# Patient Record
Sex: FEMALE | Race: WHITE | NOT HISPANIC OR LATINO | Employment: OTHER
[De-identification: names, ages, dates, MRNs, and addresses within clinical notes are randomized per-mention and may not be internally consistent; named-entity substitution may affect disease eponyms.]

---

## 2017-03-17 DIAGNOSIS — I10 HYPERTENSION GOAL BP (BLOOD PRESSURE) < 140/80: ICD-10-CM

## 2017-03-20 RX ORDER — TRIAMTERENE AND HYDROCHLOROTHIAZIDE 37.5; 25 MG/1; MG/1
CAPSULE ORAL
Qty: 90 CAPSULE | Refills: 2 | Status: SHIPPED | OUTPATIENT
Start: 2017-03-20 | End: 2017-10-11

## 2017-03-20 NOTE — TELEPHONE ENCOUNTER
TRIAMTERENE 37.5MG/ HCTZ 25MG CAPS    Last Written Prescription Date: 09/20/2016  Last Fill Quantity: 90, # refills: 1  Last Office Visit with [facility], [facility] or [facility] prescribing provider: 12/09/2016       Potassium   Date Value Ref Range Status   11/21/2016 4.5 3.4 - 5.3 mmol/L Final     Creatinine   Date Value Ref Range Status   11/21/2016 1.15 (H) 0.52 - 1.04 mg/dL Final     BP Readings from Last 3 Encounters:   12/09/16 119/82   11/21/16 128/70   08/25/16 118/64

## 2017-05-26 ENCOUNTER — HEALTH MAINTENANCE LETTER (OUTPATIENT)
Age: 62
End: 2017-05-26

## 2017-07-11 ENCOUNTER — TELEPHONE (OUTPATIENT)
Dept: FAMILY MEDICINE | Facility: CLINIC | Age: 62
End: 2017-07-11

## 2017-07-11 NOTE — LETTER
[facility]  [address]  [address]  [place] [facility] [address] [phone]                                                                                                           Suma Calabrese  [address]  [facility] [address]    July 11, 2017          Dear Suma Calabrese,      We care about your well-being!  In order to ensure we are providing the best quality care, we have reviewed your chart and see that you are due for:     __x___ Physical   _____ Pap Smear   _____ Mammogram   _____ Diabetes Followup   _____ Hypertension Followup   _____ Cholesterol Followup (Provider Appointment)   _____ Cholesterol Test (Lab-Only Appointment)   _____ Other:       We can assist you in scheduling these appointments at [phone].    If you have had (or plan to have) any of these tests done at a facility other than [facility] or a [facility], please have the results from these tests sent to your primary physician at [facility].         Sincerely,    Murali Kenney Jr MD

## 2017-07-11 NOTE — TELEPHONE ENCOUNTER
Panel Management Review      Patient has the following on her problem list:     Hypertension   Last three blood pressure readings:  BP Readings from Last 3 Encounters:   12/09/16 119/82   11/21/16 128/70   08/25/16 118/64     Blood pressure: Passed    HTN Guidelines:  Age 18-59 BP range:  Less than 140/90  Age 60-85 with Diabetes:  Less than 140/90  Age 60-85 without Diabetes:  less than 150/90      Composite cancer screening  Chart review shows that this patient is due/due soon for the following Pap Smear, Mammogram and Colonoscopy  Summary:    Patient is due/failing the following:   COLONOSCOPY, MAMMOGRAM and PAP    Action needed:   Patient needs office visit for ap. Mammo, colon.    Type of outreach:    Sent letter.    Questions for provider review:    None                                                                                                                                    Marley Lion CMA

## 2017-09-08 ENCOUNTER — TELEPHONE (OUTPATIENT)
Dept: FAMILY MEDICINE | Facility: CLINIC | Age: 62
End: 2017-09-08

## 2017-09-18 ENCOUNTER — OFFICE VISIT (OUTPATIENT)
Dept: FAMILY MEDICINE | Facility: CLINIC | Age: 62
End: 2017-09-18
Payer: COMMERCIAL

## 2017-09-18 ENCOUNTER — HOSPITAL ENCOUNTER (OUTPATIENT)
Dept: MAMMOGRAPHY | Facility: CLINIC | Age: 62
Discharge: HOME | End: 2017-09-18
Attending: FAMILY MEDICINE | Admitting: FAMILY MEDICINE
Payer: COMMERCIAL

## 2017-09-18 ENCOUNTER — HOSPITAL ENCOUNTER (OUTPATIENT)
Dept: MAMMOGRAPHY | Facility: CLINIC | Age: 62
End: 2017-09-18
Attending: FAMILY MEDICINE
Payer: COMMERCIAL

## 2017-09-18 VITALS
TEMPERATURE: 97.5 F | HEART RATE: 89 BPM | RESPIRATION RATE: 16 BRPM | WEIGHT: 121 LBS | OXYGEN SATURATION: 100 % | BODY MASS INDEX: 20.14 KG/M2 | SYSTOLIC BLOOD PRESSURE: 112 MMHG | DIASTOLIC BLOOD PRESSURE: 70 MMHG

## 2017-09-18 DIAGNOSIS — N63.10 MASS OF RIGHT BREAST: ICD-10-CM

## 2017-09-18 DIAGNOSIS — N63.10 MASS OF RIGHT BREAST: Primary | ICD-10-CM

## 2017-09-18 DIAGNOSIS — E03.4 HYPOTHYROIDISM DUE TO ACQUIRED ATROPHY OF THYROID: Chronic | ICD-10-CM

## 2017-09-18 PROCEDURE — 36415 COLL VENOUS BLD VENIPUNCTURE: CPT | Performed by: FAMILY MEDICINE

## 2017-09-18 PROCEDURE — 99214 OFFICE O/P EST MOD 30 MIN: CPT | Performed by: FAMILY MEDICINE

## 2017-09-18 PROCEDURE — G0204 DX MAMMO INCL CAD BI: HCPCS

## 2017-09-18 PROCEDURE — 84443 ASSAY THYROID STIM HORMONE: CPT | Performed by: FAMILY MEDICINE

## 2017-09-18 PROCEDURE — 76642 ULTRASOUND BREAST LIMITED: CPT | Mod: RT

## 2017-09-18 NOTE — LETTER
September 20, 2017      Suma Calabrese  [address]  [facility] [address]        Dear ,    We are writing to inform you of your test results.    Your test results fall within the expected range(s) or remain unchanged from previous results.  Please continue with current treatment plan.    NORMAL THYROID STIMULATING HORMONE TEST    Resulted Orders   TSH   Result Value Ref Range    TSH 1.21 0.40 - 4.00 mU/L       If you have any questions or concerns, please call the clinic at the number listed above.       Sincerely,        BEKAH KAUR MD

## 2017-09-18 NOTE — MR AVS SNAPSHOT
After Visit Summary   9/18/2017    Suma Calabrese    MRN: [id number]           Patient Information     Date Of Birth          1955        Visit Information        Provider Department      9/18/2017 9:30 AM Murali Kenney MD [facility]        Today's Diagnoses     Mass of right breast    -  1    Hypothyroidism due to acquired atrophy of thyroid          Care Instructions    (N63) Mass of right breast  (primary encounter diagnosis)  Comment:    Plan: BRE[facility] CENTER REFERRAL, [place] Diagnostic Digital         Bilateral  N=NEEDS DIAGNOSIS MAMMOGRAM BIOPSY OR ULTRASOUND AS SOON AS POSSIBLE            (E03.4) Hypothyroidism due to acquired atrophy of thyroid  Comment:    Plan: TSH             I STRONGLY RECOMMEND THAT YOU QUIT SMOKING OR USING ORAL TOBACCO    FIRST START SLOWLY CUTTING DOWN ON THE TIMES OF DAY THAT YOU SMOKE     BREATHER WHEN CRAVINGS COME    PUT CIGARETTES IN A JAR TO  REMIND YOU OF YOUR HABIT    TAKE THE MONEY YOU SAVE AND PLAN A VACATION OR GET AWAY    WRITE A BIG CHECK TO AN OPPOSING POLITICAL ORG ANIZATION    NEXT CONSIDER NICOTINE REPLACEMENT EITHER PATCHES 21MG IF YOU SMOKE PACK PER DAY    14 MG  IF YOU SMOKE  15 CIGARETTES PER DAY     7MG IF  SMOKE 10 CIGARETTES PER DAY     ORAL LOZENGES, OR GUM, OR INHALER ARE ALSO A CONSIDERATION  FOR CRAVINGS    E CIGARETTES HAVE NOT BEEN PROVEN, BUT MIGHT BE A CONSIDERATION BUT KEEP THEM AWAY FROM CHILDREN AND OTHERS     ZYBAN OR WELLBUTRIN 100MG SR OR 150MG PO TWICE DAILY  X 2 WEEKS    THEN SET A DATE TO QUIT    DON'T TAKE IF YOU HAVE HAD SEIZURES    LASTLY CONSIDER CHANTIX     SIDE EFFECTS INCLUDE ANOREXIA AND NAUSEA AND POSSIBLE WORSENING OF DEPRESSION  BUT  IT HAS THE HIGHEST QUIT RATE    SET A DATE TO QUIT IN TWO WEEKS    DEVELOP BEHAVIORS TO HELP FIGHT THE URGE    POSITIVE AFFIRMATIONS     RELY ON HIGHER POWER IF YOU HAVE ONE                      Follow-ups after your visit         Additional Services     BREAST CENTER REFERRAL       Your provider has referred you to: [facility]: [facility] Himanshu Mitchell [phone]   [email or web address]  NEWLY FOUND RIGHT BREAST MASS RIGHT UPPER QUADRANT BREAST MASS 1 CM IN DIAMETER   HISTORY OF RIGHT BREAST ISSUE IN PAST   SMOKER   ADOPTED   PLEASE TO DIAGNOSTIC MAMMOGRAM AS SOON AS POSSIBLE   ULTRASOUND AND BIOPSY IF NECESSARY     Please be aware that coverage of these services is subject to the terms and limitations of your health insurance plan.  Call member services at your health plan with any benefit or coverage questions.      Please bring the following with you to your appointment:    (1) Any X-Rays, CTs or MRIs which have been performed.  Contact the facility where they were done to arrange for  prior to your scheduled appointment.    (2) List of current medications   (3) This referral request   (4) Any documents/labs given to you for this referral                  Future tests that were ordered for you today     Open Future Orders        Priority Expected Expires Ordered    [facility] Diagnostic Digital Bilateral Routine  9/18/2018 9/18/2017            Who to contact     If you have questions or need follow up information about today's clinic visit or your schedule please contact [facility] directly at [phone].  Normal or non-critical lab and imaging results will be communicated to you by MyChart, letter or phone within 4 business days after the clinic has received the results. If you do not hear from us within 7 days, please contact the clinic through MyChart or phone. If you have a critical or abnormal lab result, we will notify you by phone as soon as possible.  Submit refill requests through [email or web address] or call your pharmacy and they will forward the refill request to us. Please allow 3 business days for your refill to be completed.          Additional  Information About Your Visit        [employer] Information     [employer] gives you secure access to your electronic health record. If you see a primary care provider, you can also send messages to your care team and make appointments. If you have questions, please call your primary care clinic.  If you do not have a primary care provider, please call [phone] and they will assist you.        Care EveryWhere ID     This is your Care EveryWhere ID. This could be used by other organizations to access your [facility] medical records  [id number]        Your Vitals Were     Pulse Temperature Respirations Last Period Pulse Oximetry BMI (Body Mass Index)    89 97.5  F (36.4  C) (Tympanic) 16 (LMP Unknown) 100% 20.14 kg/m2       Blood Pressure from Last 3 Encounters:   09/18/17 112/70   12/09/16 119/82   11/21/16 128/70    Weight from Last 3 Encounters:   09/18/17 121 lb (54.9 kg)   12/09/16 121 lb (54.9 kg)   11/21/16 121 lb (54.9 kg)              We Performed the Following     BREAST CENTER REFERRAL     [facility]        Primary Care Provider Office Phone # Fax #    Murali Aria Kenney [phone] [address] [facility] [address]        Equal Access to Services     KIANNA ROMERO : Hadii aad ku hadasho Soomaali, waaxda luqadaha, qaybta kaalmada adeegyada, waxay estiven moraes. So [facility] [phone].    ATENCIÓN: Si habla español, tiene a yepez disposición servicios gratuitos de asistencia lingüística. Llame al [phone].    We comply with applicable federal civil rights laws and [place] laws. We do not discriminate on the basis of race, color, national origin, age, disability sex, sexual orientation or gender identity.            Thank you!     Thank you for choosing [facility]  for your care. Our goal is always to provide you with excellent care. Hearing back from our patients is one way we can continue to improve our services. Please take a  few minutes to complete the written survey that you may receive in the mail after your visit with us. Thank you!             Your Updated Medication List - Protect others around you: Learn how to safely use, store and throw away your medicines at www.disposemymeds.org.          This list is accurate as of: 9/18/17 10:02 AM.  Always use your most recent med list.                   Brand Name Dispense Instructions for use Diagnosis    ADVIL 200 MG tablet   Generic drug:  ibuprofen      Take 200 mg by mouth every 4 hours as needed for mild pain        levothyroxine 75 MCG tablet    SYNTHROID/LEVOTHROID    90 tablet    Take 1 tablet (75 mcg) by mouth daily    Hypothyroidism due to acquired atrophy of thyroid       triamterene-hydrochlorothiazide 37.5-25 MG per capsule    DYAZIDE    90 capsule    TAKE ONE CAPSULE BY MOUTH DAILY    Hypertension goal BP (blood pressure) < 140/80

## 2017-09-18 NOTE — NURSING NOTE
"Chief Complaint   Patient presents with     Mass     right breast       Initial /70  Pulse 89  Temp 97.5  F (36.4  C) (Tympanic)  Resp 16  Wt 121 lb (54.9 kg)  LMP  (LMP Unknown)  SpO2 100%  BMI 20.14 kg/m2 Estimated body mass index is 20.14 kg/(m^2) as calculated from the following:    Height as of 12/9/16: 5' 5\" (1.651 m).    Weight as of this encounter: 121 lb (54.9 kg).  Medication Reconciliation: complete   Marley Lion CMA    "

## 2017-09-18 NOTE — PROGRESS NOTES
"  SUBJECTIVE:   Suma Calabrese is a 62 year old female who presents to clinic today for the following health issues:      lump      Duration: found a few days ago    Description (location/character/radiation): lump on right breast    Intensity:  none    Accompanying signs and symptoms: feels small lump, slightly tender    History (similar episodes/previous evaluation): yes    Precipitating or alleviating factors: None    Therapies tried and outcome: None    RIGHT UPPER INNER QUADRANT     WANTS TO GO TO BREAST CENTER IMMEDIATELY     1 CM LUMP    HISTORY OF SIMILAR ISSUES IN PAST     NEEDS COMPARISON    LAST MAMMOGRAM 2015     SMOKER     ADOPTED with UNKNOWN RISK FACTORS          LAST Friday NIGHT    THYROID NEEDS TO RECHECK ACQUIRED ON 77 MICROGRAMS DAILY    HYPERTENSION WITH GOAL OF LESS THAN 140/80 WELL CONTROLLED     HEMORRHOIDS OK OCCASIONAL BLEEDING     REFUSING COLONOSCOPY     SMOKING     TEMPOROMANDIBULAR JOINT IMPROVED     LDL OR \"BAD\" CHOLESTEROL  GOAL < 100     DIVERTICULOSIS WITH OCCASIONAL ISSUES     NEEDS ENOUGH FIBE IN DIET     BLADDER DILATION PROCEDURE IN PAST     WISDOM TEETH REMOVED    [facility] DENTAL REMOVAL  OF ALL TEETH   CALLED BREAST CENTER [facility]  BREAST CENTER   Address: [address] [place] [address]   Acc[facility]: [facility]   Phone:   [phone]    LEFT MESSAGE TO CALL BACK           .  Current Outpatient Prescriptions   Medication Sig Dispense Refill     triamterene-hydrochlorothiazide (DYAZIDE) 37.5-25 MG per capsule TAKE ONE CAPSULE BY MOUTH DAILY 90 capsule 2     levothyroxine (SYNTHROID/LEVOTHROID) 75 MCG tablet Take 1 tablet (75 mcg) by mouth daily 90 tablet 3     ibuprofen (ADVIL) 200 MG tablet Take 200 mg by mouth every 4 hours as needed for mild pain            Allergies   Allergen Reactions     Bupropion Other (See Comments)     Jittery and HA     Erythromycin GI Disturbance "       Immunization History   Administered Date(s) Administered     TD (ADULT, 7+) 2005     TDAP Vaccine (Adacel) 2015         reports that she drinks alcohol.      reports that she does not use illicit drugs.    family history includes Unknown/Adopted in her father and mother.    indicated that her mother is . She indicated that her father is . She indicated that her maternal grandmother is . She indicated that her maternal grandfather is . She indicated that her paternal grandmother is . She indicated that her paternal grandfather is . She indicated that her daughter is alive. She indicated that her son is alive.      has a past surgical history that includes bladder dilitation[ (age 17) and Dental surgery.     reports that she does not engage in sexual activity.  .  Pediatric History   Patient Guardian Status     Not on file.     Other Topics Concern     Not on file     Social History Narrative         reports that she has been smoking Cigarettes.  She has a 20.00 pack-year smoking history. She has never used smokeless tobacco.    Medical, social, surgical, and family histories reviewed.    Labs reviewed in EPIC  Patient Active Problem List   Diagnosis     Nontoxic uninodular goiter     Rectal prolapse     Hypertension goal BP (blood pressure) < 140/80     Internal hemorrhoids with other complication     Dysfunction of eustachian tube     Tobacco use disorder     TMJ (temporomandibular joint syndrome)     Hyperlipidemia LDL goal <100     Hypothyroidism due to acquired atrophy of thyroid     Diverticulitis of colon     Acute diverticulitis     Past Surgical History:   Procedure Laterality Date     bladder dilitation[  age 17     DENTAL SURGERY      wisdom tooth extraction       Social History   Substance Use Topics     Smoking status: Current Every Day Smoker     Packs/day: 0.50     Years: 40.00     Types: Cigarettes     Smokeless tobacco: Never Used       Comment: 3/4 PPD     Alcohol use 0.0 oz/week     0 Standard drinks or equivalent per week      Comment: weekends     Family History   Problem Relation Age of Onset     Unknown/Adopted Mother      Unknown/Adopted Father          Allergies   Allergen Reactions     Bupropion Other (See Comments)     Jittery and HA     Erythromycin GI Disturbance     Recent Labs   Lab Test  11/21/16   1653  08/23/16   1154  03/07/16   0857   05/05/15   1030  07/30/14   0814   05/23/11   1255   LDL   --    --   143*   --   99  138*   < >  98   HDL   --    --   83   --   73  87   < >  47   TRIG   --    --   46   --   45  49   < >  68   ALT   --    --   18   --   14   --    --   10   CR  1.15*  0.71  0.90   --   0.80  1.00   < >   --    GFRESTIMATED  48*  83  63   --   73  56*   < >   --    GFRESTBLACK  58*  >90  77   --   88  68   < >   --    POTASSIUM  4.5  3.2*  3.6   --   4.0  3.7   < >   --    TSH  0.23*  0.17*  49.11*   < >   --   96.93*   --    --     < > = values in this interval not displayed.        BP Readings from Last 6 Encounters:   09/18/17 112/70   12/09/16 119/82   11/21/16 128/70   08/25/16 118/64   08/23/16 98/68   08/20/16 110/60       Wt Readings from Last 3 Encounters:   09/18/17 121 lb (54.9 kg)   12/09/16 121 lb (54.9 kg)   11/21/16 121 lb (54.9 kg)         Positive symptoms or findings indicated by bold designation:     ROS: 10 point ROS neg other than the symptoms noted above in the HPI.except  has Nontoxic uninodular goiter; Rectal prolapse; Hypertension goal BP (blood pressure) < 140/80; Internal hemorrhoids with other complication; Dysfunction of eustachian tube; Tobacco use disorder; TMJ (temporomandibular joint syndrome); Hyperlipidemia LDL goal <100; Hypothyroidism due to acquired atrophy of thyroid; Diverticulitis of colon; and Acute diverticulitis on her problem list.   Constitutional: The patient denied fatigue, fever, insomnia, night sweats, recent illness and weight loss.  SLENDER FEMALE     Eyes: The  patient denied blindness, eye pain, eye tearing, photophobia, vision change and visual disturbance. NORMAL       Ears/Nose/Throat/Neck: The patient denied dizziness, facial pain, hearing loss, nasal discharge, oral pain, otalgia, postnasal drip, sinus congestion, sore throat, tinnitus and voice change.   EDENTULOUS     Cardiovascular: The patient denied arrhythmia, chest pain/pressure, claudication, edema, exercise intolerance, fatigue, orthopnea, palpitations and syncope.  N    Respiratory: The patient denied asthma, chest congestion, cough, dyspnea on exertion, dyspnea/shortness of breath, hemoptysis, pedal edema, pleuritic pain, productive sputum, snoring and wheezing. SMOKER with SOME EXERTIONAL DYSPNEA     NEW RIGHT BREAST LUMP     Gastrointestinal: The patient denied abdominal pain, anorexia, constipation, diarrhea, dysphagia, gastroesophageal reflux, hematochezia, hemorrhoids, melena, nausea and vomiting . NORMAL     Genitourinary/Nephrology: The patient denied breast complaint, dysuria, nocturia sexual dysfunction, t, urinary frequency, urinary incontinence, urinary urgency    NORMAL      Musculoskeletal: The patient denied arthralgia(s), back pain, joint complaint, muscle weakness, myalgias, osteoporosis, sciatica, stiffness and swelling.      Dermatoligic:: The patient denied acne, dermatitis, ecchymosis, itching, mole change, rash, skin cancer, skin lesion and sores.      Neurologic: The patient denied dizziness, gait abnormality, headache, memory loss, mental status change, paresis, paresthesia, seizure, syncope, tremor and vision change.     Psychiatric: The patient denied anxiety, depression, disturbances of memory, drug abuse, insomnia, mood swings and relationship difficulties.      Endocrine: The patient denied , goiter, obesity, polyuria and thyroid disease.  HYPOTHYROID AND EXTREMELY SLIMP     Hematologic/Lymphatic: The patient denied abnormal bleeding and bruising, abnormal ecchymoses, anemia,  lymph node enlargement/mass, petechiae and venous  Thrombosis.      Allergy/Immunology: The patient denied food allergy and  Allergic rhinitis or conjunctivitis.        PE:  /70  Pulse 89  Temp 97.5  F (36.4  C) (Tympanic)  Resp 16  Wt 121 lb (54.9 kg)  LMP  (LMP Unknown)  SpO2 100%  BMI 20.14 kg/m2 Body mass index is 20.14 kg/(m^2).    Constitutional: general appearance, well nourished, well developed, in no acute distress, well developed, appears stated age, normal body habitus,      Eyes:; The patient has normal eyelids sclerae and conjunctivae : NORMAL       Ears/Nose/Throat: external ear, overall: normal appearance; external nose, overall: benign appearance, normal moujth gums and lips  The patient has: NORMAL       Neck: thyroid, overall: normal size, normal consistency, nontender, N LK       Respiratory:  palpation of chest, overall: normal excursion, NORMAL    Clear to percussion and auscultation NORMAL     Tachypnea  NORMAL  Color  NL    Cardiovascular:  Good color with no peripheral edema    Regular sinus rhythm without murmur. Physiologic heart sounds Heart is unelarged  .   Chest/Breast: normal shape  SMALL BREAST LUMP RIGHT UPPER OUTER QUADRANT   2 CM FROM AREOLA   SOFT      Abdominal exam,  Liver and spleen are  unenlarged        Tenderness    Scars  WITHIN NORMAL LIMITS      Urogenital; no renal, flank or bladder  tenderness;  NORMAL      Lymphatic: neck nodes,  NORMAL     Other nodesNL       Musculoskeletal:  Brief ortho exam normal except:  N WITHIN NORMAL LIMITS      Integument: inspection of skin, no rash, lesions; and, palpation, no induration, no tenderness.  NORMAL      Neurologic mental status, overall: alert and oriented; gait, no ataxia, no unsteadiness; coordination, no tremors; cranial nerves, overall: normal motor, overall: normal bulk, tone.  NORMAL      Psychiatric: orientation/consciousness, overall: oriented to person, place and time; behavior/psychomotor activity, no  tics, normal psychomotor activity; mood and affect, overall: normal mood and affect; appearance, overall: well-groomed, good eye contact; speech, overall: normal quality, no aphasia and normal quality, quantity, intact.  N left     Diagnostic Test Results:  Results for orders placed or performed in visit on 11/21/16   TSH with free T4 reflex   Result Value Ref Range    TSH 0.23 (L) 0.40 - 5.00 mU/L   T4 free   Result Value Ref Range    T4 Free 1.19 0.70 - 1.85 ng/dL   Basic metabolic panel  (Ca, Cl, CO2, Creat, Gluc, K, Na, BUN)   Result Value Ref Range    Sodium 142 133 - 144 mmol/L    Potassium 4.5 3.4 - 5.3 mmol/L    Chloride 103 94 - 109 mmol/L    Carbon Dioxide 25 20 - 32 mmol/L    Anion Gap 13.8 3 - 14 mmol/L    Glucose 86 70 - 99 mg/dL    Urea Nitrogen 27 7 - 30 mg/dL    Creatinine 1.15 (H) 0.52 - 1.04 mg/dL    GFR Estimate 48 (L) >60 mL/min/1.7m2    GFR Estimate If Black 58 (L) >60 mL/min/1.7m2    Calcium 9.1 8.5 - 10.4 mg/dL         ICD-10-CM    1. Mass of right breast N63 BREAST CENTER REFERRAL     [facility] Diagnostic Digital Bilateral   2. Hypothyroidism due to acquired atrophy of thyroid E03.4 TSH        .    Side effects benefits and risks thoroughly discussed. .she may come in early if unimproved or getting worse          Importance of adhering to regimen discussed and if medications were dispensed, the importance of taking medications discussed and bringing in the medications after every visit for chronic problems         Please drink 2 glasses of water prior to meals and walk 15-30 minutes after meals    I spent  25 MINUTES SPENT  with patient discussing the following issues    The primary encounter diagnosis was Mass of right breast. A diagnosis of Hypothyroidism due to acquired atrophy of thyroid was also pertinent to this visit. over half of which involved counseling and coordination of care.    Patient Instructions   (N63) Mass of right breast  (primary encounter diagnosis)  Comment:    Plan: BREAST  CENTER REFERRAL, [facility] Diagnostic Digital         Bilateral  N=NEEDS DIAGNOSIS MAMMOGRAM BIOPSY OR ULTRASOUND AS SOON AS POSSIBLE            (E03.4) Hypothyroidism due to acquired atrophy of thyroid  Comment:    Plan: TSH             I STRONGLY RECOMMEND THAT YOU QUIT SMOKING OR USING ORAL TOBACCO    FIRST START SLOWLY CUTTING DOWN ON THE TIMES OF DAY THAT YOU SMOKE     BREATHER WHEN CRAVINGS COME    PUT CIGARETTES IN A JAR TO  REMIND YOU OF YOUR HABIT    TAKE THE MONEY YOU SAVE AND PLAN A VACATION OR GET AWAY    WRITE A BIG CHECK TO AN OPPOSING POLITICAL ORG ANIZATION    NEXT CONSIDER NICOTINE REPLACEMENT EITHER PATCHES 21MG IF YOU SMOKE PACK PER DAY    14 MG  IF YOU SMOKE  15 CIGARETTES PER DAY     7MG IF  SMOKE 10 CIGARETTES PER DAY     ORAL LOZENGES, OR GUM, OR INHALER ARE ALSO A CONSIDERATION  FOR CRAVINGS    E CIGARETTES HAVE NOT BEEN PROVEN, BUT MIGHT BE A CONSIDERATION BUT KEEP THEM AWAY FROM CHILDREN AND OTHERS     ZYBAN OR WELLBUTRIN 100MG SR OR 150MG PO TWICE DAILY  X 2 WEEKS    THEN SET A DATE TO QUIT    DON'T TAKE IF YOU HAVE HAD SEIZURES    LASTLY CONSIDER CHANTIX     SIDE EFFECTS INCLUDE ANOREXIA AND NAUSEA AND POSSIBLE WORSENING OF DEPRESSION  BUT  IT HAS THE HIGHEST QUIT RATE    SET A DATE TO QUIT IN TWO WEEKS    DEVELOP BEHAVIORS TO HELP FIGHT THE URGE    POSITIVE AFFIRMATIONS     RELY ON HIGHER POWER IF YOU HAVE ONE                    ALL THE ABOVE PROBLEMS ARE STABLE AND MED CHANGES AS NOTED    Diet:  Mediterranean diet recommended with high fibe    Exercise:  regual recommended walking   Exercises Range of motion, balance, isometric, and strengthening exercises 30 repetitions twice daily of involved joints      .BEKAH KAUR MD 9/18/2017 10:07 AM  September 18, 2017

## 2017-09-18 NOTE — PATIENT INSTRUCTIONS
(N63) Mass of right breast  (primary encounter diagnosis)  Comment:    Plan: [facility] REFERRAL, [place] [facility]         Bilateral  N=NEEDS DIAGNOSIS MAMMOGRAM BIOPSY OR ULTRASOUND AS SOON AS POSSIBLE            (E03.4) Hypothyroidism due to acquired atrophy of thyroid  Comment:    Plan: TSH             I STRONGLY RECOMMEND THAT YOU QUIT SMOKING OR USING ORAL TOBACCO    FIRST START SLOWLY CUTTING DOWN ON THE TIMES OF DAY THAT YOU SMOKE     BREATHER WHEN CRAVINGS COME    PUT CIGARETTES IN A JAR TO  REMIND YOU OF YOUR HABIT    TAKE THE MONEY YOU SAVE AND PLAN A VACATION OR GET AWAY    WRITE A BIG CHECK TO AN OPPOSING POLITICAL ORG ANIZATION    NEXT CONSIDER NICOTINE REPLACEMENT EITHER PATCHES 21MG IF YOU SMOKE PACK PER DAY    14 MG  IF YOU SMOKE  15 CIGARETTES PER DAY     7MG IF  SMOKE 10 CIGARETTES PER DAY     ORAL LOZENGES, OR GUM, OR INHALER ARE ALSO A CONSIDERATION  FOR CRAVINGS    E CIGARETTES HAVE NOT BEEN PROVEN, BUT MIGHT BE A CONSIDERATION BUT KEEP THEM AWAY FROM CHILDREN AND OTHERS     ZYBAN OR WELLBUTRIN 100MG SR OR 150MG PO TWICE DAILY  X 2 WEEKS    THEN SET A DATE TO QUIT    DON'T TAKE IF YOU HAVE HAD SEIZURES    LASTLY CONSIDER CHANTIX     SIDE EFFECTS INCLUDE ANOREXIA AND NAUSEA AND POSSIBLE WORSENING OF DEPRESSION  BUT  IT HAS THE HIGHEST QUIT RATE    SET A DATE TO QUIT IN TWO WEEKS    DEVELOP BEHAVIORS TO HELP FIGHT THE URGE    POSITIVE AFFIRMATIONS     RELY ON HIGHER POWER IF YOU HAVE ONE

## 2017-09-19 LAB — TSH SERPL DL<=0.005 MIU/L-ACNC: 1.21 MU/L (ref 0.4–4)

## 2017-09-21 ENCOUNTER — HOSPITAL ENCOUNTER (OUTPATIENT)
Dept: MAMMOGRAPHY | Facility: CLINIC | Age: 62
End: 2017-09-21
Attending: FAMILY MEDICINE
Payer: COMMERCIAL

## 2017-09-21 ENCOUNTER — HOSPITAL ENCOUNTER (OUTPATIENT)
Dept: MAMMOGRAPHY | Facility: CLINIC | Age: 62
Discharge: HOME | End: 2017-09-21
Attending: FAMILY MEDICINE | Admitting: FAMILY MEDICINE
Payer: COMMERCIAL

## 2017-09-21 DIAGNOSIS — N63.10 MASS OF RIGHT BREAST: ICD-10-CM

## 2017-09-21 PROCEDURE — 88360 TUMOR IMMUNOHISTOCHEM/MANUAL: CPT | Performed by: FAMILY MEDICINE

## 2017-09-21 PROCEDURE — 88305 TISSUE EXAM BY PATHOLOGIST: CPT | Performed by: FAMILY MEDICINE

## 2017-09-21 PROCEDURE — 88342 IMHCHEM/IMCYTCHM 1ST ANTB: CPT | Mod: 26 | Performed by: FAMILY MEDICINE

## 2017-09-21 PROCEDURE — 88342 IMHCHEM/IMCYTCHM 1ST ANTB: CPT | Performed by: FAMILY MEDICINE

## 2017-09-21 PROCEDURE — 88377 M/PHMTRC ALYS ISHQUANT/SEMIQ: CPT | Performed by: PATHOLOGY

## 2017-09-21 PROCEDURE — 25000125 ZZHC RX 250: Performed by: FAMILY MEDICINE

## 2017-09-21 PROCEDURE — 88360 TUMOR IMMUNOHISTOCHEM/MANUAL: CPT | Mod: 26 | Performed by: FAMILY MEDICINE

## 2017-09-21 PROCEDURE — 00000158 ZZHCL STATISTIC H-FISH PROCESS B/S: Performed by: FAMILY MEDICINE

## 2017-09-21 PROCEDURE — 88305 TISSUE EXAM BY PATHOLOGIST: CPT | Mod: 26 | Performed by: FAMILY MEDICINE

## 2017-09-21 PROCEDURE — 40000986 MA POST PROCEDURE RIGHT

## 2017-09-21 PROCEDURE — 88360 TUMOR IMMUNOHISTOCHEM/MANUAL: CPT | Mod: 26,59 | Performed by: FAMILY MEDICINE

## 2017-09-21 PROCEDURE — 00000159 ZZHCL STATISTIC H-SEND OUTS PREP: Performed by: FAMILY MEDICINE

## 2017-09-21 PROCEDURE — 27210206 US BREAST BIOPSY CORE NEEDLE RIGHT

## 2017-09-21 RX ADMIN — LIDOCAINE HYDROCHLORIDE 5 ML: 10 INJECTION, SOLUTION INFILTRATION; PERINEURAL at 09:03

## 2017-09-21 NOTE — LETTER
September 27, 2017      Suma Calabrese  [address] [facility] [address]        Dear ,    We are writing to inform you of your test results.        Resulted Orders   US Breast Biopsy Core Needle Right    Addendum: 9/26/2017    Suma Calabrese  Accession # [id number]    Addendum: The pathology diagnosis is invasive lobular carcinoma.  Surgical consultation and breast MRI are recommended. The patient was  notified of results and recommendations by the Breast Center nurse  navigator.    Lois Feliciano MD, (Date of Addendum: 9/26/2017).    LOIS FELICIANO MD      Narrative    ULTRASOUND-GUIDED RIGHT BREAST CORE BIOPSY;  CLIP PLACEMENT;   POSTBIOPSY UNILATERAL DIGITAL MAMMOGRAM - 9/21/2017 9:07 AM    HISTORY: 1.1 cm mass at the 11 o'clock position 3 cm from the nipple.    PROCEDURE: Following discussion of risks and benefits, consent was  obtained from the patient. 5 mL 1% lidocaine without epinephrine was  infiltrated for local anesthetic and a skin nick was made through  which a 13-gauge trocar was introduced and its tip was placed adjacent  to the mass. A series of 3 samples were obtained with a 14-gauge  core-cutting needle. A metal tissue marker was then deployed to nannette  the lesion.    Estimated blood loss was less than 3 mL. The post biopsy mammogram  showed satisfactory positioning of the marker. She tolerated the  procedure without difficulty. There was no significant post- procedure  pain or complication.     LOIS FELICIANO MD   Surgical pathology exam   Result Value Ref Range    Copath Report       Patient Name: SUMA CALABRESE  MR#: [id number]  Specimen #: [id number]  Collected: 9/21/2017  Received: 9/21/2017  Reported: 9/24/2017 13:41  Ordering Phy(s): BEKAH KAUR  Additional Phy(s): LOIS FELICIANO    For improved result formatting, select 'View Enhanced Report Format'  under Linked Documents section.    SPECIMEN(S):  Right ultrasound guided breast needle biopsy, 11:00,  3cm from nipple    FINAL DIAGNOSIS:  Breast, right, 11:00, 3 cm FN, ultrasound-guided core needle biopsy  - Invasive lobular carcinoma, [place] grade 2 of 3  - Positive for estrogen and progesterone receptors (see comment)    COMMENT:  FISH studies for HER2 have been ordered and the results will be reported  separately.    IMMUNOHISTOCHEMICAL ANALYSIS FOR ESTROGEN AND PROGESTERONE RECEPTORS    RESULTS:    ESTROGEN RECEPTORS:    Positive     (>95% of tumor cell nuclei stain positive)  Intensity of staining: strong    PROGESTERONE RECEPTORS:    Positive    (25% of tumor cell nuclei stain posi tive)  Intensity of staining: moderate    Performed on paraffin block:  1    External and internal controls stain appropriately.  Standard assay conditions may have been met.  Assay Conditions:  Fixative and processing:  10% neutral buffered formalin, paraffin  embedded.  Cold ischemia time less than one hour.  Time of specimen collection  9:07.  Time placed in formalin: not recorded (received in lab at 11:05  in formalin).  Total duration of formalin fixation greater than 6 hours and less than  72 hours.  Staining method used:  [place] predilute monoclonal antibodies, estrogen  receptor clone SP1 and progesterone receptor clone 1E2, antigen heat  retrieval in EDTA alkaline pH for 60 minutes, [place] ultraview  detection system and automatic immunostainer.    Scoring system:  The ASCO-CAP scoring guidelines are used.  A positive  test is defined as positive nuclei staining in greater than or equal to  1% of tumor cells.  A negative test is defined as nuclear staining in  less than 1% o f tumor cells.  For positive tests, an estimation of  intensity of nuclear staining over the entire tumor on tissue sections  is also provided.    Reference:  NEVAEH Bhatia, JUAREZ Simeon, SHERITA Farooq, et al.  American Society  of Clinical Oncology/College of American Pathologists guideline  recommendations for immunohistochemical testing of estrogen  "and  progesterone receptors in breast cancer, Arch Pathol Lab Med.  2010;134:907-922    Electronically signed out by:    Judy Carranza M.D.    CLINICAL HISTORY:  62-year-old, right breast, 11 o'clock, 3 cm from nipple, 1.1 cm in size,  high suspicion    GROSS:  The specimen is received in formalin with the patient's name and proper  identification labeled \"ultrasound guided right breast biopsy 11:00, 3  cm from nipple, 1.1 cm in size\".  The specimen consists of four similar  appearing yellow-pink fibrofatty breast tissue core fragments measuring  3.5 x 0.1 x 0.1 cm in aggregate.  The specimen is entirely submitted in  one cassette. (Dictated by:  Mike Capone 9/21/2017 01:38 PM)    MICROSCOPIC:  Sections show cores breast parenchyma involved by invasive carcinoma  characterized by cells that predominantly form a single filing  arrangement with rare small nests. The [place] score is 6 of 9 and  the Aarti grade is 2 of 3 for poor tubule formation, intermediate  nuclear grade, and a low mitotic rate. Lymphovascular invasion is not  identified. Invasive carcinoma extends 0.8 cm in this sample. A minute  focus of in situ lobular carcinoma is identified, associated with  microcalcification. Immunohistochemical stains for E-cadherin show that  the lesional cells are negative, supporting the morphologic impression  of a lobular phenotype.    CPT Codes:  A: 95247-QI5, 07546-MU, 32639-MI, HFISH, SOH, 31282-NNG    TESTING LAB LOCATION:  [address]  [id number] [phone]    COLLECTION SITE:  Client: [facility]  Location: [facility] (S)         If you have any questions or concerns, please call the clinic at the number listed above.       Sincerely,         Breast Radiologist                "

## 2017-09-21 NOTE — LETTER
September 26, 2017      Suma Calabrese  [address]  [facility] [address]        Dear ,    We are writing to inform you of your test results.    Please make an appointment with your provider to review or follow up on your test results.  Appointments can be made by calling [phone].    Resulted Orders   Surgical pathology exam   Result Value Ref Range    Copath Report       Patient Name: SUMA CALABRESE  MR#: [id number]  Specimen #: [id number]  Collected: 9/21/2017  Received: 9/21/2017  Reported: 9/24/2017 13:41  Ordering Phy(s): BEKAH KAUR  Additional Phy(s): VLAD FELICIANO    For improved result formatting, select 'View Enhanced Report Format'  under Linked Documents section.    SPECIMEN(S):  Right ultrasound guided breast needle biopsy, 11:00, 3cm from nipple    FINAL DIAGNOSIS:  Breast, right, 11:00, 3 cm FN, ultrasound-guided core needle biopsy  - Invasive lobular carcinoma, [place] grade 2 of 3  - Positive for estrogen and progesterone receptors (see comment)    COMMENT:  FISH studies for HER2 have been ordered and the results will be reported  separately.    IMMUNOHISTOCHEMICAL ANALYSIS FOR ESTROGEN AND PROGESTERONE RECEPTORS    RESULTS:    ESTROGEN RECEPTORS:    Positive     (>95% of tumor cell nuclei stain positive)  Intensity of staining: strong    PROGESTERONE RECEPTORS:    Positive    (25% of tumor cell nuclei stain posi tive)  Intensity of staining: moderate    Performed on paraffin block:  1    External and internal controls stain appropriately.  Standard assay conditions may have been met.  Assay Conditions:  Fixative and processing:  10% neutral buffered formalin, paraffin  embedded.  Cold ischemia time less than one hour.  Time of specimen collection  9:07.  Time placed in formalin: not recorded (received in lab at 11:05  in formalin).  Total duration of formalin fixation greater than 6 hours and less than  72 hours.  Staining method used:  [place]  "predilute monoclonal antibodies, estrogen  receptor clone SP1 and progesterone receptor clone 1E2, antigen heat  retrieval in EDTA alkaline pH for 60 minutes, [place] ultraview  detection system and automatic immunostainer.    Scoring system:  The ASCO-CAP scoring guidelines are used.  A positive  test is defined as positive nuclei staining in greater than or equal to  1% of tumor cells.  A negative test is defined as nuclear staining in  less than 1% o f tumor cells.  For positive tests, an estimation of  intensity of nuclear staining over the entire tumor on tissue sections  is also provided.    Reference:  NEVAEH Bhatia, JUAREZ Simeon, SHERITA Farooq, et al.  American Society  of Clinical Oncology/College of American Pathologists guideline  recommendations for immunohistochemical testing of estrogen and  progesterone receptors in breast cancer, Arch Pathol Lab Med.  2010;134:907-922    Electronically signed out by:    Judy Carranza M.D.    CLINICAL HISTORY:  62-year-old, right breast, 11 o'clock, 3 cm from nipple, 1.1 cm in size,  high suspicion    GROSS:  The specimen is received in formalin with the patient's name and proper  identification labeled \"ultrasound guided right breast biopsy 11:00, 3  cm from nipple, 1.1 cm in size\".  The specimen consists of four similar  appearing yellow-pink fibrofatty breast tissue core fragments measuring  3.5 x 0.1 x 0.1 cm in aggregate.  The specimen is entirely submitted in  one cassette. (Dictated by:  Mike Capone 9/21/2017 01:38 PM)    MICROSCOPIC:  Sections show cores breast parenchyma involved by invasive carcinoma  characterized by cells that predominantly form a single filing  arrangement with rare small nests. The [place] score is 6 of 9 and  the Aarti grade is 2 of 3 for poor tubule formation, intermediate  nuclear grade, and a low mitotic rate. Lymphovascular invasion is not  identified. Invasive carcinoma extends 0.8 cm in this sample. A minute  focus of in situ " lobular carcinoma is identified, associated with  microcalcification. Immunohistochemical stains for E-cadherin show that  the lesional cells are negative, supporting the morphologic impression  of a lobular phenotype.    CPT Codes:  A: 66535-UV3, 47520-FE, 89022-MQ, HFISH, SOH, 07968-WWT    TESTING LAB LOCATION:  [address]  [id number] [phone]    COLLECTION SITE:  Client: [facility]  Location: [facility] (S)         If you have any questions or concerns, please call the clinic at the number listed above.       Sincerely,    Dr. Murali Kenney

## 2017-09-21 NOTE — DISCHARGE INSTRUCTIONS

## 2017-09-22 NOTE — PROGRESS NOTES
ULTRASOUND-GUIDED RIGHT BREAST CORE BIOPSY;  CLIP PLACEMENT;   POSTBIOPSY UNILATERAL DIGITAL MAMMOGRAM - 9/21/2017 9:07 AM    HISTORY: 1.1 cm mass at the 11 o'clock position 3 cm from the nipple.    PROCEDURE: Following discussion of risks and benefits, consent was  obtained from the patient. 5 mL 1% lidocaine without epinephrine was  infiltrated for local anesthetic and a skin nick was made through  which a 13-gauge trocar was introduced and its tip was placed adjacent  to the mass. A series of 3 samples were obtained with a 14-gauge  core-cutting needle. A metal tissue marker was then deployed to nannette  the lesion.    Estimated blood loss was less than 3 mL. The post biopsy mammogram  showed satisfactory positioning of the marker. She tolerated the  procedure without difficulty. There was no significant post- procedure  pain or complication.     VLAD FELICIANO MD

## 2017-09-24 LAB — COPATH REPORT: NORMAL

## 2017-09-25 ENCOUNTER — MYC MEDICAL ADVICE (OUTPATIENT)
Dept: FAMILY MEDICINE | Facility: CLINIC | Age: 62
End: 2017-09-25

## 2017-09-25 ENCOUNTER — TELEPHONE (OUTPATIENT)
Dept: MAMMOGRAPHY | Facility: CLINIC | Age: 62
End: 2017-09-25

## 2017-09-25 LAB — COPATH REPORT: NORMAL

## 2017-09-25 NOTE — TELEPHONE ENCOUNTER
MALIGNANT path  Pathology report reviewed with breast radiologist Faraz.  I phoned and informed patient of results showing invasive lobular carcinoma. Patient states she did speak with Dr Kenney 9-24-17.    Patient states no problems with biopsy site.  Recommended follow up is surgical consult and possible MRI.    [facility] have been notified and will contact patient.     Patient has directions and phone numbers.  Questions were answered and I explained my role as Nurse Navigator in assisting her with appointments, resources and social support. New diagnosis information packet will be mailed. I will follow up with the patient. She has my phone number if she has further questions. Ordering provider notified.

## 2017-09-28 ENCOUNTER — OFFICE VISIT (OUTPATIENT)
Dept: SURGERY | Facility: CLINIC | Age: 62
End: 2017-09-28
Payer: COMMERCIAL

## 2017-09-28 VITALS
HEART RATE: 82 BPM | SYSTOLIC BLOOD PRESSURE: 114 MMHG | HEIGHT: 65 IN | WEIGHT: 121 LBS | BODY MASS INDEX: 20.16 KG/M2 | DIASTOLIC BLOOD PRESSURE: 72 MMHG

## 2017-09-28 DIAGNOSIS — C50.911 MALIGNANT NEOPLASM OF RIGHT FEMALE BREAST, UNSPECIFIED ESTROGEN RECEPTOR STATUS, UNSPECIFIED SITE OF BREAST (H): Primary | ICD-10-CM

## 2017-09-28 PROCEDURE — 99205 OFFICE O/P NEW HI 60 MIN: CPT | Performed by: SURGERY

## 2017-09-28 NOTE — LETTER
"    [facility]  Surgery Consultation    Re: Suma Calabrese-  1955     HPI: Patient is a 62 year old female who is here for consultation requested by Murali Kenney [phone] for right breast cancer The lesion was discovered on the patients recent mammogram after she felt a palpable nodule at the 11:00 position. An ultrasound revealed a 0.9 x 1.1 x 0.8 cm mass. The final pathology showed invasive lobular ER/IL positive. HER-2/selena negative. The patients last mammogram was in 2016. She has never had an abnormality or biopsy in the past. Her menarche was at age 10. Patients last menstrual cycle was in 52. She denies taking hormone replacement in the past. She has had 2 pregnancies and has 2 living children. Her first pregnancy was at age 27. Her family history is significant for unknown as she is adopted. She denies all other complaints today. Patient denies fevers, chills, nausea, vomiting, SOB, chest pain, abdominal pain.     PMH:  Suma Calabrese  has a past medical history of Hypertension and Pure hypercholesterolemia (2013).  PSH:  Suma Calabrese  has a past surgical history that includes bladder dilitation[ (age 17) and Dental surgery.  Social History:  Suma Calabrese  reports that she has been smoking Cigarettes.  She has a 20.00 pack-year smoking history. She has never used smokeless tobacco. She reports that she drinks alcohol. She reports that she does not use illicit drugs.  Family History:  Suma Calabrese family history includes Unknown/Adopted in her father and mother.  Medications/Allergies: Home medications and allergies reviewed.     ROS:  The 10 point Review of Systems is negative other than noted in the HPI.     Physical Exam:  /72  Pulse 82  Ht 5' 5\" (1.651 m)  Wt 121 lb (54.9 kg)  LMP  (LMP Unknown)  BMI 20.14 kg/m2  GENERAL: Generally appears well.  Psych: Alert and Oriented.  Normal affect  Eyes: Sclera " clear  Respiratory:  Lungs clear to ausculation bilaterally with good air excursion  Cardiovascular:  Regular Rate and Rhythm with no murmurs gallops or rubs, normal peripheral pulses  Breast: A bilateral breast exam was performed in the sitting and supine position. The hands were placed at this side and above the head. Bilateral breasts were palpated in a circumferential clockwise fashion including the supraclavicular and axillary areas. Right breast-mass palpated at 11 o clock position. Measures approximately 1 cm and is freely mobile. No axillary adenopathy. Left breast-no masses palpated.  No axillary adenopathy.  GI: Abdomen Soft Non-Tender   Lymphatic/Hematologic/Immune:  No femoral or cervical lymphadenopathy.  Integumentary:  No rashes  Neurological: grossly intact     All new lab and imaging data was reviewed.      Impression and Plan:  Patient is a 62 year old female with right breast invasive lobular carcinoma     PLAN:   I spent over 60 minutes discussing the pathophysiology of breast cancer and surgical options. She has a 1.1 cm invasive lobular carcinoma. ER/[place] positive and HER-2 negative. I will set her up for an MRI given the lobular nature. We also discussed surgical options including breast conservative therapy with a localized lumpectomy and sentinel lymph node biopsy versus simple mastectomy and sentinel lymph node biopsy. I explained all the procedures in great depth and described the postoperative course which could include radiation if she chose breast conserving therapy and possible chemotherapy or hormonal therapy depending on the final pathology report. We also discussed recurrence with lumpectomy versus mastectomy which would be approximately 10-12% versus 1-2%. She currently smokes approximately 1 pack a day so would likely not be a great candidate for reconstruction. She is also interested in having the least amount of surgery possible. She is leaning towards lumpectomy with sentinel  node but will make her final determination after the MRI.     I also discussed the risks, benefits, complications, including but not limited to, bleeding, infection, wound breakdown or skin necrosis, lymphedema, need for re excision, as well as postoperative MI, PE, and other anesthetic complications. The patient thoroughly understood the conversation and asked appropriate questions. She will sign consent and would like to proceed with surgery in the very near future.        Thank you very much for this consult.     Van Salazar M.D.  [facility]  [phone]

## 2017-09-28 NOTE — PROGRESS NOTES
"[facility]  Surgery Consultation    HPI: Patient is a 62 year old female who is here for consultation requested by Murali Kenney [phone] for right breast cancer The lesion was discovered on the patients recent mammogram after she felt a palpable nodule at the 11:00 position. An ultrasound revealed a 0.9 x 1.1 x 0.8 cm mass. The final pathology showed invasive lobular ER/IA positive. HER-2/selena negative. The patients last mammogram was in 2016. She has never had an abnormality or biopsy in the past. Her menarche was at age 10. Patients last menstrual cycle was in 52. She denies taking hormone replacement in the past. She has had 2 pregnancies and has 2 living children. Her first pregnancy was at age 27. Her family history is significant for unknown as she is adopted. She denies all other complaints today. Patient denies fevers, chills, nausea, vomiting, SOB, chest pain, abdominal pain.    PMH:  Suma Calabrese  has a past medical history of Hypertension and Pure hypercholesterolemia (1/28/2013).  PSH:  Suma Calabrese  has a past surgical history that includes bladder dilitation[ (age 17) and Dental surgery.  Social History:  Suma Calabrese  reports that she has been smoking Cigarettes.  She has a 20.00 pack-year smoking history. She has never used smokeless tobacco. She reports that she drinks alcohol. She reports that she does not use illicit drugs.  Family History:  Suma Calabrese family history includes Unknown/Adopted in her father and mother.  Medications/Allergies: Home medications and allergies reviewed.    ROS:  The 10 point Review of Systems is negative other than noted in the HPI.    Physical Exam:  /72  Pulse 82  Ht 5' 5\" (1.651 m)  Wt 121 lb (54.9 kg)  LMP  (LMP Unknown)  BMI 20.14 kg/m2  GENERAL: Generally appears well.  Psych: Alert and Oriented.  Normal affect  Eyes: Sclera clear  Respiratory:  Lungs clear to ausculation bilaterally with good air " excursion  Cardiovascular:  Regular Rate and Rhythm with no murmurs gallops or rubs, normal peripheral pulses  Breast: A bilateral breast exam was performed in the sitting and supine position. The hands were placed at this side and above the head. Bilateral breasts were palpated in a circumferential clockwise fashion including the supraclavicular and axillary areas. Right breast-mass palpated at 11 o clock position. Measures approximately 1 cm and is freely mobile. No axillary adenopathy. Left breast-no masses palpated.  No axillary adenopathy.  GI: Abdomen Soft Non-Tender   Lymphatic/Hematologic/Immune:  No femoral or cervical lymphadenopathy.  Integumentary:  No rashes  Neurological: grossly intact    All new lab and imaging data was reviewed.     Impression and Plan:  Patient is a 62 year old female with right breast invasive lobular carcinoma    PLAN:   I spent over 60 minutes discussing the pathophysiology of breast cancer and surgical options. She has a 1.1 cm invasive lobular carcinoma. ER/[place] positive and HER-2 negative. I will set her up for an MRI given the lobular nature. We also discussed surgical options including breast conservative therapy with a localized lumpectomy and sentinel lymph node biopsy versus simple mastectomy and sentinel lymph node biopsy. I explained all the procedures in great depth and described the postoperative course which could include radiation if she chose breast conserving therapy and possible chemotherapy or hormonal therapy depending on the final pathology report. We also discussed recurrence with lumpectomy versus mastectomy which would be approximately 10-12% versus 1-2%. She currently smokes approximately 1 pack a day so would likely not be a great candidate for reconstruction. She is also interested in having the least amount of surgery possible. She is leaning towards lumpectomy with sentinel node but will make her final determination after the MRI.    I also discussed  the risks, benefits, complications, including but not limited to, bleeding, infection, wound breakdown or skin necrosis, lymphedema, need for re excision, as well as postoperative MI, PE, and other anesthetic complications. The patient thoroughly understood the conversation and asked appropriate questions. She will sign consent and would like to proceed with surgery in the very near future.      Thank you very much for this consult.    Van Salazar M.D.  [facility]  [phone]    Please route or send letter to:  Primary Care Provider (PCP) and Referring Provider

## 2017-09-28 NOTE — NURSING NOTE
Breast Patients    BREAST PATIENTS (ALL)    1-Do you have any of the following symptoms? Lump(s) or Mass(es)  2-In which breast are you having the symptoms? right  3-Do you use hormones?  No  4-Have you had a Mammogram? Yes  Where: [facility]  Date: 9/2017  5-Have you ever had a breast cyst drained? No  6-Have you ever had a breast biopsy? Yes  Side: [facility]  Date: 9/2017  7-Have you ever had a Breast Cancer? No   8-Is there a history of Breast Cancer in your family? Adopted  9-Have you ever had Ovarian Cancer? No  10-Is there a history of Ovarian Cancer in your family? Adopted  11-Summarize your caffeine intake (i.e. coffee, tea, chocolate, soda etc.): 1 cup daily    BREAST PATIENTS (FEMALE)    12-What age did your periods begin? 10  13-Date your last menstrual period began? 52  14-Number of full-term pregnancies: 2  15-Your age when your first child was born? 27  16-Did you nurse your children? Yes  17-Are you pregnant now? No  18-Have you begun menopause? N/A  19-Have you had either ovary removed?No  20-Do you have breast implants? No

## 2017-09-28 NOTE — MR AVS SNAPSHOT
After Visit Summary   9/28/2017    Suma Calabrese    MRN: [id number]           Patient Information     Date Of Birth          1955        Visit Information        Provider Department      9/28/2017 2:20 PM Van Salazar MD [facility] General Surgery      Today's Diagnoses     Malignant neoplasm of right female breast, unspecified estrogen receptor status, unspecified site of breast (H)    -  1      Care Instructions    Your breast MRI is scheduled for 9/29/17 4:00 pm at [facility]          Follow-ups after your visit        Your next 10 appointments already scheduled     Sep 29, 2017  4:00 PM CDT   MR BREAST BILATERAL W/O & W CONTRAST with SHMRP1   [facility] MRI ([facility])    [address] [place] [id number] [phone]           Take your medicines as usual, unless your doctor tells you not to. Bring a list of your current medicines to your exam (including vitamins, minerals and over-the-counter drugs).  The timing of your exam may depend on the start of your last period. If you re in menopause, you may have the exam anytime.  Please bring any previous mammograms or breast MRIs from other facilities to the MRI dept. Do not mail these items to us.  You will have contrast for this exam. To prepare:   The day before your exam, drink extra fluids at least six 8-ounce glasses (unless your doctor tells you to restrict your fluids).   Have a blood test (creatinine test) within 30 days of your exam. Go to your clinic or Diagnostic Imaging Department for this test.  The MRI machine uses a strong magnet. Please wear clothes without metal (snaps, zippers). A sweatsuit works well, or we may give you a hospital gown.  Please remove any body piercings and hair extensions before you arrive. You will also remove watches, jewelry, hairpins, wallets, dentures, partial dental plates  and hearing aids. You may wear contact lenses, and you may be able to wear your rings. We have a safe place to keep your personal items, but it is safer to leave them at home.   **IMPORTANT** THE INSTRUCTIONS BELOW ARE ONLY FOR THOSE PATIENTS WHO HAVE BEEN TOLD THEY WILL RECEIVE SEDATION OR GENERAL ANESTHESIA DURING THEIR MRI PROCEDURE:  IF YOU WILL RECEIVE SEDATION (take medicine to help you relax during your exam)   You must get the medicine from your doctor before you arrive. Bring the medicine to the exam. Do not take it at home.   Arrive one hour early. Bring someone who can take you home after the test. Your medicine will make you sleepy. After the exam, you may not drive, take a bus or take a taxi by yourself.   No eating 8 hours before your exam. You may have clear liquids up until 4 hours before your exam. (Clear liquids include water, clear tea, black coffee and fruit juice without pulp.)  IF YOU WILL RECEIVE ANESTHESIA (be asleep for your exam)   Arrive 1 1/2 hours early. Bring someone who can take you home after the test. You may not drive, take a bus or take a taxi by yourself.   No eating 8 hours before your exam. You may have clear liquids up until 4 hours before your exam. (Clear liquids include water, clear tea, black coffee and fruit juice without pulp.)  If you have any questions, please contact your Imaging Department exam site.              Future tests that were ordered for you today     Open Future Orders        Priority Expected Expires Ordered    MR Breast Bilateral w/o & w Contrast Routine 9/28/2017 9/28/2018 9/28/2017            Who to contact     If you have questions or need follow up information about today's clinic visit or your schedule please contact [facility] directly at [phone].  Normal or non-critical lab and imaging results will be communicated to you by MyChart, letter or phone within 4 business days after the clinic has received the results.  "If you do not hear from us within 7 days, please contact the clinic through Viajala or phone. If you have a critical or abnormal lab result, we will notify you by phone as soon as possible.  Submit refill requests through Viajala or call your pharmacy and they will forward the refill request to us. Please allow 3 business days for your refill to be completed.          Additional Information About Your Visit        [employer] Information     [employer] gives you secure access to your electronic health record. If you see a primary care provider, you can also send messages to your care team and make appointments. If you have questions, please call your primary care clinic.  If you do not have a primary care provider, please call [phone] and they will assist you.        Care EveryWhere ID     This is your Care EveryWhere ID. This could be used by other organizations to access your [facility] medical records  [id number]        Your Vitals Were     Pulse Height Last Period BMI (Body Mass Index)          82 5' 5\" (1.651 m) (LMP Unknown) 20.14 kg/m2         Blood Pressure from Last 3 Encounters:   09/28/17 114/72   09/18/17 112/70   12/09/16 119/82    Weight from Last 3 Encounters:   09/28/17 121 lb (54.9 kg)   09/18/17 121 lb (54.9 kg)   12/09/16 121 lb (54.9 kg)               Primary Care Provider Office Phone # Fax #    Murali Aria Kenney [phone] [address] [facility] [address]        Equal Access to Services     [facility] AH: Hadii aad ku hadasho Soomaali, waaxda luqadaha, qaybta kaalmada adeegyada, omar moraes. So [facility] [phone].    ATENCIÓN: Si habla español, tiene a yepez disposición servicios gratuitos de asistencia lingüística. Llame al [phone].    We comply with applicable federal civil rights laws and [place] laws. We do not discriminate on the basis of race, color, national origin, age, disability sex, sexual orientation or gender " identity.            Thank you!     Thank you for choosing [facility]  for your care. Our goal is always to provide you with excellent care. Hearing back from our patients is one way we can continue to improve our services. Please take a few minutes to complete the written survey that you may receive in the mail after your visit with us. Thank you!             Your Updated Medication List - Protect others around you: Learn how to safely use, store and throw away your medicines at www.disposemymeds.org.          This list is accurate as of: 9/28/17  2:57 PM.  Always use your most recent med list.                   Brand Name Dispense Instructions for use Diagnosis    ADVIL 200 MG tablet   Generic drug:  ibuprofen      Take 200 mg by mouth every 4 hours as needed for mild pain        levothyroxine 75 MCG tablet    SYNTHROID/LEVOTHROID    90 tablet    Take 1 tablet (75 mcg) by mouth daily    Hypothyroidism due to acquired atrophy of thyroid       triamterene-hydrochlorothiazide 37.5-25 MG per capsule    DYAZIDE    90 capsule    TAKE ONE CAPSULE BY MOUTH DAILY    Hypertension goal BP (blood pressure) < 140/80

## 2017-09-29 ENCOUNTER — MYC MEDICAL ADVICE (OUTPATIENT)
Dept: FAMILY MEDICINE | Facility: CLINIC | Age: 62
End: 2017-09-29

## 2017-09-29 ENCOUNTER — HOSPITAL ENCOUNTER (OUTPATIENT)
Dept: MRI IMAGING | Facility: CLINIC | Age: 62
Discharge: HOME | End: 2017-09-29
Attending: SURGERY | Admitting: SURGERY
Payer: COMMERCIAL

## 2017-09-29 DIAGNOSIS — C50.911 MALIGNANT NEOPLASM OF RIGHT FEMALE BREAST, UNSPECIFIED ESTROGEN RECEPTOR STATUS, UNSPECIFIED SITE OF BREAST (H): ICD-10-CM

## 2017-09-29 PROCEDURE — 0159T MR BREAST BILATERAL W/O & W CONTRAST: CPT

## 2017-09-29 PROCEDURE — A9585 GADOBUTROL INJECTION: HCPCS | Performed by: SURGERY

## 2017-09-29 PROCEDURE — 25000128 H RX IP 250 OP 636: Performed by: SURGERY

## 2017-09-29 RX ORDER — GADOBUTROL 604.72 MG/ML
5 INJECTION INTRAVENOUS ONCE
Status: COMPLETED | OUTPATIENT
Start: 2017-09-29 | End: 2017-09-29

## 2017-09-29 RX ADMIN — GADOBUTROL 5 ML: 604.72 INJECTION INTRAVENOUS at 16:57

## 2017-10-02 ENCOUNTER — TELEPHONE (OUTPATIENT)
Dept: SURGERY | Facility: CLINIC | Age: 62
End: 2017-10-02

## 2017-10-02 DIAGNOSIS — C50.911 MALIGNANT NEOPLASM OF RIGHT FEMALE BREAST, UNSPECIFIED ESTROGEN RECEPTOR STATUS, UNSPECIFIED SITE OF BREAST (H): Primary | ICD-10-CM

## 2017-10-02 NOTE — TELEPHONE ENCOUNTER
I called and discussed MRI results with the patient. Shows her lesion in total volume measures close to 25 mm. There is a nodule on the left side that will need to be biopsied. She is in the process of getting the left side biopsy set up now. We had a long discussion options including our previous conversations. Her lesion is bigger compared to her breast size ratio so we once again talked about mastectomy. We also talked about what to do with the left side is positive. She would like some time to think about but has been leaning towards mastectomy. She would like to schedule surgery in the near future and we will help her with this. She will call if any further questions.

## 2017-10-03 DIAGNOSIS — C50.911 MALIGNANT NEOPLASM OF RIGHT FEMALE BREAST, UNSPECIFIED ESTROGEN RECEPTOR STATUS, UNSPECIFIED SITE OF BREAST (H): Primary | ICD-10-CM

## 2017-10-04 ENCOUNTER — HOSPITAL ENCOUNTER (OUTPATIENT)
Dept: MAMMOGRAPHY | Facility: CLINIC | Age: 62
End: 2017-10-04
Attending: SURGERY
Payer: COMMERCIAL

## 2017-10-04 ENCOUNTER — HOSPITAL ENCOUNTER (OUTPATIENT)
Dept: MAMMOGRAPHY | Facility: CLINIC | Age: 62
Discharge: HOME | End: 2017-10-04
Attending: SURGERY | Admitting: SURGERY
Payer: COMMERCIAL

## 2017-10-04 DIAGNOSIS — C50.911 MALIGNANT NEOPLASM OF RIGHT FEMALE BREAST, UNSPECIFIED ESTROGEN RECEPTOR STATUS, UNSPECIFIED SITE OF BREAST (H): ICD-10-CM

## 2017-10-04 PROCEDURE — 88305 TISSUE EXAM BY PATHOLOGIST: CPT | Performed by: SURGERY

## 2017-10-04 PROCEDURE — 25000125 ZZHC RX 250: Performed by: SURGERY

## 2017-10-04 PROCEDURE — 27210206 US BREAST BIOPSY CORE NEEDLE LEFT

## 2017-10-04 PROCEDURE — 76642 ULTRASOUND BREAST LIMITED: CPT | Mod: LT

## 2017-10-04 PROCEDURE — 40000986 MA POST PROCEDURE LEFT

## 2017-10-04 PROCEDURE — 88305 TISSUE EXAM BY PATHOLOGIST: CPT | Mod: 26 | Performed by: SURGERY

## 2017-10-04 RX ADMIN — LIDOCAINE HYDROCHLORIDE 5 ML: 10 INJECTION, SOLUTION INFILTRATION; PERINEURAL at 11:27

## 2017-10-04 NOTE — DISCHARGE INSTRUCTIONS

## 2017-10-05 LAB — COPATH REPORT: NORMAL

## 2017-10-09 ENCOUNTER — TELEPHONE (OUTPATIENT)
Dept: SURGERY | Facility: CLINIC | Age: 62
End: 2017-10-09

## 2017-10-09 DIAGNOSIS — C50.911 MALIGNANT NEOPLASM OF RIGHT FEMALE BREAST, UNSPECIFIED ESTROGEN RECEPTOR STATUS, UNSPECIFIED SITE OF BREAST (H): Primary | ICD-10-CM

## 2017-10-09 NOTE — TELEPHONE ENCOUNTER
Called patient back, she received a call regarding her results last Friday from Dr. Salazar and would like to discuss with him if still the best option is bilat. Mastectomy. Told patient that I would send him a message that she would like to discuss this further and he would call her back. Patient verbalized understanding of this and agreed. Encouraged them to call back if they had further questions or concerns.    Guillermina Dias RN BSN

## 2017-10-09 NOTE — TELEPHONE ENCOUNTER
Name of caller: Patient    Reason for Call:  questions    Surgeon:  Dr. Salazar    Recent Surgery:  Up coming surgery    If yes, when & what type:  Bilateral mastectomies  10/16/17      Best phone number to reach pt at is: [phone]  Ok to leave a message with medical info? Yes.    Pharmacy preferred (if calling for a refill): na

## 2017-10-11 ENCOUNTER — OFFICE VISIT (OUTPATIENT)
Dept: FAMILY MEDICINE | Facility: CLINIC | Age: 62
End: 2017-10-11
Payer: COMMERCIAL

## 2017-10-11 VITALS
SYSTOLIC BLOOD PRESSURE: 120 MMHG | HEART RATE: 71 BPM | TEMPERATURE: 98.6 F | DIASTOLIC BLOOD PRESSURE: 80 MMHG | OXYGEN SATURATION: 99 % | BODY MASS INDEX: 19.97 KG/M2 | WEIGHT: 120 LBS

## 2017-10-11 DIAGNOSIS — F17.200 TOBACCO USE DISORDER: ICD-10-CM

## 2017-10-11 DIAGNOSIS — K57.32 DIVERTICULITIS OF COLON: ICD-10-CM

## 2017-10-11 DIAGNOSIS — Z17.0 MALIGNANT NEOPLASM OF UPPER-OUTER QUADRANT OF RIGHT BREAST IN FEMALE, ESTROGEN RECEPTOR POSITIVE (H): ICD-10-CM

## 2017-10-11 DIAGNOSIS — C50.411 MALIGNANT NEOPLASM OF UPPER-OUTER QUADRANT OF RIGHT BREAST IN FEMALE, ESTROGEN RECEPTOR POSITIVE (H): ICD-10-CM

## 2017-10-11 DIAGNOSIS — E03.4 HYPOTHYROIDISM DUE TO ACQUIRED ATROPHY OF THYROID: Chronic | ICD-10-CM

## 2017-10-11 DIAGNOSIS — R94.31 ABNORMAL ELECTROCARDIOGRAM: ICD-10-CM

## 2017-10-11 DIAGNOSIS — Z01.818 PREOP GENERAL PHYSICAL EXAM: Primary | ICD-10-CM

## 2017-10-11 DIAGNOSIS — I10 HYPERTENSION GOAL BP (BLOOD PRESSURE) < 140/80: ICD-10-CM

## 2017-10-11 LAB
BASOPHILS # BLD AUTO: 0 10E9/L (ref 0–0.2)
BASOPHILS NFR BLD AUTO: 0.2 %
DIFFERENTIAL METHOD BLD: ABNORMAL
EOSINOPHIL # BLD AUTO: 0.1 10E9/L (ref 0–0.7)
EOSINOPHIL NFR BLD AUTO: 1.7 %
ERYTHROCYTE [DISTWIDTH] IN BLOOD BY AUTOMATED COUNT: 13 % (ref 10–15)
HCT VFR BLD AUTO: 43.4 % (ref 35–47)
HGB BLD-MCNC: 14.7 G/DL (ref 11.7–15.7)
LYMPHOCYTES # BLD AUTO: 2.7 10E9/L (ref 0.8–5.3)
LYMPHOCYTES NFR BLD AUTO: 33.6 %
MCH RBC QN AUTO: 33.1 PG (ref 26.5–33)
MCHC RBC AUTO-ENTMCNC: 33.9 G/DL (ref 31.5–36.5)
MCV RBC AUTO: 98 FL (ref 78–100)
MONOCYTES # BLD AUTO: 0.6 10E9/L (ref 0–1.3)
MONOCYTES NFR BLD AUTO: 7.1 %
NEUTROPHILS # BLD AUTO: 4.6 10E9/L (ref 1.6–8.3)
NEUTROPHILS NFR BLD AUTO: 57.4 %
PLATELET # BLD AUTO: 222 10E9/L (ref 150–450)
RBC # BLD AUTO: 4.44 10E12/L (ref 3.8–5.2)
WBC # BLD AUTO: 8 10E9/L (ref 4–11)

## 2017-10-11 PROCEDURE — 93000 ELECTROCARDIOGRAM COMPLETE: CPT | Performed by: FAMILY MEDICINE

## 2017-10-11 PROCEDURE — 85025 COMPLETE CBC W/AUTO DIFF WBC: CPT | Performed by: FAMILY MEDICINE

## 2017-10-11 PROCEDURE — 80048 BASIC METABOLIC PNL TOTAL CA: CPT | Performed by: FAMILY MEDICINE

## 2017-10-11 PROCEDURE — 36415 COLL VENOUS BLD VENIPUNCTURE: CPT | Performed by: FAMILY MEDICINE

## 2017-10-11 PROCEDURE — 99214 OFFICE O/P EST MOD 30 MIN: CPT | Performed by: FAMILY MEDICINE

## 2017-10-11 RX ORDER — TRIAMTERENE/HYDROCHLOROTHIAZID 37.5-25 MG
1 TABLET ORAL EVERY MORNING
COMMUNITY
End: 2017-11-06

## 2017-10-11 NOTE — NURSING NOTE
"Chief Complaint   Patient presents with     Pre-Op Exam       Initial /80  Pulse 71  Temp 98.6  F (37  C) (Tympanic)  Wt 120 lb (54.4 kg)  LMP  (LMP Unknown)  SpO2 99%  BMI 19.97 kg/m2 Estimated body mass index is 19.97 kg/(m^2) as calculated from the following:    Height as of 9/28/17: 5' 5\" (1.651 m).    Weight as of this encounter: 120 lb (54.4 kg).  Medication Reconciliation: complete   .Silviano SINGLETON      "

## 2017-10-11 NOTE — PATIENT INSTRUCTIONS
Before Your Surgery      Call your surgeon if there is any change in your health. This includes signs of a cold or flu (such as a sore throat, runny nose, cough, rash or fever).    Do not smoke, drink alcohol or take over the counter medicine (unless your surgeon or primary care doctor tells you to) for the 24 hours before and after surgery.    If you take prescribed drugs: Follow your doctor s orders about which medicines to take and which to stop until after surgery.    Eating and drinking prior to surgery: follow the instructions from your surgeon    Take a shower or bath the night before surgery. Use the soap your surgeon gave you to gently clean your skin. If you do not have soap from your surgeon, use your regular soap. Do not shave or scrub the surgery site.  Wear clean pajamas and have clean sheets on your bed.       1) Come back in for a pap smear/physical!  2) You need a colonoscopy!  This is a lifesaving screening test - please call to set up an appointment today.  [phone]

## 2017-10-11 NOTE — MR AVS SNAPSHOT
After Visit Summary   10/11/2017    Suma Calabrese    MRN: [id number]           Patient Information     Date Of Birth          1955        Visit Information        Provider Department      10/11/2017 3:20 PM May Montaño MD [facility]        Today's Diagnoses     Preop general physical exam    -  1    Malignant neoplasm of upper-outer quadrant of right breast in female, estrogen receptor positive (H)        Diverticulitis of colon        Hypertension goal BP (blood pressure) < 140/80        Hypothyroidism due to acquired atrophy of thyroid          Care Instructions      Before Your Surgery      Call your surgeon if there is any change in your health. This includes signs of a cold or flu (such as a sore throat, runny nose, cough, rash or fever).    Do not smoke, drink alcohol or take over the counter medicine (unless your surgeon or primary care doctor tells you to) for the 24 hours before and after surgery.    If you take prescribed drugs: Follow your doctor s orders about which medicines to take and which to stop until after surgery.    Eating and drinking prior to surgery: follow the instructions from your surgeon    Take a shower or bath the night before surgery. Use the soap your surgeon gave you to gently clean your skin. If you do not have soap from your surgeon, use your regular soap. Do not shave or scrub the surgery site.  Wear clean pajamas and have clean sheets on your bed.       1) Come back in for a pap smear/physical!  2) You need a colonoscopy!  This is a lifesaving screening test - please call to set up an appointment today.  [phone]                  Follow-ups after your visit        Your next 10 appointments already scheduled     Oct 16, 2017 10:15 AM CDT   NM SENTINEL NODE INJECTION BILATERAL with SHNM2   [facility] Nuclear Medicine ([facility] [place])    [address] [place] [address]    [phone]           Please bring a list of your medicines to the exam. (Include vitamins, minerals and over-the-counter drugs.) You should wear comfortable clothes. Leave your valuables at home. Please bring related prior results and films.  Tell your doctor:   If you are breastfeeding or may be pregnant.   If you have had a barium test within the past few days. Barium may change the results of certain exams.   If you think you may need sedation (medicine to help you relax).  You may eat and drink as normal.  Please call your Imaging Department at your exam site with any questions.            Oct 16, 2017 11:15 AM CDT   [facility] Same Day Surgery with Van Salazar MD   [facility])    [facility])   [phone]            Oct 16, 2017   Procedure with Van Salazar MD   [facility] (--)    [address]  [facility] [id number] [phone]            Oct 26, 2017  1:00 PM CDT   Post Op with Van Salazar MD   [facility] ([facility])    [address]  [address]  [facility] [id number] [phone]              Who to contact     If you have questions or need follow up information about today's clinic visit or your schedule please contact [facility] directly at [phone].  Normal or non-critical lab and imaging results will be communicated to you by MyChart, letter or phone within 4 business days after the clinic has received the results. If you do not hear from us within 7 days, please contact the clinic through [employer] or phone. If you have a critical or abnormal lab result, we will notify you by phone as soon as possible.  Submit refill requests through [employer]Signal or call your pharmacy and they will forward the refill request to  us. Please allow 3 business days for your refill to be completed.          Additional Information About Your Visit        [employer] Information     [employer] gives you secure access to your electronic health record. If you see a primary care provider, you can also send messages to your care team and make appointments. If you have questions, please call your primary care clinic.  If you do not have a primary care provider, please call [phone] and they will assist you.        Care EveryWhere ID     This is your Care EveryWhere ID. This could be used by other organizations to access your [facility] medical records  [id number]        Your Vitals Were     Pulse Temperature Last Period Pulse Oximetry BMI (Body Mass Index)       71 98.6  F (37  C) (Tympanic) (LMP Unknown) 99% 19.97 kg/m2        Blood Pressure from Last 3 Encounters:   10/11/17 120/80   09/28/17 114/72   09/18/17 112/70    Weight from Last 3 Encounters:   10/11/17 120 lb (54.4 kg)   09/28/17 121 lb (54.9 kg)   09/18/17 121 lb (54.9 kg)              We Performed the Following     Basic metabolic panel     CBC with platelets and differential          Today's Medication Changes          These changes are accurate as of: 10/11/17  3:52 PM.  If you have any questions, ask your nurse or doctor.               Stop taking these medicines if you haven't already. Please contact your care team if you have questions.     ADVIL 200 MG tablet   Generic drug:  ibuprofen   Stopped by:  May Montaño MD                    Primary Care Provider Office Phone # Fax #    Murali Aria Kenney [phone] [address] [facility] [address]        Equal Access to Services     [facility]: Hadii beata Martínez, wazenada romeo, qaybta kaalomar calderon. So [facility] [phone].    ATENCIÓN: Si habla español, tiene a yepez disposición servicios gratuitos de asistencia lingüística. Llame al  [phone].    We comply with applicable federal civil rights laws and [place] laws. We do not discriminate on the basis of race, color, national origin, age, disability, sex, sexual orientation, or gender identity.            Thank you!     Thank you for choosing [facility]  for your care. Our goal is always to provide you with excellent care. Hearing back from our patients is one way we can continue to improve our services. Please take a few minutes to complete the written survey that you may receive in the mail after your visit with us. Thank you!             Your Updated Medication List - Protect others around you: Learn how to safely use, store and throw away your medicines at www.disposemymeds.org.          This list is accurate as of: 10/11/17  3:52 PM.  Always use your most recent med list.                   Brand Name Dispense Instructions for use Diagnosis    levothyroxine 75 MCG tablet    SYNTHROID/LEVOTHROID    90 tablet    Take 1 tablet (75 mcg) by mouth daily    Hypothyroidism due to acquired atrophy of thyroid       triamterene-hydrochlorothiazide 37.5-25 MG per tablet    MAXZIDE-25     Take 1 tablet by mouth daily

## 2017-10-11 NOTE — PROGRESS NOTES
[address]  [address]  [facility] [address]  [phone]  Dept: [phone]    PRE-OP EVALUATION:  Today's date: 10/11/2017    Suma Calabrese (: 1955) presents for pre-operative evaluation assessment as requested by Dr. Van fortune.  She requires evaluation and anesthesia risk assessment prior to undergoing surgery/procedure for treatment of mastectomy .  Proposed procedure: double mastectomy    Date of Surgery/ Procedure: 10-16-17  Time of Surgery/ Procedure: 9:45am  Hospital/Surgical Facility: [facility]  Fax number for surgical facility: [facility]  Primary Physician: Murali Kenney  Type of Anesthesia Anticipated: to be determined    Patient has a Health Care Directive or Living Will:  NO    Preop Questions 10/11/2017   1.  Do you have a history of heart attack, stroke, stent, bypass or surgery on an artery in the head, neck, heart or legs? No   2.  Do you ever have any pain or discomfort in your chest? No   3.  Do you have a history of  Heart Failure? No   4.   Are you troubled by shortness of breath when:  walking on a level surface, or up a slight hill, or at night? No   5.  Do you currently have a cold, bronchitis or other respiratory infection? No   6.  Do you have a cough, shortness of breath, or wheezing? No   7.  Do you sometimes get pains in the calves of your legs when you walk? No   8. Do you or anyone in your family have previous history of blood clots? No   9.  Do you or does anyone in your family have a serious bleeding problem such as prolonged bleeding following surgeries or cuts? No   10. Have you ever had problems with anemia or been told to take iron pills? No   11. Have you had any abnormal blood loss such as black, tarry or bloody stools, or abnormal vaginal bleeding? No   12. Have you ever had a blood transfusion? No   13. Have you or any of your relatives ever had problems with anesthesia? No   14.  Do you have sleep apnea, excessive snoring or daytime drowsiness? No   15. Do you have any prosthetic heart valves? No   16. Do you have prosthetic joints? No   17. Is there any chance that you may be pregnant? No           HPI:                                                      Brief HPI related to upcoming procedure:   New breast cancer.  Having mastectomy.      HYPERTENSION - Patient has longstanding history of mod-severe HTN , currently denies any symptoms referable to elevated blood pressure. Specifically denies chest pain, palpitations, dyspnea, orthopnea, PND or peripheral edema. Blood pressure readings have been in normal range. Current medication regimen is as listed below. Patient denies any side effects of medication.                                                                                                                                                                                          .  HYPOTHYROIDISM - Patient has a longstanding history of chronic Hypothyroidism. Patient has been doing well, noting no tremor, insomnia, hair loss or changes in skin texture. Last TSH value of   TSH   Date Value Ref Range Status   09/18/2017 1.21 0.40 - 4.00 mU/L Final   ]  . Continues to take medications as directed, without adverse reactions or side effects.                                                                                                                                                                                                                        .    MEDICAL HISTORY:                                                    Patient Active Problem List    Diagnosis Date Noted     Malignant neoplasm of upper-outer quadrant of right breast in female, estrogen receptor positive (H) 10/11/2017     Priority: Medium     Hypothyroidism due to acquired atrophy of thyroid 08/25/2016     Priority: Medium     Diagnosed 2016 or so - stable on levothyroxine       Diverticulitis of colon 08/25/2016      Priority: Medium     2016 - no recurrence       Nontoxic uninodular goiter 01/28/2013     Priority: Medium     Rectal prolapse 01/28/2013     Priority: Medium     Hypertension goal BP (blood pressure) < 140/80 01/28/2013     Priority: Medium     Well controlled on triamterene/dyazide       Internal hemorrhoids with other complication 01/28/2013     Priority: Medium     Tobacco use disorder 01/28/2013     Priority: Medium      Past Medical History:   Diagnosis Date     Cancer (H)      Hypertension      Nontoxic uninodular goiter 1/28/2013     Pure hypercholesterolemia 1/28/2013     TMJ (temporomandibular joint syndrome) 7/1/2013     Past Surgical History:   Procedure Laterality Date     BIOPSY       bladder dilitation[  age 17     DENTAL SURGERY      wisdom tooth extraction     Current Outpatient Prescriptions   Medication Sig Dispense Refill     triamterene-hydrochlorothiazide (MAXZIDE-25) 37.5-25 MG per tablet Take 1 tablet by mouth daily       levothyroxine (SYNTHROID/LEVOTHROID) 75 MCG tablet Take 1 tablet (75 mcg) by mouth daily 90 tablet 3     OTC products: None, except as noted above, no recent use of OTC ASA, NSAIDS or Steroids, no use of herbal medications or other supplements other than 400 mg of ibuprofen today, Wed 10/11.  Will stop all NSAIDS as of this appointment.    Allergies   Allergen Reactions     Bupropion Other (See Comments)     Jittery and HA     Erythromycin GI Disturbance      Latex Allergy: NO    Social History   Substance Use Topics     Smoking status: Current Every Day Smoker     Packs/day: 0.50     Years: 40.00     Types: Cigarettes     Smokeless tobacco: Never Used      Comment: 3/4 PPD     Alcohol use 0.0 oz/week     0 Standard drinks or equivalent per week      Comment: weekends     History   Drug Use No       REVIEW OF SYSTEMS:                                                    C: NEGATIVE for fever, chills, change in weight  I: NEGATIVE for worrisome rashes, moles or lesions  E:  NEGATIVE for vision changes or irritation  E/M: NEGATIVE for ear, mouth and throat problems  R: NEGATIVE for significant cough or SOB  B: POSITIVE for breast mass  CV: NEGATIVE for chest pain, palpitations or peripheral edema  GI: NEGATIVE for nausea, abdominal pain, heartburn, or change in bowel habits  : NEGATIVE for frequency, dysuria, or hematuria  M: NEGATIVE for significant arthralgias or myalgia  N: NEGATIVE for weakness, dizziness or paresthesias  E: NEGATIVE for temperature intolerance, skin/hair changes  H: NEGATIVE for bleeding problems  P: NEGATIVE for changes in mood or affect    EXAM:                                                    /80  Pulse 71  Temp 98.6  F (37  C) (Tympanic)  Wt 120 lb (54.4 kg)  LMP  (LMP Unknown)  SpO2 99%  BMI 19.97 kg/m2    GENERAL APPEARANCE: healthy, alert, no distress and smells of tobacco smoke     EYES: EOMI, PERRL     HENT: ear canals and TM's normal and nose and mouth without ulcers or lesions     NECK: no adenopathy, no asymmetry, masses, or scars and thyroid normal to palpation     RESP: lungs clear to auscultation - no rales, rhonchi or wheezes     BREAST: mass right breast - 9 oclock     CV: regular rates and rhythm, normal S1 S2, no S3 or S4 and no murmur, click or rub     ABDOMEN:  soft, nontender, no HSM or masses and bowel sounds normal     MS: extremities normal- no gross deformities noted, no evidence of inflammation in joints, FROM in all extremities.     SKIN: no suspicious lesions or rashes     NEURO: Normal strength and tone, sensory exam grossly normal, mentation intact and speech normal     PSYCH: mentation appears normal. and affect normal/bright     LYMPHATICS: No axillary, cervical, or supraclavicular nodes    DIAGNOSTICS:                                                    EKG: appears normal, NSR, nonspecific ST-T changes, otherwise unchanged from previous tracing.  Given patient's tobacco history I'd like her to have cardiology eval -  referred.    Recent Labs   Lab Test  11/21/16   1653  08/25/16   1257  08/23/16   1154  04/28/16   1616   HGB   --   13.6   --   13.6   PLT   --   232   --   187   NA  142   --   126*   --    POTASSIUM  4.5   --   3.2*   --    CR  1.15*   --   0.71   --         IMPRESSION:                                                    Reason for surgery/procedure: Breast Cancer  Diagnosis/reason for consult: Pre-op for mastectomy for above    The proposed surgical procedure is considered INTERMEDIATE risk.    REVISED CARDIAC RISK INDEX  The patient has the following serious cardiovascular risks for perioperative complications such as (MI, PE, VFib and 3  AV Block):  No serious cardiac risks  INTERPRETATION: 0 risks: Class I (very low risk - 0.4% complication rate)    The patient has the following additional risks for perioperative complications:  No identified additional risks      ICD-10-CM    1. Preop general physical exam Z01.818 CBC with platelets and differential     Basic metabolic panel     EKG 12-lead complete w/read - Clinics   2. Malignant neoplasm of upper-outer quadrant of right breast in female, estrogen receptor positive (H) C50.411     Z17.0    3. Diverticulitis of colon K57.32    4. Hypertension goal BP (blood pressure) < 140/80 I10    5. Hypothyroidism due to acquired atrophy of thyroid E03.4    6. Abnormal electrocardiogram R94.31 CARDIOLOGY EVAL ADULT REFERRAL   7. Tobacco use disorder F17.200        RECOMMENDATIONS:                                                      EKG: appears normal, NSR, nonspecific ST-T changes, otherwise unchanged from previous tracing.  Given patient's tobacco history I'd like her to have cardiology eval - referred prior to surgery.    Pulmonary Risk  Incentive spirometry post op  Respiratory Therapy (Respiratory Care IP Consult)  post op  NG tube decompression if abdominal distension or significant vomiting   Advised smoking cessation.       --Patient is to take all scheduled  medications on the day of surgery EXCEPT for modifications listed below.  STOP NSAIDS as of today.    Additionally, needs pap and colonoscopy.  Patient agrees to schedule.       Signed Electronically by: May Montaño MD    Copy of this evaluation report is provided to requesting physician.    [facility]

## 2017-10-12 ENCOUNTER — OFFICE VISIT (OUTPATIENT)
Dept: CARDIOLOGY | Facility: CLINIC | Age: 62
End: 2017-10-12
Payer: COMMERCIAL

## 2017-10-12 VITALS
BODY MASS INDEX: 19.83 KG/M2 | WEIGHT: 119 LBS | HEART RATE: 78 BPM | HEIGHT: 65 IN | SYSTOLIC BLOOD PRESSURE: 111 MMHG | DIASTOLIC BLOOD PRESSURE: 78 MMHG

## 2017-10-12 DIAGNOSIS — I99.8 VASCULAR CALCIFICATION: ICD-10-CM

## 2017-10-12 DIAGNOSIS — I10 HYPERTENSION GOAL BP (BLOOD PRESSURE) < 140/80: Primary | ICD-10-CM

## 2017-10-12 PROCEDURE — 99204 OFFICE O/P NEW MOD 45 MIN: CPT | Performed by: INTERNAL MEDICINE

## 2017-10-12 RX ORDER — PRAVASTATIN SODIUM 20 MG
20 TABLET ORAL DAILY
Qty: 90 TABLET | Refills: 3 | Status: ON HOLD | OUTPATIENT
Start: 2017-10-12 | End: 2017-10-16

## 2017-10-12 NOTE — LETTER
10/12/2017    BEKAH KAUR MD  [address]  [facility] [address]      RE: Suma Calabrese       Dear Colleague,    I had the pleasure of seeing Suma Calabrese in the [facility].    Cardio-oncology  Consultation      Suma Calabrese MRN# [id number]   YOB: 1955 Age: 62 year old   Date of Visit 10/12/2017     Reason for consult:  preoperative clearance            Assessment and Plan:     1. Preoperative cardiovascular clearance prior to mastectomy    Patient is a low-medium (not elevated) risk for a moderate risk surgery.  She may proceed without further cardiovascular evaluation.    T waves anteriorly in V1/V2 may be related more to body habitus rather than ischemia.    Patient has no symptoms of dyspnea on exertion or exertional chest discomfort and is able to do six flights of stairs without stopping and no change in her functional capacity.    We did start a small dose of pravastatin 20 mg daily for her vascular calcification seen on CT 2016, elevated LDL and current smoking.      2. Hypertension, good control    Continue current medications      3. Hyperlipidemia and vascular calcification in the setting of smoking    Start pravastatin 20 mg daily      4. Nicotine abuse    Urged to quit.  Discussed increased future cancer risk and delayed wound healing with continued nicotine abuse as well as risk of vascular event in the setting of her calcification.    Patient will let us know if she winds up being on anthracycline or Herceptin in the future, would arrange baseline and follow-up echocardiograms if that were to happen.        This note was transcribed using electronic voice recognition software, typographical errors may be present.                Chief Complaint:   Cardiac Clearance and Hypertension           History of Present Illness:   This patient is a very pleasant 62 year old female that I'm meeting for the first time.  She has upcoming mastectomy  "planned with Dr. Paz.  She works at the [employer] and can walk to the third floor regularly which is six flights of stairs.  She is a current smoker but denies any dyspnea on exertion or exertional chest discomfort.  She has some vascular calcification seen on CT 2016.  No personal history of myocardial infarction or hard cardiovascular event.  I reviewed her EKG which has T-wave inversions in V1 and V2.  I do not feel that these are of any clinical significance at this time and may be related to her thin chest wall and body habitus.  Last LDL greater than 140 in the setting of smoking.  She denies any passing out or palpitations.             Physical Exam:     Vitals: /78  Pulse 78  Ht 1.651 m (5' 5\")  Wt 54 kg (119 lb)  LMP  (LMP Unknown)  BMI 19.8 kg/m2  Constitutional:  cooperative;alert and oriented thin      Skin:  warm and dry to the touch, no apparent skin lesions or masses noted        Head:  normocephalic, no masses or lesions        Eyes:  pupils equal and round, conjunctivae and lids unremarkable, sclera white, no xanthalasma, EOMS intact, no nystagmus        ENT:  no pallor or cyanosis, dentition good        Neck:  JVP normal;no carotid bruit        Chest:  normal breath sounds, clear to auscultation, normal A-P diameter, normal symmetry, normal respiratory excursion, no use of accessory muscles        Cardiac: regular rhythm;normal S1 and S2;no murmurs, gallops or rubs detected                  Abdomen:      benign    Vascular:                                        Extremities and Back:  no deformities, clubbing, cyanosis, erythema observed;no edema        Neurological:  affect appropriate, oriented to time, person and place;no gross motor deficits                    Past Medical History:   I have reviewed this patient's past medical history  Past Medical History:   Diagnosis Date     Cancer (H)      Hypertension      Nontoxic uninodular goiter 1/28/2013     Pure hypercholesterolemia 1/28/2013 "     TMJ (temporomandibular joint syndrome) 7/1/2013             Past Surgical History:   I have reviewed this patient's past surgical history  Past Surgical History:   Procedure Laterality Date     BIOPSY       bladder dilitation[  age 17     DENTAL SURGERY      wisdom tooth extraction               Social History:   I have reviewed this patient's social history  Social History   Substance Use Topics     Smoking status: Current Every Day Smoker     Packs/day: 0.50     Years: 40.00     Types: Cigarettes     Smokeless tobacco: Never Used      Comment: 3/4 PPD     Alcohol use 0.0 oz/week     0 Standard drinks or equivalent per week      Comment: weekends             Family History:   I have reviewed this patient's family history  Family History   Problem Relation Age of Onset     Unknown/Adopted Mother      Unknown/Adopted Father              Allergies:     Allergies   Allergen Reactions     Bupropion Other (See Comments)     Jittery and HA     Erythromycin GI Disturbance             Medications:   I have reviewed this patient's current medications  Current Outpatient Prescriptions   Medication Sig Dispense Refill     pravastatin (PRAVACHOL) 20 MG tablet Take 1 tablet (20 mg) by mouth daily 90 tablet 3     triamterene-hydrochlorothiazide (MAXZIDE-25) 37.5-25 MG per tablet Take 1 tablet by mouth daily       levothyroxine (SYNTHROID/LEVOTHROID) 75 MCG tablet Take 1 tablet (75 mcg) by mouth daily 90 tablet 3               Review of Systems:     Review of Systems:  Skin:  Negative     Eyes:  Positive for glasses  ENT:  Negative    Respiratory:  Negative    Cardiovascular:  Negative    Gastroenterology: Negative    Genitourinary:  not assessed    Musculoskeletal:  Negative    Neurologic:  Positive for headaches  Psychiatric:  Negative    Heme/Lymph/Imm:  Negative    Endocrine:  Positive for thyroid disorder                     Data:   All laboratory data reviewed  Lab Results   Component Value Date    CHOL 235 03/07/2016      Lab Results   Component Value Date    HDL 83 03/07/2016     Lab Results   Component Value Date     03/07/2016     Lab Results   Component Value Date    TRIG 46 03/07/2016     Lab Results   Component Value Date    CHOLHDLRATIO 2.5 05/05/2015     TSH   Date Value Ref Range Status   09/18/2017 1.21 0.40 - 4.00 mU/L Final     Last Basic Metabolic Panel:  Lab Results   Component Value Date     11/21/2016      Lab Results   Component Value Date    POTASSIUM 4.5 11/21/2016     Lab Results   Component Value Date    CHLORIDE 103 11/21/2016     Lab Results   Component Value Date    CODY 9.1 11/21/2016     Lab Results   Component Value Date    CO2 25 11/21/2016     Lab Results   Component Value Date    BUN 27 11/21/2016     Lab Results   Component Value Date    CR 1.15 11/21/2016     Lab Results   Component Value Date    GLC 86 11/21/2016     Lab Results   Component Value Date    WBC 8.0 10/11/2017     Lab Results   Component Value Date    RBC 4.44 10/11/2017     Lab Results   Component Value Date    HGB 14.7 10/11/2017     Lab Results   Component Value Date    HCT 43.4 10/11/2017     Lab Results   Component Value Date    MCV 98 10/11/2017     Lab Results   Component Value Date    MCH 33.1 10/11/2017     Lab Results   Component Value Date    MCHC 33.9 10/11/2017     Lab Results   Component Value Date    RDW 13.0 10/11/2017     Lab Results   Component Value Date     10/11/2017     Thank you for allowing me to participate in the care of your patient.    Sincerely,     Delmar Bneson MD     [facility]

## 2017-10-12 NOTE — MR AVS SNAPSHOT
After Visit Summary   10/12/2017    Suma Calabrese    MRN: [id number]           Patient Information     Date Of Birth          1955        Visit Information        Provider Department      10/12/2017 9:15 AM Delmar Benson MD [facility] [place]        Today's Diagnoses     Hypertension goal BP (blood pressure) < 140/80    -  1    Vascular calcification           Follow-ups after your visit        Your next 10 appointments already scheduled     Oct 16, 2017 10:15 AM CDT   NM SENTINEL NODE INJECTION BILATERAL with SHNM2   [facility] ([facility])    [address] [address] [phone]           Please bring a list of your medicines to the exam. (Include vitamins, minerals and over-the-counter drugs.) You should wear comfortable clothes. Leave your valuables at home. Please bring related prior results and films.  Tell your doctor:   If you are breastfeeding or may be pregnant.   If you have had a barium test within the past few days. Barium may change the results of certain exams.   If you think you may need sedation (medicine to help you relax).  You may eat and drink as normal.  Please call your Imaging Department at your exam site with any questions.            Oct 16, 2017 11:15 AM CDT   [facility] Same Day Surgery with Van Salazar MD   [facility])    [facility] ([facility])   [phone]            Oct 16, 2017   Procedure with Van Salazar MD   [facility] (--)    [address]  [facility] [address]   [phone]            Oct 26, 2017  1:00 PM CDT   Post Op with Van Salazar MD   [facility] ([facility])    [address] [facility]  [address]  [facility]  [phone]              Who to contact     If you have questions or need follow up information about today's clinic visit or your schedule please contact [facility] [place] directly at [phone].  Normal or non-critical lab and imaging results will be communicated to you by MyChart, letter or phone within 4 business days after the clinic has received the results. If you do not hear from us within 7 days, please contact the clinic through Archy[employer] or phone. If you have a critical or abnormal lab result, we will notify you by phone as soon as possible.  Submit refill requests through Deemelo or call your pharmacy and they will forward the refill request to us. Please allow 3 business days for your refill to be completed.          Additional Information About Your Visit        Archyhart Information     [facility] gives you secure access to your electronic health record. If you see a primary care provider, you can also send messages to your care team and make appointments. If you have questions, please call your primary care clinic.  If you do not have a primary care provider, please call [phone] and they will assist you.        Care EveryWhere ID     This is your Care EveryWhere ID. This could be used by other organizations to access your [facility] records  [id number]        Your Vitals Were     Pulse Height Last Period BMI (Body Mass Index)          78 1.651 m (5' 5\") (LMP Unknown) 19.8 kg/m2         Blood Pressure from Last 3 Encounters:   10/12/17 111/78   10/11/17 120/80   09/28/17 114/72    Weight from Last 3 Encounters:   10/12/17 54 kg (119 lb)   10/11/17 54.4 kg (120 lb)   09/28/17 54.9 kg (121 lb)              Today, you had the following     No orders found for display         Today's Medication Changes          These changes are accurate as of: 10/12/17  9:56 AM.  If you have any questions, ask your nurse or doctor.               Start taking these " medicines.        Dose/Directions    pravastatin 20 MG tablet   Commonly known as:  PRAVACHOL   Used for:  Vascular calcification   Started by:  Delmar Benson MD        Dose:  20 mg   Take 1 tablet (20 mg) by mouth daily   Quantity:  90 tablet   Refills:  3            Where to get your medicines      These medications were sent to [id number] [address] [facility] & [address] [place] [address]    Hours:  24-hours Phone:  [phone]     pravastatin 20 MG tablet                Primary Care Provider Office Phone # Fax #    Murali Aria Kenney [phone] [address] [facility] [address]        Equal Access to Services     KIANNA ROMERO AH: Hadii beata edmonds hadasho Soomaali, waaxda luqadaha, qaybta kaalmada adeegyada, omar aguayo . So [facility] [phone].    ATENCIÓN: Si habla español, tiene a yepez disposición servicios gratuitos de asistencia lingüística. Llame al [phone].    We comply with applicable federal civil rights laws and [place] laws. We do not discriminate on the basis of race, color, national origin, age, disability, sex, sexual orientation, or gender identity.            Thank you!     Thank you for choosing [facility] [place]  for your care. Our goal is always to provide you with excellent care. Hearing back from our patients is one way we can continue to improve our services. Please take a few minutes to complete the written survey that you may receive in the mail after your visit with us. Thank you!             Your Updated Medication List - Protect others around you: Learn how to safely use, store and throw away your medicines at www.disposemymeds.org.          This list is accurate as of: 10/12/17  9:56 AM.  Always use your most recent med list.                   Brand Name Dispense Instructions for use Diagnosis     levothyroxine 75 MCG tablet    SYNTHROID/LEVOTHROID    90 tablet    Take 1 tablet (75 mcg) by mouth daily    Hypothyroidism due to acquired atrophy of thyroid       pravastatin 20 MG tablet    PRAVACHOL    90 tablet    Take 1 tablet (20 mg) by mouth daily    Vascular calcification       triamterene-hydrochlorothiazide 37.5-25 MG per tablet    MAXZIDE-25     Take 1 tablet by mouth daily

## 2017-10-12 NOTE — H&P (VIEW-ONLY)
[address]  [address]  [facility] [address]  [phone]  Dept: [phone]    PRE-OP EVALUATION:  Today's date: 10/11/2017    Suma Calabrese (: 1955) presents for pre-operative evaluation assessment as requested by Dr. Van fortune.  She requires evaluation and anesthesia risk assessment prior to undergoing surgery/procedure for treatment of mastectomy .  Proposed procedure: double mastectomy    Date of Surgery/ Procedure: 10-16-17  Time of Surgery/ Procedure: 9:45am  Hospital/Surgical Facility: [facility]  Fax number for surgical facility: [facility]  Primary Physician: Murali Kenney  Type of Anesthesia Anticipated: to be determined    Patient has a Health Care Directive or Living Will:  NO    Preop Questions 10/11/2017   1.  Do you have a history of heart attack, stroke, stent, bypass or surgery on an artery in the head, neck, heart or legs? No   2.  Do you ever have any pain or discomfort in your chest? No   3.  Do you have a history of  Heart Failure? No   4.   Are you troubled by shortness of breath when:  walking on a level surface, or up a slight hill, or at night? No   5.  Do you currently have a cold, bronchitis or other respiratory infection? No   6.  Do you have a cough, shortness of breath, or wheezing? No   7.  Do you sometimes get pains in the calves of your legs when you walk? No   8. Do you or anyone in your family have previous history of blood clots? No   9.  Do you or does anyone in your family have a serious bleeding problem such as prolonged bleeding following surgeries or cuts? No   10. Have you ever had problems with anemia or been told to take iron pills? No   11. Have you had any abnormal blood loss such as black, tarry or bloody stools, or abnormal vaginal bleeding? No   12. Have you ever had a blood transfusion? No   13. Have you or any of your relatives ever had problems with anesthesia? No   14.  Do you have sleep apnea, excessive snoring or daytime drowsiness? No   15. Do you have any prosthetic heart valves? No   16. Do you have prosthetic joints? No   17. Is there any chance that you may be pregnant? No           HPI:                                                      Brief HPI related to upcoming procedure:   New breast cancer.  Having mastectomy.      HYPERTENSION - Patient has longstanding history of mod-severe HTN , currently denies any symptoms referable to elevated blood pressure. Specifically denies chest pain, palpitations, dyspnea, orthopnea, PND or peripheral edema. Blood pressure readings have been in normal range. Current medication regimen is as listed below. Patient denies any side effects of medication.                                                                                                                                                                                          .  HYPOTHYROIDISM - Patient has a longstanding history of chronic Hypothyroidism. Patient has been doing well, noting no tremor, insomnia, hair loss or changes in skin texture. Last TSH value of   TSH   Date Value Ref Range Status   09/18/2017 1.21 0.40 - 4.00 mU/L Final   ]  . Continues to take medications as directed, without adverse reactions or side effects.                                                                                                                                                                                                                        .    MEDICAL HISTORY:                                                    Patient Active Problem List    Diagnosis Date Noted     Malignant neoplasm of upper-outer quadrant of right breast in female, estrogen receptor positive (H) 10/11/2017     Priority: Medium     Hypothyroidism due to acquired atrophy of thyroid 08/25/2016     Priority: Medium     Diagnosed 2016 or so - stable on levothyroxine       Diverticulitis of colon 08/25/2016      Priority: Medium     2016 - no recurrence       Nontoxic uninodular goiter 01/28/2013     Priority: Medium     Rectal prolapse 01/28/2013     Priority: Medium     Hypertension goal BP (blood pressure) < 140/80 01/28/2013     Priority: Medium     Well controlled on triamterene/dyazide       Internal hemorrhoids with other complication 01/28/2013     Priority: Medium     Tobacco use disorder 01/28/2013     Priority: Medium      Past Medical History:   Diagnosis Date     Cancer (H)      Hypertension      Nontoxic uninodular goiter 1/28/2013     Pure hypercholesterolemia 1/28/2013     TMJ (temporomandibular joint syndrome) 7/1/2013     Past Surgical History:   Procedure Laterality Date     BIOPSY       bladder dilitation[  age 17     DENTAL SURGERY      wisdom tooth extraction     Current Outpatient Prescriptions   Medication Sig Dispense Refill     triamterene-hydrochlorothiazide (MAXZIDE-25) 37.5-25 MG per tablet Take 1 tablet by mouth daily       levothyroxine (SYNTHROID/LEVOTHROID) 75 MCG tablet Take 1 tablet (75 mcg) by mouth daily 90 tablet 3     OTC products: None, except as noted above, no recent use of OTC ASA, NSAIDS or Steroids, no use of herbal medications or other supplements other than 400 mg of ibuprofen today, Wed 10/11.  Will stop all NSAIDS as of this appointment.    Allergies   Allergen Reactions     Bupropion Other (See Comments)     Jittery and HA     Erythromycin GI Disturbance      Latex Allergy: NO    Social History   Substance Use Topics     Smoking status: Current Every Day Smoker     Packs/day: 0.50     Years: 40.00     Types: Cigarettes     Smokeless tobacco: Never Used      Comment: 3/4 PPD     Alcohol use 0.0 oz/week     0 Standard drinks or equivalent per week      Comment: weekends     History   Drug Use No       REVIEW OF SYSTEMS:                                                    C: NEGATIVE for fever, chills, change in weight  I: NEGATIVE for worrisome rashes, moles or lesions  E:  NEGATIVE for vision changes or irritation  E/M: NEGATIVE for ear, mouth and throat problems  R: NEGATIVE for significant cough or SOB  B: POSITIVE for breast mass  CV: NEGATIVE for chest pain, palpitations or peripheral edema  GI: NEGATIVE for nausea, abdominal pain, heartburn, or change in bowel habits  : NEGATIVE for frequency, dysuria, or hematuria  M: NEGATIVE for significant arthralgias or myalgia  N: NEGATIVE for weakness, dizziness or paresthesias  E: NEGATIVE for temperature intolerance, skin/hair changes  H: NEGATIVE for bleeding problems  P: NEGATIVE for changes in mood or affect    EXAM:                                                    /80  Pulse 71  Temp 98.6  F (37  C) (Tympanic)  Wt 120 lb (54.4 kg)  LMP  (LMP Unknown)  SpO2 99%  BMI 19.97 kg/m2    GENERAL APPEARANCE: healthy, alert, no distress and smells of tobacco smoke     EYES: EOMI, PERRL     HENT: ear canals and TM's normal and nose and mouth without ulcers or lesions     NECK: no adenopathy, no asymmetry, masses, or scars and thyroid normal to palpation     RESP: lungs clear to auscultation - no rales, rhonchi or wheezes     BREAST: mass right breast - 9 oclock     CV: regular rates and rhythm, normal S1 S2, no S3 or S4 and no murmur, click or rub     ABDOMEN:  soft, nontender, no HSM or masses and bowel sounds normal     MS: extremities normal- no gross deformities noted, no evidence of inflammation in joints, FROM in all extremities.     SKIN: no suspicious lesions or rashes     NEURO: Normal strength and tone, sensory exam grossly normal, mentation intact and speech normal     PSYCH: mentation appears normal. and affect normal/bright     LYMPHATICS: No axillary, cervical, or supraclavicular nodes    DIAGNOSTICS:                                                    EKG: appears normal, NSR, nonspecific ST-T changes, otherwise unchanged from previous tracing.  Given patient's tobacco history I'd like her to have cardiology eval -  referred.    Recent Labs   Lab Test  11/21/16   1653  08/25/16   1257  08/23/16   1154  04/28/16   1616   HGB   --   13.6   --   13.6   PLT   --   232   --   187   NA  142   --   126*   --    POTASSIUM  4.5   --   3.2*   --    CR  1.15*   --   0.71   --         IMPRESSION:                                                    Reason for surgery/procedure: Breast Cancer  Diagnosis/reason for consult: Pre-op for mastectomy for above    The proposed surgical procedure is considered INTERMEDIATE risk.    REVISED CARDIAC RISK INDEX  The patient has the following serious cardiovascular risks for perioperative complications such as (MI, PE, VFib and 3  AV Block):  No serious cardiac risks  INTERPRETATION: 0 risks: Class I (very low risk - 0.4% complication rate)    The patient has the following additional risks for perioperative complications:  No identified additional risks      ICD-10-CM    1. Preop general physical exam Z01.818 CBC with platelets and differential     Basic metabolic panel     EKG 12-lead complete w/read - Clinics   2. Malignant neoplasm of upper-outer quadrant of right breast in female, estrogen receptor positive (H) C50.411     Z17.0    3. Diverticulitis of colon K57.32    4. Hypertension goal BP (blood pressure) < 140/80 I10    5. Hypothyroidism due to acquired atrophy of thyroid E03.4    6. Abnormal electrocardiogram R94.31 CARDIOLOGY EVAL ADULT REFERRAL   7. Tobacco use disorder F17.200        RECOMMENDATIONS:                                                      EKG: appears normal, NSR, nonspecific ST-T changes, otherwise unchanged from previous tracing.  Given patient's tobacco history I'd like her to have cardiology eval - referred prior to surgery.    Pulmonary Risk  Incentive spirometry post op  Respiratory Therapy (Respiratory Care IP Consult)  post op  NG tube decompression if abdominal distension or significant vomiting   Advised smoking cessation.       --Patient is to take all scheduled  medications on the day of surgery EXCEPT for modifications listed below.  STOP NSAIDS as of today.    Additionally, needs pap and colonoscopy.  Patient agrees to schedule.       Signed Electronically by: May Montaño MD    Copy of this evaluation report is provided to requesting physician.    [facility]

## 2017-10-12 NOTE — PROGRESS NOTES
Cardio-oncology  Consultation      Suma Calabrese MRN# [id number]   YOB: 1955 Age: 62 year old   Date of Visit 10/12/2017     Reason for consult:  preoperative clearance            Assessment and Plan:     1. Preoperative cardiovascular clearance prior to mastectomy    Patient is a low-medium (not elevated) risk for a moderate risk surgery.  She may proceed without further cardiovascular evaluation.    T waves anteriorly in V1/V2 may be related more to body habitus rather than ischemia.    Patient has no symptoms of dyspnea on exertion or exertional chest discomfort and is able to do six flights of stairs without stopping and no change in her functional capacity.    We did start a small dose of pravastatin 20 mg daily for her vascular calcification seen on CT 2016, elevated LDL and current smoking.      2. Hypertension, good control    Continue current medications      3. Hyperlipidemia and vascular calcification in the setting of smoking    Start pravastatin 20 mg daily      4. Nicotine abuse    Urged to quit.  Discussed increased future cancer risk and delayed wound healing with continued nicotine abuse as well as risk of vascular event in the setting of her calcification.    Patient will let us know if she winds up being on anthracycline or Herceptin in the future, would arrange baseline and follow-up echocardiograms if that were to happen.        This note was transcribed using electronic voice recognition software, typographical errors may be present.                Chief Complaint:   Cardiac Clearance and Hypertension           History of Present Illness:   This patient is a very pleasant 62 year old female that I'm meeting for the first time.  She has upcoming mastectomy planned with Dr. Paz.  She works at the [employer] and can walk to the third floor regularly which is six flights of stairs.  She is a current smoker but denies any dyspnea on exertion or exertional chest discomfort.  She has some  "vascular calcification seen on CT 2016.  No personal history of myocardial infarction or hard cardiovascular event.  I reviewed her EKG which has T-wave inversions in V1 and V2.  I do not feel that these are of any clinical significance at this time and may be related to her thin chest wall and body habitus.  Last LDL greater than 140 in the setting of smoking.  She denies any passing out or palpitations.             Physical Exam:     Vitals: /78  Pulse 78  Ht 1.651 m (5' 5\")  Wt 54 kg (119 lb)  LMP  (LMP Unknown)  BMI 19.8 kg/m2  Constitutional:  cooperative;alert and oriented thin      Skin:  warm and dry to the touch, no apparent skin lesions or masses noted        Head:  normocephalic, no masses or lesions        Eyes:  pupils equal and round, conjunctivae and lids unremarkable, sclera white, no xanthalasma, EOMS intact, no nystagmus        ENT:  no pallor or cyanosis, dentition good        Neck:  JVP normal;no carotid bruit        Chest:  normal breath sounds, clear to auscultation, normal A-P diameter, normal symmetry, normal respiratory excursion, no use of accessory muscles        Cardiac: regular rhythm;normal S1 and S2;no murmurs, gallops or rubs detected                  Abdomen:      benign    Vascular:                                        Extremities and Back:  no deformities, clubbing, cyanosis, erythema observed;no edema        Neurological:  affect appropriate, oriented to time, person and place;no gross motor deficits                    Past Medical History:   I have reviewed this patient's past medical history  Past Medical History:   Diagnosis Date     Cancer (H)      Hypertension      Nontoxic uninodular goiter 1/28/2013     Pure hypercholesterolemia 1/28/2013     TMJ (temporomandibular joint syndrome) 7/1/2013             Past Surgical History:   I have reviewed this patient's past surgical history  Past Surgical History:   Procedure Laterality Date     BIOPSY       bladder " dilitation[  age 17     DENTAL SURGERY      wisdom tooth extraction               Social History:   I have reviewed this patient's social history  Social History   Substance Use Topics     Smoking status: Current Every Day Smoker     Packs/day: 0.50     Years: 40.00     Types: Cigarettes     Smokeless tobacco: Never Used      Comment: 3/4 PPD     Alcohol use 0.0 oz/week     0 Standard drinks or equivalent per week      Comment: weekends             Family History:   I have reviewed this patient's family history  Family History   Problem Relation Age of Onset     Unknown/Adopted Mother      Unknown/Adopted Father              Allergies:     Allergies   Allergen Reactions     Bupropion Other (See Comments)     Jittery and HA     Erythromycin GI Disturbance             Medications:   I have reviewed this patient's current medications  Current Outpatient Prescriptions   Medication Sig Dispense Refill     pravastatin (PRAVACHOL) 20 MG tablet Take 1 tablet (20 mg) by mouth daily 90 tablet 3     triamterene-hydrochlorothiazide (MAXZIDE-25) 37.5-25 MG per tablet Take 1 tablet by mouth daily       levothyroxine (SYNTHROID/LEVOTHROID) 75 MCG tablet Take 1 tablet (75 mcg) by mouth daily 90 tablet 3               Review of Systems:     Review of Systems:  Skin:  Negative     Eyes:  Positive for glasses  ENT:  Negative    Respiratory:  Negative    Cardiovascular:  Negative    Gastroenterology: Negative    Genitourinary:  not assessed    Musculoskeletal:  Negative    Neurologic:  Positive for headaches  Psychiatric:  Negative    Heme/Lymph/Imm:  Negative    Endocrine:  Positive for thyroid disorder                     Data:   All laboratory data reviewed  Lab Results   Component Value Date    CHOL 235 03/07/2016     Lab Results   Component Value Date    HDL 83 03/07/2016     Lab Results   Component Value Date     03/07/2016     Lab Results   Component Value Date    TRIG 46 03/07/2016     Lab Results   Component Value Date     CHOLHDLRATIO 2.5 05/05/2015     TSH   Date Value Ref Range Status   09/18/2017 1.21 0.40 - 4.00 mU/L Final     Last Basic Metabolic Panel:  Lab Results   Component Value Date     11/21/2016      Lab Results   Component Value Date    POTASSIUM 4.5 11/21/2016     Lab Results   Component Value Date    CHLORIDE 103 11/21/2016     Lab Results   Component Value Date    CODY 9.1 11/21/2016     Lab Results   Component Value Date    CO2 25 11/21/2016     Lab Results   Component Value Date    BUN 27 11/21/2016     Lab Results   Component Value Date    CR 1.15 11/21/2016     Lab Results   Component Value Date    GLC 86 11/21/2016     Lab Results   Component Value Date    WBC 8.0 10/11/2017     Lab Results   Component Value Date    RBC 4.44 10/11/2017     Lab Results   Component Value Date    HGB 14.7 10/11/2017     Lab Results   Component Value Date    HCT 43.4 10/11/2017     Lab Results   Component Value Date    MCV 98 10/11/2017     Lab Results   Component Value Date    MCH 33.1 10/11/2017     Lab Results   Component Value Date    MCHC 33.9 10/11/2017     Lab Results   Component Value Date    RDW 13.0 10/11/2017     Lab Results   Component Value Date     10/11/2017

## 2017-10-13 LAB
ANION GAP SERPL CALCULATED.3IONS-SCNC: 5 MMOL/L (ref 3–14)
BUN SERPL-MCNC: 21 MG/DL (ref 7–30)
CALCIUM SERPL-MCNC: 9.6 MG/DL (ref 8.5–10.1)
CHLORIDE SERPL-SCNC: 99 MMOL/L (ref 94–109)
CO2 SERPL-SCNC: 29 MMOL/L (ref 20–32)
CREAT SERPL-MCNC: 1.08 MG/DL (ref 0.52–1.04)
GFR SERPL CREATININE-BSD FRML MDRD: 51 ML/MIN/1.7M2
GLUCOSE SERPL-MCNC: 81 MG/DL (ref 70–99)
POTASSIUM SERPL-SCNC: 5.2 MMOL/L (ref 3.4–5.3)
SODIUM SERPL-SCNC: 133 MMOL/L (ref 133–144)

## 2017-10-16 ENCOUNTER — SURGERY (OUTPATIENT)
Age: 62
End: 2017-10-16

## 2017-10-16 ENCOUNTER — ANESTHESIA (OUTPATIENT)
Dept: SURGERY | Facility: CLINIC | Age: 62
End: 2017-10-16
Payer: COMMERCIAL

## 2017-10-16 ENCOUNTER — TRANSFERRED RECORDS (OUTPATIENT)
Dept: HEALTH INFORMATION MANAGEMENT | Facility: CLINIC | Age: 62
End: 2017-10-16

## 2017-10-16 ENCOUNTER — HOSPITAL ENCOUNTER (OUTPATIENT)
Dept: NUCLEAR MEDICINE | Facility: CLINIC | Age: 62
Setting detail: NUCLEAR MEDICINE
Discharge: HOME | End: 2017-10-16
Attending: SURGERY | Admitting: SURGERY
Payer: COMMERCIAL

## 2017-10-16 ENCOUNTER — HOSPITAL ENCOUNTER (OUTPATIENT)
Facility: CLINIC | Age: 62
Discharge: HOME | End: 2017-10-17
Attending: SURGERY | Admitting: SURGERY
Payer: COMMERCIAL

## 2017-10-16 ENCOUNTER — ANESTHESIA EVENT (OUTPATIENT)
Dept: SURGERY | Facility: CLINIC | Age: 62
End: 2017-10-16
Payer: COMMERCIAL

## 2017-10-16 ENCOUNTER — OFFICE VISIT (OUTPATIENT)
Dept: SURGERY | Facility: PHYSICIAN GROUP | Age: 62
End: 2017-10-16
Payer: COMMERCIAL

## 2017-10-16 DIAGNOSIS — Z17.0 MALIGNANT NEOPLASM OF UPPER-OUTER QUADRANT OF RIGHT BREAST IN FEMALE, ESTROGEN RECEPTOR POSITIVE (H): Primary | ICD-10-CM

## 2017-10-16 DIAGNOSIS — C50.911 MALIGNANT NEOPLASM OF RIGHT FEMALE BREAST, UNSPECIFIED ESTROGEN RECEPTOR STATUS, UNSPECIFIED SITE OF BREAST (H): ICD-10-CM

## 2017-10-16 DIAGNOSIS — C50.411 MALIGNANT NEOPLASM OF UPPER-OUTER QUADRANT OF RIGHT BREAST IN FEMALE, ESTROGEN RECEPTOR POSITIVE (H): Primary | ICD-10-CM

## 2017-10-16 PROCEDURE — 25000128 H RX IP 250 OP 636: Performed by: NURSE ANESTHETIST, CERTIFIED REGISTERED

## 2017-10-16 PROCEDURE — 40000936 ZZH STATISTIC OUTPATIENT (NON-OBS) NIGHT

## 2017-10-16 PROCEDURE — 71000012 ZZH RECOVERY PHASE 1 LEVEL 1 FIRST HR: Performed by: SURGERY

## 2017-10-16 PROCEDURE — 88377 M/PHMTRC ALYS ISHQUANT/SEMIQ: CPT | Performed by: PATHOLOGY

## 2017-10-16 PROCEDURE — 88360 TUMOR IMMUNOHISTOCHEM/MANUAL: CPT | Performed by: SURGERY

## 2017-10-16 PROCEDURE — 38525 BIOPSY/REMOVAL LYMPH NODES: CPT | Mod: 50 | Performed by: SURGERY

## 2017-10-16 PROCEDURE — 88307 TISSUE EXAM BY PATHOLOGIST: CPT | Mod: 26,59 | Performed by: SURGERY

## 2017-10-16 PROCEDURE — 19303 MAST SIMPLE COMPLETE: CPT | Mod: 50 | Performed by: SURGERY

## 2017-10-16 PROCEDURE — 88331 PATH CONSLTJ SURG 1 BLK 1SPC: CPT | Mod: 91 | Performed by: SURGERY

## 2017-10-16 PROCEDURE — 25000128 H RX IP 250 OP 636: Performed by: PHYSICIAN ASSISTANT

## 2017-10-16 PROCEDURE — 25000128 H RX IP 250 OP 636: Performed by: ANESTHESIOLOGY

## 2017-10-16 PROCEDURE — 88342 IMHCHEM/IMCYTCHM 1ST ANTB: CPT | Mod: 26 | Performed by: SURGERY

## 2017-10-16 PROCEDURE — 88360 TUMOR IMMUNOHISTOCHEM/MANUAL: CPT | Mod: 26,59 | Performed by: SURGERY

## 2017-10-16 PROCEDURE — 36000056 ZZH SURGERY LEVEL 3 1ST 30 MIN: Performed by: SURGERY

## 2017-10-16 PROCEDURE — 25000125 ZZHC RX 250: Performed by: SURGERY

## 2017-10-16 PROCEDURE — 88341 IMHCHEM/IMCYTCHM EA ADD ANTB: CPT | Mod: 26 | Performed by: SURGERY

## 2017-10-16 PROCEDURE — S0020 INJECTION, BUPIVICAINE HYDRO: HCPCS | Performed by: SURGERY

## 2017-10-16 PROCEDURE — 19303 MAST SIMPLE COMPLETE: CPT | Mod: AS | Performed by: PHYSICIAN ASSISTANT

## 2017-10-16 PROCEDURE — 00000158 ZZHCL STATISTIC H-FISH PROCESS B/S: Performed by: SURGERY

## 2017-10-16 PROCEDURE — 25000125 ZZHC RX 250: Performed by: NURSE ANESTHETIST, CERTIFIED REGISTERED

## 2017-10-16 PROCEDURE — 88342 IMHCHEM/IMCYTCHM 1ST ANTB: CPT | Performed by: SURGERY

## 2017-10-16 PROCEDURE — 25000566 ZZH SEVOFLURANE, EA 15 MIN: Performed by: SURGERY

## 2017-10-16 PROCEDURE — 88360 TUMOR IMMUNOHISTOCHEM/MANUAL: CPT | Mod: 26 | Performed by: SURGERY

## 2017-10-16 PROCEDURE — 88341 IMHCHEM/IMCYTCHM EA ADD ANTB: CPT | Performed by: SURGERY

## 2017-10-16 PROCEDURE — 27210794 ZZH OR GENERAL SUPPLY STERILE: Performed by: SURGERY

## 2017-10-16 PROCEDURE — 40000935 ZZH STATISTIC OUTPATIENT (NON-OBS) EVE

## 2017-10-16 PROCEDURE — 37000008 ZZH ANESTHESIA TECHNICAL FEE, 1ST 30 MIN: Performed by: SURGERY

## 2017-10-16 PROCEDURE — 25000132 ZZH RX MED GY IP 250 OP 250 PS 637: Performed by: PHYSICIAN ASSISTANT

## 2017-10-16 PROCEDURE — 00000093 ZZHCL STATISTIC COURTESY CONSULT: Performed by: SURGERY

## 2017-10-16 PROCEDURE — 88331 PATH CONSLTJ SURG 1 BLK 1SPC: CPT | Mod: 26,59 | Performed by: SURGERY

## 2017-10-16 PROCEDURE — 00000159 ZZHCL STATISTIC H-SEND OUTS PREP: Performed by: SURGERY

## 2017-10-16 PROCEDURE — 40000170 ZZH STATISTIC PRE-PROCEDURE ASSESSMENT II: Performed by: SURGERY

## 2017-10-16 PROCEDURE — 36000058 ZZH SURGERY LEVEL 3 EA 15 ADDTL MIN: Performed by: SURGERY

## 2017-10-16 PROCEDURE — 88307 TISSUE EXAM BY PATHOLOGIST: CPT | Performed by: SURGERY

## 2017-10-16 PROCEDURE — 25000128 H RX IP 250 OP 636: Performed by: SURGERY

## 2017-10-16 PROCEDURE — 37000009 ZZH ANESTHESIA TECHNICAL FEE, EACH ADDTL 15 MIN: Performed by: SURGERY

## 2017-10-16 PROCEDURE — 71000013 ZZH RECOVERY PHASE 1 LEVEL 1 EA ADDTL HR: Performed by: SURGERY

## 2017-10-16 RX ORDER — ONDANSETRON 2 MG/ML
4 INJECTION INTRAMUSCULAR; INTRAVENOUS EVERY 30 MIN PRN
Status: DISCONTINUED | OUTPATIENT
Start: 2017-10-16 | End: 2017-10-16 | Stop reason: HOSPADM

## 2017-10-16 RX ORDER — HYDROCODONE BITARTRATE AND ACETAMINOPHEN 5; 325 MG/1; MG/1
1-2 TABLET ORAL EVERY 4 HOURS PRN
Qty: 30 TABLET | Refills: 0 | Status: SHIPPED | OUTPATIENT
Start: 2017-10-16 | End: 2017-10-26

## 2017-10-16 RX ORDER — CEFAZOLIN SODIUM 2 G/100ML
2 INJECTION, SOLUTION INTRAVENOUS
Status: COMPLETED | OUTPATIENT
Start: 2017-10-16 | End: 2017-10-16

## 2017-10-16 RX ORDER — TRIAMTERENE/HYDROCHLOROTHIAZID 37.5-25 MG
1 TABLET ORAL EVERY MORNING
Status: DISCONTINUED | OUTPATIENT
Start: 2017-10-17 | End: 2017-10-17 | Stop reason: HOSPADM

## 2017-10-16 RX ORDER — ONDANSETRON 2 MG/ML
4 INJECTION INTRAMUSCULAR; INTRAVENOUS EVERY 6 HOURS PRN
Status: DISCONTINUED | OUTPATIENT
Start: 2017-10-16 | End: 2017-10-17 | Stop reason: HOSPADM

## 2017-10-16 RX ORDER — PROPOFOL 10 MG/ML
INJECTION, EMULSION INTRAVENOUS PRN
Status: DISCONTINUED | OUTPATIENT
Start: 2017-10-16 | End: 2017-10-16

## 2017-10-16 RX ORDER — CEFAZOLIN SODIUM 1 G/3ML
1 INJECTION, POWDER, FOR SOLUTION INTRAMUSCULAR; INTRAVENOUS SEE ADMIN INSTRUCTIONS
Status: DISCONTINUED | OUTPATIENT
Start: 2017-10-16 | End: 2017-10-16 | Stop reason: HOSPADM

## 2017-10-16 RX ORDER — LEVOTHYROXINE SODIUM 75 UG/1
75 TABLET ORAL EVERY MORNING
Status: DISCONTINUED | OUTPATIENT
Start: 2017-10-17 | End: 2017-10-17 | Stop reason: HOSPADM

## 2017-10-16 RX ORDER — SODIUM CHLORIDE, SODIUM LACTATE, POTASSIUM CHLORIDE, CALCIUM CHLORIDE 600; 310; 30; 20 MG/100ML; MG/100ML; MG/100ML; MG/100ML
INJECTION, SOLUTION INTRAVENOUS CONTINUOUS PRN
Status: DISCONTINUED | OUTPATIENT
Start: 2017-10-16 | End: 2017-10-16

## 2017-10-16 RX ORDER — HYDROMORPHONE HYDROCHLORIDE 1 MG/ML
.2-.4 INJECTION, SOLUTION INTRAMUSCULAR; INTRAVENOUS; SUBCUTANEOUS
Status: DISCONTINUED | OUTPATIENT
Start: 2017-10-16 | End: 2017-10-17 | Stop reason: HOSPADM

## 2017-10-16 RX ORDER — PROPOFOL 10 MG/ML
INJECTION, EMULSION INTRAVENOUS CONTINUOUS PRN
Status: DISCONTINUED | OUTPATIENT
Start: 2017-10-16 | End: 2017-10-16

## 2017-10-16 RX ORDER — ONDANSETRON 4 MG/1
4 TABLET, ORALLY DISINTEGRATING ORAL EVERY 6 HOURS PRN
Status: DISCONTINUED | OUTPATIENT
Start: 2017-10-16 | End: 2017-10-17 | Stop reason: HOSPADM

## 2017-10-16 RX ORDER — ONDANSETRON 2 MG/ML
INJECTION INTRAMUSCULAR; INTRAVENOUS PRN
Status: DISCONTINUED | OUTPATIENT
Start: 2017-10-16 | End: 2017-10-16

## 2017-10-16 RX ORDER — FENTANYL CITRATE 50 UG/ML
INJECTION, SOLUTION INTRAMUSCULAR; INTRAVENOUS PRN
Status: DISCONTINUED | OUTPATIENT
Start: 2017-10-16 | End: 2017-10-16

## 2017-10-16 RX ORDER — EPHEDRINE SULFATE 50 MG/ML
INJECTION, SOLUTION INTRAMUSCULAR; INTRAVENOUS; SUBCUTANEOUS PRN
Status: DISCONTINUED | OUTPATIENT
Start: 2017-10-16 | End: 2017-10-16

## 2017-10-16 RX ORDER — DEXAMETHASONE SODIUM PHOSPHATE 4 MG/ML
INJECTION, SOLUTION INTRA-ARTICULAR; INTRALESIONAL; INTRAMUSCULAR; INTRAVENOUS; SOFT TISSUE PRN
Status: DISCONTINUED | OUTPATIENT
Start: 2017-10-16 | End: 2017-10-16

## 2017-10-16 RX ORDER — NALOXONE HYDROCHLORIDE 0.4 MG/ML
.1-.4 INJECTION, SOLUTION INTRAMUSCULAR; INTRAVENOUS; SUBCUTANEOUS
Status: DISCONTINUED | OUTPATIENT
Start: 2017-10-16 | End: 2017-10-16 | Stop reason: HOSPADM

## 2017-10-16 RX ORDER — LIDOCAINE HYDROCHLORIDE 10 MG/ML
INJECTION, SOLUTION EPIDURAL; INFILTRATION; INTRACAUDAL; PERINEURAL
Status: DISCONTINUED
Start: 2017-10-16 | End: 2017-10-16 | Stop reason: HOSPADM

## 2017-10-16 RX ORDER — IBUPROFEN 600 MG/1
600 TABLET, FILM COATED ORAL EVERY 6 HOURS PRN
Status: DISCONTINUED | OUTPATIENT
Start: 2017-10-16 | End: 2017-10-17 | Stop reason: HOSPADM

## 2017-10-16 RX ORDER — PHYSOSTIGMINE SALICYLATE 1 MG/ML
1.2 INJECTION INTRAVENOUS
Status: DISCONTINUED | OUTPATIENT
Start: 2017-10-16 | End: 2017-10-16 | Stop reason: HOSPADM

## 2017-10-16 RX ORDER — OXYCODONE AND ACETAMINOPHEN 5; 325 MG/1; MG/1
1-2 TABLET ORAL EVERY 4 HOURS PRN
Status: DISCONTINUED | OUTPATIENT
Start: 2017-10-16 | End: 2017-10-16

## 2017-10-16 RX ORDER — ACETAMINOPHEN 10 MG/ML
INJECTION, SOLUTION INTRAVENOUS PRN
Status: DISCONTINUED | OUTPATIENT
Start: 2017-10-16 | End: 2017-10-16

## 2017-10-16 RX ORDER — AMOXICILLIN 250 MG
1 CAPSULE ORAL AT BEDTIME
Status: DISCONTINUED | OUTPATIENT
Start: 2017-10-16 | End: 2017-10-17 | Stop reason: HOSPADM

## 2017-10-16 RX ORDER — KETOROLAC TROMETHAMINE 15 MG/ML
15 INJECTION, SOLUTION INTRAMUSCULAR; INTRAVENOUS ONCE
Status: COMPLETED | OUTPATIENT
Start: 2017-10-16 | End: 2017-10-16

## 2017-10-16 RX ORDER — SODIUM CHLORIDE, SODIUM LACTATE, POTASSIUM CHLORIDE, CALCIUM CHLORIDE 600; 310; 30; 20 MG/100ML; MG/100ML; MG/100ML; MG/100ML
INJECTION, SOLUTION INTRAVENOUS CONTINUOUS
Status: DISCONTINUED | OUTPATIENT
Start: 2017-10-16 | End: 2017-10-17 | Stop reason: HOSPADM

## 2017-10-16 RX ORDER — FENTANYL CITRATE 50 UG/ML
25-50 INJECTION, SOLUTION INTRAMUSCULAR; INTRAVENOUS EVERY 5 MIN PRN
Status: DISCONTINUED | OUTPATIENT
Start: 2017-10-16 | End: 2017-10-16 | Stop reason: HOSPADM

## 2017-10-16 RX ORDER — ACETAMINOPHEN 10 MG/ML
INJECTION, SOLUTION INTRAVENOUS
Status: COMPLETED
Start: 2017-10-16 | End: 2017-10-16

## 2017-10-16 RX ORDER — PRAVASTATIN SODIUM 40 MG
20 TABLET ORAL AT BEDTIME
Status: DISCONTINUED | OUTPATIENT
Start: 2017-10-16 | End: 2017-10-17 | Stop reason: HOSPADM

## 2017-10-16 RX ORDER — NALOXONE HYDROCHLORIDE 0.4 MG/ML
.1-.4 INJECTION, SOLUTION INTRAMUSCULAR; INTRAVENOUS; SUBCUTANEOUS
Status: DISCONTINUED | OUTPATIENT
Start: 2017-10-16 | End: 2017-10-17 | Stop reason: HOSPADM

## 2017-10-16 RX ORDER — BUPIVACAINE HYDROCHLORIDE 2.5 MG/ML
INJECTION, SOLUTION EPIDURAL; INFILTRATION; INTRACAUDAL
Status: DISCONTINUED
Start: 2017-10-16 | End: 2017-10-16 | Stop reason: HOSPADM

## 2017-10-16 RX ORDER — PROCHLORPERAZINE MALEATE 5 MG
5-10 TABLET ORAL EVERY 6 HOURS PRN
Status: DISCONTINUED | OUTPATIENT
Start: 2017-10-16 | End: 2017-10-17 | Stop reason: HOSPADM

## 2017-10-16 RX ORDER — HYDROCODONE BITARTRATE AND ACETAMINOPHEN 5; 325 MG/1; MG/1
1-2 TABLET ORAL EVERY 4 HOURS PRN
Status: DISCONTINUED | OUTPATIENT
Start: 2017-10-16 | End: 2017-10-17 | Stop reason: HOSPADM

## 2017-10-16 RX ORDER — MEPERIDINE HYDROCHLORIDE 25 MG/ML
12.5 INJECTION INTRAMUSCULAR; INTRAVENOUS; SUBCUTANEOUS
Status: DISCONTINUED | OUTPATIENT
Start: 2017-10-16 | End: 2017-10-16 | Stop reason: HOSPADM

## 2017-10-16 RX ORDER — FENTANYL CITRATE 50 UG/ML
25-50 INJECTION, SOLUTION INTRAMUSCULAR; INTRAVENOUS
Status: DISCONTINUED | OUTPATIENT
Start: 2017-10-16 | End: 2017-10-16 | Stop reason: HOSPADM

## 2017-10-16 RX ORDER — LIDOCAINE HYDROCHLORIDE 20 MG/ML
INJECTION, SOLUTION INFILTRATION; PERINEURAL PRN
Status: DISCONTINUED | OUTPATIENT
Start: 2017-10-16 | End: 2017-10-16

## 2017-10-16 RX ORDER — ONDANSETRON 4 MG/1
4 TABLET, ORALLY DISINTEGRATING ORAL EVERY 30 MIN PRN
Status: DISCONTINUED | OUTPATIENT
Start: 2017-10-16 | End: 2017-10-16 | Stop reason: HOSPADM

## 2017-10-16 RX ORDER — SODIUM CHLORIDE, SODIUM LACTATE, POTASSIUM CHLORIDE, CALCIUM CHLORIDE 600; 310; 30; 20 MG/100ML; MG/100ML; MG/100ML; MG/100ML
INJECTION, SOLUTION INTRAVENOUS CONTINUOUS
Status: DISCONTINUED | OUTPATIENT
Start: 2017-10-16 | End: 2017-10-16 | Stop reason: HOSPADM

## 2017-10-16 RX ADMIN — FENTANYL CITRATE 50 MCG: 50 INJECTION, SOLUTION INTRAMUSCULAR; INTRAVENOUS at 12:52

## 2017-10-16 RX ADMIN — Medication 5 MG: at 12:37

## 2017-10-16 RX ADMIN — FENTANYL CITRATE 50 MCG: 50 INJECTION, SOLUTION INTRAMUSCULAR; INTRAVENOUS at 12:28

## 2017-10-16 RX ADMIN — MIDAZOLAM HYDROCHLORIDE 2 MG: 1 INJECTION, SOLUTION INTRAMUSCULAR; INTRAVENOUS at 12:28

## 2017-10-16 RX ADMIN — BUPIVACAINE HYDROCHLORIDE 35 ML: 2.5 INJECTION, SOLUTION EPIDURAL; INFILTRATION; INTRACAUDAL; PERINEURAL at 14:19

## 2017-10-16 RX ADMIN — SODIUM CHLORIDE, POTASSIUM CHLORIDE, SODIUM LACTATE AND CALCIUM CHLORIDE: 600; 310; 30; 20 INJECTION, SOLUTION INTRAVENOUS at 16:15

## 2017-10-16 RX ADMIN — TILMANOCEPT 0.58 MILLICURIE: KIT at 10:05

## 2017-10-16 RX ADMIN — SODIUM CHLORIDE, POTASSIUM CHLORIDE, SODIUM LACTATE AND CALCIUM CHLORIDE: 600; 310; 30; 20 INJECTION, SOLUTION INTRAVENOUS at 12:26

## 2017-10-16 RX ADMIN — LIDOCAINE HYDROCHLORIDE 40 MG: 20 INJECTION, SOLUTION INFILTRATION; PERINEURAL at 12:28

## 2017-10-16 RX ADMIN — DEXAMETHASONE SODIUM PHOSPHATE 4 MG: 4 INJECTION, SOLUTION INTRA-ARTICULAR; INTRALESIONAL; INTRAMUSCULAR; INTRAVENOUS; SOFT TISSUE at 12:35

## 2017-10-16 RX ADMIN — FENTANYL CITRATE 50 MCG: 50 INJECTION, SOLUTION INTRAMUSCULAR; INTRAVENOUS at 15:32

## 2017-10-16 RX ADMIN — ONDANSETRON 4 MG: 2 INJECTION INTRAMUSCULAR; INTRAVENOUS at 14:30

## 2017-10-16 RX ADMIN — KETOROLAC TROMETHAMINE 15 MG: 15 INJECTION, SOLUTION INTRAMUSCULAR; INTRAVENOUS at 16:24

## 2017-10-16 RX ADMIN — SENNOSIDES AND DOCUSATE SODIUM 1 TABLET: 8.6; 5 TABLET ORAL at 23:45

## 2017-10-16 RX ADMIN — FENTANYL CITRATE 50 MCG: 50 INJECTION, SOLUTION INTRAMUSCULAR; INTRAVENOUS at 15:08

## 2017-10-16 RX ADMIN — SODIUM CHLORIDE, POTASSIUM CHLORIDE, SODIUM LACTATE AND CALCIUM CHLORIDE: 600; 310; 30; 20 INJECTION, SOLUTION INTRAVENOUS at 18:19

## 2017-10-16 RX ADMIN — PROPOFOL 160 MG: 10 INJECTION, EMULSION INTRAVENOUS at 12:28

## 2017-10-16 RX ADMIN — FENTANYL CITRATE 50 MCG: 50 INJECTION, SOLUTION INTRAMUSCULAR; INTRAVENOUS at 13:27

## 2017-10-16 RX ADMIN — SODIUM CHLORIDE, POTASSIUM CHLORIDE, SODIUM LACTATE AND CALCIUM CHLORIDE: 600; 310; 30; 20 INJECTION, SOLUTION INTRAVENOUS at 14:12

## 2017-10-16 RX ADMIN — FENTANYL CITRATE 25 MCG: 50 INJECTION, SOLUTION INTRAMUSCULAR; INTRAVENOUS at 14:13

## 2017-10-16 RX ADMIN — METHYLENE BLUE 7 ML: 10 INJECTION INTRAVENOUS at 12:45

## 2017-10-16 RX ADMIN — ACETAMINOPHEN 1000 MG: 10 INJECTION, SOLUTION INTRAVENOUS at 12:51

## 2017-10-16 RX ADMIN — CEFAZOLIN SODIUM 2 G: 2 INJECTION, SOLUTION INTRAVENOUS at 12:34

## 2017-10-16 RX ADMIN — PROPOFOL 50 MCG/KG/MIN: 10 INJECTION, EMULSION INTRAVENOUS at 14:20

## 2017-10-16 RX ADMIN — PRAVASTATIN SODIUM 20 MG: 40 TABLET ORAL at 23:45

## 2017-10-16 RX ADMIN — PROPOFOL 180 MCG/KG/MIN: 10 INJECTION, EMULSION INTRAVENOUS at 12:28

## 2017-10-16 ASSESSMENT — LIFESTYLE VARIABLES: TOBACCO_USE: 1

## 2017-10-16 NOTE — OP NOTE
PREOPERATIVE DIAGNOSIS: Left-sided lobular breast cancer.          Right Radial Scar      POSTOPERATIVE DIAGNOSIS: Left-sided lobular breast cancer.             Right Radial Scar      PROCEDURE:   1. Bilateral mastectomy.   2. Bilateral sentinel lymph node biopsy.       ASSISTANT: Judy Carter PA-C      ANESTHESIA: LMA.       COMPLICATIONS: None.       BLOOD LOSS: 10 cc.       FINDINGS- Bilateral node reported as negative      INDICATIONS: Mrs. Calabrese presented with a Left sided lobular breast cancer and right radial scar. She has elected to go forward with bilateral simple mastectomy and node biopsy. I explained the risks, benefits, complications including but not limited to bleeding, infection, possible postop hematoma, seroma, skin necrosis, lymphedema, axillary nerve injury. If any of these occurred, she would require additional procedures. Patient did agree and did sign consent.       DETAILS OF PROCEDURE: The patient was brought to the operating room per anesthesia. She had a preop sentinel node injection, and 5 mL of blue dye was also injected. She was then prepped and draped in the usual fashion using ChloraPrep. A surgical timeout was then performed, verifying the correct surgeon, site, procedure and patient, and all in the room were in agreement. The boundaries were marked with a pen bilateral and an elliptical incision was made on the left. I then created planes to get into the axilla. The [place] counter was used and I was able to follow blue dye into the axillary fat pad. Immediately, I found 1 small blue node. I removed this and had a count of 1200. I then got into the axillary fat pad further and found 1 additional node with a count of 500. I placed the [place] counter back into the axilla and found no count was found. I explored the area and did not palpate any other abnormalities. The nodes were sent for frozen section and reported to be negative. I then created the flaps with cautery going up to the  sternal border, the clavicle, the inframammary ridge and the lateral chest wall. This was taken down with minimal bleeding. Cautery was used for hemostasis when needed. I then took the breast off with the clavipectoral fascia. This was done without significant bleeding or other findings. The breast was marked and sent to pathology. It was then copiously irrigated with saline until it was cleared. There was no bleeding whatsoever. A drain was placed and anchored with 3-0 nylon suture.   An incision was then made on the right and planes were created to get into the axilla. The [place] counter was used and I was able to follow blue dye into the axillary fat pad. Immediately, I found 1 small blue node. I removed this and had a count of 500.  I placed the [place] counter back into the axilla and found no count was found. I explored the area and did not palpate any other abnormalities. The nodes were sent for frozen section and reported to be negative. I then created the flaps with cautery going up to the sternal border, the clavicle, the inframammary ridge and the lateral chest wall. This was taken down with minimal bleeding. Cautery was used for hemostasis when needed. I then took the breast off with the clavipectoral fascia. This was done without significant bleeding or other findings. The breast was marked and sent to pathology. It was then copiously irrigated with saline until it was cleared. There was no bleeding whatsoever. A drain was placed and anchored with 3-0 nylon suture. The skin was approximated with multiple 3-0 Vicryls and a 4-0 Monocryl in subcuticular fashion bilateraly. Steri-Strips were applied. The patient was awoken from anesthesia. She transferred to PACU in stable condition.  A PA was medically necessary for prepping, patient positioning, to aid in retraction, control of bleeding,and complex closure.           EMILEE QUINTERO MD     Please CC to referring provider and PCP

## 2017-10-16 NOTE — IP AVS SNAPSHOT
MRN:[id number]                      After Visit Summary   10/16/2017    Suma Calabrese    MRN: [id number]           Thank you!     Thank you for choosing [facility] for your care. Our goal is always to provide you with excellent care. Hearing back from our patients is one way we can continue to improve our services. Please take a few minutes to complete the written survey that you may receive in the mail after you visit with us. Thank you!        Patient Information     Date Of Birth          1955        Designated Caregiver       Most Recent Value    Caregiver    Will someone help with your care after discharge? yes    Name of designated caregiver Analisa Maldonado    Phone number of caregiver [phone]    Caregiver address [address] pt unsure of daughters address      About your hospital stay     You were admitted on:  October 16, 2017 [facility]    You were discharged on:  October 17, 2017        Reason for your hospital stay       Bilateral mastectomy and bilateral sentinel lymph node biopsy with Dr. Van Salazar                  Who to Call     For medical emergencies, please call 911.  For non-urgent questions about your medical care, please call your primary care provider or clinic, [phone]  For questions related to your surgery, please call your surgery clinic        Attending Provider     Provider Specialty    Van Salazar MD Surgery       Primary Care Provider Office Phone # Fax #    Murali Aria Kenney [phone]      After Care Instructions     Activity       Avoid strenuous upper body activity or heavy lifting >20 pounds for 2 weeks.            Diet       Gradually resume your regular diet as tolerated.            Discharge Instructions       CONSTIPATION PREVENTION  We recommend that you go to a pharmacy and purchase ONE of the following stool softeners/laxatives and start taking today to  prevent constipation. You can stop this bowel regimen once you are no longer taking your narcotic pain medication or are having regular bowel movements:    - Senokot oral once daily  - Milk of magnesium once daily  - Docusate Sodium 1 pill twice daily (stool softener)  - Miralax 1 scoop/packet 1-2 times daily (laxative)    In addition to the above options, if constipation continues, you can add:   - 4 oz Prune juice or Plum juice daily for constipation            Wound care and dressings       Wear the ace wrap for the first 2-3 days postoperatively. Keep your dressings clean and dry.  After 2 days (Wednesday or later) you may remove the outer white dressing and tape. White steri strips that are over the incision are to remain in place and will typically fall off on their own in 7-10 days. Do not attempt to replace these if they should fall off sooner. Do not submerge or soak your incision under water for 2 weeks. You can shower normally, allowing warm water and soap to run over the incision after you have removed the outer dressing. After showering always pat the incision dry.                  Follow-up Appointments     Follow Up and recommended labs and tests       Follow-up next week with Dr. Salazar for your first postoperative appointment and drain removal. Call our office at [phone] to schedule this appointment. If your drain output is still high the day prior to your appointment (>40cc per day) cancel the appointment and reschedule when your drain output is 30cc or less for at least 2 consecutive days.            Follow-up and recommended labs and tests        DRAIN CARE  You have 2 ASHWINI drains. In order to empty this, open the tab/vent on the drain and record how much liquid you remove. To properly close the drain, squeeze the bulb (with tab/vent open) then close the tab while still squeezing the bulb. You should notice that liquid moves in the tubing toward the bulb if it is properly closed.   You should  "also strip the drain tubing once daily to avoid any clogging. You do this by gently pinching the drain, starting near the skin, and stretching the tubing out this way until you reach the bulb. Do not pull hard on the drain tubing to the point of pulling the drain away from the skin.     When each drain is putting out 30cc or less for at least 2 consecutive days, call Dr. Salazar's office and schedule an appointment with him for drain removal.                  Your next 10 appointments already scheduled     Oct 26, 2017  1:00 PM CDT   Post Op with Van Salazar MD   [facility] ([facility])    [address]  [address]  [place] [address] [phone]              Pending Results     Date and Time Order Name Status Description    10/16/2017 1312 Surgical pathology exam In process             Statement of Approval     Ordered          10/17/17 6066  I have reviewed and agree with all the recommendations and orders detailed in this document.  EFFECTIVE NOW     Approved and electronically signed by:  Van Salazar MD             Admission Information     Date & Time Provider Department Dept. Phone    10/16/2017 Van Salazar MD [facility] [phone]      Your Vitals Were     Blood Pressure Temperature Respirations Height Weight Last Period    103/63 (BP Location: Left arm) 98.7  F (37.1  C) (Oral) 16 1.676 m (5' 6\") 54.6 kg (120 lb 6.4 oz) (LMP Unknown)    Pulse Oximetry BMI (Body Mass Index)                96% 19.43 kg/m2          [employer] Information     [employer] gives you secure access to your electronic health record. If you see a primary care provider, you can also send messages to your care team and make appointments. If you have questions, please call your primary care clinic.  If you do not have a primary care provider, please call [phone] and they will assist you.        Care EveryWhere ID     " This is your Care EveryWhere ID. This could be used by other organizations to access your [place] medical records  [id number]        Equal Access to Services     KIANNA ROMERO : Hadii beata Martínez, amauri walters, daphniemax allenstevieashleigh sierraanicetoashleigh, waxtolu blanein hayaaraya sierraarpita garcia lakarenraya moraes. So [facility] [phone].    ATENCIÓN: Si habla español, tiene a yepez disposición servicios gratuitos de asistencia lingüística. Llame al [phone].    We comply with applicable federal civil rights laws and [place] laws. We do not discriminate on the basis of race, color, national origin, age, disability, sex, sexual orientation, or gender identity.               Review of your medicines      START taking        Dose / Directions    * HYDROcodone-acetaminophen 5-325 MG per tablet   Commonly known as:  NORCO   Used for:  Malignant neoplasm of upper-outer quadrant of right breast in female, estrogen receptor positive (H)        Dose:  1-2 tablet   Take 1-2 tablets by mouth every 4 hours as needed for moderate to severe pain maximum 8 tablet(s) per day   Quantity:  30 tablet   Refills:  0       * HYDROcodone-acetaminophen 5-325 MG per tablet   Commonly known as:  NORCO   Used for:  Malignant neoplasm of upper-outer quadrant of right breast in female, estrogen receptor positive (H)        Dose:  1-2 tablet   Take 1-2 tablets by mouth every 4 hours as needed for moderate to severe pain   Quantity:  20 tablet   Refills:  0       ondansetron 4 MG ODT tab   Commonly known as:  ZOFRAN-ODT   Used for:  Malignant neoplasm of upper-outer quadrant of right breast in female, estrogen receptor positive (H)        Dose:  4 mg   Take 1 tablet (4 mg) by mouth every 6 hours as needed for nausea or vomiting   Quantity:  6 tablet   Refills:  0       * Notice:  This list has 2 medication(s) that are the same as other medications prescribed for you. Read the directions carefully, and ask your doctor or other care provider to review them with you.       CONTINUE these medicines which have NOT CHANGED        Dose / Directions    ADVIL PO        Dose:  200 mg   Take 200 mg by mouth every 4 hours as needed for moderate pain   Refills:  0       CLARITIN PO        Dose:  1 Dose   Take 1 Dose by mouth daily as needed   Refills:  0       PRAVACHOL PO        Dose:  20 mg   Take 20 mg by mouth At Bedtime   Refills:  0       SYNTHROID PO        Dose:  75 mcg   Take 75 mcg by mouth every morning   Refills:  0       triamterene-hydrochlorothiazide 37.5-25 MG per tablet   Commonly known as:  MAXZIDE-25        Dose:  1 tablet   Take 1 tablet by mouth every morning   Refills:  0       VITAMIN D (CHOLECALCIFEROL) PO        Dose:  1000 Units   Take 1,000 Units by mouth every morning   Refills:  0            Where to get your medicines      Some of these will need a paper prescription and others can be bought over the counter. Ask your nurse if you have questions.     Bring a paper prescription for each of these medications     HYDROcodone-acetaminophen 5-325 MG per tablet    HYDROcodone-acetaminophen 5-325 MG per tablet    ondansetron 4 MG ODT tab                Protect others around you: Learn how to safely use, store and throw away your medicines at www.disposemymeds.org.             Medication List: This is a list of all your medications and when to take them. Check marks below indicate your daily home schedule. Keep this list as a reference.      Medications           Morning Afternoon Evening Bedtime As Needed    ADVIL PO   Take 200 mg by mouth every 4 hours as needed for moderate pain                                   CLARITIN PO   Take 1 Dose by mouth daily as needed                                   * HYDROcodone-acetaminophen 5-325 MG per tablet   Commonly known as:  NORCO   Take 1-2 tablets by mouth every 4 hours as needed for moderate to severe pain maximum 8 tablet(s) per day   Last time this was given:  1 tablet on 10/17/2017  8:55 AM   Next Dose Due:  May have  again @1 p.m. If needed for pain control.                                    * HYDROcodone-acetaminophen 5-325 MG per tablet   Commonly known as:  NORCO   Take 1-2 tablets by mouth every 4 hours as needed for moderate to severe pain   Last time this was given:  1 tablet on 10/17/2017  8:55 AM                                ondansetron 4 MG ODT tab   Commonly known as:  ZOFRAN-ODT   Take 1 tablet (4 mg) by mouth every 6 hours as needed for nausea or vomiting                                   PRAVACHOL PO   Take 20 mg by mouth At Bedtime   Last time this was given:  20 mg on 10/16/2017 11:45 PM   Next Dose Due:  May take again tonight.                                    SYNTHROID PO   Take 75 mcg by mouth every morning   Last time this was given:  75 mcg on 10/17/2017  8:55 AM   Next Dose Due:  Next dose due tomorrow.                                    triamterene-hydrochlorothiazide 37.5-25 MG per tablet   Commonly known as:  MAXZIDE-25   Take 1 tablet by mouth every morning   Last time this was given:  1 tablet on 10/17/2017  8:55 AM   Next Dose Due:  Next dose tomorrow.                                    VITAMIN D (CHOLECALCIFEROL) PO   Take 1,000 Units by mouth every morning   Next Dose Due:  Next dose --may restart today or tomorrow.                                    * Notice:  This list has 2 medication(s) that are the same as other medications prescribed for you. Read the directions carefully, and ask your doctor or other care provider to review them with you.

## 2017-10-16 NOTE — PROGRESS NOTES
Admission medication history interview status for the 10/16/2017  admission is complete. See EPIC admission navigator for prior to admission medications     Medication history source reliability:Good    Medication history interview source(s):Patient    Medication history resources (including written lists, pill bottles, clinic record):Patient mailed in a med list prior to day of procedure.    Primary pharmacy.[facility] ([place])    Additional medication history information not noted on PTA med list :None    Time spent in this activity: 30 minutes    Prior to Admission medications    Medication Sig Last Dose Taking? Auth Provider   Levothyroxine Sodium (SYNTHROID PO) Take 75 mcg by mouth every morning 10/16/2017 at 0645 Yes Reported, Patient   Pravastatin Sodium (PRAVACHOL PO) Take 20 mg by mouth At Bedtime 10/15/2017 at 1830 Yes Reported, Patient   Ibuprofen (ADVIL PO) Take 200 mg by mouth every 4 hours as needed for moderate pain 10/12/2017 at prn Yes Reported, Patient   VITAMIN D, CHOLECALCIFEROL, PO Take 1,000 Units by mouth every morning 10/15/2017 at am Yes Reported, Patient   Loratadine (CLARITIN PO) Take 1 Dose by mouth daily as needed Over 2 weeks ago at prn Yes Reported, Patient   triamterene-hydrochlorothiazide (MAXZIDE-25) 37.5-25 MG per tablet Take 1 tablet by mouth every morning  10/16/2017 at 0640 Yes Reported, Patient

## 2017-10-16 NOTE — ANESTHESIA CARE TRANSFER NOTE
Patient: Suma Calabrese    Procedure(s):  BILATERAL SIMPLE MASTECTOMY, BILATERAL AXILLARY SENTINEL NODE BIOPSY - Wound Class: I-Clean   - Wound Class: I-Clean    Diagnosis: RIGHT BREAST CANCER  Diagnosis Additional Information: No value filed.    Anesthesia Type:   General, LMA     Note:  Airway :Face Mask  Patient transferred to:PACU  Comments: Patient to PACU, 10L o2 via face mask, exchanging well, VSS; Stable transfer of care, report to RN.  Handoff Report: Identifed the Patient, Identified the Reponsible Provider, Reviewed the pertinent medical history, Discussed the surgical course, Reviewed Intra-OP anesthesia mangement and issues during anesthesia, Set expectations for post-procedure period and Allowed opportunity for questions and acknowledgement of understanding      Vitals: (Last set prior to Anesthesia Care Transfer)    CRNA VITALS  10/16/2017 1412 - 10/16/2017 1446      10/16/2017             Pulse: 73    SpO2: 99 %    Resp Rate (observed): 12    Resp Rate (set): 10    EKG: Sinus rhythm                Electronically Signed By: JT Dennison CRNA  October 16, 2017  2:46 PM

## 2017-10-16 NOTE — ANESTHESIA POSTPROCEDURE EVALUATION
Patient: Suma Calabrese    Procedure(s):  BILATERAL SIMPLE MASTECTOMY, BILATERAL AXILLARY SENTINEL NODE BIOPSY - Wound Class: I-Clean   - Wound Class: I-Clean    Diagnosis:RIGHT BREAST CANCER  Diagnosis Additional Information: No value filed.    Anesthesia Type:  General, LMA    Note:  Anesthesia Post Evaluation    Patient location during evaluation: PACU  Patient participation: Able to fully participate in evaluation  Level of consciousness: awake  Pain management: adequate  Airway patency: patent  Cardiovascular status: acceptable  Respiratory status: acceptable  Hydration status: acceptable  PONV: none     Anesthetic complications: None          Last vitals:  Vitals:    10/16/17 1530 10/16/17 1535 10/16/17 1545   BP: (!) 153/99 (!) 153/99 145/90   Resp: 19 9 12   Temp:      SpO2: 98% 98% 98%         Electronically Signed By: Angel Gee MD  October 16, 2017  3:58 PM

## 2017-10-16 NOTE — IP AVS SNAPSHOT
[facility]    [address] [place] [address]    Phone:  [phone]                                       After Visit Summary   10/16/2017    Suma Calabrese    MRN: [id number]           After Visit Summary Signature Page     I have received my discharge instructions, and my questions have been answered. I have discussed any challenges I see with this plan with the nurse or doctor.    ..........................................................................................................................................  Patient/Patient Representative Signature      ..........................................................................................................................................  Patient Representative Print Name and Relationship to Patient    ..................................................               ................................................  Date                                            Time    ..........................................................................................................................................  Reviewed by Signature/Title    ...................................................              ..............................................  Date                                                            Time

## 2017-10-16 NOTE — ANESTHESIA PREPROCEDURE EVALUATION
Anesthesia Evaluation     . Pt has had prior anesthetic.     No history of anesthetic complications          ROS/MED HX    ENT/Pulmonary:     (+)tobacco use, Current use 3/4 packs/day  , . .    Neurologic:       Cardiovascular:     (+) hypertension----. : . . . :. .       METS/Exercise Tolerance:     Hematologic:         Musculoskeletal:         GI/Hepatic:         Renal/Genitourinary:         Endo:     (+) thyroid problem hypothyroidism, .      Psychiatric:         Infectious Disease:         Malignancy:   (+) Malignancy History of Breast          Other:                     Physical Exam  Normal systems: cardiovascular and pulmonary    Airway   Mallampati: I  TM distance: >3 FB  Neck ROM: full    Dental     Cardiovascular       Pulmonary                     Anesthesia Plan      History & Physical Review  History and physical reviewed and following examination; no interval change.    ASA Status:  2 .    NPO Status:  > 8 hours    Plan for General and LMA with Intravenous and Propofol induction. Maintenance will be TIVA.    PONV prophylaxis:  Ondansetron (or other 5HT-3) and Dexamethasone or Solumedrol  IV Tylenol      Postoperative Care  Postoperative pain management:  IV analgesics and Oral pain medications.      Consents  Anesthetic plan, risks, benefits and alternatives discussed with:  Patient..                          .  DPreop diagnosis: RIGHT BREAST CANCER  Procedure(s):  MASTECTOMY SIMPLE BILATERAL  BIOPSY NODE SENTINEL  Allergies   Allergen Reactions     Bupropion Other (See Comments)     Jittery and HA     Erythromycin GI Disturbance     Oxycodone Nausea and Vomiting       No current facility-administered medications on file prior to encounter.   No current outpatient prescriptions on file prior to encounter.  Hemoglobin   Date Value Ref Range Status   10/11/2017 14.7 11.7 - 15.7 g/dL Final     Potassium   Date Value Ref Range Status   10/11/2017 5.2 3.4 - 5.3 mmol/L Final

## 2017-10-17 VITALS
BODY MASS INDEX: 19.35 KG/M2 | SYSTOLIC BLOOD PRESSURE: 103 MMHG | RESPIRATION RATE: 16 BRPM | TEMPERATURE: 98.7 F | HEIGHT: 66 IN | WEIGHT: 120.4 LBS | DIASTOLIC BLOOD PRESSURE: 63 MMHG | OXYGEN SATURATION: 96 %

## 2017-10-17 LAB — GLUCOSE BLDC GLUCOMTR-MCNC: 105 MG/DL (ref 70–99)

## 2017-10-17 PROCEDURE — 25000132 ZZH RX MED GY IP 250 OP 250 PS 637: Performed by: PHYSICIAN ASSISTANT

## 2017-10-17 PROCEDURE — 25000128 H RX IP 250 OP 636: Performed by: PHYSICIAN ASSISTANT

## 2017-10-17 PROCEDURE — 82962 GLUCOSE BLOOD TEST: CPT

## 2017-10-17 PROCEDURE — 40000934 ZZH STATISTIC OUTPATIENT (NON-OBS) DAY

## 2017-10-17 RX ORDER — ONDANSETRON 4 MG/1
4 TABLET, ORALLY DISINTEGRATING ORAL EVERY 6 HOURS PRN
Qty: 6 TABLET | Refills: 0 | Status: SHIPPED | OUTPATIENT
Start: 2017-10-17 | End: 2017-10-17

## 2017-10-17 RX ORDER — ONDANSETRON 4 MG/1
4 TABLET, ORALLY DISINTEGRATING ORAL EVERY 6 HOURS PRN
Qty: 6 TABLET | Refills: 0 | Status: SHIPPED | OUTPATIENT
Start: 2017-10-17 | End: 2017-10-26

## 2017-10-17 RX ORDER — HYDROCODONE BITARTRATE AND ACETAMINOPHEN 5; 325 MG/1; MG/1
1-2 TABLET ORAL EVERY 4 HOURS PRN
Qty: 20 TABLET | Refills: 0 | Status: SHIPPED | OUTPATIENT
Start: 2017-10-17 | End: 2017-10-26

## 2017-10-17 RX ADMIN — TRIAMTERENE AND HYDROCHLOROTHIAZIDE 1 TABLET: 37.5; 25 TABLET ORAL at 08:55

## 2017-10-17 RX ADMIN — SODIUM CHLORIDE, POTASSIUM CHLORIDE, SODIUM LACTATE AND CALCIUM CHLORIDE: 600; 310; 30; 20 INJECTION, SOLUTION INTRAVENOUS at 00:37

## 2017-10-17 RX ADMIN — HYDROCODONE BITARTRATE AND ACETAMINOPHEN 1 TABLET: 5; 325 TABLET ORAL at 00:36

## 2017-10-17 RX ADMIN — LEVOTHYROXINE SODIUM 75 MCG: 75 TABLET ORAL at 08:55

## 2017-10-17 RX ADMIN — HYDROCODONE BITARTRATE AND ACETAMINOPHEN 1 TABLET: 5; 325 TABLET ORAL at 08:55

## 2017-10-17 ASSESSMENT — PAIN DESCRIPTION - DESCRIPTORS: DESCRIPTORS: ACHING

## 2017-10-17 NOTE — DISCHARGE SUMMARY
Patient has been discharged to home. Home medication regimen and new medications discussed with patient and patient verbalizes understanding. Diet and activity and wound care discussed with patient and patient verbalizes understanding. Upcoming appointments also discussed. Patient had no questions. Sent w/discharge meds and mastectomy camisole.

## 2017-10-17 NOTE — PLAN OF CARE
Problem: Patient Care Overview  Goal: Plan of Care/Patient Progress Review  Outcome: Adequate for Discharge Date Met:  10/17/17  2 ASHWINI drains in place w/serosangeonous output (L>R). Educated on drain care. Reports mild HA, relieved w/norco, denies incisional pain. Ace bandage around chest, CDI. Ambulating SBA. Voiding adequately. Tolerating regular diet. Will d/c later this morning.

## 2017-10-17 NOTE — PLAN OF CARE
Problem: Patient Care Overview  Goal: Plan of Care/Patient Progress Review  Outcome: Improving  Pt is A+O x 4. BP is soft 95/55, asymptomatic, other VSS on RA. IVF is infusing. Bilateral dressings covered with ace wrap on chest are CDI. ASHWINI drains are patent with sanguineous drainage; R 50 cc L 70 cc. Tolerating regular diet. Up independently. Voiding blue tinged output. L hand PIV infiltrated. New PIV inserted. Denied pain. Amb in hallway. PCDs used. Daughter is at bedside.

## 2017-10-17 NOTE — PLAN OF CARE
Problem: Patient Care Overview  Goal: Plan of Care/Patient Progress Review  Outcome: Improving  RN: Alert and oriented X 4, up Ind without assistance. ACE wrap in place over chest; CDI. Denies associated pain. Upper back pain improved with oral Norco. ASHWINI drains patent and draining. Tolerating regular diet. Voiding adequately without difficulty. Daughter stayed overnight at bedside. Plan to d/c home today if stable and improving.

## 2017-10-17 NOTE — DISCHARGE SUMMARY
Admission Date: 10/16/2017  Dismissal Date: 10/17/2016    Diagnoses:  Patient Active Problem List   Diagnosis     Nontoxic uninodular goiter     Rectal prolapse     Hypertension goal BP (blood pressure) < 140/80     Internal hemorrhoids with other complication     Tobacco use disorder     Hypothyroidism due to acquired atrophy of thyroid     Diverticulitis of colon     Malignant neoplasm of upper-outer quadrant of right breast in female, estrogen receptor positive (H)     S/P bilateral mastectomy       Consultants:  None    Procedures Performed:  Bilateral mastectomy and bilateral SLNBX for left lobular breast cancer and right radial scar    Brief Clinical History: Please see the H&P    Hospital Course:  Patient had a non-complicated postoperative course. No significant complications occurred and the patient was ready for dismissal to home after meeting all discharge criteria.      Instructions:  Diet: Return to preoperative diet as tolerated.  Activity: Slowly return to regular activity as tolerated.  Medications: Resume home medications   Followup: 2 weeks in surgical office    Questions answered.    Condition on discharge: Stable

## 2017-10-17 NOTE — PROGRESS NOTES
"Surgery Progress Note    Doing well. Pain controlled with meds. Mild nausea when taking narcotics.     /63 (BP Location: Left arm)  Temp 98.7  F (37.1  C) (Oral)  Resp 16  Ht 5' 6\" (1.676 m)  Wt 120 lb 6.4 oz (54.6 kg)  LMP  (LMP Unknown)  SpO2 96%  BMI 19.43 kg/m2  General- Alert and Oriented.  Chest- Wrap removed. Flaps pink with minimal ecchymosis. Drains serosang    A/P-   Doing great. Flaps pink without ecchymosis. Plan for D/C today. Follow up in 2 weeks or when drain output less than 30 cc for 24 hrs.      Van Salazar M.D.  [facility]  [phone]  "

## 2017-10-18 ENCOUNTER — OFFICE VISIT (OUTPATIENT)
Dept: SURGERY | Facility: CLINIC | Age: 62
End: 2017-10-18
Payer: COMMERCIAL

## 2017-10-18 DIAGNOSIS — Z48.00 CHANGE OF DRESSING: Primary | ICD-10-CM

## 2017-10-18 PROCEDURE — 99207 ZZC NO CHARGE NURSE ONLY: CPT

## 2017-10-18 NOTE — MR AVS SNAPSHOT
After Visit Summary   10/18/2017    Suma Calabrese    MRN: [id number]           Patient Information     Date Of Birth          1955        Visit Information        Provider Department      10/18/2017 12:30 PM Nurse, [facility]      Today's Diagnoses     Change of dressing    -  1       Follow-ups after your visit        Your next 10 appointments already scheduled     Oct 26, 2017  1:00 PM CDT   Post Op with Van Salazar MD   [facility] ([facility])    [address]  [address]  [facility] [address] [phone]              Who to contact     If you have questions or need follow up information about today's clinic visit or your schedule please contact [facility] directly at [phone].  Normal or non-critical lab and imaging results will be communicated to you by MyChart, letter or phone within 4 business days after the clinic has received the results. If you do not hear from us within 7 days, please contact the clinic through [employer] or phone. If you have a critical or abnormal lab result, we will notify you by phone as soon as possible.  Submit refill requests through MiName or call your pharmacy and they will forward the refill request to us. Please allow 3 business days for your refill to be completed.          Additional Information About Your Visit        MyChart Information     MiName gives you secure access to your electronic health record. If you see a primary care provider, you can also send messages to your care team and make appointments. If you have questions, please call your primary care clinic.  If you do not have a primary care provider, please call [phone] and they will assist you.        Care EveryWhere ID     This is your Care EveryWhere ID. This could be used by other organizations to access  your [place] medical records  [id number]        Your Vitals Were     Last Period                   (LMP Unknown)            Blood Pressure from Last 3 Encounters:   10/17/17 103/63   10/12/17 111/78   10/11/17 120/80    Weight from Last 3 Encounters:   10/16/17 120 lb 6.4 oz (54.6 kg)   10/12/17 119 lb (54 kg)   10/11/17 120 lb (54.4 kg)              Today, you had the following     No orders found for display       Primary Care Provider Office Phone # Fax #    Murali Aria Kenney [phone] [address] [address]        Equal Access to Services     SANJUANA ROMERO : Hadii beata Martínez, waaxda lujesicaadaha, qaybta kaalmada adearpitayaashleigh, omar moraes. So [facility] [phone].    ATENCIÓN: Si habla español, tiene a yepez disposición servicios gratuitos de asistencia lingüística. Llame al [phone].    We comply with applicable federal civil rights laws and [place] laws. We do not discriminate on the basis of race, color, national origin, age, disability, sex, sexual orientation, or gender identity.            Thank you!     Thank you for choosing [facility]  for your care. Our goal is always to provide you with excellent care. Hearing back from our patients is one way we can continue to improve our services. Please take a few minutes to complete the written survey that you may receive in the mail after your visit with us. Thank you!             Your Updated Medication List - Protect others around you: Learn how to safely use, store and throw away your medicines at www.disposemymeds.org.          This list is accurate as of: 10/18/17  2:05 PM.  Always use your most recent med list.                   Brand Name Dispense Instructions for use Diagnosis    ADVIL PO      Take 200 mg by mouth every 4 hours as needed for moderate pain        CLARITIN PO      Take 1 Dose by mouth daily as needed        * HYDROcodone-acetaminophen 5-325  MG per tablet    NORCO    30 tablet    Take 1-2 tablets by mouth every 4 hours as needed for moderate to severe pain maximum 8 tablet(s) per day    Malignant neoplasm of upper-outer quadrant of right breast in female, estrogen receptor positive (H)       * HYDROcodone-acetaminophen 5-325 MG per tablet    NORCO    20 tablet    Take 1-2 tablets by mouth every 4 hours as needed for moderate to severe pain    Malignant neoplasm of upper-outer quadrant of right breast in female, estrogen receptor positive (H)       ondansetron 4 MG ODT tab    ZOFRAN-ODT    6 tablet    Take 1 tablet (4 mg) by mouth every 6 hours as needed for nausea or vomiting    Malignant neoplasm of upper-outer quadrant of right breast in female, estrogen receptor positive (H)       PRAVACHOL PO      Take 20 mg by mouth At Bedtime        SYNTHROID PO      Take 75 mcg by mouth every morning        triamterene-hydrochlorothiazide 37.5-25 MG per tablet    MAXZIDE-25     Take 1 tablet by mouth every morning        VITAMIN D (CHOLECALCIFEROL) PO      Take 1,000 Units by mouth every morning        * Notice:  This list has 2 medication(s) that are the same as other medications prescribed for you. Read the directions carefully, and ask your doctor or other care provider to review them with you.

## 2017-10-18 NOTE — TELEPHONE ENCOUNTER
Patient had double mastectomy 10/16/17, discharged 10/17/17. Has questions regarding after care and incisions. Please call.

## 2017-10-18 NOTE — PROGRESS NOTES
GENERAL SURGERY NURSE VISIT WOUND/INCISION CARE     Suma Calabrese      MRN# [id number]  AGE:  62 year old     YOB: 1955  [phone] (home) [phone] (work)      Surgeon:  Dr. Salazar      Surgery type: Mastectomy, Bilateral      Surgery Date: October / 17 / 2017     Post Op Day: 1     WOUND CARE:     Breast (Bilateral) drain sites.  Open Post-operative wound: Drain sites    Surrounding Skin:  Intact  Drainage amount: moderate  Type of Exudate: Sero-Sang.  Drainage Color: serous sanguinous     Patient was seen in clinic to provide education on caring for her incisions and drain sites. Removed bandages from surgery and showed patient how to apply new dressing to area. Discussed options for showering with the ASHWINI drains. Told patient to continue to monitor drainage each day as it changes/decreases. Provided some supplies for at home. Encouraged her to call back if she had any questions.  PLAN:   Plan clinic appointment with MD/AP as scheduled  Suma Calabrese is recommended to contact the clinic if worsening pain, onset of fever/redness at any incision site, or new drainage from the area. Patientt also recommended to call office at any time if ongoing questions/concerns during recovery.   Patientt is in agreement with this plan.

## 2017-10-18 NOTE — TELEPHONE ENCOUNTER
Patient was called back, she stated that she has questions about caring for her drains and the incision areas. Offered nurse visit to go through this information, patient was very appreciative of this and felt that she did not get very much information or training at discharge on how to manage this. Patient was scheduled for nurse visit for today at 12:30pm. Patient verbalized understanding of this and agreed. Encouraged them to call back if they had further questions or concerns.    Guillermina Dias RN BSN

## 2017-10-19 ENCOUNTER — TELEPHONE (OUTPATIENT)
Dept: SURGERY | Facility: CLINIC | Age: 62
End: 2017-10-19

## 2017-10-19 LAB
COPATH REPORT: NORMAL
COPATH REPORT: NORMAL

## 2017-10-19 NOTE — TELEPHONE ENCOUNTER
Called patient's to discuss pathology results. Right breast showed a 2 cm invasive lobular with node negative. Left breast actually showed invasive ductal with node negative. She is recovering very well. Her drains have serosanguineous output. She has no discomfort at this time. She will follow up next week.

## 2017-10-20 ENCOUNTER — MEDICAL CORRESPONDENCE (OUTPATIENT)
Dept: HEALTH INFORMATION MANAGEMENT | Facility: CLINIC | Age: 62
End: 2017-10-20

## 2017-10-20 ENCOUNTER — TELEPHONE (OUTPATIENT)
Dept: SURGERY | Facility: CLINIC | Age: 62
End: 2017-10-20

## 2017-10-20 NOTE — TELEPHONE ENCOUNTER
Name of caller: Patient    Reason for Call:  QUESTIONS    Surgeon:  Dr. Salazar    Recent Surgery:  Yes.    If yes, when & what type:  BILATERAL MASTECTOMIES.5,460 mg (actual weight)/16/17      Best phone number to reach pt at is: [phone]  Ok to leave a message with medical info? Yes.    Pharmacy preferred (if calling for a refill): NA

## 2017-10-20 NOTE — TELEPHONE ENCOUNTER
Called Patient back, she has some concerns about continued drainage from around her tube site. This is a clear yellow/orange color. Denies fever or night sweats or chills. Patient is changing bandage daily PRN. Told patient that this is normal and to continue to monitor area for thick white or pus like drainage or fever. Patient verbalized understanding of this and agreed. Encouraged them to call back if they had further questions or concerns.    Guillermina Dias RN BSN

## 2017-10-26 ENCOUNTER — OFFICE VISIT (OUTPATIENT)
Dept: SURGERY | Facility: CLINIC | Age: 62
End: 2017-10-26
Payer: COMMERCIAL

## 2017-10-26 DIAGNOSIS — C50.912 BILATERAL MALIGNANT NEOPLASM OF BREAST IN FEMALE, UNSPECIFIED ESTROGEN RECEPTOR STATUS, UNSPECIFIED SITE OF BREAST (H): ICD-10-CM

## 2017-10-26 DIAGNOSIS — C50.911 BILATERAL MALIGNANT NEOPLASM OF BREAST IN FEMALE, UNSPECIFIED ESTROGEN RECEPTOR STATUS, UNSPECIFIED SITE OF BREAST (H): ICD-10-CM

## 2017-10-26 DIAGNOSIS — Z09 SURGERY FOLLOW-UP: Primary | ICD-10-CM

## 2017-10-26 PROCEDURE — 99024 POSTOP FOLLOW-UP VISIT: CPT | Performed by: SURGERY

## 2017-10-26 NOTE — MR AVS SNAPSHOT
After Visit Summary   10/26/2017    Suma Calabrese    MRN: [id number]           Patient Information     Date Of Birth          1955        Visit Information        Provider Department      10/26/2017 1:00 PM Van Salazar MD [facility]      Today's Diagnoses     Surgery follow-up    -  1    Bilateral malignant neoplasm of breast in female, unspecified estrogen receptor status, unspecified site of breast (H)          Care Instructions    Your appt with Dr Powers is scheduled for 11/8/17 3:15 pm          Follow-ups after your visit        Additional Services     ONC/HEME ADULT REFERRAL       Your provider has referred you to: [facility]: Cancer Care/Hematology (All Cancer Related Services) - [place] [phone]   [email or web address]    Please be aware that coverage of these services is subject to the terms and limitations of your health insurance plan.  Call member services at your health plan with any benefit or coverage questions.      Please bring the following with you to your appointment:    (1) Any X-Rays, CTs or MRIs which have been performed.  Contact the facility where they were done to arrange for  prior to your scheduled appointment.   (2) List of current medications  (3) This referral request   (4) Any documents/labs given to you for this referral                  Who to contact     If you have questions or need follow up information about today's clinic visit or your schedule please contact [facility] directly at [phone].  Normal or non-critical lab and imaging results will be communicated to you by MyChart, letter or phone within 4 business days after the clinic has received the results. If you do not hear from us within 7 days, please contact the clinic through MyChart or phone. If you have a critical or abnormal lab  result, we will notify you by phone as soon as possible.  Submit refill requests through [employer] or call your pharmacy and they will forward the refill request to us. Please allow 3 business days for your refill to be completed.          Additional Information About Your Visit        Hochy etohart Information     [employer] gives you secure access to your electronic health record. If you see a primary care provider, you can also send messages to your care team and make appointments. If you have questions, please call your primary care clinic.  If you do not have a primary care provider, please call [phone] and they will assist you.        Care EveryWhere ID     This is your Care EveryWhere ID. This could be used by other organizations to access your [place] medical records  [id number]        Your Vitals Were     Last Period                   (LMP Unknown)            Blood Pressure from Last 3 Encounters:   10/17/17 103/63   10/12/17 111/78   10/11/17 120/80    Weight from Last 3 Encounters:   10/16/17 120 lb 6.4 oz (54.6 kg)   10/12/17 119 lb (54 kg)   10/11/17 120 lb (54.4 kg)              We Performed the Following     ONC/HEME ADULT REFERRAL          Today's Medication Changes          These changes are accurate as of: 10/26/17  1:30 PM.  If you have any questions, ask your nurse or doctor.               Stop taking these medicines if you haven't already. Please contact your care team if you have questions.     HYDROcodone-acetaminophen 5-325 MG per tablet   Commonly known as:  NORCO   Stopped by:  Van Salazar MD           ondansetron 4 MG ODT tab   Commonly known as:  ZOFRAN-ODT   Stopped by:  Van Salazar MD                    Primary Care Provider Office Phone # Fax #    Murali Aria Kenney [phone] [address]  [facility] [address]        Equal Access to Services     KIANNA ROMERO AH: Christine Martínez, waaxda luqadaha, qaybta kaalmada sloan,  omar sierraarpita machado'aan ah. So [facility] [phone].    ATENCIÓN: Si habla aryan, tiene a yepez disposición servicios gratuitos de asistencia lingüística. Estella al [phone].    We comply with applicable federal civil rights laws and [place] laws. We do not discriminate on the basis of race, color, national origin, age, disability, sex, sexual orientation, or gender identity.            Thank you!     Thank you for choosing [facility]  for your care. Our goal is always to provide you with excellent care. Hearing back from our patients is one way we can continue to improve our services. Please take a few minutes to complete the written survey that you may receive in the mail after your visit with us. Thank you!             Your Updated Medication List - Protect others around you: Learn how to safely use, store and throw away your medicines at www.disposemymeds.org.          This list is accurate as of: 10/26/17  1:30 PM.  Always use your most recent med list.                   Brand Name Dispense Instructions for use Diagnosis    ADVIL PO      Take 200 mg by mouth every 4 hours as needed for moderate pain        CLARITIN PO      Take 1 Dose by mouth daily as needed        PRAVACHOL PO      Take 20 mg by mouth At Bedtime        SYNTHROID PO      Take 75 mcg by mouth every morning        triamterene-hydrochlorothiazide 37.5-25 MG per tablet    MAXZIDE-25     Take 1 tablet by mouth every morning        VITAMIN D (CHOLECALCIFEROL) PO      Take 1,000 Units by mouth every morning

## 2017-10-26 NOTE — LETTER
2017    Re: Suma Calabrese - 1955    Doing well. Tolerating diet without nausea. Pain controlled without narcotics. Drain output approximately or less than 30 cc in 24 hours bilaterally for 2-3 days.     Mastectomy sites-incisions completely healed. Flush against the chest wall. Drains with scant serosanguineous output.     A/P  Suma Calabrese is recovering well from her bilateral mastectomy and bilateral sentinel node biopsy. We discussed her path which showed invasive lobular an approximate 2 cm on the right. The left side showed invasive ductal at 5 mm. Nodes negative. I will place a referral for oncology today. Bilateral drains were removed without issues. I advised her to watch out for swelling and to come back to the office immediately if she notices redness or swelling. I will also place referral for a bra prosthesis for her. She will return in 2-4 weeks after oncology visit.     Thank you for the opportunity to help in her care.     Van Salazar M.D.  [facility]  [phone]

## 2017-10-26 NOTE — PROGRESS NOTES
Surgery Postoperative Note    Doing well. Tolerating diet without nausea. Pain controlled without narcotics. Drain output approximately or less than 30 cc in 24 hours bilaterally for 2-3 days.    Mastectomy sites-incisions completely healed. Flush against the chest wall. Drains with scant serosanguineous output.    A/P  Suma Calabrese is recovering well from her bilateral mastectomy and bilateral sentinel node biopsy. We discussed her path which showed invasive lobular an approximate 2 cm on the right. The left side showed invasive ductal at 5 mm. Nodes negative. I will place a referral for oncology today. Bilateral drains were removed without issues. I advised her to watch out for swelling and to come back to the office immediately if she notices redness or swelling. I will also place referral for a bra prosthesis for her. She will return in 2-4 weeks after oncology visit.    Thank you for the opportunity to help in her care.    Van Salazar M.D.  [facility]  [phone]    Please route or send letter to:  Primary Care Provider (PCP) and Referring Provider

## 2017-10-27 ENCOUNTER — TELEPHONE (OUTPATIENT)
Dept: SURGERY | Facility: CLINIC | Age: 62
End: 2017-10-27

## 2017-10-27 NOTE — TELEPHONE ENCOUNTER
Called patient back after speaking with PA, recommended Ace bandage or compressive bra/camisole to help minimize risk for increasing fluid in the area and to follow-up Monday if this increases or become painful over the weekend. Patient verbalized understanding of this and agreed. Encouraged them to call back if they had further questions or concerns.    Guillermina Dias RN BSN

## 2017-10-27 NOTE — TELEPHONE ENCOUNTER
Name of caller: Patient    Reason for Call:   Questions about fluid in drains and build up     Surgeon:  Dr. Salazar    Recent Surgery:  Yes.    If yes, when & what type:  *bilateral simple mastectomies bilateral axillary SLN biopsy 10-16-17    Best phone number to reach pt at is:[phone]  Ok to leave a message with medical info? No    Pharmacy preferred (if calling for a refill): N/A

## 2017-10-27 NOTE — TELEPHONE ENCOUNTER
Patient was called back and states that she has noticed a fluid pocket of what she would estimate is about 25cc's of drainage just under her incision area. Denies pain or fevers.  She is concerned about if this continues over the weekend or if it should be drained today. Will discuss with PA or Dr. Salazar and let patient know. Patient verbalized understanding of this and agreed. Encouraged them to call back if they had further questions or concerns.    Guillermina Dias RN BSN

## 2017-10-30 NOTE — PROGRESS NOTES
Here are your results - your kidney tests are a bit worse than they were 1 year ago.  I think you should follow up with Dr. Kenney about this after you are recovered from your surgery - I hope everything went very well.  Best,  Dr. May Montaño MD/[facility]   none

## 2017-11-03 ENCOUNTER — OFFICE VISIT (OUTPATIENT)
Dept: FAMILY MEDICINE | Facility: CLINIC | Age: 62
End: 2017-11-03
Payer: COMMERCIAL

## 2017-11-03 ENCOUNTER — APPOINTMENT (OUTPATIENT)
Dept: CT IMAGING | Facility: CLINIC | Age: 62
End: 2017-11-03
Attending: EMERGENCY MEDICINE
Payer: COMMERCIAL

## 2017-11-03 ENCOUNTER — TELEPHONE (OUTPATIENT)
Dept: FAMILY MEDICINE | Facility: CLINIC | Age: 62
End: 2017-11-03

## 2017-11-03 ENCOUNTER — HOSPITAL ENCOUNTER (EMERGENCY)
Facility: CLINIC | Age: 62
Discharge: HOME | End: 2017-11-03
Attending: EMERGENCY MEDICINE | Admitting: EMERGENCY MEDICINE
Payer: COMMERCIAL

## 2017-11-03 VITALS
BODY MASS INDEX: 19.77 KG/M2 | OXYGEN SATURATION: 100 % | DIASTOLIC BLOOD PRESSURE: 90 MMHG | TEMPERATURE: 97.8 F | SYSTOLIC BLOOD PRESSURE: 115 MMHG | HEIGHT: 66 IN | WEIGHT: 123 LBS

## 2017-11-03 VITALS
SYSTOLIC BLOOD PRESSURE: 94 MMHG | OXYGEN SATURATION: 99 % | HEART RATE: 76 BPM | WEIGHT: 123.5 LBS | TEMPERATURE: 97.6 F | DIASTOLIC BLOOD PRESSURE: 72 MMHG | RESPIRATION RATE: 20 BRPM | BODY MASS INDEX: 19.93 KG/M2

## 2017-11-03 DIAGNOSIS — E87.6 HYPOKALEMIA: ICD-10-CM

## 2017-11-03 DIAGNOSIS — R10.32 ABDOMINAL PAIN, LEFT LOWER QUADRANT: Primary | ICD-10-CM

## 2017-11-03 DIAGNOSIS — E87.1 HYPONATREMIA: ICD-10-CM

## 2017-11-03 DIAGNOSIS — Z87.19 HISTORY OF DIVERTICULITIS: ICD-10-CM

## 2017-11-03 DIAGNOSIS — K57.32 DIVERTICULITIS OF COLON: ICD-10-CM

## 2017-11-03 LAB
ALBUMIN SERPL-MCNC: 2.8 G/DL (ref 3.4–5)
ALBUMIN UR-MCNC: NEGATIVE MG/DL
ALP SERPL-CCNC: 83 U/L (ref 40–150)
ALT SERPL W P-5'-P-CCNC: 13 U/L (ref 0–50)
ANION GAP SERPL CALCULATED.3IONS-SCNC: 9 MMOL/L (ref 3–14)
APPEARANCE UR: CLEAR
AST SERPL W P-5'-P-CCNC: 13 U/L (ref 0–45)
BASOPHILS # BLD AUTO: 0 10E9/L (ref 0–0.2)
BASOPHILS NFR BLD AUTO: 0.2 %
BILIRUB SERPL-MCNC: 0.7 MG/DL (ref 0.2–1.3)
BILIRUB UR QL STRIP: NEGATIVE
BUN SERPL-MCNC: 7 MG/DL (ref 7–30)
CALCIUM SERPL-MCNC: 8.9 MG/DL (ref 8.5–10.1)
CHLORIDE SERPL-SCNC: 87 MMOL/L (ref 94–109)
CO2 SERPL-SCNC: 29 MMOL/L (ref 20–32)
COLOR UR AUTO: YELLOW
CREAT SERPL-MCNC: 0.66 MG/DL (ref 0.52–1.04)
DIFFERENTIAL METHOD BLD: ABNORMAL
EOSINOPHIL # BLD AUTO: 0.1 10E9/L (ref 0–0.7)
EOSINOPHIL NFR BLD AUTO: 0.8 %
ERYTHROCYTE [DISTWIDTH] IN BLOOD BY AUTOMATED COUNT: 11.8 % (ref 10–15)
GFR SERPL CREATININE-BSD FRML MDRD: >90 ML/MIN/1.7M2
GLUCOSE SERPL-MCNC: 94 MG/DL (ref 70–99)
GLUCOSE UR STRIP-MCNC: NEGATIVE MG/DL
HCT VFR BLD AUTO: 33 % (ref 35–47)
HGB BLD-MCNC: 12.2 G/DL (ref 11.7–15.7)
HGB UR QL STRIP: NEGATIVE
IMM GRANULOCYTES # BLD: 0 10E9/L (ref 0–0.4)
IMM GRANULOCYTES NFR BLD: 0.3 %
KETONES UR STRIP-MCNC: NEGATIVE MG/DL
LEUKOCYTE ESTERASE UR QL STRIP: NEGATIVE
LYMPHOCYTES # BLD AUTO: 1.1 10E9/L (ref 0.8–5.3)
LYMPHOCYTES NFR BLD AUTO: 18.4 %
MCH RBC QN AUTO: 32.9 PG (ref 26.5–33)
MCHC RBC AUTO-ENTMCNC: 37 G/DL (ref 31.5–36.5)
MCV RBC AUTO: 89 FL (ref 78–100)
MONOCYTES # BLD AUTO: 0.6 10E9/L (ref 0–1.3)
MONOCYTES NFR BLD AUTO: 10 %
NEUTROPHILS # BLD AUTO: 4.3 10E9/L (ref 1.6–8.3)
NEUTROPHILS NFR BLD AUTO: 70.3 %
NITRATE UR QL: NEGATIVE
NRBC # BLD AUTO: 0 10*3/UL
NRBC BLD AUTO-RTO: 0 /100
OSMOLALITY SERPL: 253 MMOL/KG (ref 280–301)
PH UR STRIP: 7.5 PH (ref 5–7)
PLATELET # BLD AUTO: 249 10E9/L (ref 150–450)
POTASSIUM SERPL-SCNC: 3.1 MMOL/L (ref 3.4–5.3)
PROT SERPL-MCNC: 6.7 G/DL (ref 6.8–8.8)
RBC # BLD AUTO: 3.71 10E12/L (ref 3.8–5.2)
SODIUM SERPL-SCNC: 125 MMOL/L (ref 133–144)
SOURCE: ABNORMAL
SP GR UR STRIP: 1 (ref 1–1.03)
UROBILINOGEN UR STRIP-MCNC: 2 MG/DL (ref 0–2)
WBC # BLD AUTO: 6.1 10E9/L (ref 4–11)

## 2017-11-03 PROCEDURE — 81003 URINALYSIS AUTO W/O SCOPE: CPT | Performed by: EMERGENCY MEDICINE

## 2017-11-03 PROCEDURE — 96361 HYDRATE IV INFUSION ADD-ON: CPT

## 2017-11-03 PROCEDURE — 74177 CT ABD & PELVIS W/CONTRAST: CPT

## 2017-11-03 PROCEDURE — 99285 EMERGENCY DEPT VISIT HI MDM: CPT | Mod: 25

## 2017-11-03 PROCEDURE — 25000128 H RX IP 250 OP 636: Performed by: EMERGENCY MEDICINE

## 2017-11-03 PROCEDURE — 99215 OFFICE O/P EST HI 40 MIN: CPT | Performed by: FAMILY MEDICINE

## 2017-11-03 PROCEDURE — 85025 COMPLETE CBC W/AUTO DIFF WBC: CPT | Performed by: EMERGENCY MEDICINE

## 2017-11-03 PROCEDURE — 25000125 ZZHC RX 250: Performed by: EMERGENCY MEDICINE

## 2017-11-03 PROCEDURE — 83930 ASSAY OF BLOOD OSMOLALITY: CPT | Performed by: EMERGENCY MEDICINE

## 2017-11-03 PROCEDURE — 80053 COMPREHEN METABOLIC PANEL: CPT | Performed by: EMERGENCY MEDICINE

## 2017-11-03 PROCEDURE — 96360 HYDRATION IV INFUSION INIT: CPT | Mod: 59

## 2017-11-03 RX ORDER — METRONIDAZOLE 500 MG/1
500 TABLET ORAL 3 TIMES DAILY
Qty: 30 TABLET | Refills: 0 | Status: ON HOLD | OUTPATIENT
Start: 2017-11-03 | End: 2017-11-17

## 2017-11-03 RX ORDER — CIPROFLOXACIN 500 MG/1
500 TABLET, FILM COATED ORAL 2 TIMES DAILY
Qty: 20 TABLET | Refills: 0 | Status: ON HOLD | OUTPATIENT
Start: 2017-11-03 | End: 2017-11-17

## 2017-11-03 RX ORDER — IOPAMIDOL 755 MG/ML
62 INJECTION, SOLUTION INTRAVASCULAR ONCE
Status: COMPLETED | OUTPATIENT
Start: 2017-11-03 | End: 2017-11-03

## 2017-11-03 RX ORDER — SODIUM CHLORIDE 9 MG/ML
1000 INJECTION, SOLUTION INTRAVENOUS CONTINUOUS
Status: DISCONTINUED | OUTPATIENT
Start: 2017-11-03 | End: 2017-11-03 | Stop reason: HOSPADM

## 2017-11-03 RX ADMIN — SODIUM CHLORIDE, PRESERVATIVE FREE 60 ML: 5 INJECTION INTRAVENOUS at 12:34

## 2017-11-03 RX ADMIN — SODIUM CHLORIDE 1000 ML: 9 INJECTION, SOLUTION INTRAVENOUS at 11:27

## 2017-11-03 RX ADMIN — IOPAMIDOL 62 ML: 755 INJECTION, SOLUTION INTRAVENOUS at 12:34

## 2017-11-03 ASSESSMENT — ENCOUNTER SYMPTOMS
ABDOMINAL DISTENTION: 1
RHINORRHEA: 0
CHILLS: 0
HEADACHES: 0
ABDOMINAL PAIN: 1
APPETITE CHANGE: 1
FATIGUE: 0
CONSTIPATION: 1
FEVER: 0

## 2017-11-03 NOTE — NURSING NOTE
"Chief Complaint   Patient presents with     Constipation       Initial BP 94/72 (Cuff Size: Adult Regular)  Pulse 76  Temp 97.6  F (36.4  C) (Oral)  Resp 20  Wt 123 lb 8 oz (56 kg)  LMP  (LMP Unknown)  SpO2 99%  BMI 19.93 kg/m2 Estimated body mass index is 19.93 kg/(m^2) as calculated from the following:    Height as of 10/16/17: 5' 6\" (1.676 m).    Weight as of this encounter: 123 lb 8 oz (56 kg).  Medication Reconciliation: complete     Evelyn Calixto, RUBI      "

## 2017-11-03 NOTE — ED AVS SNAPSHOT
Emergency Department    [address] [facility] [address]    Phone:  [phone]    Fax:  [phone]                                       Suma Calabrese   MRN: [id number]    Department:   Emergency Department   Date of Visit:  11/3/2017           Patient Information     Date Of Birth          1955        Your diagnoses for this visit were:     Diverticulitis of colon     Hyponatremia     Hypokalemia        You were seen by Sanjeev Jimenez MD.      Follow-up Information     Follow up with Murali Kenney MD In 3 days.    Specialty:  Family Practice    Contact information:    [address]  [facility] [id number] [phone]          Follow up with  Emergency Department Today.    Specialty:  EMERGENCY MEDICINE    Why:  If symptoms worsen    Contact information:    [address] [facility] [id number] [phone]        Discharge Instructions         Diverticulitis    Some people get pouches along the wall of the colon as they get older. The pouches, called diverticuli, usually cause no symptoms. If the pouches become blocked, you can get an infection. This infection is called diverticulitis. It causes pain in your lower abdomen and fever. If not treated, it can become a serious condition, causing an abscess to form inside the pouch. The abscess may block the intestinal tract even or rupture, spreading infection throughout the abdomen.  When treatment is started early, oral antibiotics alone may be enough to cure diverticulitis. This method is tried first. But, if you don't improve or if your condition gets worse while using oral antibiotics, you may need to be admitted to the hospital for IV antibiotics. Severe cases may require surgery.  Home care  The following guidelines will help you care for yourself at home:    During the acute illness, rest and follow your healthcare provider's instructions about diet. Sometimes you will need to follow a clear  liquid diet to rest your bowel. Once your symptoms are better, you may be told to follow a low-fiber diet for some time. Include foods like:    Flake cereal, mashed potatoes, pancakes, waffles, pasta, white bread, rice, applesauce, bananas, eggs, fish, poultry, tofu, and cooked soft vegetables    Take antibiotics exactly as instructed. Don't miss any doses or stop taking the medication, even if you feel better.    Monitor your temperature and tell your healthcare provider if you have rising temperatures.  Preventing future attacks  Once you have an episode of diverticulitis, you are at risk for having it again. After you have recovered from this episode, you may be able to lower your risk by eating a high-fiber diet (20 gm/day to 35 gm/day of fiber). This cleans out the colon pouches that already exist and may prevent new ones from forming. Foods high in fiber include fresh fruits and edible peelings, raw or lightly cooked vegetables, whole grain cereals and breads, dried beans and peas, and bran.  Other steps that can help prevent future attacks include:    Take your medicines, such as antibiotics, as your healthcare provider says.    Drink 6 to 8 glasses of water every day, unless told otherwise.    Use a heating pad or hot water bottle to help abdominal cramping or pain.    Begin an exercise program. Ask your healthcare provider how to get started. You can benefit from simple activities such as walking or gardening.    Treat diarrhea with a bland diet. Start with liquids only; then slowly add fiber over time.    Watch for changes in your bowel movements (constipation to diarrhea). Avoid constipation by eating a high fiber diet and taking a stool softener if needed.    Get plenty of rest and sleep.  Follow-up care  Follow up with your healthcare provider as advised or sooner if you are not getting better in the next 2 days.  When to seek medical advice  Call your healthcare provider right away if any of these  occur:    Fever of 100.4 F (38 C) or higher, or as directed by your healthcare provider    Repeated vomiting or swelling of the abdomen    Weakness, dizziness, light-headedness    Pain in your abdomen that gets worse, severe, or spreads to your back    Pain that moves to the right lower abdomen    Rectal bleeding (stools that are red, black or maroon color)    Unexpected vaginal bleeding  Date Last Reviewed: 9/1/2016 2000-2017 [employer]. [address] [place] [address]. All rights reserved. This information is not intended as a substitute for professional medical care. Always follow your healthcare professional's instructions.          Hyponatremia  Hyponatremia means low sodium levels in the blood. This condition most often occurs after prolonged vomiting or diarrhea, which causes your body to lose too much water and sodium. It can also result from drinking excess amounts of water or the use of diuretics (water pills). Rarely, it can be associated with disorders of your endocrine system, as side effects of illicit drug use (ecstasy), as a complication of some cancers especially small cell lung cancer, or as a complication of renal and liver disease or heart failure.  Mild hyponatremia causes no symptoms. It is only discovered with a blood test. As sodium levels in the blood decreases, symptoms begin to appear. This includes weakness, confusion, muscle cramping and seizures.  Home care    Reduce your daily water intake until the problem is corrected.    If you have been taking diuretics, you may be asked to stop taking them for a short time.    If you are having symptoms of weakness or confusion, do not drive or operate dangerous machinery until symptoms resolve.    If your sodium levels are too low to be managed at home with the above recommendations, you will be asked to go to the hospital to have your sodium replaced through your vein.  Follow-up care  Follow up with your healthcare  provider for a repeat blood test within the next week, or as advised.  When to seek medical advice  Call your healthcare provider if any of the following occur:    Increasing weakness    Dizziness    Irregular heartbeat, extra beats or very fast heart rate    Increasing confusion    Fainting or loss of consciousness    Seizure  Date Last Reviewed: 7/1/2017 2000-2017 [employer]. [address] [place] [address]. All rights reserved. This information is not intended as a substitute for professional medical care. Always follow your healthcare professional's instructions.          Hypokalemia  Hypokalemia means a low level of potassium in the blood. This most often occurs in people who take diuretics (water pills). It can also occur due to severe vomiting or diarrhea.  It is also seen in people who take laxatives for long periods of time. It can sometimes be associated with hypomagnesemia (low magnesium) which needs to be corrected before your potassium levels can be fixed.  A mild case usually causes no symptoms. It is only found with blood testing. More severe potassium loss causes generalized weakness, muscle or abdominal cramping, heart palpitations (rapid or irregular heartbeats), low blood pressure, specific muscle weakness, and in some people who are paralyzed.   Home care    Take any potassium supplements prescribed.    Eat foods rich in potassium. The highest amount is found in avocado, baked potatoes, spinach, cantaloupe, cod, halibut, salmon, and scallops. White, red, or moore beans are also very good sources. A modest amount is found in orange juice, bananas, carrots, and tomato juice.    If you take certain types of diuretics, you will also need to take potassium supplements. If you take a diuretic, discuss potassium supplements with your doctor.  Follow-up care  Follow up with your healthcare provider for a repeat blood test within the next week or as advised by our staff.  When  to seek medical advice  Call your healthcare provider if any of the following occur:    Increased weakness, fatigue, muscle cramps    Dizziness  Call 911  Call 911 if any of the following occur:    Irregular heartbeat, extra beats or very fast heart rate    Loss of consciousness  Date Last Reviewed: 7/1/2017 2000-2017 [employer]. [address] [place] [address]. All rights reserved. This information is not intended as a substitute for professional medical care. Always follow your healthcare professional's instructions.          Future Appointments        Provider Department Dept Phone Center    11/8/2017 3:15 PM Celi Powers MD, MD [facility] [phone] [facility]    11/9/2017 9:45 AM Van Salazar MD [phone] [facility]    11/16/2017 1:30 PM Van Salazar MD [facility] [phone] [facility]      24 Hour Appointment Hotline       To make an appointment at any [facility], call [phone] ([phone]). If you don't have a family doctor or clinic, we will help you find one. [facility] clinics are conveniently located to serve the needs of you and your family.             Review of your medicines      START taking        Dose / Directions Last dose taken    ciprofloxacin 500 MG tablet   Commonly known as:  CIPRO   Dose:  500 mg   Quantity:  20 tablet        Take 1 tablet (500 mg) by mouth 2 times daily for 10 days   Refills:  0        metroNIDAZOLE 500 MG tablet   Commonly known as:  FLAGYL   Dose:  500 mg   Quantity:  30 tablet        Take 1 tablet (500 mg) by mouth 3 times daily for 10 days   Refills:  0          Our records show that you are taking the medicines listed below. If these are incorrect, please call your family doctor or clinic.        Dose / Directions Last dose taken    ADVIL PO   Dose:  200 mg        Take 200 mg by mouth every 4 hours as needed for moderate pain   Refills:  0         CLARITIN PO   Dose:  1 Dose        Take 1 Dose by mouth daily as needed   Refills:  0        PRAVACHOL PO   Dose:  20 mg        Take 20 mg by mouth At Bedtime   Refills:  0        SYNTHROID PO   Dose:  75 mcg        Take 75 mcg by mouth every morning   Refills:  0        triamterene-hydrochlorothiazide 37.5-25 MG per tablet   Commonly known as:  MAXZIDE-25   Dose:  1 tablet        Take 1 tablet by mouth every morning   Refills:  0        VITAMIN D (CHOLECALCIFEROL) PO   Dose:  1000 Units        Take 1,000 Units by mouth every morning   Refills:  0                Prescriptions were sent or printed at these locations (2 Prescriptions)                   Other Prescriptions                Printed at Department/Unit printer (2 of 2)         ciprofloxacin (CIPRO) 500 MG tablet               metroNIDAZOLE (FLAGYL) 500 MG tablet                Procedures and tests performed during your visit     CBC with platelets differential    CT Abdomen Pelvis w Contrast    Comprehensive metabolic panel    UA reflex to Microscopic      Orders Needing Specimen Collection     None      Pending Results     Date and Time Order Name Status Description    11/3/2017 1103 CT Abdomen Pelvis w Contrast Preliminary             Pending Culture Results     No orders found from 11/1/2017 to 11/4/2017.            Pending Results Instructions     If you had any lab results that were not finalized at the time of your Discharge, you can call the ED Lab Result RN at [phone]. You will be contacted by this team for any positive Lab results or changes in treatment. The nurses are available 7 days a week from 10A to 6:30P.  You can leave a message 24 hours per day and they will return your call.        Test Results From Your Hospital Stay        11/3/2017 11:56 AM      Component Results     Component Value Ref Range & Units Status    WBC 6.1 4.0 - 11.0 10e9/L Final    RBC Count 3.71 (L) 3.8 - 5.2 10e12/L Final    Hemoglobin 12.2 11.7 - 15.7 g/dL Final     Hematocrit 33.0 (L) 35.0 - 47.0 % Final    MCV 89 78 - 100 fl Final    MCH 32.9 26.5 - 33.0 pg Final    MCHC 37.0 (H) 31.5 - 36.5 g/dL Final    RDW 11.8 10.0 - 15.0 % Final    Platelet Count 249 150 - 450 10e9/L Final    Diff Method Automated Method  Final    % Neutrophils 70.3 % Final    % Lymphocytes 18.4 % Final    % Monocytes 10.0 % Final    % Eosinophils 0.8 % Final    % Basophils 0.2 % Final    % Immature Granulocytes 0.3 % Final    Nucleated RBCs 0 0 /100 Final    Absolute Neutrophil 4.3 1.6 - 8.3 10e9/L Final    Absolute Lymphocytes 1.1 0.8 - 5.3 10e9/L Final    Absolute Monocytes 0.6 0.0 - 1.3 10e9/L Final    Absolute Eosinophils 0.1 0.0 - 0.7 10e9/L Final    Absolute Basophils 0.0 0.0 - 0.2 10e9/L Final    Abs Immature Granulocytes 0.0 0 - 0.4 10e9/L Final    Absolute Nucleated RBC 0.0  Final         11/3/2017 12:06 PM      Component Results     Component Value Ref Range & Units Status    Sodium 125 (L) 133 - 144 mmol/L Final    Potassium 3.1 (L) 3.4 - 5.3 mmol/L Final    Chloride 87 (L) 94 - 109 mmol/L Final    Carbon Dioxide 29 20 - 32 mmol/L Final    Anion Gap 9 3 - 14 mmol/L Final    Glucose 94 70 - 99 mg/dL Final    Urea Nitrogen 7 7 - 30 mg/dL Final    Creatinine 0.66 0.52 - 1.04 mg/dL Final    GFR Estimate >90 >60 mL/min/1.7m2 Final    Non  GFR Calc    GFR Estimate If Black >90 >60 mL/min/1.7m2 Final    African American GFR Calc    Calcium 8.9 8.5 - 10.1 mg/dL Final    Bilirubin Total 0.7 0.2 - 1.3 mg/dL Final    Albumin 2.8 (L) 3.4 - 5.0 g/dL Final    Protein Total 6.7 (L) 6.8 - 8.8 g/dL Final    Alkaline Phosphatase 83 40 - 150 U/L Final    ALT 13 0 - 50 U/L Final    AST 13 0 - 45 U/L Final         11/3/2017  1:33 PM      Narrative     CT ABDOMEN/PELVIS WITH CONTRAST November 3, 2017 12:55 PM    HISTORY: Left lower quadrant pain.    TECHNIQUE: Helical axial scans from dome of liver through pubic  symphysis with 62mL Isovue-370 IV contrast. Radiation dose for this  scan was  reduced using automated exposure control, adjustment of the  mA and/or kV according to patient size, or iterative reconstruction  technique.    COMPARISON: None.    FINDINGS: The liver, spleen, pancreas and bilateral adrenal glands are  normal. The left kidney is unremarkable. The right kidney contains a  0.8 cm benign cyst anteriorly in the midpole. There is a large amount  of stool throughout the ascending, transverse and descending colon.  Vascular calcifications are seen.    Scans through the pelvis are difficult to interpret because of a  relative lack of pelvic fat. The sigmoid colon appears abnormally  thickened. There is suggestion of an intramural lucency in the mid  sigmoid colon (image 52 of series 2) which could be an intramural  abscess. The most likely diagnosis is diverticulitis but the  thickening of the sigmoid colon is nonspecific and tumor is not  completely excluded so follow-up exam is recommended. In addition,  there is a collection of fluid interposed between the uterus and the  sigmoid colon and extending to the left side which is not definitely  bowel and could be an abscess measuring up to 7.7 x 4 x 2 cm (images  23-32 of series 3). Other very small abnormal collections of fluid  could be present in the right pelvis also. There is no free fluid.  There is only mild induration of the pericolonic fat in the sigmoid  region. The appendix is identified and appears normal.        Impression     IMPRESSION:  1. Abnormal appearing sigmoid colon, superimposed on diverticulosis.  This is most likely diverticulitis, perhaps with a small intramural  abscess.  2. 7.7 x 4 x 2 cm fluid collection interposed between the uterus and  the sigmoid colon could represent an abscess. Other small fluid  collections in the right pelvis are also possible.  3. Large amount of stool in the colon.         11/3/2017 11:45 AM      Component Results     Component Value Ref Range & Units Status    Color Urine Yellow   Final    Appearance Urine Clear  Final    Glucose Urine Negative NEG^Negative mg/dL Final    Bilirubin Urine Negative NEG^Negative Final    Ketones Urine Negative NEG^Negative mg/dL Final    Specific Gravity Urine 1.003 1.003 - 1.035 Final    Blood Urine Negative NEG^Negative Final    pH Urine 7.5 (H) 5.0 - 7.0 pH Final    Protein Albumin Urine Negative NEG^Negative mg/dL Final    Urobilinogen mg/dL 2.0 0.0 - 2.0 mg/dL Final    Nitrite Urine Negative NEG^Negative Final    Leukocyte Esterase Urine Negative NEG^Negative Final    Source Midstream Urine  Final                Clinical Quality Measure: Blood Pressure Screening     Your blood pressure was checked while you were in the emergency department today. The last reading we obtained was  BP: 114/65 . Please read the guidelines below about what these numbers mean and what you should do about them.  If your systolic blood pressure (the top number) is less than 120 and your diastolic blood pressure (the bottom number) is less than 80, then your blood pressure is normal. There is nothing more that you need to do about it.  If your systolic blood pressure (the top number) is 120-139 or your diastolic blood pressure (the bottom number) is 80-89, your blood pressure may be higher than it should be. You should have your blood pressure rechecked within a year by a primary care provider.  If your systolic blood pressure (the top number) is 140 or greater or your diastolic blood pressure (the bottom number) is 90 or greater, you may have high blood pressure. High blood pressure is treatable, but if left untreated over time it can put you at risk for heart attack, stroke, or kidney failure. You should have your blood pressure rechecked by a primary care provider within the next 4 weeks.  If your provider in the emergency department today gave you specific instructions to follow-up with your doctor or provider even sooner than that, you should follow that instruction and not  wait for up to 4 weeks for your follow-up visit.        Thank you for choosing [facility]       Thank you for choosing [facility] for your care. Our goal is always to provide you with excellent care. Hearing back from our patients is one way we can continue to improve our services. Please take a few minutes to complete the written survey that you may receive in the mail after you visit with us. Thank you!        [employer] Information     [employer] gives you secure access to your electronic health record. If you see a primary care provider, you can also send messages to your care team and make appointments. If you have questions, please call your primary care clinic.  If you do not have a primary care provider, please call [phone] and they will assist you.        Care EveryWhere ID     This is your Care EveryWhere ID. This could be used by other organizations to access your [facility] medical records  [id number]        Equal Access to Services     KIANNA ROMERO : Christine Martínez, amauri walters, omar winslow. So [facility] [phone].    ATENCIÓN: Si habla español, tiene a yepez disposición servicios gratuitos de asistencia lingüística. Llame al [phone].    We comply with applicable federal civil rights laws and [place] laws. We do not discriminate on the basis of race, color, national origin, age, disability, sex, sexual orientation, or gender identity.            After Visit Summary       This is your record. Keep this with you and show to your community pharmacist(s) and doctor(s) at your next visit.

## 2017-11-03 NOTE — MR AVS SNAPSHOT
After Visit Summary   11/3/2017    Suma Calabrese    MRN: [id number]           Patient Information     Date Of Birth          1955        Visit Information        Provider Department      11/3/2017 9:00 AM Jake Olivera MD [facility]        Today's Diagnoses     Abdominal pain, left lower quadrant    -  1    History of diverticulitis           Follow-ups after your visit        Your next 10 appointments already scheduled     Nov 08, 2017  3:15 PM CST   New Visit with Celi Powers MD   [facility] ([facility])    [facility]  [address]  [place] [address]   [phone]            Nov 09, 2017  9:45 AM CST   Post Op with Van Salazar MD   [facility] [place] [facility] [place]    [address]  [place] [address]   [phone]            Nov 16, 2017  1:30 PM CST   Post Op with Van Salazar MD   [facility] ([facility])    [address]  [address]  [place] [address]   [phone]              Future tests that were ordered for you today     Open Future Orders        Priority Expected Expires Ordered    CT Abdomen Pelvis w Contrast STAT  11/3/2018 11/3/2017            Who to contact     If you have questions or need follow up information about today's clinic visit or your schedule please contact [facility] directly at [phone].  Normal or non-critical lab and imaging results will be communicated to you by MyChart, letter or phone within 4 business days after the clinic has received the results. If you do not hear from us within 7 days, please contact the clinic through MyChart or phone. If you have a critical or abnormal lab result, we will notify you by phone as soon as possible.  Submit refill requests through [employer] or call your pharmacy and they will forward  the refill request to us. Please allow 3 business days for your refill to be completed.          Additional Information About Your Visit        [email or web address] Information     [employer] gives you secure access to your electronic health record. If you see a primary care provider, you can also send messages to your care team and make appointments. If you have questions, please call your primary care clinic.  If you do not have a primary care provider, please call [phone] and they will assist you.        Care EveryWhere ID     This is your Care EveryWhere ID. This could be used by other organizations to access your [facility] medical records  [id number]        Your Vitals Were     Pulse Temperature Respirations Last Period Pulse Oximetry BMI (Body Mass Index)    76 97.6  F (36.4  C) (Oral) 20 (LMP Unknown) 99% 19.93 kg/m2       Blood Pressure from Last 3 Encounters:   11/03/17 94/72   10/17/17 103/63   10/12/17 111/78    Weight from Last 3 Encounters:   11/03/17 123 lb 8 oz (56 kg)   10/16/17 120 lb 6.4 oz (54.6 kg)   10/12/17 119 lb (54 kg)               Primary Care Provider Office Phone # Fax #    Murali Aria Kenney [phone] [address] [facility] [address]        Equal Access to Services     KIANNA ROMERO AH: Hadii aad ku hadasho Soomaali, waaxda luqadaha, qaybta kaalmada adeegyada, waxtolu moraes. So [facility] [phone].    ATENCIÓN: Si habla español, tiene a yepez disposición servicios gratuitos de asistencia lingüística. Llame al [phone].    We comply with applicable federal civil rights laws and [place] laws. We do not discriminate on the basis of race, color, national origin, age, disability, sex, sexual orientation, or gender identity.            Thank you!     Thank you for choosing [facility]  for your care. Our goal is always to provide you with excellent care. Hearing back from our patients is one way we can continue to improve our  services. Please take a few minutes to complete the written survey that you may receive in the mail after your visit with us. Thank you!             Your Updated Medication List - Protect others around you: Learn how to safely use, store and throw away your medicines at www.disposemymeds.org.          This list is accurate as of: 11/3/17 10:04 AM.  Always use your most recent med list.                   Brand Name Dispense Instructions for use Diagnosis    ADVIL PO      Take 200 mg by mouth every 4 hours as needed for moderate pain        CLARITIN PO      Take 1 Dose by mouth daily as needed        PRAVACHOL PO      Take 20 mg by mouth At Bedtime        SYNTHROID PO      Take 75 mcg by mouth every morning        triamterene-hydrochlorothiazide 37.5-25 MG per tablet    MAXZIDE-25     Take 1 tablet by mouth every morning        VITAMIN D (CHOLECALCIFEROL) PO      Take 1,000 Units by mouth every morning

## 2017-11-03 NOTE — DISCHARGE INSTRUCTIONS
Diverticulitis    Some people get pouches along the wall of the colon as they get older. The pouches, called diverticuli, usually cause no symptoms. If the pouches become blocked, you can get an infection. This infection is called diverticulitis. It causes pain in your lower abdomen and fever. If not treated, it can become a serious condition, causing an abscess to form inside the pouch. The abscess may block the intestinal tract even or rupture, spreading infection throughout the abdomen.  When treatment is started early, oral antibiotics alone may be enough to cure diverticulitis. This method is tried first. But, if you don't improve or if your condition gets worse while using oral antibiotics, you may need to be admitted to the hospital for IV antibiotics. Severe cases may require surgery.  Home care  The following guidelines will help you care for yourself at home:    During the acute illness, rest and follow your healthcare provider's instructions about diet. Sometimes you will need to follow a clear liquid diet to rest your bowel. Once your symptoms are better, you may be told to follow a low-fiber diet for some time. Include foods like:    Flake cereal, mashed potatoes, pancakes, waffles, pasta, white bread, rice, applesauce, bananas, eggs, fish, poultry, tofu, and cooked soft vegetables    Take antibiotics exactly as instructed. Don't miss any doses or stop taking the medication, even if you feel better.    Monitor your temperature and tell your healthcare provider if you have rising temperatures.  Preventing future attacks  Once you have an episode of diverticulitis, you are at risk for having it again. After you have recovered from this episode, you may be able to lower your risk by eating a high-fiber diet (20 gm/day to 35 gm/day of fiber). This cleans out the colon pouches that already exist and may prevent new ones from forming. Foods high in fiber include fresh fruits and edible peelings, raw or  lightly cooked vegetables, whole grain cereals and breads, dried beans and peas, and bran.  Other steps that can help prevent future attacks include:    Take your medicines, such as antibiotics, as your healthcare provider says.    Drink 6 to 8 glasses of water every day, unless told otherwise.    Use a heating pad or hot water bottle to help abdominal cramping or pain.    Begin an exercise program. Ask your healthcare provider how to get started. You can benefit from simple activities such as walking or gardening.    Treat diarrhea with a bland diet. Start with liquids only; then slowly add fiber over time.    Watch for changes in your bowel movements (constipation to diarrhea). Avoid constipation by eating a high fiber diet and taking a stool softener if needed.    Get plenty of rest and sleep.  Follow-up care  Follow up with your healthcare provider as advised or sooner if you are not getting better in the next 2 days.  When to seek medical advice  Call your healthcare provider right away if any of these occur:    Fever of 100.4 F (38 C) or higher, or as directed by your healthcare provider    Repeated vomiting or swelling of the abdomen    Weakness, dizziness, light-headedness    Pain in your abdomen that gets worse, severe, or spreads to your back    Pain that moves to the right lower abdomen    Rectal bleeding (stools that are red, black or maroon color)    Unexpected vaginal bleeding  Date Last Reviewed: 9/1/2016 2000-2017 [employer]. [address] [place] [address]. All rights reserved. This information is not intended as a substitute for professional medical care. Always follow your healthcare professional's instructions.          Hyponatremia  Hyponatremia means low sodium levels in the blood. This condition most often occurs after prolonged vomiting or diarrhea, which causes your body to lose too much water and sodium. It can also result from drinking excess amounts of water or  the use of diuretics (water pills). Rarely, it can be associated with disorders of your endocrine system, as side effects of illicit drug use (ecstasy), as a complication of some cancers especially small cell lung cancer, or as a complication of renal and liver disease or heart failure.  Mild hyponatremia causes no symptoms. It is only discovered with a blood test. As sodium levels in the blood decreases, symptoms begin to appear. This includes weakness, confusion, muscle cramping and seizures.  Home care    Reduce your daily water intake until the problem is corrected.    If you have been taking diuretics, you may be asked to stop taking them for a short time.    If you are having symptoms of weakness or confusion, do not drive or operate dangerous machinery until symptoms resolve.    If your sodium levels are too low to be managed at home with the above recommendations, you will be asked to go to the hospital to have your sodium replaced through your vein.  Follow-up care  Follow up with your healthcare provider for a repeat blood test within the next week, or as advised.  When to seek medical advice  Call your healthcare provider if any of the following occur:    Increasing weakness    Dizziness    Irregular heartbeat, extra beats or very fast heart rate    Increasing confusion    Fainting or loss of consciousness    Seizure  Date Last Reviewed: 7/1/2017 2000-2017 [employer]. [address] [place] [address], [place] [address]. All rights reserved. This information is not intended as a substitute for professional medical care. Always follow your healthcare professional's instructions.          Hypokalemia  Hypokalemia means a low level of potassium in the blood. This most often occurs in people who take diuretics (water pills). It can also occur due to severe vomiting or diarrhea.  It is also seen in people who take laxatives for long periods of time. It can sometimes be associated with hypomagnesemia  (low magnesium) which needs to be corrected before your potassium levels can be fixed.  A mild case usually causes no symptoms. It is only found with blood testing. More severe potassium loss causes generalized weakness, muscle or abdominal cramping, heart palpitations (rapid or irregular heartbeats), low blood pressure, specific muscle weakness, and in some people who are paralyzed.   Home care    Take any potassium supplements prescribed.    Eat foods rich in potassium. The highest amount is found in avocado, baked potatoes, spinach, cantaloupe, cod, halibut, salmon, and scallops. White, red, or moore beans are also very good sources. A modest amount is found in orange juice, bananas, carrots, and tomato juice.    If you take certain types of diuretics, you will also need to take potassium supplements. If you take a diuretic, discuss potassium supplements with your doctor.  Follow-up care  Follow up with your healthcare provider for a repeat blood test within the next week or as advised by our staff.  When to seek medical advice  Call your healthcare provider if any of the following occur:    Increased weakness, fatigue, muscle cramps    Dizziness  Call 911  Call 911 if any of the following occur:    Irregular heartbeat, extra beats or very fast heart rate    Loss of consciousness  Date Last Reviewed: 7/1/2017 2000-2017 The [employer]. [address] [place] [address], [place] [address]. All rights reserved. This information is not intended as a substitute for professional medical care. Always follow your healthcare professional's instructions.

## 2017-11-03 NOTE — ED PROVIDER NOTES
History     Chief Complaint:  Abdominal Pain    The history is provided by the patient.      Suma Calabrese is a 62 year old female who presents with abdominal pain. The patient was seen by her PCP in clinic today for LLQ pain and abdominal distention. She was unable to have a CT scan performed until 1630 today, so her PCP told her to come into the ED for imaging. Here in the ED the patient states that today is day 4 of her pain. She has not been eating as much because she is currently constipated and afraid to eat. She reports that her last bowel movement was about 4 days ago. Of note, the patient had diverticulitis in the past and her current symptoms feel similar but not as intense as her previous diverticulitis. She denies any fever, chills, headaches, runny nose, fatigue, or other medical concerns. She has been taking Miralax for the past 2 days with no relief in her constipation.    Allergies:  Bupropion  Erythromycin  Oxycodone      Medications:    Synthroid  Pravachol  Claritin  Maxzide    Past Medical History:    Cancer (H)   Hypertension   Nontoxic uninodular goiter   Pure hypercholesterolemia   TMJ (temporomandibular joint syndrome)     Past Surgical History:    Biopsy  Biopsy node sentinel  Bladder dilation  Bird In Hand tooth extraction  Mastectomy simple bilateral    Family History:    History reviewed. No pertinent family history.      Social History:  Presents alone  Tobacco use: 0.50 PPD for 40 years.   Alcohol use: Occasional  PCP: BEKAH KAUR    Marital Status:  Single      Review of Systems   Constitutional: Positive for appetite change. Negative for chills, fatigue and fever.   HENT: Negative for rhinorrhea.    Gastrointestinal: Positive for abdominal distention, abdominal pain and constipation.   Neurological: Negative for headaches.   All other systems reviewed and are negative.    Physical Exam     Patient Vitals for the past 24 hrs:   BP Temp Temp src Heart Rate SpO2 Height Weight  "  11/03/17 1026 114/65 97.8  F (36.6  C) Oral 73 100 % 1.676 m (5' 6\") 55.8 kg (123 lb)      Physical Exam  Physical Exam   Nursing note and vitals reviewed.  General: Oriented to person, place, and time. Appears well-developed and well-nourished.   Head: No signs of trauma.   Mouth/Throat: Oropharynx is clear and moist.   Eyes: Conjunctivae are normal. Pupils are equal, round, and reactive to light.   Neck: Normal range of motion. No nuchal rigidity.   Cardiovascular: Normal rate and regular rhythm.    Respiratory: Effort normal and breath sounds normal. No respiratory distress.   Abdominal: Soft. There is no tenderness. There is no guarding.   Musculoskeletal: Normal range of motion. no edema.   Neurological: The patient is alert and oriented to person, place, and time.  PERRLA, EOMI, visual fields intact, strength in upper/lower extremities normal and symmetrical.   Sensation normal. Speech normal  GCS eye subscore is 4. GCS verbal subscore is 5. GCS motor subscore is 6.   Skin: Skin is warm and dry. No rash noted.   Psychiatric: normal mood and affect. behavior is normal.      Emergency Department Course   Imaging:  Radiographic findings were communicated with the patient who voiced understanding of the findings.  CT Abdomen Pelvis w Contrast  IMPRESSION:  1. Abnormal appearing sigmoid colon, superimposed on diverticulosis. This is most likely diverticulitis, perhaps with a small intramural abscess.  2. 7.7 x 4 x 2 cm fluid collection interposed between the uterus and the sigmoid colon could represent an abscess. Other small fluid collections in the right pelvis are also possible.  3. Large amount of stool in the colon.    Imaging independently reviewed and agree with radiologist interpretation.     Laboratory:  CBC: WNL (WBC 6.1, HGB 12.2, )   CMP:  (L), Potassium 3.1 (L), Chloride 87 (L), Albumin 2.8 (L), Protein 6.7 (L), o/w WNL (Creatinine 0.66)    UA: pH Urine 7.5 (H), o/w Negative "     Interventions:  1127: NS 1L IV Bolus   1129: Omnipaque 50 mLs PO    Emergency Department Course:  Past medical records, nursing notes, and vitals reviewed.  1056: I performed an exam of the patient and obtained history, as documented above.      1352: I rechecked the patient. Findings and plan explained to the Patient. Patient discharged home with instructions regarding supportive care, medications, and reasons to return. The importance of close follow-up was reviewed.      I personally reviewed the laboratory results with the Patient and answered all related questions prior to discharge.   Impression & Plan    Medical Decision Making:  Suma Calabrese is a 62 year old female who presents for evaluation of abdominal pain.  A broad differential diagnosis was considered and CT confirms diverticulitis without abscess or perforation; this appears consistent with the history and physical exam so I doubt another underlying etiology is also present. At this time the patient declined pain medications here in the emergency department. This represents uncomplicated diverticulitis at this time. There are no signs of sepsis, shock or bacteremia, nor signs of abscess. She has a normal WBC and no fever.  Her symptoms are mild, not consistent with a large abscess.  I offered admission for close monitoring given the CT findings but the patient refuses but will return if symptoms do not resolve with outpatient treatment.    Of note, because of the patient's concomitant hyponatremia and hypokalemia I recommended admission to the hospital but at this time she is electing to be treated as an outpatient. I relayed this information to her PCP, Dr. Kenney, who agrees to follow-up with the patient early next week.    The natural history of this problem was discussed, and I educated the patient regarding the symptoms and signs that should prompt return to the Emergency Department.  This would include worsening fevers, chills,  vomiting, and more intense pain.  The patient is to take antibiotics as directed.       Diagnosis:    ICD-10-CM   1. Diverticulitis of colon K57.32   2. Hyponatremia E87.1   3. Hypokalemia E87.6       Disposition:  Discharged to home with plan as outlined.    Discharge Medications:  New Prescriptions    CIPROFLOXACIN (CIPRO) 500 MG TABLET    Take 1 tablet (500 mg) by mouth 2 times daily for 10 days    METRONIDAZOLE (FLAGYL) 500 MG TABLET    Take 1 tablet (500 mg) by mouth 3 times daily for 10 days         Javier Calzada  11/3/2017    EMERGENCY DEPARTMENT  I, Javier Calzada, am serving as a scribe at 10:56 AM on 11/3/2017 to document services personally performed by Sanjeev Jimenez MD based on my observations and the provider's statements to me.       Sanjeev Jimenez MD  11/03/17 0960

## 2017-11-03 NOTE — TELEPHONE ENCOUNTER
SEE DR PARADA'S NOTE    PREVIOUS DIAGNOSIS 08/2016    DR POSADA RECOMMENDED GOING TO EMERGENCY ROOM     WILL BE GOING HOME ON CIPRO AND FLAGYL TWICE DAILY     NEEDS CLOSE CAREFUL FOLLOW UP      HYPOKALEMIA  AND SODIUM LOW LOW     NORMAL WHITE BLOOD CELL COUNT     OFFERED XERXES TOMORROW IF PROBLEMS     OR BACK TO EMERGENCY ROOM     CALL WITH DR SWETHA KAUR JR., MD

## 2017-11-03 NOTE — PROGRESS NOTES
SUBJECTIVE:   Suma Calabrese is a 62 year old female who presents to clinic today for the following health issues:      Constipation      Duration: 4-5 days     Description:       Frequency of bowel movements: usually has daily BM but not had this week        Consistency of stool: n/a    Intensity:  mild, moderate    Accompanying signs and symptoms: possibly flare up of diverticulitis pt states and bloating and hard to eat due to having a feeling of fullness        Abdominal pain: YES- lower left        Rectal pain: no        Blood in stool: no        Nausea/vomitting: no     History:        Similar problems in past: no     Precipitating or alleviating factors: miralax outcome: not helpful       Medications worsening symptoms: no     Therapies tried and outcome: miralax outcome: not helpful       Chronic laxative use: no   Additional: Oct 16 2017 had surgery had double masectomy     History of diverticulitis last year.     First couple of weeks after surgery - fine.   Last week - bowel movement since Monday.   Left sided abdominal pain as it was last time with diverticulitis.   No fever.   Not taking opiates or other pain medications.     Problem list and histories reviewed & adjusted, as indicated.  Additional history: as documented    Labs reviewed in EPIC    Reviewed and updated as needed this visit by clinical staff     Reviewed and updated as needed this visit by Provider      Social History     Social History     Marital status: Single     Spouse name: N/A     Number of children: N/A     Years of education: N/A     Social History Main Topics     Smoking status: Current Every Day Smoker     Packs/day: 0.50     Years: 40.00     Types: Cigarettes     Smokeless tobacco: Never Used      Comment: 3/4 PPD     Alcohol use 0.0 oz/week     0 Standard drinks or equivalent per week      Comment: weekends     Drug use: No     Sexual activity: No     Other Topics Concern     Parent/Sibling W/ Cabg, Mi Or Angioplasty  Before 65f 55m? No     Social History Narrative     Allergies   Allergen Reactions     Bupropion Other (See Comments)     Jittery and HA     Erythromycin GI Disturbance     Oxycodone Nausea and Vomiting     Patient Active Problem List   Diagnosis     Nontoxic uninodular goiter     Rectal prolapse     Hypertension goal BP (blood pressure) < 140/80     Internal hemorrhoids with other complication     Tobacco use disorder     Hypothyroidism due to acquired atrophy of thyroid     Diverticulitis of colon     Malignant neoplasm of upper-outer quadrant of right breast in female, estrogen receptor positive (H)     S/P bilateral mastectomy     Reviewed medications, social history and  past medical and surgical history.    Review of system: for general, respiratory, CVS, GI and psychiatry negative except for noted above.     EXAM:  BP 94/72 (Cuff Size: Adult Regular)  Pulse 76  Temp 97.6  F (36.4  C) (Oral)  Resp 20  Wt 123 lb 8 oz (56 kg)  LMP  (LMP Unknown)  SpO2 99%  BMI 19.93 kg/m2  Constitutional: healthy, alert and no distress   Psychiatric: mentation appears normal and affect normal/bright  Abdomen: Abdomen soft, mild diffuse tenderness, left lower quadrant tenderness but no rebound or rigidity, BS decreaesd. No masses, organomegaly  : Deferred       ASSESSMENT / PLAN:  (R10.32) Abdominal pain, left lower quadrant  (primary encounter diagnosis)  Comment: with recent surgery but not on opiates. Constipation may be the cause but she had diverticulitis last year and possible abscess and contained rupture of sigmoid colon. She declined hospitalization at that time. Fortunately she is not feeling as bad but due to weekend we would like to get a CT scan for her. Discussed best and safe option is to go through ER to rule out serious etiology sooner. She is not too excited about going through ER. We will order stat CT abdomen for her and will schedule it for her  Plan: CT Abdomen Pelvis w Contrast           (Z87.19)  History of diverticulitis  Comment: see above.  Plan: CT Abdomen Pelvis w Contrast           ADDENDUM  Could not get in for CT scan until  this evening and due to weekend we again discussed going to ER and she agreed. She will go to [facility]. I called and gave [facility] ER physician basic info.

## 2017-11-03 NOTE — ED AVS SNAPSHOT
Emergency Department    [address] [place] [address]    Phone:  [phone]    Fax:  [phone]                                       Suma Calabrese   MRN: [id number]    Department:   Emergency Department   Date of Visit:  11/3/2017           After Visit Summary Signature Page     I have received my discharge instructions, and my questions have been answered. I have discussed any challenges I see with this plan with the nurse or doctor.    ..........................................................................................................................................  Patient/Patient Representative Signature      ..........................................................................................................................................  Patient Representative Print Name and Relationship to Patient    ..................................................               ................................................  Date                                            Time    ..........................................................................................................................................  Reviewed by Signature/Title    ...................................................              ..............................................  Date                                                            Time

## 2017-11-06 ENCOUNTER — OFFICE VISIT (OUTPATIENT)
Dept: FAMILY MEDICINE | Facility: CLINIC | Age: 62
End: 2017-11-06
Payer: COMMERCIAL

## 2017-11-06 VITALS
RESPIRATION RATE: 20 BRPM | HEART RATE: 73 BPM | BODY MASS INDEX: 20.25 KG/M2 | WEIGHT: 126 LBS | OXYGEN SATURATION: 100 % | HEIGHT: 66 IN | SYSTOLIC BLOOD PRESSURE: 126 MMHG | TEMPERATURE: 98.2 F | DIASTOLIC BLOOD PRESSURE: 70 MMHG

## 2017-11-06 DIAGNOSIS — I10 HYPERTENSION GOAL BP (BLOOD PRESSURE) < 140/80: ICD-10-CM

## 2017-11-06 DIAGNOSIS — E87.1 HYPONATREMIA: ICD-10-CM

## 2017-11-06 DIAGNOSIS — E03.4 HYPOTHYROIDISM DUE TO ACQUIRED ATROPHY OF THYROID: Chronic | ICD-10-CM

## 2017-11-06 DIAGNOSIS — K57.32 DIVERTICULITIS OF COLON: Primary | ICD-10-CM

## 2017-11-06 DIAGNOSIS — E87.6 HYPOKALEMIA: ICD-10-CM

## 2017-11-06 PROCEDURE — 99214 OFFICE O/P EST MOD 30 MIN: CPT | Performed by: INTERNAL MEDICINE

## 2017-11-06 RX ORDER — LEVOTHYROXINE SODIUM 75 UG/1
75 TABLET ORAL EVERY MORNING
Qty: 90 TABLET | Refills: 3 | Status: SHIPPED | OUTPATIENT
Start: 2017-11-06 | End: 2018-12-24

## 2017-11-06 RX ORDER — TRIAMTERENE/HYDROCHLOROTHIAZID 37.5-25 MG
1 TABLET ORAL EVERY MORNING
Qty: 90 TABLET | Refills: 3 | Status: ON HOLD | OUTPATIENT
Start: 2017-11-06 | End: 2017-11-17

## 2017-11-06 NOTE — PATIENT INSTRUCTIONS
Advance your diet gradually.             Do not take the triamterene/hctz until you are eating a regular diet.                      Cut back on the Miralax.                                    You should plan another CT in 4-6 weeks; see Dr Kenney or me for a follow up.                                               Later you can add some Citrucel.                   Eventually, you should have your colon examined.

## 2017-11-06 NOTE — PROGRESS NOTES
SUBJECTIVE:   Suma Calabrese is a 62 year old female who presents to clinic today for the following health issues:      ED/UC Followup:    Facility:  [facility]  Date of visit: 11/3/2017  Reason for visit: abdominal pain-dx'd: Diverticulitis  Current Status: stable, improved         ER note reviewed with pt.    She had not been eating, and drinking lots of water.             Was very constipated.                   She went to the ER on 11/3,                               Taking cipro and flagyl; tolerating them.                                                      Having less abd pain.    Taking miralax bid, and antibiotics; bowels are moving today; several times.                                          Minimal food intake;has increased her electrolyte intake;  drinking V-8,fruit juice,gatorade.                Still taking triam/hctz.                         Problem list and histories reviewed & adjusted, as indicated.      Patient Active Problem List    Diagnosis Date Noted     Hypertension goal BP (blood pressure) < 140/80 01/28/2013     Priority: High     Well controlled on triamterene/dyazide       Tobacco use disorder 01/28/2013     Priority: High     S/P bilateral mastectomy 10/16/2017     Priority: Medium     Malignant neoplasm of upper-outer quadrant of right breast in female, estrogen receptor positive (H) 10/11/2017     Priority: Medium     Hypothyroidism due to acquired atrophy of thyroid 08/25/2016     Priority: Medium     Diagnosed 2016 or so - stable on levothyroxine       Diverticulitis of colon 08/25/2016     Priority: Medium     2016 - no recurrence       Nontoxic uninodular goiter 01/28/2013     Priority: Medium     Rectal prolapse 01/28/2013     Priority: Medium     Internal hemorrhoids with other complication 01/28/2013     Priority: Medium       Past Surgical History:   Procedure Laterality Date     BIOPSY       BIOPSY NODE SENTINEL Bilateral 10/16/2017    Procedure: BIOPSY NODE SENTINEL;;   Surgeon: Van Salazar MD;  Location: [facility]     bladder dilitation[  age 17     DENTAL SURGERY      wisdom tooth extraction     MASTECTOMY SIMPLE BILATERAL Bilateral 10/16/2017    Procedure: MASTECTOMY SIMPLE BILATERAL;  BILATERAL SIMPLE MASTECTOMY, BILATERAL AXILLARY SENTINEL NODE BIOPSY;  Surgeon: Van Salazar MD;  Location: [facility]       Social History   Substance Use Topics     Smoking status: Current Every Day Smoker     Packs/day: 0.50     Years: 40.00     Types: Cigarettes     Smokeless tobacco: Never Used      Comment: 3/4 PPD     Alcohol use 0.0 oz/week     0 Standard drinks or equivalent per week      Comment: weekends     Family History   Problem Relation Age of Onset     Unknown/Adopted Mother      Unknown/Adopted Father          Current Outpatient Prescriptions   Medication Sig Dispense Refill     ciprofloxacin (CIPRO) 500 MG tablet Take 1 tablet (500 mg) by mouth 2 times daily for 10 days 20 tablet 0     metroNIDAZOLE (FLAGYL) 500 MG tablet Take 1 tablet (500 mg) by mouth 3 times daily for 10 days 30 tablet 0     Levothyroxine Sodium (SYNTHROID PO) Take 75 mcg by mouth every morning       Pravastatin Sodium (PRAVACHOL PO) Take 20 mg by mouth At Bedtime       Ibuprofen (ADVIL PO) Take 200 mg by mouth every 4 hours as needed for moderate pain       VITAMIN D, CHOLECALCIFEROL, PO Take 1,000 Units by mouth every morning       Loratadine (CLARITIN PO) Take 1 Dose by mouth daily as needed       triamterene-hydrochlorothiazide (MAXZIDE-25) 37.5-25 MG per tablet Take 1 tablet by mouth every morning        Allergies   Allergen Reactions     Bupropion Other (See Comments)     Jittery and HA     Erythromycin GI Disturbance     Oxycodone Nausea and Vomiting     BP Readings from Last 3 Encounters:   11/06/17 126/70   11/03/17 115/90   11/03/17 94/72    Wt Readings from Last 3 Encounters:   11/06/17 126 lb (57.2 kg)   11/03/17 123 lb (55.8 kg)   11/03/17 123 lb 8 oz (56 kg)                 "        Reviewed and updated as needed this visit by clinical staff       Reviewed and updated as needed this visit by Provider         ROS:  CONSTITUTIONAL:NEGATIVE for fever, chills, change in weight  GI: NEGATIVE for hematochezia and melena    OBJECTIVE:                                                    /70 (BP Location: Left arm, Patient Position: Chair, Cuff Size: Adult Regular)  Pulse 73  Temp 98.2  F (36.8  C)  Resp 20  Ht 5' 6\" (1.676 m)  Wt 126 lb (57.2 kg)  LMP  (LMP Unknown)  SpO2 100%  Breastfeeding? No  BMI 20.34 kg/m2  Body mass index is 20.34 kg/(m^2).  GENERAL APPEARANCE: alert and no distress  CV: regular rates and rhythm, normal S1 S2, no S3 or S4 and no murmur, click or rub  ABDOMEN: soft, nontender, without hepatosplenomegaly or masses    Diagnostic test results:  Results for orders placed or performed during the hospital encounter of 11/03/17   CT Abdomen Pelvis w Contrast    Narrative    CT ABDOMEN/PELVIS WITH CONTRAST November 3, 2017 12:55 PM    HISTORY: Left lower quadrant pain.    TECHNIQUE: Helical axial scans from dome of liver through pubic  symphysis with 62mL Isovue-370 IV contrast. Radiation dose for this  scan was reduced using automated exposure control, adjustment of the  mA and/or kV according to patient size, or iterative reconstruction  technique.    COMPARISON: None.    FINDINGS: The liver, spleen, pancreas and bilateral adrenal glands are  normal. The left kidney is unremarkable. The right kidney contains a  0.8 cm benign cyst anteriorly in the midpole. There is a large amount  of stool throughout the ascending, transverse and descending colon.  Vascular calcifications are seen.    Scans through the pelvis are difficult to interpret because of a  relative lack of pelvic fat. The sigmoid colon appears abnormally  thickened. There is suggestion of an intramural lucency in the mid  sigmoid colon (image 52 of series 2) which could be an intramural  abscess. The " most likely diagnosis is diverticulitis but the  thickening of the sigmoid colon is nonspecific and tumor is not  completely excluded so follow-up exam is recommended. In addition,  there is a collection of fluid interposed between the uterus and the  sigmoid colon and extending to the left side which is not definitely  bowel and could be an abscess measuring up to 7.7 x 4 x 2 cm (images  23-32 of series 3). Other very small abnormal collections of fluid  could be present in the right pelvis also. There is no free fluid.  There is only mild induration of the pericolonic fat in the sigmoid  region. The appendix is identified and appears normal.      Impression    IMPRESSION:  1. Abnormal appearing sigmoid colon, superimposed on diverticulosis.  This is most likely diverticulitis, perhaps with a small intramural  abscess.  2. 7.7 x 4 x 2 cm fluid collection interposed between the uterus and  the sigmoid colon could represent an abscess. Other small fluid  collections in the right pelvis are also possible.  3. Large amount of stool in the colon.    JAMIE CALERO MD   CBC with platelets differential   Result Value Ref Range    WBC 6.1 4.0 - 11.0 10e9/L    RBC Count 3.71 (L) 3.8 - 5.2 10e12/L    Hemoglobin 12.2 11.7 - 15.7 g/dL    Hematocrit 33.0 (L) 35.0 - 47.0 %    MCV 89 78 - 100 fl    MCH 32.9 26.5 - 33.0 pg    MCHC 37.0 (H) 31.5 - 36.5 g/dL    RDW 11.8 10.0 - 15.0 %    Platelet Count 249 150 - 450 10e9/L    Diff Method Automated Method     % Neutrophils 70.3 %    % Lymphocytes 18.4 %    % Monocytes 10.0 %    % Eosinophils 0.8 %    % Basophils 0.2 %    % Immature Granulocytes 0.3 %    Nucleated RBCs 0 0 /100    Absolute Neutrophil 4.3 1.6 - 8.3 10e9/L    Absolute Lymphocytes 1.1 0.8 - 5.3 10e9/L    Absolute Monocytes 0.6 0.0 - 1.3 10e9/L    Absolute Eosinophils 0.1 0.0 - 0.7 10e9/L    Absolute Basophils 0.0 0.0 - 0.2 10e9/L    Abs Immature Granulocytes 0.0 0 - 0.4 10e9/L    Absolute Nucleated RBC 0.0     Comprehensive metabolic panel   Result Value Ref Range    Sodium 125 (L) 133 - 144 mmol/L    Potassium 3.1 (L) 3.4 - 5.3 mmol/L    Chloride 87 (L) 94 - 109 mmol/L    Carbon Dioxide 29 20 - 32 mmol/L    Anion Gap 9 3 - 14 mmol/L    Glucose 94 70 - 99 mg/dL    Urea Nitrogen 7 7 - 30 mg/dL    Creatinine 0.66 0.52 - 1.04 mg/dL    GFR Estimate >90 >60 mL/min/1.7m2    GFR Estimate If Black >90 >60 mL/min/1.7m2    Calcium 8.9 8.5 - 10.1 mg/dL    Bilirubin Total 0.7 0.2 - 1.3 mg/dL    Albumin 2.8 (L) 3.4 - 5.0 g/dL    Protein Total 6.7 (L) 6.8 - 8.8 g/dL    Alkaline Phosphatase 83 40 - 150 U/L    ALT 13 0 - 50 U/L    AST 13 0 - 45 U/L   UA reflex to Microscopic   Result Value Ref Range    Color Urine Yellow     Appearance Urine Clear     Glucose Urine Negative NEG^Negative mg/dL    Bilirubin Urine Negative NEG^Negative    Ketones Urine Negative NEG^Negative mg/dL    Specific Gravity Urine 1.003 1.003 - 1.035    Blood Urine Negative NEG^Negative    pH Urine 7.5 (H) 5.0 - 7.0 pH    Protein Albumin Urine Negative NEG^Negative mg/dL    Urobilinogen mg/dL 2.0 0.0 - 2.0 mg/dL    Nitrite Urine Negative NEG^Negative    Leukocyte Esterase Urine Negative NEG^Negative    Source Midstream Urine    Osmolality   Result Value Ref Range    Osmolality 253 (L) 280 - 301 mmol/kg          ASSESSMENT/PLAN:                                                      ICD-10-CM    1. Diverticulitis of colon K57.32    2. Hyponatremia E87.1    3. Hypokalemia E87.6    4. Hypothyroidism due to acquired atrophy of thyroid E03.4 levothyroxine (SYNTHROID) 75 MCG tablet   5. Hypertension goal BP (blood pressure) < 140/80 I10 triamterene-hydrochlorothiazide (MAXZIDE-25) 37.5-25 MG per tablet       Summary and implications:  We reviewed her situation at length.     The CT suggested a diverticular abscess, however she was never febrile and her white count was normal.                                                     Patient Instructions   Advance your diet  gradually.             Do not take the triamterene/hctz until you are eating a regular diet.                      Cut back on the Miralax.                                    You should plan another CT in 4-6 weeks; see Dr Kenney or me for a follow up.                                               Later you can add some Citrucel.                   Eventually, you should have your colon examined.                       (We discussed eventually having a colonoscopy, even if her situation totally resolves. She's never had a screening colonoscopy)    Owen Jean MD  [facility]

## 2017-11-06 NOTE — NURSING NOTE
"Chief Complaint   Patient presents with     Hospital F/U       Initial /70 (BP Location: Left arm, Patient Position: Chair, Cuff Size: Adult Regular)  Pulse 73  Temp 98.2  F (36.8  C)  Resp 20  Ht 5' 6\" (1.676 m)  Wt 126 lb (57.2 kg)  LMP  (LMP Unknown)  SpO2 100%  Breastfeeding? No  BMI 20.34 kg/m2 Estimated body mass index is 20.34 kg/(m^2) as calculated from the following:    Height as of this encounter: 5' 6\" (1.676 m).    Weight as of this encounter: 126 lb (57.2 kg).  Medication Reconciliation: complete   Ana Victoria LPN  "

## 2017-11-06 NOTE — MR AVS SNAPSHOT
After Visit Summary   11/6/2017    Suma Calabrese    MRN: [id number]           Patient Information     Date Of Birth          1955        Visit Information        Provider Department      11/6/2017 3:00 PM Owen Jean MD [facility]        Today's Diagnoses     Diverticulitis of colon    -  1    Hyponatremia        Hypokalemia        Hypothyroidism due to acquired atrophy of thyroid        Hypertension goal BP (blood pressure) < 140/80          Care Instructions    Advance your diet gradually.             Do not take the triamterene/hctz until you are eating a regular diet.                      Cut back on the Miralax.                                    You should plan another CT in 4-6 weeks; see Dr Kenney or me for a follow up.                                               Later you can add some Citrucel.                   Eventually, you should have your colon examined.           Follow-ups after your visit        Your next 10 appointments already scheduled     Nov 08, 2017  3:15 PM CST   New Visit with Celi Powers MD   [facility] ([facility])    [facility]  [address]  [facility] [address]   [phone]            Nov 09, 2017  9:45 AM CST   Post Op with Van Salazar MD   [facility]    [address]  [facility] [id number] [phone]            Nov 16, 2017  1:30 PM CST   Post Op with Van Salazar MD   [facility] ([facility])    [address] [facility]  [address]  [facility] [id number] [phone]              Who to contact     If you have questions or need follow up information about today's clinic visit or your schedule please contact [facility] directly at [phone].  Normal or non-critical lab and  "imaging results will be communicated to you by Red LaGoonhart, letter or phone within 4 business days after the clinic has received the results. If you do not hear from us within 7 days, please contact the clinic through [employer]Account[employer] or phone. If you have a critical or abnormal lab result, we will notify you by phone as soon as possible.  Submit refill requests through [employer]Account[employer] or call your pharmacy and they will forward the refill request to us. Please allow 3 business days for your refill to be completed.          Additional Information About Your Visit        Red LaGoonharNiftyThrifty Information     [employer] gives you secure access to your electronic health record. If you see a primary care provider, you can also send messages to your care team and make appointments. If you have questions, please call your primary care clinic.  If you do not have a primary care provider, please call [phone] and they will assist you.        Care EveryWhere ID     This is your Care EveryWhere ID. This could be used by other organizations to access your [facility] medical records  [id number]        Your Vitals Were     Pulse Temperature Respirations Height Last Period Pulse Oximetry    73 98.2  F (36.8  C) 20 5' 6\" (1.676 m) (LMP Unknown) 100%    Breastfeeding? BMI (Body Mass Index)                No 20.34 kg/m2           Blood Pressure from Last 3 Encounters:   11/06/17 126/70   11/03/17 115/90   11/03/17 94/72    Weight from Last 3 Encounters:   11/06/17 126 lb (57.2 kg)   11/03/17 123 lb (55.8 kg)   11/03/17 123 lb 8 oz (56 kg)              Today, you had the following     No orders found for display         Today's Medication Changes          These changes are accurate as of: 11/6/17  3:27 PM.  If you have any questions, ask your nurse or doctor.               These medicines have changed or have updated prescriptions.        Dose/Directions    levothyroxine 75 MCG tablet   Commonly known as:  SYNTHROID   This may have changed:  medication strength "   Used for:  Hypothyroidism due to acquired atrophy of thyroid   Changed by:  Owen Jean MD        Dose:  75 mcg   Take 1 tablet (75 mcg) by mouth every morning   Quantity:  90 tablet   Refills:  3            Where to get your medicines      [facility] [address] [address] [facility] [address] [place] [address]    Hours:  24-hours Phone:  [phone]     levothyroxine 75 MCG tablet    triamterene-hydrochlorothiazide 37.5-25 MG per tablet                Primary Care Provider Office Phone # Fax #    Murali Aria Kenney [phone] [address] [facility] [address]        Equal Access to Services     [facility] : Hadii beata edmonds hadasho Soomaali, waaxda luqadaha, qaybta kaalmada adeegyada, omar aguayo . So [facility] [phone].    ATENCIÓN: Si habla español, tiene a yepez disposición servicios gratuitos de asistencia lingüística. [facility] [phone].    We comply with applicable federal civil rights laws and [place] laws. We do not discriminate on the basis of race, color, national origin, age, disability, sex, sexual orientation, or gender identity.            Thank you!     Thank you for choosing [facility]  for your care. Our goal is always to provide you with excellent care. Hearing back from our patients is one way we can continue to improve our services. Please take a few minutes to complete the written survey that you may receive in the mail after your visit with us. Thank you!             Your Updated Medication List - Protect others around you: Learn how to safely use, store and throw away your medicines at www.disposemymeds.org.          This list is accurate as of: 11/6/17  3:27 PM.  Always use your most recent med list.                   Brand Name Dispense Instructions for use Diagnosis    ADVIL PO       Take 200 mg by mouth every 4 hours as needed for moderate pain        ciprofloxacin 500 MG tablet    CIPRO    20 tablet    Take 1 tablet (500 mg) by mouth 2 times daily for 10 days        CLARITIN PO      Take 1 Dose by mouth daily as needed        levothyroxine 75 MCG tablet    SYNTHROID    90 tablet    Take 1 tablet (75 mcg) by mouth every morning    Hypothyroidism due to acquired atrophy of thyroid       metroNIDAZOLE 500 MG tablet    FLAGYL    30 tablet    Take 1 tablet (500 mg) by mouth 3 times daily for 10 days        PRAVACHOL PO      Take 20 mg by mouth At Bedtime        triamterene-hydrochlorothiazide 37.5-25 MG per tablet    MAXZIDE-25    90 tablet    Take 1 tablet by mouth every morning    Hypertension goal BP (blood pressure) < 140/80       VITAMIN D (CHOLECALCIFEROL) PO      Take 1,000 Units by mouth every morning

## 2017-11-07 ENCOUNTER — APPOINTMENT (OUTPATIENT)
Dept: CT IMAGING | Facility: CLINIC | Age: 62
End: 2017-11-07
Attending: EMERGENCY MEDICINE
Payer: COMMERCIAL

## 2017-11-07 ENCOUNTER — TELEPHONE (OUTPATIENT)
Dept: FAMILY MEDICINE | Facility: CLINIC | Age: 62
End: 2017-11-07

## 2017-11-07 ENCOUNTER — TELEPHONE (OUTPATIENT)
Dept: ONCOLOGY | Facility: CLINIC | Age: 62
End: 2017-11-07

## 2017-11-07 ENCOUNTER — HOSPITAL ENCOUNTER (INPATIENT)
Facility: CLINIC | Age: 62
LOS: 13 days | Discharge: HOME HEALTH | End: 2017-11-20
Attending: EMERGENCY MEDICINE | Admitting: INTERNAL MEDICINE
Payer: COMMERCIAL

## 2017-11-07 DIAGNOSIS — K59.1 FUNCTIONAL DIARRHEA: ICD-10-CM

## 2017-11-07 DIAGNOSIS — K56.609 SMALL BOWEL OBSTRUCTION (H): ICD-10-CM

## 2017-11-07 DIAGNOSIS — K57.32 DIVERTICULITIS OF COLON: Primary | ICD-10-CM

## 2017-11-07 LAB
ALBUMIN SERPL-MCNC: 2.7 G/DL (ref 3.4–5)
ALP SERPL-CCNC: 88 U/L (ref 40–150)
ALT SERPL W P-5'-P-CCNC: 13 U/L (ref 0–50)
ANION GAP SERPL CALCULATED.3IONS-SCNC: 8 MMOL/L (ref 3–14)
AST SERPL W P-5'-P-CCNC: 14 U/L (ref 0–45)
BASOPHILS # BLD AUTO: 0 10E9/L (ref 0–0.2)
BASOPHILS NFR BLD AUTO: 0.2 %
BILIRUB SERPL-MCNC: 0.4 MG/DL (ref 0.2–1.3)
BUN SERPL-MCNC: 5 MG/DL (ref 7–30)
CALCIUM SERPL-MCNC: 8.8 MG/DL (ref 8.5–10.1)
CHLORIDE SERPL-SCNC: 96 MMOL/L (ref 94–109)
CO2 SERPL-SCNC: 25 MMOL/L (ref 20–32)
CREAT SERPL-MCNC: 0.72 MG/DL (ref 0.52–1.04)
DIFFERENTIAL METHOD BLD: NORMAL
EOSINOPHIL # BLD AUTO: 0 10E9/L (ref 0–0.7)
EOSINOPHIL NFR BLD AUTO: 0.5 %
ERYTHROCYTE [DISTWIDTH] IN BLOOD BY AUTOMATED COUNT: 12.2 % (ref 10–15)
GFR SERPL CREATININE-BSD FRML MDRD: 82 ML/MIN/1.7M2
GLUCOSE SERPL-MCNC: 115 MG/DL (ref 70–99)
HCT VFR BLD AUTO: 35.7 % (ref 35–47)
HGB BLD-MCNC: 12.8 G/DL (ref 11.7–15.7)
IMM GRANULOCYTES # BLD: 0 10E9/L (ref 0–0.4)
IMM GRANULOCYTES NFR BLD: 0.2 %
LACTATE BLD-SCNC: 0.9 MMOL/L (ref 0.7–2)
LIPASE SERPL-CCNC: 59 U/L (ref 73–393)
LYMPHOCYTES # BLD AUTO: 1 10E9/L (ref 0.8–5.3)
LYMPHOCYTES NFR BLD AUTO: 17.5 %
MCH RBC QN AUTO: 32.7 PG (ref 26.5–33)
MCHC RBC AUTO-ENTMCNC: 35.9 G/DL (ref 31.5–36.5)
MCV RBC AUTO: 91 FL (ref 78–100)
MONOCYTES # BLD AUTO: 0.5 10E9/L (ref 0–1.3)
MONOCYTES NFR BLD AUTO: 7.9 %
NEUTROPHILS # BLD AUTO: 4.3 10E9/L (ref 1.6–8.3)
NEUTROPHILS NFR BLD AUTO: 73.7 %
NRBC # BLD AUTO: 0 10*3/UL
NRBC BLD AUTO-RTO: 0 /100
PLATELET # BLD AUTO: 286 10E9/L (ref 150–450)
POTASSIUM SERPL-SCNC: 3.2 MMOL/L (ref 3.4–5.3)
PROT SERPL-MCNC: 6.2 G/DL (ref 6.8–8.8)
RBC # BLD AUTO: 3.92 10E12/L (ref 3.8–5.2)
SODIUM SERPL-SCNC: 129 MMOL/L (ref 133–144)
WBC # BLD AUTO: 5.8 10E9/L (ref 4–11)

## 2017-11-07 PROCEDURE — 83690 ASSAY OF LIPASE: CPT | Performed by: EMERGENCY MEDICINE

## 2017-11-07 PROCEDURE — 99285 EMERGENCY DEPT VISIT HI MDM: CPT | Mod: 25

## 2017-11-07 PROCEDURE — 96375 TX/PRO/DX INJ NEW DRUG ADDON: CPT

## 2017-11-07 PROCEDURE — 74177 CT ABD & PELVIS W/CONTRAST: CPT

## 2017-11-07 PROCEDURE — 85025 COMPLETE CBC W/AUTO DIFF WBC: CPT | Performed by: EMERGENCY MEDICINE

## 2017-11-07 PROCEDURE — 25000125 ZZHC RX 250: Performed by: EMERGENCY MEDICINE

## 2017-11-07 PROCEDURE — 25000128 H RX IP 250 OP 636: Performed by: INTERNAL MEDICINE

## 2017-11-07 PROCEDURE — 25000128 H RX IP 250 OP 636: Performed by: EMERGENCY MEDICINE

## 2017-11-07 PROCEDURE — 80053 COMPREHEN METABOLIC PANEL: CPT | Performed by: EMERGENCY MEDICINE

## 2017-11-07 PROCEDURE — 12000000 ZZH R&B MED SURG/OB

## 2017-11-07 PROCEDURE — 83605 ASSAY OF LACTIC ACID: CPT | Performed by: EMERGENCY MEDICINE

## 2017-11-07 PROCEDURE — 96365 THER/PROPH/DIAG IV INF INIT: CPT | Mod: 59

## 2017-11-07 PROCEDURE — 99223 1ST HOSP IP/OBS HIGH 75: CPT | Mod: AI | Performed by: INTERNAL MEDICINE

## 2017-11-07 PROCEDURE — 25000125 ZZHC RX 250: Performed by: INTERNAL MEDICINE

## 2017-11-07 RX ORDER — SODIUM CHLORIDE 9 MG/ML
INJECTION, SOLUTION INTRAVENOUS CONTINUOUS
Status: DISCONTINUED | OUTPATIENT
Start: 2017-11-07 | End: 2017-11-09

## 2017-11-07 RX ORDER — PROCHLORPERAZINE MALEATE 5 MG
10 TABLET ORAL EVERY 6 HOURS PRN
Status: DISCONTINUED | OUTPATIENT
Start: 2017-11-07 | End: 2017-11-20 | Stop reason: HOSPADM

## 2017-11-07 RX ORDER — PROCHLORPERAZINE 25 MG
25 SUPPOSITORY, RECTAL RECTAL EVERY 12 HOURS PRN
Status: DISCONTINUED | OUTPATIENT
Start: 2017-11-07 | End: 2017-11-20 | Stop reason: HOSPADM

## 2017-11-07 RX ORDER — MORPHINE SULFATE 2 MG/ML
2 INJECTION, SOLUTION INTRAMUSCULAR; INTRAVENOUS ONCE
Status: COMPLETED | OUTPATIENT
Start: 2017-11-07 | End: 2017-11-07

## 2017-11-07 RX ORDER — POTASSIUM CHLORIDE 7.45 MG/ML
10 INJECTION INTRAVENOUS
Status: DISCONTINUED | OUTPATIENT
Start: 2017-11-07 | End: 2017-11-20 | Stop reason: HOSPADM

## 2017-11-07 RX ORDER — POTASSIUM CHLORIDE 1.5 G/1.58G
20-40 POWDER, FOR SOLUTION ORAL
Status: DISCONTINUED | OUTPATIENT
Start: 2017-11-07 | End: 2017-11-20 | Stop reason: HOSPADM

## 2017-11-07 RX ORDER — POTASSIUM CHLORIDE 1500 MG/1
20-40 TABLET, EXTENDED RELEASE ORAL
Status: DISCONTINUED | OUTPATIENT
Start: 2017-11-07 | End: 2017-11-20 | Stop reason: HOSPADM

## 2017-11-07 RX ORDER — MAGNESIUM SULFATE HEPTAHYDRATE 40 MG/ML
4 INJECTION, SOLUTION INTRAVENOUS EVERY 4 HOURS PRN
Status: DISCONTINUED | OUTPATIENT
Start: 2017-11-07 | End: 2017-11-20 | Stop reason: HOSPADM

## 2017-11-07 RX ORDER — NALOXONE HYDROCHLORIDE 0.4 MG/ML
.1-.4 INJECTION, SOLUTION INTRAMUSCULAR; INTRAVENOUS; SUBCUTANEOUS
Status: DISCONTINUED | OUTPATIENT
Start: 2017-11-07 | End: 2017-11-13

## 2017-11-07 RX ORDER — LEVOTHYROXINE SODIUM ANHYDROUS 100 UG/5ML
37.5 INJECTION, POWDER, LYOPHILIZED, FOR SOLUTION INTRAVENOUS DAILY
Status: DISCONTINUED | OUTPATIENT
Start: 2017-11-09 | End: 2017-11-14

## 2017-11-07 RX ORDER — IOPAMIDOL 755 MG/ML
62 INJECTION, SOLUTION INTRAVASCULAR ONCE
Status: COMPLETED | OUTPATIENT
Start: 2017-11-07 | End: 2017-11-07

## 2017-11-07 RX ORDER — POTASSIUM CL/LIDO/0.9 % NACL 10MEQ/0.1L
10 INTRAVENOUS SOLUTION, PIGGYBACK (ML) INTRAVENOUS
Status: DISCONTINUED | OUTPATIENT
Start: 2017-11-07 | End: 2017-11-20 | Stop reason: HOSPADM

## 2017-11-07 RX ORDER — ONDANSETRON 4 MG/1
4 TABLET, ORALLY DISINTEGRATING ORAL EVERY 6 HOURS PRN
Status: DISCONTINUED | OUTPATIENT
Start: 2017-11-07 | End: 2017-11-13

## 2017-11-07 RX ORDER — POTASSIUM CHLORIDE 29.8 MG/ML
20 INJECTION INTRAVENOUS
Status: DISCONTINUED | OUTPATIENT
Start: 2017-11-07 | End: 2017-11-07 | Stop reason: RX

## 2017-11-07 RX ORDER — CIPROFLOXACIN 2 MG/ML
400 INJECTION, SOLUTION INTRAVENOUS EVERY 12 HOURS
Status: DISCONTINUED | OUTPATIENT
Start: 2017-11-08 | End: 2017-11-08

## 2017-11-07 RX ORDER — CIPROFLOXACIN 2 MG/ML
400 INJECTION, SOLUTION INTRAVENOUS ONCE
Status: COMPLETED | OUTPATIENT
Start: 2017-11-07 | End: 2017-11-07

## 2017-11-07 RX ORDER — MORPHINE SULFATE 2 MG/ML
2-4 INJECTION, SOLUTION INTRAMUSCULAR; INTRAVENOUS
Status: DISCONTINUED | OUTPATIENT
Start: 2017-11-07 | End: 2017-11-13

## 2017-11-07 RX ORDER — ONDANSETRON 2 MG/ML
4 INJECTION INTRAMUSCULAR; INTRAVENOUS EVERY 6 HOURS PRN
Status: DISCONTINUED | OUTPATIENT
Start: 2017-11-07 | End: 2017-11-20 | Stop reason: HOSPADM

## 2017-11-07 RX ADMIN — IOPAMIDOL 62 ML: 755 INJECTION, SOLUTION INTRAVENOUS at 15:22

## 2017-11-07 RX ADMIN — SODIUM CHLORIDE: 9 INJECTION, SOLUTION INTRAVENOUS at 18:21

## 2017-11-07 RX ADMIN — Medication 10 MEQ: at 21:23

## 2017-11-07 RX ADMIN — SODIUM CHLORIDE 60 ML: 9 INJECTION, SOLUTION INTRAVENOUS at 15:22

## 2017-11-07 RX ADMIN — MORPHINE SULFATE 2 MG: 2 INJECTION, SOLUTION INTRAMUSCULAR; INTRAVENOUS at 17:17

## 2017-11-07 RX ADMIN — CIPROFLOXACIN 400 MG: 2 INJECTION, SOLUTION INTRAVENOUS at 17:17

## 2017-11-07 RX ADMIN — Medication 10 MEQ: at 22:28

## 2017-11-07 RX ADMIN — METRONIDAZOLE 500 MG: 500 INJECTION, SOLUTION INTRAVENOUS at 20:15

## 2017-11-07 ASSESSMENT — ENCOUNTER SYMPTOMS
COUGH: 0
FEVER: 0
SHORTNESS OF BREATH: 0
APPETITE CHANGE: 1
ABDOMINAL PAIN: 1
BLOOD IN STOOL: 0
CONSTIPATION: 1
NAUSEA: 1
DIARRHEA: 1

## 2017-11-07 NOTE — PHARMACY-ADMISSION MEDICATION HISTORY
Admission medication history interview status for the 11/7/2017  admission is complete. See EPIC admission navigator for prior to admission medications     Medication history source reliability:Good    Actions taken by pharmacist (provider contacted, etc):None     Additional medication history information not noted on PTA med list :None    Medication reconciliation/reorder completed by provider prior to medication history? No    Time spent in this activity: 10 min    Prior to Admission medications    Medication Sig Last Dose Taking? Auth Provider   levothyroxine (SYNTHROID) 75 MCG tablet Take 1 tablet (75 mcg) by mouth every morning 11/6/2017 at Unknown time Yes Owen Jean MD   ciprofloxacin (CIPRO) 500 MG tablet Take 1 tablet (500 mg) by mouth 2 times daily for 10 days 11/7/2017 at am Yes Sanjeev Jimenez MD   metroNIDAZOLE (FLAGYL) 500 MG tablet Take 1 tablet (500 mg) by mouth 3 times daily for 10 days 11/7/2017 at am Yes Sanjeev Jimenez MD   Pravastatin Sodium (PRAVACHOL PO) Take 20 mg by mouth At Bedtime Past Week at Unknown time Yes Reported, Patient   Ibuprofen (ADVIL PO) Take 200 mg by mouth every 4 hours as needed for moderate pain 11/6/2017 at Unknown time Yes Reported, Patient   VITAMIN D, CHOLECALCIFEROL, PO Take 1,000 Units by mouth every morning Past Week at Unknown time Yes Reported, Patient   Loratadine (CLARITIN PO) Take 10 mg by mouth daily as needed   Yes Reported, Patient   triamterene-hydrochlorothiazide (MAXZIDE-25) 37.5-25 MG per tablet Take 1 tablet by mouth every morning   Owen Jean MD

## 2017-11-07 NOTE — TELEPHONE ENCOUNTER
Reason for call:  Patient reporting a symptom    Symptom or request:   Constipation   Gas   Unable to eat     Duration (how long have symptoms been present):  For awhile       Have you been treated for this before? Yes    Additional comments:   Patient has been seen by several providers    Last one was Dr Owen Jean      They discussed introducing chicken to her diet but she is not able to eat this due to her symptoms    Please call her to discuss    Phone Number patient can be reached at:  Home number on file [phone] (home)    Best Time:    anytime    Can we leave a detailed message on this number:  YES    Call taken on 11/7/2017 at 10:43 AM by JESÚS GONZALEZ

## 2017-11-07 NOTE — IP AVS SNAPSHOT
MRN:[id number]                      After Visit Summary   11/7/2017    Suma Calabrese    MRN: [id number]           Thank you!     Thank you for choosing [facility] for your care. Our goal is always to provide you with excellent care. Hearing back from our patients is one way we can continue to improve our services. Please take a few minutes to complete the written survey that you may receive in the mail after you visit with us. Thank you!        Patient Information     Date Of Birth          1955        Designated Caregiver       Most Recent Value    Caregiver    Will someone help with your care after discharge? no      About your hospital stay     You were admitted on:  November 7, 2017 You last received care in the:  [facility] [address] [facility]    You were discharged on:  November 20, 2017        Reason for your hospital stay       You were in the hospital to be treated for diverticulitis.                  Who to Call     For medical emergencies, please call 911.  For non-urgent questions about your medical care, please call your primary care provider or clinic, [phone]  For questions related to your surgery, please call your surgery clinic        Attending Provider     Provider Specialty    Angel Sandoval MD --    Owen Melendrez MD Internal Medicine       Primary Care Provider Office Phone # Fax #    Murali Aria Kenney [phone]      After Care Instructions     Activity       Your activity upon discharge: activity as tolerated            Diet       Follow this diet upon discharge: Orders Placed This Encounter      Snacks/Supplements Adult: Other - Please comment; Ensure + Banana Prostat smoothie; Between Meals      Regular Diet Adult            Wound care and dressings       Instructions to care for your wound at home: keep wound clean and dry.  Please care for ileostomy and appliance as directed by wound ostomy nurse.                   Follow-up Appointments     Follow-up and recommended labs and tests        Follow up with Dr. Spain , within 2 weeks  to evaluate after surgery.  Follow up with PCP in one week.                  Additional Services     Home care nursing referral       RN skilled nursing visit. RN to provide Ileostomy cares.    Your provider has ordered home care nursing services. If you have not been contacted within 2 days of your discharge please call the inpatient department phone number at [phone] .                  Further instructions from your care team       Discharge Instructions following General Abdominal Surgery  [facility]    Diet:    Diet as instructed by surgeon.  Avoid gas forming foods.  You may find your appetite to be diminished briefly after surgery.  Drink plenty of fluids, especially water.  Activities:    Activity as tolerated and instructed by surgeon.  Take frequent, short walks.  No strenuous activity or heavy lifting (greater than 10-15 lbs) until approved by surgeon.  Bathing/Incision Care    Ok to shower.  Do not submerge incision (no tub baths or swimming) until advised by surgeon.  Pat incisions dry.  If present, staples will be removed in the office.  If present, tape dressing (Steri-Strips) will fall off on their own in 7-10 days.  No lotion, powders, or perfumes near or on the incision.  What to expect:    A tingly or itchy sensation around the incision is a sign of normal healing.    A small amount of clear, pink drainage from the incision is normal.  Call your surgeon if you have these signs or symptoms:    Fever greater than 101 oF or chills.    Redness, swelling, warmth, foul-smelling drainage from incision.    Increased pain that does not improve with pain medicine.    With any questions or concerns.          New diverting loop Ileostomy      1. Change appliance 2x/wk and as needed for any leakage   2. Empty pouch when 1/3 full. Initially wake  "to check need for pouch emtpying at least once during the night    3. Carry an extra pouching system with you at all times  4. Diet: Eat 6 small meals/day. Drink 8oz/fluid every hour during waking hours. Chew all food well.    5. Diet: Eat pasta, rice, potatoes, hot/dry cereal, toast, breads, pretzels, chips, crackers; yogurt, meat, fish, eggs, cheese, peanut butter, bananas to thicken the stool. Eat 4-6 servings of protien/day.  6. Avoid eating: Seeds, nuts, peels, raw vegetables, chinese vegetables, casings on meats, grape/prume juice, grapefruit, oranges, pineapple, mushrooms.   7. NO laxatives. NO timed released medications   8. May shower with appliance on. Blow dry (on cool setting) cloth backing and tape following bathing.   9. Change your appliance in the morning before breakfast.  10. Walk, no heavy lifting  11. Use an odor eliminator (Ex: Febreeze) in the room prior to emptying or changing appliance  12. Initially monitor your output to avoid dehydration.   Call Physician  if your output exceeds 1500cc/24hrs.      Supplies: [place] New Image 2-pc (20 pouch changes/month)   1. Barrier New Image: Flat                                                    #40860 =  4 bx/month   2.  Pouch: high output                                                          #51022  = 2bx/month     and/or  3. Pouch: drainable lock n roll ultra clear                                #12718  = 2bx/month  4.  Optional if leakage [place] Cera ring                           #8805    =  2bx/month  5.  Optional if skin irritation:3M No Sting skin Prep                 #3052 = 1bx/month     Procedure for Ileostomy Pouch Change  1. Draw and cut opening in barrier/pouch following pattern. Cut outside the line that was drawn, approx 1 3/8\"   2. Remove plastic backing   3. Optional if leakage:  Open ring and stretch to approx size of opening in barrier. Apply around opening on sticky side of barrier. Press into place.   4. Fold back edges of " "paper that covers the tape to create a \"tab\". This assists with paper removal in Step #9. OK to snap pouch of choice on barrier at this step and apply as a 1-pc pouch  5. Set barrier aside.   6. Remove pouch from around stoma. Discard.   7. Cleanse skin around stoma with water. Pat dry.  8. Center stoma in barrier opening.      Press barrier down to skin  9 .Pull on \"tab\" to remove paper that covers the tape. Press tape to skin   10.Snap on pouch of choice  11. Close lock n roll closure on end of the drainable pouch or plug on high output pouch      Follow-up  Danita Porter/Marvin Judd/ Cynthia Mercedes  CWOCN's (ostomy nurse)   [phone]   Call for any ?/concerns   You are being discharged with home care services through [facility]. [facility] will contact you to schedule initial visit. If you have any questions prior to this call, please contact the 24 hour patient line at [phone].    Pending Results     No orders found from 11/5/2017 to 11/8/2017.            Statement of Approval     Ordered          11/20/17 0842  I have reviewed and agree with all the recommendations and orders detailed in this document.  EFFECTIVE NOW     Approved and electronically signed by:  Esther Deluna MD           11/18/17 0753  I have reviewed and agree with all the recommendations and orders detailed in this document.  EFFECTIVE NOW     Comments:  Discharge home if OK with surgery   Approved and electronically signed by:  Esther Deluna MD             Admission Information     Date & Time Provider Department Dept. Phone    11/7/2017 Owen Melendrez MD [place] [address] [facility] [phone]      Your Vitals Were     Blood Pressure Pulse Temperature Respirations Height Weight    112/65 68 98  F (36.7  C) (Oral) 16 1.702 m (5' 7\") 61.5 kg (135 lb 9.3 oz)    Last Period Pulse Oximetry BMI (Body Mass Index)             (LMP Unknown) 98% 21.24 kg/m2         MyChart " Information     [employer] gives you secure access to your electronic health record. If you see a primary care provider, you can also send messages to your care team and make appointments. If you have questions, please call your primary care clinic.  If you do not have a primary care provider, please call [phone] and they will assist you.        Care EveryWhere ID     This is your Care EveryWhere ID. This could be used by other organizations to access your [facility] medical records  [id number]        Equal Access to Services     KIANNA ROMERO : Hadii beata ku hadasho Soomaali, waaxda luqadaha, qaybta kaalmada adeegyada, omar moraes. So [facility] [phone].    ATENCIÓN: Si habla español, tiene a yepez disposición servicios gratuitos de asistencia lingüística. [facility] [phone].    We comply with applicable federal civil rights laws and [place] laws. We do not discriminate on the basis of race, color, national origin, age, disability, sex, sexual orientation, or gender identity.               Review of your medicines      START taking        Dose / Directions    diazepam 2 MG tablet   Commonly known as:  VALIUM   Used for:  Diverticulitis of colon        Dose:  2 mg   Take 1 tablet (2 mg) by mouth every 6 hours as needed for anxiety   Quantity:  12 tablet   Refills:  0       HYDROmorphone 2 MG tablet   Commonly known as:  DILAUDID   Used for:  Small bowel obstruction        Dose:  2 mg   Take 1 tablet (2 mg) by mouth every 3 hours as needed for moderate to severe pain   Quantity:  18 tablet   Refills:  0       loperamide 2 MG capsule   Commonly known as:  IMODIUM   Used for:  Functional diarrhea        Dose:  2 mg   Take 1 capsule (2 mg) by mouth 4 times daily as needed for diarrhea   Quantity:  20 capsule   Refills:  1       psyllium Packet   Commonly known as:  METAMUCIL/KONSYL   Used for:  Diverticulitis of colon        Dose:  1 packet   Take 1 packet by mouth 2 times daily    Quantity:  60 packet   Refills:  0         CONTINUE these medicines which have NOT CHANGED        Dose / Directions    ADVIL PO        Dose:  200 mg   Take 200 mg by mouth every 4 hours as needed for moderate pain   Refills:  0       CLARITIN PO        Dose:  10 mg   Take 10 mg by mouth daily as needed   Refills:  0       levothyroxine 75 MCG tablet   Commonly known as:  SYNTHROID   Used for:  Hypothyroidism due to acquired atrophy of thyroid        Dose:  75 mcg   Take 1 tablet (75 mcg) by mouth every morning   Quantity:  90 tablet   Refills:  3       PRAVACHOL PO        Dose:  20 mg   Take 20 mg by mouth At Bedtime   Refills:  0       VITAMIN D (CHOLECALCIFEROL) PO        Dose:  1000 Units   Take 1,000 Units by mouth every morning   Refills:  0         STOP taking     ciprofloxacin 500 MG tablet   Commonly known as:  CIPRO           metroNIDAZOLE 500 MG tablet   Commonly known as:  FLAGYL           triamterene-hydrochlorothiazide 37.5-25 MG per tablet   Commonly known as:  MAXZIDE-25                Where to get your medicines      These medications were sent to [facility]IR[facility] - [address]  [address] [facility] [address], [place] [address]     Phone:  [phone]     loperamide 2 MG capsule    psyllium Packet         Some of these will need a paper prescription and others can be bought over the counter. Ask your nurse if you have questions.     Bring a paper prescription for each of these medications     diazepam 2 MG tablet    HYDROmorphone 2 MG tablet                Protect others around you: Learn how to safely use, store and throw away your medicines at www.disposemymeds.org.             Medication List: This is a list of all your medications and when to take them. Check marks below indicate your daily home schedule. Keep this list as a reference.      Medications           Morning Afternoon Evening Bedtime As Needed    ADVIL PO   Take 200 mg by mouth every 4 hours as needed for  moderate pain                                   CLARITIN PO   Take 10 mg by mouth daily as needed                                   diazepam 2 MG tablet   Commonly known as:  VALIUM   Take 1 tablet (2 mg) by mouth every 6 hours as needed for anxiety   Last time this was given:  2 mg on 11/15/2017  8:57 AM                                   HYDROmorphone 2 MG tablet   Commonly known as:  DILAUDID   Take 1 tablet (2 mg) by mouth every 3 hours as needed for moderate to severe pain   Last time this was given:  2 mg on 11/16/2017  8:32 PM                                   levothyroxine 75 MCG tablet   Commonly known as:  SYNTHROID   Take 1 tablet (75 mcg) by mouth every morning   Last time this was given:  75 mcg on 11/20/2017  6:01 AM   Next Dose Due:  Tomorrow morning, 11/21                                     loperamide 2 MG capsule   Commonly known as:  IMODIUM   Take 1 capsule (2 mg) by mouth 4 times daily as needed for diarrhea   Last time this was given:  2 mg on 11/19/2017 10:24 AM                                   PRAVACHOL PO   Take 20 mg by mouth At Bedtime   Last time this was given:  20 mg on 11/19/2017  9:09 PM   Next Dose Due:  Tonight at bedtime, 11/20                                   psyllium Packet   Commonly known as:  METAMUCIL/KONSYL   Take 1 packet by mouth 2 times daily   Last time this was given:  1 packet on 11/20/2017 12:18 PM   Next Dose Due:  Tonight, 11/20                                        VITAMIN D (CHOLECALCIFEROL) PO   Take 1,000 Units by mouth every morning

## 2017-11-07 NOTE — TELEPHONE ENCOUNTER
Pt states she is having a lot of gas. She has not had any diarrhea since OV with Dr Jean yesterday.  Has only been taking in liquids and soft mashed potatoes. She said that she has a lot of pain with nausea. She does not have a temp. States her abd is very tender. Discussed with Dr Jean and he feels if the pain is severe that she should go back to the  ER. Reported back to pt and said that Dr Jean did not want to prescribe a narcotic because it is constipating. He is also is  concerned about the abcess. She agreed.

## 2017-11-07 NOTE — LETTER
Transition Communication Hand-off for Care Transitions to Next Level of Care Provider    Name: Suma Calabrese  MRN #: [id number]  Primary Care Provider: BEKAH KAUR     Primary Clinic: [address]  [facility] [address]     Reason for Hospitalization:  Small bowel obstruction [K56.609]  Abdominal abscess (H) [K65.1]  Admit Date/Time: 11/7/2017  1:12 PM  Discharge Date: 11/20/2017  Payor Source: Payor: [facility] / Plan: [employer]EE PROGRAM / Product Type: PPO /     Readmission Assessment Measure (ALAN) Risk Score/category: Average    Reason for Communication Hand-off Referral: Other Home today with C RN for new ostomy cares    Discharge Plan: home today with [facility] RN for new ostomy cares through [facility]       Concern for non-adherence with plan of care:   Y/N N  Discharge Needs Assessment:  Needs       Most Recent Value    Equipment Currently Used at Home none    Transportation Available car, family or friend will provide          Already enrolled in Tele-monitoring program and name of program:  no  Follow-up specialty is recommended: Yes    Follow-up plan:  Future Appointments  Date Time Provider Department Center   11/20/2017 3:00 PM [facility]       Any outstanding tests or procedures:        Referrals     Future Labs/Procedures    Home care nursing referral     Comments:    RN skilled nursing visit. RN to provide Ileostomy cares.    Your provider has ordered home care nursing services. If you have not been contacted within 2 days of your discharge please call the inpatient department phone number at [phone] .            Key Recommendations:  Home today with [facility] RN for new ostomy cares.     Chrissy Klein    AVS/Discharge Summary is the source of truth; this is a helpful guide for improved communication of patient story

## 2017-11-07 NOTE — ED NOTES
".  [facility]  ED Nurse Handoff Report    ED Chief complaint: Abdominal Pain (seen recently and diagnosed diverticulitis. to ED for persistent sx)      ED Diagnosis:   Final diagnoses:   Small bowel obstruction   Abdominal abscess (H)       Code Status: Full Code    Allergies:   Allergies   Allergen Reactions     Bupropion Other (See Comments)     Jittery and HA     Erythromycin GI Disturbance     Oxycodone Nausea and Vomiting       Activity level - Baseline/Home:  Independent    Activity Level - Current:   Independent     Needed?: No    Isolation: No  Infection: Not Applicable    Bariatric?: No    Vital Signs:   Vitals:    11/07/17 1308 11/07/17 1535 11/07/17 1537   BP: 140/85 113/74    Resp: 16     Temp: 98.7  F (37.1  C)     TempSrc: Oral     SpO2: 98%  97%   Weight: 55.8 kg (123 lb)     Height: 1.702 m (5' 7\")         Cardiac Rhythm: ,        Pain level: 0-10 Pain Scale: 5    Is this patient confused?: No    Patient Report: Initial Complaint: Persistent abd pain/gas - dx of diverticulitis recently (11/3)  Focused Assessment: Reporting nausea r/t /bloating, unable to pass gas, and loose stools   Tests Performed: Blood, CT  Abnormal Results: CT, K & sodium still low but improved since 11/3  Treatments provided: Morphine & Cipro IV    Family Comments: Alone    OBS brochure/video discussed/provided to patient: N/A    ED Medications:   Medications   iohexol (OMNIPAQUE) solution 25 mL (25 mLs Oral Given 11/7/17 1421)   Saline Flush (60 mLs Intravenous Given 11/7/17 1522)   iopamidol (ISOVUE-370) solution 62 mL (62 mLs Intravenous Given 11/7/17 1522)       Drips infusing?:  No      ED NURSE PHONE NUMBER: *[phone]         "

## 2017-11-07 NOTE — IP AVS SNAPSHOT
[facility] [address] [facility]    [address] [place] [address]    Phone:  [phone]                                       After Visit Summary   11/7/2017    Suma Calabrese    MRN: [id number]           After Visit Summary Signature Page     I have received my discharge instructions, and my questions have been answered. I have discussed any challenges I see with this plan with the nurse or doctor.    ..........................................................................................................................................  Patient/Patient Representative Signature      ..........................................................................................................................................  Patient Representative Print Name and Relationship to Patient    ..................................................               ................................................  Date                                            Time    ..........................................................................................................................................  Reviewed by Signature/Title    ...................................................              ..............................................  Date                                                            Time

## 2017-11-07 NOTE — H&P
History and Physical     Suma Calabrese MRN# [id number]   YOB: 1955 Age: 62 year old      Date of Admission:  11/7/2017    Primary care provider: Murali Kenney          Assessment and Plan:   This is a  62 year old female admitted with diverticulitis with large bowel obstruction and possible intra-abdominal abscess.    #1.  Diverticulitis with possible intra-abdominal abscess.  Patient will be treated with IV Cipro and IV Flagyl.  She will be fluid resuscitated with IV normal saline.  General surgery is been consulted to determine whether patient will require interventional or intraoperative drainage.  I will treat pain with IV morphine.  Patient will remain n.p.o.    #2.  Large bowel obstruction.  Presumed to be secondary to diverticulitis.  Patient will be n.p.o.  If she starts vomiting, patient will require NG tube for gastric drainage.      #3.  Hypothyroidism.  Change levothyroxine to IV dosing.    #4.  Hypertension.  Hold triamterene hydrochlorothiazide.    #5.  Hyperlipidemia.  Hold pravastatin.    #6.  Vitamin D deficiency.  Hold cholecalciferol.           Attestation:  This patient has been seen and evaluated by me, Owen Melendrez MD.           Chief Complaint:   This patient is a 62 year old female who presents with abdominal pain.    History is obtained from the patient         History of Present Illness:   Patient states she started having mild vague abdominal pain approximately two weeks ago.  She presented to the emergency room five days ago, was diagnosed with diverticulitis.  She was discharged to home with Cipro and Flagyl.  She was given MiraLAX for constipation, and was moving her bowels up until yesterday.  She went to see her primary physician yesterday afternoon for follow-up.  On the way home from that appointment.  Patient states she started having increased abdominal pain, distention and nausea, but no vomiting.  Patient states she feels as though she needs to  pass gas but is unable to.  She denies fevers or chills.  She has little appetite and has not eaten since yesterday.  She denies any cough or shortness of breath.  No nasal congestion, runny nose or sore throat.  No rash or lower extremity edema.  No muscle aches or joint aches.             Past Medical History:     Past Medical History:   Diagnosis Date     Cancer (H)     breast     Diverticulitis      Hypertension      Nontoxic uninodular goiter 1/28/2013     Pure hypercholesterolemia 1/28/2013     TMJ (temporomandibular joint syndrome) 7/1/2013             Past Surgical History:     Past Surgical History:   Procedure Laterality Date     BIOPSY       BIOPSY NODE SENTINEL Bilateral 10/16/2017    Procedure: BIOPSY NODE SENTINEL;;  Surgeon: Van Salazar MD;  Location: [facility]     bladder dilitation[  age 17     DENTAL SURGERY      wisdom tooth extraction     MASTECTOMY SIMPLE BILATERAL Bilateral 10/16/2017    Procedure: MASTECTOMY SIMPLE BILATERAL;  BILATERAL SIMPLE MASTECTOMY, BILATERAL AXILLARY SENTINEL NODE BIOPSY;  Surgeon: Van Salazar MD;  Location: [facility]             Social History:     Social History   Substance Use Topics     Smoking status: Current Every Day Smoker     Packs/day: 0.50     Years: 40.00     Types: Cigarettes     Smokeless tobacco: Never Used      Comment: 3/4 PPD     Alcohol use 0.0 oz/week     0 Standard drinks or equivalent per week      Comment: weekends             Family History:     Family History   Problem Relation Age of Onset     Unknown/Adopted Mother      Unknown/Adopted Father              Immunizations:     Immunization History   Administered Date(s) Administered     TD (ADULT, 7+) 05/21/2005     TDAP Vaccine (Adacel) 09/28/2015            Allergies:     Allergies   Allergen Reactions     Bupropion Other (See Comments)     Jittery and HA     Erythromycin GI Disturbance     Oxycodone Nausea and Vomiting             Medications:     (Not in a hospital admission)           Review of Systems:   The 10 point Review of Systems is negative other than noted in the HPI           Physical Exam:   Vitals were reviewed  Temp: 98.7  F (37.1  C) Temp src: Oral BP: 113/74   Heart Rate: 84 Resp: 16 SpO2: 97 % O2 Device: None (Room air)      This is a well nourished well developed female resting comfortably in bed, appears uncomfortable.  Head: atraumatic normocephalic, sclera noninjected and anicterric, oral mucosa moist without lesions or exudates.  Neck: supple without spinal abnormality  Chest: clear to auscultation bilaterally, without wheezes, rhonchi, or rales.  Cardiovascular: regular rate and rhythm, no murmurs, gallops, or rubs, no edema  Abdomen: bowel sounds high pitched, abdomen distended and tender without guarding, no hepatosplenomegaly or masses.  Musculoskeletal: normal muscle mass and tone  Skin: no rashes  Lymph: no lymphadenopathy.  Neuro: cranial nerves II-XII intact, strength in a extremities normal, reflexes normal, coordination normal.         Data:   All laboratory data reviewed  All cardiac studies reviewed by me.  All imaging studies reviewed by me.

## 2017-11-08 LAB
ANION GAP SERPL CALCULATED.3IONS-SCNC: 7 MMOL/L (ref 3–14)
BUN SERPL-MCNC: 6 MG/DL (ref 7–30)
CALCIUM SERPL-MCNC: 8.3 MG/DL (ref 8.5–10.1)
CHLORIDE SERPL-SCNC: 103 MMOL/L (ref 94–109)
CO2 SERPL-SCNC: 24 MMOL/L (ref 20–32)
CREAT SERPL-MCNC: 0.64 MG/DL (ref 0.52–1.04)
ERYTHROCYTE [DISTWIDTH] IN BLOOD BY AUTOMATED COUNT: 12.5 % (ref 10–15)
GFR SERPL CREATININE-BSD FRML MDRD: >90 ML/MIN/1.7M2
GLUCOSE SERPL-MCNC: 109 MG/DL (ref 70–99)
HCT VFR BLD AUTO: 34.1 % (ref 35–47)
HGB BLD-MCNC: 12.1 G/DL (ref 11.7–15.7)
MCH RBC QN AUTO: 32.7 PG (ref 26.5–33)
MCHC RBC AUTO-ENTMCNC: 35.5 G/DL (ref 31.5–36.5)
MCV RBC AUTO: 92 FL (ref 78–100)
PLATELET # BLD AUTO: 260 10E9/L (ref 150–450)
POTASSIUM SERPL-SCNC: 3.4 MMOL/L (ref 3.4–5.3)
RBC # BLD AUTO: 3.7 10E12/L (ref 3.8–5.2)
SODIUM SERPL-SCNC: 134 MMOL/L (ref 133–144)
WBC # BLD AUTO: 5.6 10E9/L (ref 4–11)

## 2017-11-08 PROCEDURE — 25000128 H RX IP 250 OP 636: Performed by: INTERNAL MEDICINE

## 2017-11-08 PROCEDURE — 12000000 ZZH R&B MED SURG/OB

## 2017-11-08 PROCEDURE — 99232 SBSQ HOSP IP/OBS MODERATE 35: CPT | Performed by: INTERNAL MEDICINE

## 2017-11-08 PROCEDURE — 80048 BASIC METABOLIC PNL TOTAL CA: CPT | Performed by: INTERNAL MEDICINE

## 2017-11-08 PROCEDURE — 25000125 ZZHC RX 250: Performed by: INTERNAL MEDICINE

## 2017-11-08 PROCEDURE — 85027 COMPLETE CBC AUTOMATED: CPT | Performed by: INTERNAL MEDICINE

## 2017-11-08 PROCEDURE — 36415 COLL VENOUS BLD VENIPUNCTURE: CPT | Performed by: INTERNAL MEDICINE

## 2017-11-08 PROCEDURE — 99221 1ST HOSP IP/OBS SF/LOW 40: CPT | Performed by: SURGERY

## 2017-11-08 RX ORDER — ERTAPENEM 1 G/1
1 INJECTION, POWDER, LYOPHILIZED, FOR SOLUTION INTRAMUSCULAR; INTRAVENOUS EVERY 24 HOURS
Status: DISCONTINUED | OUTPATIENT
Start: 2017-11-08 | End: 2017-11-14

## 2017-11-08 RX ADMIN — SODIUM CHLORIDE: 9 INJECTION, SOLUTION INTRAVENOUS at 13:28

## 2017-11-08 RX ADMIN — METRONIDAZOLE 500 MG: 500 INJECTION, SOLUTION INTRAVENOUS at 02:40

## 2017-11-08 RX ADMIN — ERTAPENEM SODIUM 1 G: 1 INJECTION, POWDER, LYOPHILIZED, FOR SOLUTION INTRAMUSCULAR; INTRAVENOUS at 12:10

## 2017-11-08 RX ADMIN — MORPHINE SULFATE 2 MG: 2 INJECTION, SOLUTION INTRAMUSCULAR; INTRAVENOUS at 00:15

## 2017-11-08 RX ADMIN — METRONIDAZOLE 500 MG: 500 INJECTION, SOLUTION INTRAVENOUS at 19:05

## 2017-11-08 RX ADMIN — CIPROFLOXACIN 400 MG: 2 INJECTION, SOLUTION INTRAVENOUS at 04:44

## 2017-11-08 RX ADMIN — METRONIDAZOLE 500 MG: 500 INJECTION, SOLUTION INTRAVENOUS at 07:14

## 2017-11-08 RX ADMIN — ONDANSETRON 4 MG: 2 SOLUTION INTRAMUSCULAR; INTRAVENOUS at 13:32

## 2017-11-08 RX ADMIN — METRONIDAZOLE 500 MG: 500 INJECTION, SOLUTION INTRAVENOUS at 13:23

## 2017-11-08 RX ADMIN — MORPHINE SULFATE 2 MG: 2 INJECTION, SOLUTION INTRAMUSCULAR; INTRAVENOUS at 13:23

## 2017-11-08 RX ADMIN — MORPHINE SULFATE 2 MG: 2 INJECTION, SOLUTION INTRAMUSCULAR; INTRAVENOUS at 05:22

## 2017-11-08 RX ADMIN — Medication 10 MEQ: at 01:19

## 2017-11-08 RX ADMIN — Medication 10 MEQ: at 00:00

## 2017-11-08 NOTE — PHARMACY-PHARMACOTHERAPY NOTE
November 7, 2017 Levothyroxine IV Hospital policy for maintenance is to hold drug for 2 days then start IV at 2pm daily.  Levothyroxine IV 37.5mcg is to start 11/9 at 2pm -  Frantz Acevedo [facility]

## 2017-11-08 NOTE — PROGRESS NOTES
She remains uncomfortable. No BM or flatus. No vomiting. Abdomen feels distended to her.   Physical Exam completed:  HEENT - No scleral icterus, nose normal. Tongue normal  Neck- no cervical adenopathy, thyromegaly, or stridor  Chest- post mastectomy. Non-tender. No labored respirations  Heart- Normal sinus rhythm  Abdomen-fairly distended. Diffuse mild tenderness. No hernias, no scars. No rebound or localized worrisome findings  CVA- non-tender to percussion  Pelvis-pelvic bone stable and non-tender.  Extrem- no edema, good peripheral pulses  Neuro- A & O x3. Moves all fours well. Train of thought normal  Labs: Normal WBC  Imp: severe recurrent diverticulitis with relative obstruction.   Plan: walk, antibiotics, only ice and water PO. Hold on operative intervention if possible.  We discussed the options and the preference for non-operative therapy. All questions answered.

## 2017-11-08 NOTE — PLAN OF CARE
Problem: Patient Care Overview  Goal: Plan of Care/Patient Progress Review  Outcome: No Change  A&Ox4. VSS on RA. Up independently, activity encouraged. IVF infusing at 100, continues on IV abx. LS clear. Morphine given x1 with relief; zofran given x1 with relief. BS hypoactive. NPO, except ice chips. Continue to monitor.

## 2017-11-08 NOTE — PLAN OF CARE
Problem: Patient Care Overview  Goal: Plan of Care/Patient Progress Review  Outcome: No Change  Pt A/Ox4. Pt up SBA. Vitals stable on room air. Abdomen distented and pt reports discomfort. Complained of mild nausea, but it resolved without intervention. No vomit. Last bowel movement reported yesterday by pt, loose. K+ 3.2, replacement started 1/4 doses. Strict NPO. IV antibiotics and fluids. Pt has 2 incisions on breasts for bilateral mastectomy, steri-strips C/D/I. Per surgery continue with IV fluid, antibiotic and NPO.

## 2017-11-08 NOTE — PROGRESS NOTES
[facility]    Hospitalist Progress Note    Date of Service (when I saw the patient): 11/08/2017    Assessment & Plan   Suma Calabrese is a 62 year old female who was admitted on 11/7/2017.  This is a  62 year old female admitted with diverticulitis with large bowel obstruction and possible intra-abdominal abscess.     #1.  Diverticulitis with possible small  intra-abdominal abscess and bowel obstruction :  NPO, IVF   Was on cipro and flazyl . Will change to invanz and flazyl today   Surgery consulted and are following   Conservative amangement for now  Encourage activity   Use narcotics sparingly   3.  Hypothyroidism.  Change levothyroxine to IV dosing.     #4.  Hypertension.  Hold triamterene hydrochlorothiazide.     #5.  Hyperlipidemia.  Hold pravastatin.     #6.  Vitamin D deficiency.  Hold cholecalciferol.               DVT Prophylaxis: Pneumatic Compression Devices  Code Status: Full Code    Disposition: Expected discharge in 2-3days once bowel function returns and abscess improves     Carolina Hennessy MD  [phone] (P)      Interval History    having abdominal pain. No nausea. Distension little better since admission     -Data reviewed today: I reviewed all new labs and imaging results over the last 24 hours. I personally reviewed no images or EKG's today.    Physical Exam   Temp: 98.4  F (36.9  C) Temp src: Oral BP: 107/51 Pulse: 76 Heart Rate: 71 Resp: 16 SpO2: 97 % O2 Device: None (Room air)    Vitals:    11/07/17 1308   Weight: 55.8 kg (123 lb)     Vital Signs with Ranges  Temp:  [98.4  F (36.9  C)-98.7  F (37.1  C)] 98.4  F (36.9  C)  Pulse:  [76] 76  Heart Rate:  [66-84] 71  Resp:  [16] 16  BP: (107-140)/(51-85) 107/51  SpO2:  [93 %-98 %] 97 %  I/O last 3 completed shifts:  In: 1199 [I.V.:1199]  Out: 250 [Urine:250]    Constitutional: Awake, alert, cooperative, no apparent distress  Respiratory: Clear to auscultation bilaterally, no crackles or wheezing  Cardiovascular: Regular rate  and rhythm, normal S1 and S2, and no murmur noted  GI: Normal bowel sounds, soft, mild distension, diffusely tender   Skin/Integumen: No rashes, no cyanosis, no edema  Other:     Medications     NaCl 100 mL/hr at 11/08/17 0545       ertapenem (INVanz) IV  1 g Intravenous Q24H     [START ON 11/9/2017] levothyroxine  37.5 mcg Intravenous Daily     metroNIDAZOLE  500 mg Intravenous Q6H       Data     Recent Labs  Lab 11/08/17  0500 11/07/17  1340 11/03/17  1125   WBC 5.6 5.8 6.1   HGB 12.1 12.8 12.2   MCV 92 91 89    286 249    129* 125*   POTASSIUM 3.4 3.2* 3.1*   CHLORIDE 103 96 87*   CO2 24 25 29   BUN 6* 5* 7   CR 0.64 0.72 0.66   ANIONGAP 7 8 9   CODY 8.3* 8.8 8.9   * 115* 94   ALBUMIN  --  2.7* 2.8*   PROTTOTAL  --  6.2* 6.7*   BILITOTAL  --  0.4 0.7   ALKPHOS  --  88 83   ALT  --  13 13   AST  --  14 13   LIPASE  --  59*  --        Recent Results (from the past 24 hour(s))   CT Abdomen Pelvis w Contrast    Narrative    CT ABDOMEN AND PELVIS WITH CONTRAST   11/7/2017 3:28 PM     HISTORY: Diffuse abdominal pain, no bowel movement or gas.  Evaluate  for obstruction and re-evaluation of possible abscess.     TECHNIQUE:  62 mL Isovue-370. Radiation dose for this scan was reduced  using automated exposure control, adjustment of the mA and/or kV  according to patient size, or iterative reconstruction technique.    COMPARISON: 11/3/2017    FINDINGS:  There is prominent abnormal wall thickening involving the  distal sigmoid colon. This is now causing obstruction and there is  dilatation of the colon proximal to this region. There is a somewhat  serpiginous perisigmoid fluid collection again seen. As previously  described, this may well represent abscess. This appears mildly  increased and currently has a maximum dimension of about 8 cm. Small  amount of free fluid, which is new. The remaining findings appear  stable over the short time interval. Nothing else acute is seen in the  upper abdominal  organs.       Impression    IMPRESSION: Distal sigmoid wall thickening, currently resulting in  colonic obstruction. This could relate to diverticulitis or focal  colitis, but neoplasm cannot be excluded. Serpiginous perisigmoid  fluid collection/abscess appears mildly increased. Small amount of  free fluid.     TAMEKA HUERTAS MD

## 2017-11-08 NOTE — PLAN OF CARE
Problem: Patient Care Overview  Goal: Plan of Care/Patient Progress Review  Outcome: No Change  A&O x4, SBA.  VSS on room air.  Strict NPO.  Potassium replacement complete, lab recheck 3.4.  IV antibiotics given.  C/o mild nausea but no vomiting.  Hypoactive bowel sounds. No flatus. IV morphine given x2 for abdominal pain effective for relief.

## 2017-11-08 NOTE — CONSULTS
[facility] Consultation/H&P    Suma Calabrese MRN#: [id number]   Age: 62 year old YOB: 1955     Date of Admission:          11/7/2017  Reason for consult/H&P: Abdominal pain   Surgeon:      Frantz Silva MD                  Chief Complaint:   Abdominal pain, left lower quadrant         History of Present Illness:   This patient is a 62 year old  female who presented to the [facility] ER with abdominal pain. This started about 2 weeks ago. She was seen in the ER 5 days ago and started on oral antibiotics. The pain became worse and she had trouble passing gas.  Denies fever, chills,  vomiting, change in urination. She has noted nausea and no BM.   Denies having any previous episodes or abdominal surgery. History is obtained from the patient and chart.         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          Past Surgical History:     Past Surgical History:   Procedure Laterality Date     BIOPSY       BIOPSY NODE SENTINEL Bilateral 10/16/2017    Procedure: BIOPSY NODE SENTINEL;;  Surgeon: Van Salazar MD;  Location: [facility]     bladder dilitation[  age 17     DENTAL SURGERY      wisdom tooth extraction     MASTECTOMY SIMPLE BILATERAL Bilateral 10/16/2017    Procedure: MASTECTOMY SIMPLE BILATERAL;  BILATERAL SIMPLE MASTECTOMY, BILATERAL AXILLARY SENTINEL NODE BIOPSY;  Surgeon: Van Salazar MD;  Location: [facility]            Medications:     Prior to Admission medications    Medication Sig Start Date End Date Taking? Authorizing Provider   levothyroxine (SYNTHROID) 75 MCG tablet Take 1 tablet (75 mcg) by mouth every morning 11/6/17  Yes Owen Jean MD   ciprofloxacin (CIPRO) 500 MG tablet Take 1 tablet (500 mg) by mouth 2 times daily for 10 days 11/3/17 11/13/17 Yes  "Sanjeev Jimenez MD   metroNIDAZOLE (FLAGYL) 500 MG tablet Take 1 tablet (500 mg) by mouth 3 times daily for 10 days 11/3/17 11/13/17 Yes Sanjeev Jimenez MD   Pravastatin Sodium (PRAVACHOL PO) Take 20 mg by mouth At Bedtime   Yes Reported, Patient   Ibuprofen (ADVIL PO) Take 200 mg by mouth every 4 hours as needed for moderate pain   Yes Reported, Patient   VITAMIN D, CHOLECALCIFEROL, PO Take 1,000 Units by mouth every morning   Yes Reported, Patient   Loratadine (CLARITIN PO) Take 10 mg by mouth daily as needed    Yes Reported, Patient   triamterene-hydrochlorothiazide (MAXZIDE-25) 37.5-25 MG per tablet Take 1 tablet by mouth every morning 11/6/17   Owen Jean MD            Allergies:     Allergies   Allergen Reactions     Bupropion Other (See Comments)     Jittery and HA     Erythromycin GI Disturbance     Oxycodone Nausea and Vomiting            Social History:     Social History   Substance Use Topics     Smoking status: Current Every Day Smoker     Packs/day: 0.50     Years: 40.00     Types: Cigarettes     Smokeless tobacco: Never Used      Comment: 3/4 PPD     Alcohol use 0.0 oz/week     0 Standard drinks or equivalent per week      Comment: weekends             Family History:    This patient has no significant relevant family history.  Family history is reviewed in detail.          Review of Systems:   Brief ROS is negative other than noted in the HPI.  C: NEGATIVE for fever, chills, change in weight  R: NEGATIVE for significant cough or SOB  CV: NEGATIVE for chest pain, palpitations or peripheral edema  GI:  NEGATIVE for dysuria, heartburn, or change in bowel habits  H: NEGATIVE for bleeding problems         Physical Exam:   Blood pressure 114/77, pulse 76, temperature 98.5  F (36.9  C), temperature source Oral, resp. rate 16, height 1.702 m (5' 7\"), weight 55.8 kg (123 lb), SpO2 93 %, not currently breastfeeding.      Patient was sound asleep. I did not awaken her. We will examine her more " thoroughly in the morning.            Data:   Labs:  Recent Labs   Lab Test  11/07/17   1340  11/03/17   1125  10/11/17   1606   WBC  5.8  6.1  8.0   HGB  12.8  12.2  14.7   HCT  35.7  33.0*  43.4   PLT  286  249  222     Recent Labs   Lab Test  11/07/17   1340  11/03/17   1125  10/11/17   1606   POTASSIUM  3.2*  3.1*  5.2   CHLORIDE  96  87*  99   CO2  25  29  29   BUN  5*  7  21   CR  0.72  0.66  1.08*     Recent Labs   Lab Test  11/07/17   1340  11/03/17   1125  03/07/16   0857   07/30/14   0814   BILITOTAL  0.4  0.7   --    --    --    ALT  13  13  18   < >   --    AST  14  13   --    --   28   ALKPHOS  88  83   --    --    --    LIPASE  59*   --    --    --    --     < > = values in this interval not displayed.     No lab results found.  Recent Labs   Lab Test  11/07/17   1340  11/03/17   1125  10/11/17   1606   01/28/12   1433  01/03/11   1619   CODY  8.8  8.9  9.6   < >  8.9  9.6   PHOS   --    --    --    --   3.4  3.2    < > = values in this interval not displayed.     Recent Labs   Lab Test  11/07/17   1340  11/03/17   1125  11/03/17   1052  10/11/17   1606   08/25/16   1258   08/20/16   0942   01/28/12   1433   ANIONGAP  8  9   --   5   < >   --    < >   --    < >   --    PROTEIN   --    --   Negative   --    --   Negative   --   30*   < >   --    ALBUMIN  2.7*  2.8*   --    --    --    --    --    --    --   3.7    < > = values in this interval not displayed.       CT scan of the abdomen: images reviewed in detail. Distended proximal colon with small amount of free fluid. Not any significant amount of free air. inflammed sigmoid.    Ultrasound of the abdomen: not done                 Assessment:   Probable flare of diverticulitis with relative sigmoid obstruction. Will most likely resolve with rest and intensive antibiotic therapy. Would hold on feeding.          Plan:    Antibiotics, no food now. Follow exam and symptoms         Frantz Silva M.D.

## 2017-11-09 LAB
ALBUMIN SERPL-MCNC: 2.5 G/DL (ref 3.4–5)
ALP SERPL-CCNC: 70 U/L (ref 40–150)
ALT SERPL W P-5'-P-CCNC: 12 U/L (ref 0–50)
ANION GAP SERPL CALCULATED.3IONS-SCNC: 10 MMOL/L (ref 3–14)
AST SERPL W P-5'-P-CCNC: 14 U/L (ref 0–45)
BILIRUB SERPL-MCNC: 0.3 MG/DL (ref 0.2–1.3)
BUN SERPL-MCNC: 12 MG/DL (ref 7–30)
CALCIUM SERPL-MCNC: 8.6 MG/DL (ref 8.5–10.1)
CHLORIDE SERPL-SCNC: 106 MMOL/L (ref 94–109)
CO2 SERPL-SCNC: 21 MMOL/L (ref 20–32)
CREAT SERPL-MCNC: 0.77 MG/DL (ref 0.52–1.04)
ERYTHROCYTE [DISTWIDTH] IN BLOOD BY AUTOMATED COUNT: 12.9 % (ref 10–15)
GFR SERPL CREATININE-BSD FRML MDRD: 76 ML/MIN/1.7M2
GLUCOSE SERPL-MCNC: 73 MG/DL (ref 70–99)
HCT VFR BLD AUTO: 35.7 % (ref 35–47)
HGB BLD-MCNC: 12.2 G/DL (ref 11.7–15.7)
MAGNESIUM SERPL-MCNC: 2 MG/DL (ref 1.6–2.3)
MCH RBC QN AUTO: 32.4 PG (ref 26.5–33)
MCHC RBC AUTO-ENTMCNC: 34.2 G/DL (ref 31.5–36.5)
MCV RBC AUTO: 95 FL (ref 78–100)
PLATELET # BLD AUTO: 281 10E9/L (ref 150–450)
POTASSIUM SERPL-SCNC: 3.7 MMOL/L (ref 3.4–5.3)
PROT SERPL-MCNC: 5.9 G/DL (ref 6.8–8.8)
RBC # BLD AUTO: 3.77 10E12/L (ref 3.8–5.2)
SODIUM SERPL-SCNC: 137 MMOL/L (ref 133–144)
WBC # BLD AUTO: 4.2 10E9/L (ref 4–11)

## 2017-11-09 PROCEDURE — 99232 SBSQ HOSP IP/OBS MODERATE 35: CPT | Performed by: SURGERY

## 2017-11-09 PROCEDURE — 12000000 ZZH R&B MED SURG/OB

## 2017-11-09 PROCEDURE — 85027 COMPLETE CBC AUTOMATED: CPT | Performed by: INTERNAL MEDICINE

## 2017-11-09 PROCEDURE — 25000125 ZZHC RX 250: Performed by: INTERNAL MEDICINE

## 2017-11-09 PROCEDURE — 25000128 H RX IP 250 OP 636: Performed by: INTERNAL MEDICINE

## 2017-11-09 PROCEDURE — 80053 COMPREHEN METABOLIC PANEL: CPT | Performed by: INTERNAL MEDICINE

## 2017-11-09 PROCEDURE — 83735 ASSAY OF MAGNESIUM: CPT | Performed by: INTERNAL MEDICINE

## 2017-11-09 PROCEDURE — 99232 SBSQ HOSP IP/OBS MODERATE 35: CPT | Performed by: INTERNAL MEDICINE

## 2017-11-09 PROCEDURE — 36415 COLL VENOUS BLD VENIPUNCTURE: CPT | Performed by: INTERNAL MEDICINE

## 2017-11-09 RX ORDER — SODIUM CHLORIDE 9 MG/ML
INJECTION, SOLUTION INTRAVENOUS CONTINUOUS
Status: DISCONTINUED | OUTPATIENT
Start: 2017-11-09 | End: 2017-11-12

## 2017-11-09 RX ADMIN — ONDANSETRON 4 MG: 2 SOLUTION INTRAMUSCULAR; INTRAVENOUS at 11:37

## 2017-11-09 RX ADMIN — METRONIDAZOLE 500 MG: 500 INJECTION, SOLUTION INTRAVENOUS at 19:55

## 2017-11-09 RX ADMIN — METRONIDAZOLE 500 MG: 500 INJECTION, SOLUTION INTRAVENOUS at 06:44

## 2017-11-09 RX ADMIN — SODIUM CHLORIDE: 9 INJECTION, SOLUTION INTRAVENOUS at 04:08

## 2017-11-09 RX ADMIN — SODIUM CHLORIDE 1000 ML: 9 INJECTION, SOLUTION INTRAVENOUS at 09:21

## 2017-11-09 RX ADMIN — METRONIDAZOLE 500 MG: 500 INJECTION, SOLUTION INTRAVENOUS at 01:49

## 2017-11-09 RX ADMIN — METRONIDAZOLE 500 MG: 500 INJECTION, SOLUTION INTRAVENOUS at 13:49

## 2017-11-09 RX ADMIN — LEVOTHYROXINE SODIUM ANHYDROUS 37.5 MCG: 100 INJECTION, POWDER, LYOPHILIZED, FOR SOLUTION INTRAVENOUS at 13:50

## 2017-11-09 RX ADMIN — MORPHINE SULFATE 2 MG: 2 INJECTION, SOLUTION INTRAMUSCULAR; INTRAVENOUS at 00:34

## 2017-11-09 RX ADMIN — SODIUM CHLORIDE: 9 INJECTION, SOLUTION INTRAVENOUS at 11:41

## 2017-11-09 RX ADMIN — ERTAPENEM SODIUM 1 G: 1 INJECTION, POWDER, LYOPHILIZED, FOR SOLUTION INTRAMUSCULAR; INTRAVENOUS at 11:42

## 2017-11-09 RX ADMIN — SODIUM CHLORIDE: 9 INJECTION, SOLUTION INTRAVENOUS at 19:59

## 2017-11-09 NOTE — PLAN OF CARE
Problem: Patient Care Overview  Goal: Plan of Care/Patient Progress Review  Outcome: No Change  VSS on RA. Up independently, ambulated in room and halls this shift. Mild nausea improved with Zofran x1. Abd distended, rates pain 2-3/10, declines medication, heat applied. NPO w/ice chips, may have sips. +BS, small BM (soft, loose), no flatus. IVF 1000cc bolus for dark, tea-coloured urine with improvement. IVF@150/hr. Voiding adequately. Nursing will continue to monitor.

## 2017-11-09 NOTE — PROGRESS NOTES
[facility]    Hospitalist Progress Note    Date of Service (when I saw the patient): 11/09/2017    Assessment & Plan   Suma Calabrese is a 62 year old female who was admitted on 11/7/2017.  This is a  62 year old female admitted with diverticulitis with large bowel obstruction and possible intra-abdominal abscess.     #1.  Diverticulitis with possible small  intra-abdominal abscess and bowel obstruction :  NPO, IVF   Was on cipro and flazyl .  changed to invanz and flazyl on 11/9  Sips of water as pt had a loose Bm today   Conservative amangement for now  Encourage activity   Use narcotics sparingly   3.  Hypothyroidism.  Change levothyroxine to IV dosing.     #4.  Hypertension.  Hold triamterene hydrochlorothiazide.     #5.  Hyperlipidemia.  Hold pravastatin.     #6.  Vitamin D deficiency.  Hold cholecalciferol.               DVT Prophylaxis: Pneumatic Compression Devices  Code Status: Full Code    Disposition: Expected discharge in 2-3days once bowel function returns and abscess improves     Carolina Hennessy MD  [phone] (P)      Interval History    Low urine out put with dark urine. No nausea. Distension much  better since admission     -Data reviewed today: I reviewed all new labs and imaging results over the last 24 hours. I personally reviewed no images or EKG's today.    Physical Exam   Temp: 98.3  F (36.8  C) Temp src: Oral BP: 124/74 Pulse: 67 Heart Rate: 72 Resp: 16 SpO2: 96 % O2 Device: None (Room air)    Vitals:    11/07/17 1308   Weight: 55.8 kg (123 lb)     Vital Signs with Ranges  Temp:  [98.3  F (36.8  C)-98.4  F (36.9  C)] 98.3  F (36.8  C)  Pulse:  [67] 67  Heart Rate:  [72-74] 72  Resp:  [16] 16  BP: (124-134)/(74-83) 124/74  SpO2:  [96 %] 96 %  I/O last 3 completed shifts:  In: 903 [I.V.:903]  Out: 50 [Urine:50]    Constitutional: Awake, alert, cooperative, no apparent distress  Respiratory: Clear to auscultation bilaterally, no crackles or wheezing  Cardiovascular: Regular  rate and rhythm, normal S1 and S2, and no murmur noted  GI: Normal bowel sounds, soft, mild distension, less tender today than yesterday   Skin/Integumen: No rashes, no cyanosis, no edema  Other:     Medications     NaCl 150 mL/hr at 11/09/17 1141       ertapenem (INVanz) IV  1 g Intravenous Q24H     levothyroxine  37.5 mcg Intravenous Daily     metroNIDAZOLE  500 mg Intravenous Q6H       Data     Recent Labs  Lab 11/09/17  0930 11/08/17  0500 11/07/17  1340   WBC 4.2 5.6 5.8   HGB 12.2 12.1 12.8   MCV 95 92 91    260 286    134 129*   POTASSIUM 3.7 3.4 3.2*   CHLORIDE 106 103 96   CO2 21 24 25   BUN 12 6* 5*   CR 0.77 0.64 0.72   ANIONGAP 10 7 8   CODY 8.6 8.3* 8.8   GLC 73 109* 115*   ALBUMIN 2.5*  --  2.7*   PROTTOTAL 5.9*  --  6.2*   BILITOTAL 0.3  --  0.4   ALKPHOS 70  --  88   ALT 12  --  13   AST 14  --  14   LIPASE  --   --  59*       No results found for this or any previous visit (from the past 24 hour(s)).

## 2017-11-09 NOTE — PROGRESS NOTES
"General Surgery Progress Note    Admission Date: 11/7/2017  Today's Date: 11/9/2017         Assessment:      Suma Calabrese is a 62 year old female with severe diverticulitis with relative sigmoid obstruction         Plan:   Hold on PO intake for now until more return of bowel function. Ambulate, continue Invanz. No surgical intervention at this time, hopefully will resolve with conservative management. Will continue to follow along.    Pain: improving, morphine IV available  IVFs: had marginal UOP so received bolus and fluids now up to 0.9NaCl @ 150ml/hr  DVT prophylaxis: PCDs, ambulation          Interval History:   Afebrile, VSS on room air. Had loose stool overnight but nothing today and feels like she still has passed no gas. Pain is improving, using morphine less frequently. Having some occasional nausea as well, no vomiting.          Physical Exam:   /74 (BP Location: Left arm)  Pulse 67  Temp 98.3  F (36.8  C) (Oral)  Resp 16  Ht 1.702 m (5' 7\")  Wt 55.8 kg (123 lb)  LMP  (LMP Unknown)  SpO2 96%  BMI 19.26 kg/m2  I/O last 3 completed shifts:  In: 903 [I.V.:903]  Out: 50 [Urine:50]  General: NAD, pleasant, alert and oriented x3  Abdomen: distended and tympanic, mostly soft, mild tenderness to palpation worse in epigastrium and right side  Extremities: no edema, no calf tenderness    LABS:  Recent Labs   Lab Test  11/09/17   0930  11/08/17   0500  11/07/17   1340   WBC  4.2  5.6  5.8   HGB  12.2  12.1  12.8   MCV  95  92  91   PLT  281  260  286      Recent Labs   Lab Test  11/09/17   0930  11/08/17   0500  11/07/17   1340   POTASSIUM  3.7  3.4  3.2*   CHLORIDE  106  103  96   CO2  21  24  25   BUN  12  6*  5*   CR  0.77  0.64  0.72   ANIONGAP  10  7  8      Recent Labs   Lab Test  11/09/17   0930  11/07/17   1340  11/03/17   1125   ALBUMIN  2.5*  2.7*  2.8*   BILITOTAL  0.3  0.4  0.7   ALT  12  13  13   AST  14  14  13   ALKPHOS  70  88  83              Judy Carter PA-C  Surgical " Consultants: [phone]  Pager: [phone] (7am-5pm)

## 2017-11-09 NOTE — PLAN OF CARE
Problem: Patient Care Overview  Goal: Plan of Care/Patient Progress Review  Outcome: No Change  Pt A/Ox4. Vitals stable on room air. Up independent, steady gait. Pt denied pain during shift. NPO except ice chips. Continent of bowel and bladder, had 1 medium loose BM. Abdomen less distended and firm than yesterday. IV NS running at 100ml/hr. Per MD Hennessy, d/c in 2-3 days once bowel function returns and abscess improves.

## 2017-11-09 NOTE — PLAN OF CARE
Problem: Patient Care Overview  Goal: Plan of Care/Patient Progress Review  Outcome: No Change  A&O x4, independent.  VSS on room air. NPO with ice chips.  IV flagyl given.  IV morphine given x1 for abdominal pain effective for pain relief.  Denies nausea.  Bowel sounds active, no flatus. IVF infusing.  Nursing will continue to monitor.

## 2017-11-10 PROCEDURE — 25000125 ZZHC RX 250: Performed by: INTERNAL MEDICINE

## 2017-11-10 PROCEDURE — 25000128 H RX IP 250 OP 636: Performed by: INTERNAL MEDICINE

## 2017-11-10 PROCEDURE — 12000000 ZZH R&B MED SURG/OB

## 2017-11-10 PROCEDURE — 99232 SBSQ HOSP IP/OBS MODERATE 35: CPT | Performed by: INTERNAL MEDICINE

## 2017-11-10 PROCEDURE — 99231 SBSQ HOSP IP/OBS SF/LOW 25: CPT | Performed by: SURGERY

## 2017-11-10 RX ADMIN — LEVOTHYROXINE SODIUM ANHYDROUS 37.5 MCG: 100 INJECTION, POWDER, LYOPHILIZED, FOR SOLUTION INTRAVENOUS at 17:02

## 2017-11-10 RX ADMIN — METRONIDAZOLE 500 MG: 500 INJECTION, SOLUTION INTRAVENOUS at 01:09

## 2017-11-10 RX ADMIN — METRONIDAZOLE 500 MG: 500 INJECTION, SOLUTION INTRAVENOUS at 13:41

## 2017-11-10 RX ADMIN — MORPHINE SULFATE 2 MG: 2 INJECTION, SOLUTION INTRAMUSCULAR; INTRAVENOUS at 00:23

## 2017-11-10 RX ADMIN — METRONIDAZOLE 500 MG: 500 INJECTION, SOLUTION INTRAVENOUS at 18:30

## 2017-11-10 RX ADMIN — ERTAPENEM SODIUM 1 G: 1 INJECTION, POWDER, LYOPHILIZED, FOR SOLUTION INTRAMUSCULAR; INTRAVENOUS at 09:45

## 2017-11-10 RX ADMIN — SODIUM CHLORIDE: 9 INJECTION, SOLUTION INTRAVENOUS at 17:02

## 2017-11-10 RX ADMIN — METRONIDAZOLE 500 MG: 500 INJECTION, SOLUTION INTRAVENOUS at 06:53

## 2017-11-10 RX ADMIN — SODIUM CHLORIDE: 9 INJECTION, SOLUTION INTRAVENOUS at 05:05

## 2017-11-10 NOTE — PROGRESS NOTES
Feels a bit better  More loose stools  No appreciable flatus  Denies nausea  Af vss  abd soft without significant tenderness, slightly full    A/p  Will give clears  Instructed to go slowly   Patient ambulated to restroom once more in an attempt for a urine specimen. Once again, after several minutes patient unable to urinate despite being given water. Patient educated on the necessity that we have a urine specimen and that there may be a need for a straight catheter.

## 2017-11-10 NOTE — PLAN OF CARE
Problem: Patient Care Overview  Goal: Plan of Care/Patient Progress Review  Outcome: No Change  Pt A/Ox4. Vitals stable on room air. Up independent. NPO with ice chips and sips of water. IV fluids infusing. IV antibiotics. K+ 3.7. Pt denied nausea. Pt stated minimal pain in abdomen, heat applied. 1 small loose soft BM.  Expected D/C 2-3 days when bowel function returns and abscess improves per MD.

## 2017-11-10 NOTE — PLAN OF CARE
Problem: Patient Care Overview  Goal: Plan of Care/Patient Progress Review  Outcome: Improving  Pt had large loose stool while in shower today, was incontinent, has felt relief of abd fullness since. Tolerating clear liquids. VSS. Nathen mastectomy incisions healing well, partial steri-strips intact. Pt is up independent.

## 2017-11-10 NOTE — PLAN OF CARE
Problem: Patient Care Overview  Goal: Plan of Care/Patient Progress Review  Outcome: Improving  Pt. A&Ox4. VSS on RA. NPO. Bowel sounds positive; had a small BM, not passing gas yet. C/o abdominal pain; Morphine x1 effective. NS at 100ml/hr. Up independently. D/c pending

## 2017-11-10 NOTE — PROGRESS NOTES
[facility]    Hospitalist Progress Note    Date of Service (when I saw the patient): 11/10/2017    Assessment & Plan   Suma Calabrese is a 62 year old female who was admitted on 11/7/2017.  This is a  62 year old female admitted with diverticulitis with large bowel obstruction and possible intra-abdominal abscess.     #1.  Diverticulitis with possible small  intra-abdominal abscess and bowel obstruction :  NPO, IVF   Was on cipro and flazyl PTA .  changed to invanz and flazyl on 11/9  Had more loose stools since yesterday and this AM   No flatus  Starting clears slowly per surgery today   Conservative amangement for now  Encourage activity   Use narcotics sparingly   3.  Hypothyroidism.  Changed levothyroxine to IV dosing.     #4.  Hypertension.  Hold triamterene hydrochlorothiazide.     #5.  Hyperlipidemia.  Hold pravastatin.     #6.  Vitamin D deficiency.  Hold cholecalciferol.               DVT Prophylaxis: Pneumatic Compression Devices  Code Status: Full Code    Disposition: Expected discharge in 1-2 days when OK with surgery   Carolina Hennessy MD  [phone] (P)      Interval History    Low urine out put with dark urine yesterday improved with increase in IVF . No nausea. Distension much  better since admission     -Data reviewed today: I reviewed all new labs and imaging results over the last 24 hours. I personally reviewed no images or EKG's today.    Physical Exam   Temp: 98.4  F (36.9  C) Temp src: Oral BP: 114/71 Pulse: 95 Heart Rate: 72 Resp: 16 SpO2: 96 % O2 Device: None (Room air)    Vitals:    11/07/17 1308   Weight: 55.8 kg (123 lb)     Vital Signs with Ranges  Temp:  [98.1  F (36.7  C)-98.4  F (36.9  C)] 98.4  F (36.9  C)  Pulse:  [95] 95  Heart Rate:  [72-73] 72  Resp:  [16] 16  BP: (114-124)/(71-78) 114/71  SpO2:  [96 %-98 %] 96 %  I/O last 3 completed shifts:  In: 4652 [I.V.:3652; IV Piggyback:1000]  Out: 700 [Urine:700]    Constitutional: Awake, alert, cooperative, no apparent  distress  Respiratory: Clear to auscultation bilaterally, no crackles or wheezing  Cardiovascular: Regular rate and rhythm, normal S1 and S2, and no murmur noted  GI: Normal bowel sounds, soft, mild distension, less tender today than yesterday   Skin/Integumen: No rashes, no cyanosis, no edema  Other:     Medications     NaCl 150 mL/hr at 11/10/17 0505       ertapenem (INVanz) IV  1 g Intravenous Q24H     levothyroxine  37.5 mcg Intravenous Daily     metroNIDAZOLE  500 mg Intravenous Q6H       Data     Recent Labs  Lab 11/09/17  0930 11/08/17  0500 11/07/17  1340   WBC 4.2 5.6 5.8   HGB 12.2 12.1 12.8   MCV 95 92 91    260 286    134 129*   POTASSIUM 3.7 3.4 3.2*   CHLORIDE 106 103 96   CO2 21 24 25   BUN 12 6* 5*   CR 0.77 0.64 0.72   ANIONGAP 10 7 8   CODY 8.6 8.3* 8.8   GLC 73 109* 115*   ALBUMIN 2.5*  --  2.7*   PROTTOTAL 5.9*  --  6.2*   BILITOTAL 0.3  --  0.4   ALKPHOS 70  --  88   ALT 12  --  13   AST 14  --  14   LIPASE  --   --  59*       No results found for this or any previous visit (from the past 24 hour(s)).

## 2017-11-11 LAB
CREAT SERPL-MCNC: 0.68 MG/DL (ref 0.52–1.04)
GFR SERPL CREATININE-BSD FRML MDRD: 88 ML/MIN/1.7M2

## 2017-11-11 PROCEDURE — 99231 SBSQ HOSP IP/OBS SF/LOW 25: CPT | Performed by: SURGERY

## 2017-11-11 PROCEDURE — 12000000 ZZH R&B MED SURG/OB

## 2017-11-11 PROCEDURE — 25000125 ZZHC RX 250: Performed by: INTERNAL MEDICINE

## 2017-11-11 PROCEDURE — 25000128 H RX IP 250 OP 636: Performed by: INTERNAL MEDICINE

## 2017-11-11 PROCEDURE — 25000128 H RX IP 250 OP 636: Performed by: SURGERY

## 2017-11-11 PROCEDURE — 36415 COLL VENOUS BLD VENIPUNCTURE: CPT | Performed by: INTERNAL MEDICINE

## 2017-11-11 PROCEDURE — 82565 ASSAY OF CREATININE: CPT | Performed by: INTERNAL MEDICINE

## 2017-11-11 PROCEDURE — 99232 SBSQ HOSP IP/OBS MODERATE 35: CPT | Performed by: INTERNAL MEDICINE

## 2017-11-11 RX ADMIN — METRONIDAZOLE 500 MG: 500 INJECTION, SOLUTION INTRAVENOUS at 06:51

## 2017-11-11 RX ADMIN — LEVOTHYROXINE SODIUM ANHYDROUS 37.5 MCG: 100 INJECTION, POWDER, LYOPHILIZED, FOR SOLUTION INTRAVENOUS at 16:20

## 2017-11-11 RX ADMIN — SODIUM CHLORIDE: 9 INJECTION, SOLUTION INTRAVENOUS at 05:06

## 2017-11-11 RX ADMIN — METRONIDAZOLE 500 MG: 500 INJECTION, SOLUTION INTRAVENOUS at 18:30

## 2017-11-11 RX ADMIN — METRONIDAZOLE 500 MG: 500 INJECTION, SOLUTION INTRAVENOUS at 01:07

## 2017-11-11 RX ADMIN — METRONIDAZOLE 500 MG: 500 INJECTION, SOLUTION INTRAVENOUS at 13:26

## 2017-11-11 RX ADMIN — ERTAPENEM SODIUM 1 G: 1 INJECTION, POWDER, LYOPHILIZED, FOR SOLUTION INTRAMUSCULAR; INTRAVENOUS at 09:35

## 2017-11-11 RX ADMIN — MORPHINE SULFATE 2 MG: 2 INJECTION, SOLUTION INTRAMUSCULAR; INTRAVENOUS at 02:08

## 2017-11-11 RX ADMIN — SODIUM CHLORIDE: 9 INJECTION, SOLUTION INTRAVENOUS at 18:06

## 2017-11-11 NOTE — PROGRESS NOTES
"General Surgery Progress Note    Subjective: pt improving slowly. Still feels distended. Tolerating clears. No nausea. Having bowel movements.     Objective: /76 (BP Location: Right arm)  Pulse 66  Temp 98.7  F (37.1  C) (Oral)  Resp 16  Ht 1.702 m (5' 7\")  Wt 55.8 kg (123 lb)  LMP  (LMP Unknown)  SpO2 94%  BMI 19.26 kg/m2  Gen: alert, no distress  CV: RRR  Pulm: nonlabored breathing  Abd: distended, tender in RLQ.   Ext: WWP    Assessment/Plan:   Suma Calabrese  is a 62 year old female with sigmoid diverticulitis which is improving. Given ongoing distention continue clears 1 more day prior to advancing.   - clear liquids  - continue IV abx  - likely able to advance diet tomorrow with possible d/c on Monday     May Spain MD  [facility]  [phone]              "

## 2017-11-11 NOTE — PLAN OF CARE
Problem: Patient Care Overview  Goal: Plan of Care/Patient Progress Review  Outcome: Improving  [id number] RN A&O. VSS. Reporting minimal pain. Warm blanket given with improvement. BS active. BMx1 this shift. UOP roddy, dark, adequate. Up Ind.

## 2017-11-11 NOTE — PLAN OF CARE
Problem: Patient Care Overview  Goal: Plan of Care/Patient Progress Review  Outcome: No Change  A&O, VSS on RA. Abd pain/discomfort managed with PRN Morphine x1. Denies nausea, +BS, +BM, +flatus. Abd distention improving. Tolerating clear liquids. Up independently. Adequate UOP, dark/tea-coloured (improving). IV Flagyl per orders. Slept well most of shift.

## 2017-11-11 NOTE — PLAN OF CARE
Problem: Patient Care Overview  Goal: Plan of Care/Patient Progress Review  Outcome: Improving  Pt still complains of some abd fullness, but no pain, abd looks less distended and is soft. Pt is passing gas and has had a couple small soft stools. Pt continues to tolerate small amounts of clear liquids. She has been up walking in the halls a couple times. VSS. Nathen mastectomy sites continue to look good.

## 2017-11-12 ENCOUNTER — APPOINTMENT (OUTPATIENT)
Dept: GENERAL RADIOLOGY | Facility: CLINIC | Age: 62
End: 2017-11-12
Attending: SURGERY
Payer: COMMERCIAL

## 2017-11-12 LAB
CREAT SERPL-MCNC: 0.63 MG/DL (ref 0.52–1.04)
GFR SERPL CREATININE-BSD FRML MDRD: >90 ML/MIN/1.7M2

## 2017-11-12 PROCEDURE — 25000132 ZZH RX MED GY IP 250 OP 250 PS 637: Performed by: INTERNAL MEDICINE

## 2017-11-12 PROCEDURE — 82565 ASSAY OF CREATININE: CPT | Performed by: INTERNAL MEDICINE

## 2017-11-12 PROCEDURE — 99232 SBSQ HOSP IP/OBS MODERATE 35: CPT | Performed by: INTERNAL MEDICINE

## 2017-11-12 PROCEDURE — 36415 COLL VENOUS BLD VENIPUNCTURE: CPT | Performed by: INTERNAL MEDICINE

## 2017-11-12 PROCEDURE — 25000128 H RX IP 250 OP 636: Performed by: INTERNAL MEDICINE

## 2017-11-12 PROCEDURE — 25000125 ZZHC RX 250: Performed by: INTERNAL MEDICINE

## 2017-11-12 PROCEDURE — 99231 SBSQ HOSP IP/OBS SF/LOW 25: CPT | Mod: 57 | Performed by: SURGERY

## 2017-11-12 PROCEDURE — 25800025 ZZH RX 258: Performed by: INTERNAL MEDICINE

## 2017-11-12 PROCEDURE — 74000 XR ABDOMEN 1 VW: CPT

## 2017-11-12 PROCEDURE — 12000000 ZZH R&B MED SURG/OB

## 2017-11-12 RX ORDER — TRAMADOL HYDROCHLORIDE 50 MG/1
50 TABLET ORAL EVERY 6 HOURS PRN
Status: DISCONTINUED | OUTPATIENT
Start: 2017-11-12 | End: 2017-11-13

## 2017-11-12 RX ORDER — DEXTROSE MONOHYDRATE, SODIUM CHLORIDE, AND POTASSIUM CHLORIDE 50; 1.49; 9 G/1000ML; G/1000ML; G/1000ML
INJECTION, SOLUTION INTRAVENOUS CONTINUOUS
Status: DISCONTINUED | OUTPATIENT
Start: 2017-11-12 | End: 2017-11-16

## 2017-11-12 RX ADMIN — MORPHINE SULFATE 4 MG: 2 INJECTION, SOLUTION INTRAMUSCULAR; INTRAVENOUS at 14:41

## 2017-11-12 RX ADMIN — METRONIDAZOLE 500 MG: 500 INJECTION, SOLUTION INTRAVENOUS at 14:08

## 2017-11-12 RX ADMIN — LEVOTHYROXINE SODIUM ANHYDROUS 37.5 MCG: 100 INJECTION, POWDER, LYOPHILIZED, FOR SOLUTION INTRAVENOUS at 14:08

## 2017-11-12 RX ADMIN — Medication 1 LOZENGE: at 21:42

## 2017-11-12 RX ADMIN — MORPHINE SULFATE 2 MG: 2 INJECTION, SOLUTION INTRAMUSCULAR; INTRAVENOUS at 20:18

## 2017-11-12 RX ADMIN — MORPHINE SULFATE 2 MG: 2 INJECTION, SOLUTION INTRAMUSCULAR; INTRAVENOUS at 01:23

## 2017-11-12 RX ADMIN — METRONIDAZOLE 500 MG: 500 INJECTION, SOLUTION INTRAVENOUS at 01:23

## 2017-11-12 RX ADMIN — ERTAPENEM SODIUM 1 G: 1 INJECTION, POWDER, LYOPHILIZED, FOR SOLUTION INTRAMUSCULAR; INTRAVENOUS at 11:29

## 2017-11-12 RX ADMIN — MORPHINE SULFATE 2 MG: 2 INJECTION, SOLUTION INTRAMUSCULAR; INTRAVENOUS at 16:47

## 2017-11-12 RX ADMIN — TRAMADOL HYDROCHLORIDE 50 MG: 50 TABLET, COATED ORAL at 08:34

## 2017-11-12 RX ADMIN — POTASSIUM CHLORIDE, DEXTROSE MONOHYDRATE AND SODIUM CHLORIDE: 150; 5; 900 INJECTION, SOLUTION INTRAVENOUS at 11:30

## 2017-11-12 RX ADMIN — POTASSIUM CHLORIDE, DEXTROSE MONOHYDRATE AND SODIUM CHLORIDE: 150; 5; 900 INJECTION, SOLUTION INTRAVENOUS at 21:41

## 2017-11-12 RX ADMIN — METRONIDAZOLE 500 MG: 500 INJECTION, SOLUTION INTRAVENOUS at 06:22

## 2017-11-12 RX ADMIN — METRONIDAZOLE 500 MG: 500 INJECTION, SOLUTION INTRAVENOUS at 18:38

## 2017-11-12 ASSESSMENT — PAIN DESCRIPTION - DESCRIPTORS: DESCRIPTORS: ACHING

## 2017-11-12 NOTE — PLAN OF CARE
Problem: Patient Care Overview  Goal: Plan of Care/Patient Progress Review  Outcome: No Change  VSS on RA. Increased abd pain/distention, not relieved with PRN Tramadol. PRN Morphine x1. No N/V. +BS, no flatus/BM. Up Ind, ambulates in halls. Adequate UOP, remains dark. IVF adjusted. NPO w/ice chips. Abd XR shows increased colonic distention. Plans for surgery tomorrow @1000. NG placement pending.

## 2017-11-12 NOTE — PROVIDER NOTIFICATION
MD Notification    Notified Person:  MD    Notified Persons Name: Dr. Osuna    Notification Date/Time: 11/12/17 @0806    Notification Interaction:  Text paged Physician    Purpose of Notification: Pt with increased abd distention/discomfort and is wary to use PRN Morphine. Would Tramadol be appropriate?    Orders Received:    Comments: PRN Tramadol ordered.

## 2017-11-12 NOTE — PLAN OF CARE
Problem: Patient Care Overview  Goal: Plan of Care/Patient Progress Review  Outcome: No Change  Pt A/O x4. Up independently in room. VSS on RA. Clear liquid diet, very little intake. BS + in all 4. One very small soft BM tonight and also voided very small amount of tea colored urine. C/o moderately severe abd pain on RUQ and RLQ. Pt feels there is buildup of gas. Some relief from PRN Morphine and heat pack. Did sleep well through the night. IVF running, also on IV Flagyl. Nurse will continue to monitor.

## 2017-11-12 NOTE — PROGRESS NOTES
Nursing Note:  [employer]         Called by 6th floor to help place NG.  Nurse tried twice and was unable to pass NG past back of pts. nose.  I attempted twice with a 16Fr NG, and also had troubles placing NG.  Called  for a 12 Fr. NG.  Attempted 3rd time placing NG.  Able to place 12 Fr. NG through Right Nare and into stomach betweeen 55-60 cm markings on NG. Placed tube in glass of water to make sure NG wasn't in pts lung. No bubbles were noted.  Had Meggan; RN place stethoscope over pts upper abdomen, and then I inserted 30ml of air into NG.  Bubbling was heard in stomach region.  Attached NG to wall suction at LIS.

## 2017-11-12 NOTE — PROGRESS NOTES
"General Surgery Progress Note    Subjective: pt reports overall continues to feel better. She is still distended and having some abdominal pain. She is passing gas and has had bowel movements. Intermittent nausea.     Objective: /84 (BP Location: Left arm)  Pulse 67  Temp 98.3  F (36.8  C) (Oral)  Resp 14  Ht 1.702 m (5' 7\")  Wt 55.8 kg (123 lb)  LMP  (LMP Unknown)  SpO2 94%  BMI 19.26 kg/m2  Gen: alert, nodistress  CV: RRR  Pulm: nonlabored breathing  Abd: distended but soft, mildly tender RLQ  Ext: WWP    Assessment/Plan:   Suma Calabrese  is a 62 year old female with sigmoid diverticulitis who has been slow to improve on IV abx. We discussed options and if she is not improving in the next 1-2 days, she will likely need surgery with sigmoidectomy and likely ostomy. She is feels strongly that she would like to continue waiting at this time to see if there is any more improvement with abx.   - abdominal XR to evaluate degree of bowel distention.   - ok for clears, small amounts  - continue abx    May Spain MD  [facility]  [phone]              "

## 2017-11-12 NOTE — PROGRESS NOTES
Surgery Update    Pt abdominal XR reveals worsening of colonic distention. Pt more distended this afternoon on exam. No flatus or bm since last night.      I recommend surgery with exploratory laparotomy, sigmoidectomy and end colostomy. I do not think this needs to be done emergently. We discussed timing of surgery for tomorrow morning. She would like to discuss this with her children prior to OR. I will have the ostomy RN see and nannette her in the morning.     - NPO  - NGT if nausea    May Spain MD  [facility]  [phone]

## 2017-11-12 NOTE — PROGRESS NOTES
[facility]    Hospitalist Progress Note    Date of Service (when I saw the patient): 11/12/2017    Assessment & Plan   Suma Calabrese is a 62 year old female who was admitted on 11/7/2017. A bowel obstruction and diverticulitis    Summary:More right sided pain since this AM  Bowel obstruction in the distal sigmoid colon due to a ddiverticular abscess  Hypothyroidism, treated  Hypertension, controlled  Hyperlipidemia, not treated but likely doesn't need to be given the lack of eating      What I did today:  Adjusted her IV fluids as her urine is very concentrated. She requested pain meds other than Morphine as she is concerned that is slowing her bowel recovery, tried Tramadol which so far has not helped  -    -    -      DVT Prophylaxis: Pneumatic Compression Devices  Code Status: Full Code    Disposition: Expected discharge unknown at this time, depends on her clinical course.    José Antonio Osuna MD    Interval History   Ms. Calabrese is not feeling well since this mid AM. More right sided abdominal pain, no nausea or vomiting. No stool or flatus today. Voiding but it is dark. No headache, dizziness, light headedness, or dyspnea, no chest pain. A bit discouraged and doesn't want surgery    -Data reviewed today: I reviewed all new labs and imaging results over the last 24 hours. I personally reviewed no images or EKG's today. There is a KUB and  Upright pending and I'll look at that when it is done    Physical Exam   Temp: 98.3  F (36.8  C) Temp src: Oral BP: 133/84 Pulse: 67 Heart Rate: 66 Resp: 14 SpO2: 94 % O2 Device: None (Room air)    Vitals:    11/07/17 1308   Weight: 55.8 kg (123 lb)     Vital Signs with Ranges  Temp:  [98  F (36.7  C)-98.9  F (37.2  C)] 98.3  F (36.8  C)  Pulse:  [67-73] 67  Heart Rate:  [66-70] 66  Resp:  [12-18] 14  BP: (116-138)/(73-86) 133/84  SpO2:  [94 %-98 %] 94 %  I/O last 3 completed shifts:  In: 1493 [P.O.:450; I.V.:1043]  Out: 500  [Urine:500]    Constitutional: Alert, concerned, walking, color is unchanged. No fever and vitals are stable  Respiratory: Clear to a&P  Cardiovascular: regular rhythm, normal S1 and S2, no murmurs or S3  GI: bowel sounds present, normal in quality, more tenderness in her right lower and mid abdomen  Skin/Integumen: no changes  Other:      Medications     dextrose 5% and 0.9% NaCl with potassium chloride 20 mEq         ertapenem (INVanz) IV  1 g Intravenous Q24H     levothyroxine  37.5 mcg Intravenous Daily     metroNIDAZOLE  500 mg Intravenous Q6H       Data     Recent Labs  Lab 11/12/17  0940 11/11/17  0744 11/09/17  0930 11/08/17  0500 11/07/17  1340   WBC  --   --  4.2 5.6 5.8   HGB  --   --  12.2 12.1 12.8   MCV  --   --  95 92 91   PLT  --   --  281 260 286   NA  --   --  137 134 129*   POTASSIUM  --   --  3.7 3.4 3.2*   CHLORIDE  --   --  106 103 96   CO2  --   --  21 24 25   BUN  --   --  12 6* 5*   CR 0.63 0.68 0.77 0.64 0.72   ANIONGAP  --   --  10 7 8   CODY  --   --  8.6 8.3* 8.8   GLC  --   --  73 109* 115*   ALBUMIN  --   --  2.5*  --  2.7*   PROTTOTAL  --   --  5.9*  --  6.2*   BILITOTAL  --   --  0.3  --  0.4   ALKPHOS  --   --  70  --  88   ALT  --   --  12  --  13   AST  --   --  14  --  14   LIPASE  --   --   --   --  59*       Imaging:  No results found for this or any previous visit (from the past 24 hour(s)).

## 2017-11-12 NOTE — PLAN OF CARE
Problem: Patient Care Overview  Goal: Plan of Care/Patient Progress Review  Outcome: No Change  A&O. VSS. Denies pain, reports abd fullness. Occ. Small BMs. UOP borderline, Tea-colored. Reports unable to intake more fluid than she is currently, as it increases pain. Passing flatus. BS +, high pitched. Up Ind.

## 2017-11-12 NOTE — PROVIDER NOTIFICATION
MD Notification    Notified Person:  MD    Notified Persons Name:  Enrrique    Notification Date/Time: 11/12/17 @0819    Notification Interaction:  Text paged Physician    Purpose of Notification: FYI: +BS, Pt states abd discomfort in the past with Tylenol use. UOP adequate but still very concentrated. Increase IVF?    Orders Received: New orders for IVF.    Comments:

## 2017-11-13 ENCOUNTER — ANESTHESIA (OUTPATIENT)
Dept: SURGERY | Facility: CLINIC | Age: 62
End: 2017-11-13
Payer: COMMERCIAL

## 2017-11-13 ENCOUNTER — ANESTHESIA EVENT (OUTPATIENT)
Dept: SURGERY | Facility: CLINIC | Age: 62
End: 2017-11-13
Payer: COMMERCIAL

## 2017-11-13 LAB
ABO + RH BLD: NORMAL
ABO + RH BLD: NORMAL
ANION GAP SERPL CALCULATED.3IONS-SCNC: 7 MMOL/L (ref 3–14)
ANION GAP SERPL CALCULATED.3IONS-SCNC: 8 MMOL/L (ref 3–14)
BASE DEFICIT BLDA-SCNC: 3 MMOL/L
BLD GP AB SCN SERPL QL: NORMAL
BLD PROD TYP BPU: NORMAL
BLOOD BANK CMNT PATIENT-IMP: NORMAL
BUN SERPL-MCNC: 4 MG/DL (ref 7–30)
BUN SERPL-MCNC: 5 MG/DL (ref 7–30)
CALCIUM SERPL-MCNC: 7.8 MG/DL (ref 8.5–10.1)
CALCIUM SERPL-MCNC: 7.9 MG/DL (ref 8.5–10.1)
CHLORIDE SERPL-SCNC: 111 MMOL/L (ref 94–109)
CHLORIDE SERPL-SCNC: 111 MMOL/L (ref 94–109)
CO2 SERPL-SCNC: 22 MMOL/L (ref 20–32)
CO2 SERPL-SCNC: 24 MMOL/L (ref 20–32)
CREAT SERPL-MCNC: 0.57 MG/DL (ref 0.52–1.04)
CREAT SERPL-MCNC: 0.61 MG/DL (ref 0.52–1.04)
ERYTHROCYTE [DISTWIDTH] IN BLOOD BY AUTOMATED COUNT: 13.8 % (ref 10–15)
GFR SERPL CREATININE-BSD FRML MDRD: >90 ML/MIN/1.7M2
GFR SERPL CREATININE-BSD FRML MDRD: >90 ML/MIN/1.7M2
GLUCOSE SERPL-MCNC: 112 MG/DL (ref 70–99)
GLUCOSE SERPL-MCNC: 128 MG/DL (ref 70–99)
HCO3 BLD-SCNC: 23 MMOL/L (ref 21–28)
HCT VFR BLD AUTO: 37.4 % (ref 35–47)
HGB BLD-MCNC: 12.7 G/DL (ref 11.7–15.7)
HGB BLD-MCNC: 12.9 G/DL (ref 11.7–15.7)
MCH RBC QN AUTO: 32.2 PG (ref 26.5–33)
MCHC RBC AUTO-ENTMCNC: 34 G/DL (ref 31.5–36.5)
MCV RBC AUTO: 95 FL (ref 78–100)
NUM BPU REQUESTED: 1
OXYHGB MFR BLD: 95 % (ref 92–100)
PCO2 BLD: 45 MM HG (ref 35–45)
PH BLD: 7.32 PH (ref 7.35–7.45)
PLATELET # BLD AUTO: 182 10E9/L (ref 150–450)
PO2 BLD: 85 MM HG (ref 80–105)
POTASSIUM SERPL-SCNC: 3.3 MMOL/L (ref 3.4–5.3)
POTASSIUM SERPL-SCNC: 3.6 MMOL/L (ref 3.4–5.3)
RBC # BLD AUTO: 3.95 10E12/L (ref 3.8–5.2)
SODIUM SERPL-SCNC: 141 MMOL/L (ref 133–144)
SODIUM SERPL-SCNC: 142 MMOL/L (ref 133–144)
SPECIMEN EXP DATE BLD: NORMAL
WBC # BLD AUTO: 11.6 10E9/L (ref 4–11)

## 2017-11-13 PROCEDURE — 25800025 ZZH RX 258: Performed by: INTERNAL MEDICINE

## 2017-11-13 PROCEDURE — 25000125 ZZHC RX 250: Performed by: INTERNAL MEDICINE

## 2017-11-13 PROCEDURE — 25000125 ZZHC RX 250: Performed by: NURSE ANESTHETIST, CERTIFIED REGISTERED

## 2017-11-13 PROCEDURE — 36000063 ZZH SURGERY LEVEL 4 EA 15 ADDTL MIN: Performed by: SURGERY

## 2017-11-13 PROCEDURE — 88307 TISSUE EXAM BY PATHOLOGIST: CPT | Performed by: SURGERY

## 2017-11-13 PROCEDURE — 25000128 H RX IP 250 OP 636: Performed by: NURSE ANESTHETIST, CERTIFIED REGISTERED

## 2017-11-13 PROCEDURE — 88307 TISSUE EXAM BY PATHOLOGIST: CPT | Mod: 26 | Performed by: SURGERY

## 2017-11-13 PROCEDURE — 25000128 H RX IP 250 OP 636: Performed by: SURGERY

## 2017-11-13 PROCEDURE — 80048 BASIC METABOLIC PNL TOTAL CA: CPT | Performed by: SURGERY

## 2017-11-13 PROCEDURE — 37000009 ZZH ANESTHESIA TECHNICAL FEE, EACH ADDTL 15 MIN: Performed by: SURGERY

## 2017-11-13 PROCEDURE — 25000128 H RX IP 250 OP 636: Performed by: INTERNAL MEDICINE

## 2017-11-13 PROCEDURE — 40000275 ZZH STATISTIC RCP TIME EA 10 MIN

## 2017-11-13 PROCEDURE — 0DTE0ZZ RESECTION OF LARGE INTESTINE, OPEN APPROACH: ICD-10-PCS | Performed by: SURGERY

## 2017-11-13 PROCEDURE — 40000922 ZZH STATISTIC RCP TIME PACU VENT EA 10 MIN

## 2017-11-13 PROCEDURE — 71000014 ZZH RECOVERY PHASE 1 LEVEL 2 FIRST HR: Performed by: SURGERY

## 2017-11-13 PROCEDURE — 86900 BLOOD TYPING SEROLOGIC ABO: CPT | Performed by: SURGERY

## 2017-11-13 PROCEDURE — 86901 BLOOD TYPING SEROLOGIC RH(D): CPT | Performed by: SURGERY

## 2017-11-13 PROCEDURE — 40000902 ZZH STATISTIC WOC PT EDUCATION, 16-30 MIN

## 2017-11-13 PROCEDURE — 25000128 H RX IP 250 OP 636: Performed by: ANESTHESIOLOGY

## 2017-11-13 PROCEDURE — 27210995 ZZH RX 272: Performed by: SURGERY

## 2017-11-13 PROCEDURE — 25000566 ZZH SEVOFLURANE, EA 15 MIN: Performed by: SURGERY

## 2017-11-13 PROCEDURE — 36415 COLL VENOUS BLD VENIPUNCTURE: CPT | Performed by: SURGERY

## 2017-11-13 PROCEDURE — 25000125 ZZHC RX 250: Performed by: ANESTHESIOLOGY

## 2017-11-13 PROCEDURE — 40000169 ZZH STATISTIC PRE-PROCEDURE ASSESSMENT I: Performed by: SURGERY

## 2017-11-13 PROCEDURE — 80048 BASIC METABOLIC PNL TOTAL CA: CPT | Performed by: INTERNAL MEDICINE

## 2017-11-13 PROCEDURE — 0D1B0Z4 BYPASS ILEUM TO CUTANEOUS, OPEN APPROACH: ICD-10-PCS | Performed by: SURGERY

## 2017-11-13 PROCEDURE — P9041 ALBUMIN (HUMAN),5%, 50ML: HCPCS | Performed by: NURSE ANESTHETIST, CERTIFIED REGISTERED

## 2017-11-13 PROCEDURE — 86850 RBC ANTIBODY SCREEN: CPT | Performed by: SURGERY

## 2017-11-13 PROCEDURE — 37000008 ZZH ANESTHESIA TECHNICAL FEE, 1ST 30 MIN: Performed by: SURGERY

## 2017-11-13 PROCEDURE — 44150 REMOVAL OF COLON: CPT | Performed by: SURGERY

## 2017-11-13 PROCEDURE — 12000007 ZZH R&B INTERMEDIATE

## 2017-11-13 PROCEDURE — 85018 HEMOGLOBIN: CPT | Performed by: SURGERY

## 2017-11-13 PROCEDURE — 71000015 ZZH RECOVERY PHASE 1 LEVEL 2 EA ADDTL HR: Performed by: SURGERY

## 2017-11-13 PROCEDURE — 86923 COMPATIBILITY TEST ELECTRIC: CPT | Performed by: SURGERY

## 2017-11-13 PROCEDURE — 27210794 ZZH OR GENERAL SUPPLY STERILE: Performed by: SURGERY

## 2017-11-13 PROCEDURE — 99232 SBSQ HOSP IP/OBS MODERATE 35: CPT | Performed by: INTERNAL MEDICINE

## 2017-11-13 PROCEDURE — 82805 BLOOD GASES W/O2 SATURATION: CPT | Performed by: SURGERY

## 2017-11-13 PROCEDURE — 36000093 ZZH SURGERY LEVEL 4 1ST 30 MIN: Performed by: SURGERY

## 2017-11-13 PROCEDURE — 44150 REMOVAL OF COLON: CPT | Mod: 80 | Performed by: SURGERY

## 2017-11-13 PROCEDURE — 36600 WITHDRAWAL OF ARTERIAL BLOOD: CPT

## 2017-11-13 PROCEDURE — 85027 COMPLETE CBC AUTOMATED: CPT | Performed by: SURGERY

## 2017-11-13 PROCEDURE — 36415 COLL VENOUS BLD VENIPUNCTURE: CPT | Performed by: INTERNAL MEDICINE

## 2017-11-13 RX ORDER — PROPOFOL 10 MG/ML
INJECTION, EMULSION INTRAVENOUS CONTINUOUS PRN
Status: DISCONTINUED | OUTPATIENT
Start: 2017-11-13 | End: 2017-11-13

## 2017-11-13 RX ORDER — MAGNESIUM HYDROXIDE 1200 MG/15ML
LIQUID ORAL PRN
Status: DISCONTINUED | OUTPATIENT
Start: 2017-11-13 | End: 2017-11-13 | Stop reason: HOSPADM

## 2017-11-13 RX ORDER — ONDANSETRON 2 MG/ML
4 INJECTION INTRAMUSCULAR; INTRAVENOUS EVERY 30 MIN PRN
Status: DISCONTINUED | OUTPATIENT
Start: 2017-11-13 | End: 2017-11-13 | Stop reason: HOSPADM

## 2017-11-13 RX ORDER — POTASSIUM CL/LIDO/0.9 % NACL 10MEQ/0.1L
10 INTRAVENOUS SOLUTION, PIGGYBACK (ML) INTRAVENOUS ONCE
Status: DISCONTINUED | OUTPATIENT
Start: 2017-11-13 | End: 2017-11-13 | Stop reason: HOSPADM

## 2017-11-13 RX ORDER — SODIUM CHLORIDE, SODIUM LACTATE, POTASSIUM CHLORIDE, CALCIUM CHLORIDE 600; 310; 30; 20 MG/100ML; MG/100ML; MG/100ML; MG/100ML
INJECTION, SOLUTION INTRAVENOUS CONTINUOUS
Status: DISCONTINUED | OUTPATIENT
Start: 2017-11-13 | End: 2017-11-13 | Stop reason: HOSPADM

## 2017-11-13 RX ORDER — DIPHENHYDRAMINE HYDROCHLORIDE 50 MG/ML
INJECTION INTRAMUSCULAR; INTRAVENOUS PRN
Status: DISCONTINUED | OUTPATIENT
Start: 2017-11-13 | End: 2017-11-13

## 2017-11-13 RX ORDER — NEOSTIGMINE METHYLSULFATE 1 MG/ML
VIAL (ML) INJECTION PRN
Status: DISCONTINUED | OUTPATIENT
Start: 2017-11-13 | End: 2017-11-13

## 2017-11-13 RX ORDER — VECURONIUM BROMIDE 1 MG/ML
INJECTION, POWDER, LYOPHILIZED, FOR SOLUTION INTRAVENOUS PRN
Status: DISCONTINUED | OUTPATIENT
Start: 2017-11-13 | End: 2017-11-13

## 2017-11-13 RX ORDER — ALBUMIN, HUMAN INJ 5% 5 %
SOLUTION INTRAVENOUS CONTINUOUS PRN
Status: DISCONTINUED | OUTPATIENT
Start: 2017-11-13 | End: 2017-11-13

## 2017-11-13 RX ORDER — NALOXONE HYDROCHLORIDE 0.4 MG/ML
0.01 INJECTION, SOLUTION INTRAMUSCULAR; INTRAVENOUS; SUBCUTANEOUS
Status: DISCONTINUED | OUTPATIENT
Start: 2017-11-13 | End: 2017-11-20 | Stop reason: HOSPADM

## 2017-11-13 RX ORDER — FENTANYL CITRATE 50 UG/ML
25-50 INJECTION, SOLUTION INTRAMUSCULAR; INTRAVENOUS
Status: DISCONTINUED | OUTPATIENT
Start: 2017-11-13 | End: 2017-11-13 | Stop reason: HOSPADM

## 2017-11-13 RX ORDER — FENTANYL CITRATE 50 UG/ML
50-100 INJECTION, SOLUTION INTRAMUSCULAR; INTRAVENOUS
Status: DISCONTINUED | OUTPATIENT
Start: 2017-11-13 | End: 2017-11-13 | Stop reason: HOSPADM

## 2017-11-13 RX ORDER — LIDOCAINE HYDROCHLORIDE 20 MG/ML
INJECTION, SOLUTION INFILTRATION; PERINEURAL PRN
Status: DISCONTINUED | OUTPATIENT
Start: 2017-11-13 | End: 2017-11-13

## 2017-11-13 RX ORDER — SCOLOPAMINE TRANSDERMAL SYSTEM 1 MG/1
1 PATCH, EXTENDED RELEASE TRANSDERMAL
Status: DISCONTINUED | OUTPATIENT
Start: 2017-11-13 | End: 2017-11-13 | Stop reason: HOSPADM

## 2017-11-13 RX ORDER — GLYCOPYRROLATE 0.2 MG/ML
INJECTION, SOLUTION INTRAMUSCULAR; INTRAVENOUS PRN
Status: DISCONTINUED | OUTPATIENT
Start: 2017-11-13 | End: 2017-11-13

## 2017-11-13 RX ORDER — NALOXONE HYDROCHLORIDE 0.4 MG/ML
0.04 INJECTION, SOLUTION INTRAMUSCULAR; INTRAVENOUS; SUBCUTANEOUS ONCE
Status: COMPLETED | OUTPATIENT
Start: 2017-11-13 | End: 2017-11-13

## 2017-11-13 RX ORDER — ONDANSETRON 4 MG/1
4 TABLET, ORALLY DISINTEGRATING ORAL EVERY 30 MIN PRN
Status: DISCONTINUED | OUTPATIENT
Start: 2017-11-13 | End: 2017-11-13 | Stop reason: HOSPADM

## 2017-11-13 RX ORDER — DIMENHYDRINATE 50 MG/ML
12.5 INJECTION, SOLUTION INTRAMUSCULAR; INTRAVENOUS
Status: DISCONTINUED | OUTPATIENT
Start: 2017-11-13 | End: 2017-11-13 | Stop reason: HOSPADM

## 2017-11-13 RX ORDER — PROPOFOL 10 MG/ML
INJECTION, EMULSION INTRAVENOUS PRN
Status: DISCONTINUED | OUTPATIENT
Start: 2017-11-13 | End: 2017-11-13

## 2017-11-13 RX ORDER — ONDANSETRON 2 MG/ML
INJECTION INTRAMUSCULAR; INTRAVENOUS PRN
Status: DISCONTINUED | OUTPATIENT
Start: 2017-11-13 | End: 2017-11-13

## 2017-11-13 RX ORDER — MEPERIDINE HYDROCHLORIDE 25 MG/ML
12.5 INJECTION INTRAMUSCULAR; INTRAVENOUS; SUBCUTANEOUS EVERY 5 MIN PRN
Status: DISCONTINUED | OUTPATIENT
Start: 2017-11-13 | End: 2017-11-13 | Stop reason: HOSPADM

## 2017-11-13 RX ORDER — HYDROMORPHONE HYDROCHLORIDE 1 MG/ML
.3-.5 INJECTION, SOLUTION INTRAMUSCULAR; INTRAVENOUS; SUBCUTANEOUS EVERY 5 MIN PRN
Status: DISCONTINUED | OUTPATIENT
Start: 2017-11-13 | End: 2017-11-13 | Stop reason: HOSPADM

## 2017-11-13 RX ORDER — FENTANYL CITRATE 50 UG/ML
INJECTION, SOLUTION INTRAMUSCULAR; INTRAVENOUS PRN
Status: DISCONTINUED | OUTPATIENT
Start: 2017-11-13 | End: 2017-11-13

## 2017-11-13 RX ADMIN — VECURONIUM BROMIDE 2 MG: 1 INJECTION, POWDER, LYOPHILIZED, FOR SOLUTION INTRAVENOUS at 11:58

## 2017-11-13 RX ADMIN — ALBUMIN (HUMAN): 12.5 SOLUTION INTRAVENOUS at 11:58

## 2017-11-13 RX ADMIN — FENTANYL CITRATE 75 MCG: 50 INJECTION, SOLUTION INTRAMUSCULAR; INTRAVENOUS at 10:44

## 2017-11-13 RX ADMIN — MORPHINE SULFATE 2 MG: 2 INJECTION, SOLUTION INTRAMUSCULAR; INTRAVENOUS at 06:43

## 2017-11-13 RX ADMIN — HYDROMORPHONE HYDROCHLORIDE 0.5 MG: 1 INJECTION, SOLUTION INTRAMUSCULAR; INTRAVENOUS; SUBCUTANEOUS at 10:55

## 2017-11-13 RX ADMIN — METRONIDAZOLE 500 MG: 500 INJECTION, SOLUTION INTRAVENOUS at 18:55

## 2017-11-13 RX ADMIN — SODIUM CHLORIDE, POTASSIUM CHLORIDE, SODIUM LACTATE AND CALCIUM CHLORIDE: 600; 310; 30; 20 INJECTION, SOLUTION INTRAVENOUS at 09:41

## 2017-11-13 RX ADMIN — METRONIDAZOLE 500 MG: 500 INJECTION, SOLUTION INTRAVENOUS at 00:53

## 2017-11-13 RX ADMIN — PROPOFOL 200 MCG/KG/MIN: 10 INJECTION, EMULSION INTRAVENOUS at 10:19

## 2017-11-13 RX ADMIN — POTASSIUM CHLORIDE, DEXTROSE MONOHYDRATE AND SODIUM CHLORIDE: 150; 5; 900 INJECTION, SOLUTION INTRAVENOUS at 06:26

## 2017-11-13 RX ADMIN — PHENYLEPHRINE HYDROCHLORIDE 100 MCG: 10 INJECTION INTRAVENOUS at 10:43

## 2017-11-13 RX ADMIN — DEXMEDETOMIDINE HYDROCHLORIDE 8 MCG: 100 INJECTION, SOLUTION INTRAVENOUS at 10:51

## 2017-11-13 RX ADMIN — ALBUMIN (HUMAN): 12.5 SOLUTION INTRAVENOUS at 11:05

## 2017-11-13 RX ADMIN — PHENYLEPHRINE HYDROCHLORIDE 100 MCG: 10 INJECTION INTRAVENOUS at 10:25

## 2017-11-13 RX ADMIN — GLYCOPYRROLATE 0.5 MG: 0.2 INJECTION, SOLUTION INTRAMUSCULAR; INTRAVENOUS at 13:44

## 2017-11-13 RX ADMIN — POTASSIUM CHLORIDE 10 MEQ: 7.46 INJECTION, SOLUTION INTRAVENOUS at 10:37

## 2017-11-13 RX ADMIN — FENTANYL CITRATE 50 MCG: 50 INJECTION, SOLUTION INTRAMUSCULAR; INTRAVENOUS at 10:41

## 2017-11-13 RX ADMIN — MIDAZOLAM HYDROCHLORIDE 1 MG: 1 INJECTION, SOLUTION INTRAMUSCULAR; INTRAVENOUS at 10:19

## 2017-11-13 RX ADMIN — SODIUM CHLORIDE, POTASSIUM CHLORIDE, SODIUM LACTATE AND CALCIUM CHLORIDE: 600; 310; 30; 20 INJECTION, SOLUTION INTRAVENOUS at 11:32

## 2017-11-13 RX ADMIN — MIDAZOLAM HYDROCHLORIDE 1 MG: 1 INJECTION, SOLUTION INTRAMUSCULAR; INTRAVENOUS at 10:14

## 2017-11-13 RX ADMIN — DIPHENHYDRAMINE HYDROCHLORIDE 12.5 MG: 50 INJECTION, SOLUTION INTRAMUSCULAR; INTRAVENOUS at 10:40

## 2017-11-13 RX ADMIN — NEOSTIGMINE METHYLSULFATE 3 MG: 1 INJECTION INTRAMUSCULAR; INTRAVENOUS; SUBCUTANEOUS at 13:44

## 2017-11-13 RX ADMIN — SCOPALAMINE 1 PATCH: 1 PATCH, EXTENDED RELEASE TRANSDERMAL at 09:42

## 2017-11-13 RX ADMIN — MORPHINE SULFATE 4 MG: 2 INJECTION, SOLUTION INTRAMUSCULAR; INTRAVENOUS at 08:42

## 2017-11-13 RX ADMIN — VECURONIUM BROMIDE 2 MG: 1 INJECTION, POWDER, LYOPHILIZED, FOR SOLUTION INTRAVENOUS at 11:00

## 2017-11-13 RX ADMIN — POTASSIUM CHLORIDE, DEXTROSE MONOHYDRATE AND SODIUM CHLORIDE: 150; 5; 900 INJECTION, SOLUTION INTRAVENOUS at 20:39

## 2017-11-13 RX ADMIN — PHENYLEPHRINE HYDROCHLORIDE 100 MCG: 10 INJECTION INTRAVENOUS at 10:30

## 2017-11-13 RX ADMIN — HYDROMORPHONE HYDROCHLORIDE 0.5 MG: 1 INJECTION, SOLUTION INTRAMUSCULAR; INTRAVENOUS; SUBCUTANEOUS at 12:39

## 2017-11-13 RX ADMIN — MORPHINE SULFATE 2 MG: 2 INJECTION, SOLUTION INTRAMUSCULAR; INTRAVENOUS at 00:53

## 2017-11-13 RX ADMIN — PHENYLEPHRINE HYDROCHLORIDE 100 MCG: 10 INJECTION INTRAVENOUS at 10:50

## 2017-11-13 RX ADMIN — PHENYLEPHRINE HYDROCHLORIDE 100 MCG: 10 INJECTION INTRAVENOUS at 11:15

## 2017-11-13 RX ADMIN — DEXMEDETOMIDINE HYDROCHLORIDE 4 MCG: 100 INJECTION, SOLUTION INTRAVENOUS at 11:00

## 2017-11-13 RX ADMIN — FENTANYL CITRATE 75 MCG: 50 INJECTION, SOLUTION INTRAMUSCULAR; INTRAVENOUS at 10:19

## 2017-11-13 RX ADMIN — SODIUM CHLORIDE, POTASSIUM CHLORIDE, SODIUM LACTATE AND CALCIUM CHLORIDE: 600; 310; 30; 20 INJECTION, SOLUTION INTRAVENOUS at 12:41

## 2017-11-13 RX ADMIN — ONDANSETRON 4 MG: 2 INJECTION INTRAMUSCULAR; INTRAVENOUS at 13:44

## 2017-11-13 RX ADMIN — ERTAPENEM SODIUM 1 G: 1 INJECTION, POWDER, LYOPHILIZED, FOR SOLUTION INTRAMUSCULAR; INTRAVENOUS at 10:30

## 2017-11-13 RX ADMIN — METRONIDAZOLE 500 MG: 500 INJECTION, SOLUTION INTRAVENOUS at 06:24

## 2017-11-13 RX ADMIN — SODIUM CHLORIDE 1000 ML: 9 INJECTION, SOLUTION INTRAVENOUS at 21:05

## 2017-11-13 RX ADMIN — PHENYLEPHRINE HYDROCHLORIDE 150 MCG: 10 INJECTION INTRAVENOUS at 11:17

## 2017-11-13 RX ADMIN — LIDOCAINE HYDROCHLORIDE 40 MG: 20 INJECTION, SOLUTION INFILTRATION; PERINEURAL at 10:19

## 2017-11-13 RX ADMIN — PROPOFOL 120 MG: 10 INJECTION, EMULSION INTRAVENOUS at 10:19

## 2017-11-13 RX ADMIN — PHENYLEPHRINE HYDROCHLORIDE 100 MCG: 10 INJECTION INTRAVENOUS at 10:38

## 2017-11-13 RX ADMIN — NALOXONE HYDROCHLORIDE 0.04 MG: 0.4 INJECTION, SOLUTION INTRAMUSCULAR; INTRAVENOUS; SUBCUTANEOUS at 16:09

## 2017-11-13 RX ADMIN — DEXMEDETOMIDINE HYDROCHLORIDE 8 MCG: 100 INJECTION, SOLUTION INTRAVENOUS at 10:55

## 2017-11-13 RX ADMIN — ONDANSETRON 4 MG: 2 INJECTION INTRAMUSCULAR; INTRAVENOUS at 10:30

## 2017-11-13 RX ADMIN — PHENYLEPHRINE HYDROCHLORIDE 100 MCG: 10 INJECTION INTRAVENOUS at 13:04

## 2017-11-13 RX ADMIN — HYDROMORPHONE HYDROCHLORIDE: 10 INJECTION, SOLUTION INTRAMUSCULAR; INTRAVENOUS; SUBCUTANEOUS at 16:52

## 2017-11-13 RX ADMIN — SUCCINYLCHOLINE CHLORIDE 80 MG: 20 INJECTION, SOLUTION INTRAMUSCULAR; INTRAVENOUS at 10:19

## 2017-11-13 RX ADMIN — ROCURONIUM BROMIDE 50 MG: 10 INJECTION INTRAVENOUS at 10:30

## 2017-11-13 ASSESSMENT — ENCOUNTER SYMPTOMS
SEIZURES: 0
DYSRHYTHMIAS: 0
ORTHOPNEA: 0

## 2017-11-13 ASSESSMENT — LIFESTYLE VARIABLES: TOBACCO_USE: 0

## 2017-11-13 ASSESSMENT — COPD QUESTIONNAIRES: COPD: 0

## 2017-11-13 NOTE — OP NOTE
General Surgery Operative Note    PREOPERATIVE DIAGNOSIS: Sigmoid bowel obstruction    POSTOPERATIVE DIAGNOSIS: Sigmoid bowel obstruction, near perforated cecum    PROCEDURE: Exploratory laparotomy, subtotal abdominal colectomy, end ileostomy    SURGEON:  May Spain MD   Co-surgeon: Dae Bingham M.D.  Intraoperative consultation: Al Ritchie M.D.    ASSISTANT:  Reji Fink [place] [facility] [place]  The resident's assistance was medically necessary to provide adequate exposure in the operating field, maintain hemostasis, cutting suture, clamping and ligating bleeding vessels, and visualization of anatomic structures throughout the surgical procedure.     ANESTHESIA:  General.    BLOOD LOSS: 250ml    FINDINGS: Extremely dilated ascending and transverse colon with near perforation of the cecum, obstructing mass in the sigmoid colon    INDICATIONS:   Ms. Calabrese was admitted on November 7 with abdominal pain.  She had a CT scan which demonstrated likely sigmoid diverticulitis with partial sigmoid obstruction.  Her proximal colon was distended on her initial CT scan.  She was treated with IV antibiotics and made some progress through her first few days of hospitalization.  She was having bowel movements and passing flatus.  Over the weekend she has stopped passing flatus and had worsening abdominal distention on Sunday.  Abdominal x-ray revealed worsened distention of her cecum and ascending and transverse colon.  She is presenting today for sigmoid colectomy.  We had a discussion of the risks, benefits, indications and alternatives and she agreed to proceed with surgery.  She understands that she will have an end ileostomy as I do believe that the size mismatch between the proximal bowel and the rectum would be too significant to perform a primary anastomosis.    DETAILS OF PROCEDURE: The patient was brought to the operating room per Anesthesia, placed in supine position, and intubated without difficulty.  She was  positioned in lithotomy positioning.  A Lake catheter was placed using sterile technique.  An NG tube had been placed preoperatively.  She was given perioperative antibiotics with Invanz.  A Surgical timeout was then performed, verifying the correct surgeon, site, procedure, and patient and all in the room were in agreement.     A vertical lower midline incision was made using a 10 blade.  The subcutaneous tissue was divided with electrocautery and the fascia exposed.  The fascia was then sharply divided in the midline along the length of the incision.  The peritoneum was grasped with DeBakeys and elevated and divided upon entrance into the abdomen a large amount of serosanguineous fluid was evacuated, approximately 500ml.  We initially saw the cecum and this was tense, the serosa had tears and was extremely thin, very near perforation with small areas of ischemia.  We then then palpated in the pelvis and felt a large firm mass within the distal sigmoid colon.  Our skin and fascial incision was extended above the umbilicus a few inches below the xiphoid process.  The Bookwalter retractor was then placed for improved exposure.  Given the appearance of the cecum we did not feel that this was viable and proceeded with a subtotal colectomy.  The lateral aspect of the cecum and ascending colon had largely self dissected.  There was significant edema within the tissue planes.  Using the cautery we scored the peritoneal reflection.  LigaSure was used to divide the mesentery.  We divided the small bowel at the terminal ileum using an echelon stapler with a blue load. The transverse colon was elevated and the gastrocolic ligament divided using the LigaSure device.  We then took down the splenic flexure again with a combination of blunt dissection and using the LigaSure to divide the mesentery.  The proximal jejunum just distal to the ligament of Treitz was adherent to the mesentery of the transverse colon near the splenic  flexure.  This was taken down using a combination of sharp and blunt dissection.  The descending colon was also mobilized.  We used cautery to score along the white line of Toldt to medialize the colon.  We then used a LigaSure to divide the mesentery.  With the cecum, transverse colon, and descending colon mobilized and the mesentery divided there able to then continue following the sigmoid colon into the pelvis.  There was a large palpable firm area in the sigmoid colon with desmoplastic surrounding reaction.  We were initially concerned that this could be a malignancy given its appearance and palpation and called Dr. Al Ritchie for intraoperative consultation.      We continued to dissect along the lateral aspect of the sigmoid colon and this mass.  In doing so we entered a abscess pocket which had purulent fluid which was evacuated and more consistent with diverticulitis.  With even further dissection along the peritoneal reflection on the right lateral aspect of the rectum we determined that the sigmoid colon was buckled on itself and this area of inflammation was causing a complete bowel obstruction.  This was extremely difficult to dissect due to the extreme desmoplastic type reaction and there were no tissue planes easily found.  With a combination of blunt and sharp dissection we were able to ultimately free up the sigmoid colon.  The sigmoid colon mesentery and rectal mesocolon was divided using the LigaSure device.  Bilateral ureters were identified and protected in the entire dissection.  Once the mass was freed from the left lateral pelvic sidewall we were able to create a window in the mesorectum using cautery. The proximal mesorectum was divided with ligasure. Dr. Ritchie then scrubbed in and agreed that the mass seemed consistent with severe diverticulitis.  The rectum was divided again using the echelon stapler with two blue loads to divide the bowel.  The specimen was named subtotal colectomy and  passed off the field.  Dr. Ritchie opened the specimen on the back table and did not see evidence of malignancy.  The rectal staple line was marked with a 2-0 Prolene suture.  The abdomen was then irrigated with several liters of warm sterile saline.  It was some bleeding noted in the right upper quadrant which was arising from the cut aspect of the peritoneal reflection.  This was controlled with cautery.  There was excellent hemostasis in the pelvis.  We then chose a location on the right abdomen to place the end ileostomy.  A disc of skin was excised in the right mid abdomen using electrocautery.  This carried down to fascia dividing the subcutaneous fat.  The fascia was incised and the muscle spread a Carmalt clamp was then passed intra-abdominally.  The ostomy opening was stretched slightly so that two fingers could easily pass.  The ileum staple line was then delivered into this ostomy opening. The ileum was secured to the peritoneum using 3-0 vicryl sutures.  Once we were satisfied that there was no further bleeding, Seprafilm was then placed in all quadrants of the abdomen and across the mid section.    In accordance with hospital policy we then also regloved and gowned prior to closing the fascia.  A new sterile towels were placed.  The midline fascia was closed using two looped 0 PDS sutures.  The skin was closed using staples.  We then directed our attention to maturation of the ostomy.  The staple line was excised with cautery.  The ostomy was matured in a Monica fashion using many 3-0 Vicryl sutures to lulu the mucosa form a nice rosebud.  An ostomy appliance was cut to shape and placed.  The midline incision was covered with sterile dressings.    All needle, sponge, and instrument counts were correct at the conclusion of the procedure.  The patient tolerated the procedure well and was taken to the PACU for recovery.    May Spain MD

## 2017-11-13 NOTE — PROGRESS NOTES
Dr Arce notified regarding resulted K+: 3.3 prior to surgery.  Verbal order for 10mEq IV bag to be ordered and given during OR procedure.  Pharmacy called, Potassium replacement bag to be delivered to OR 50.

## 2017-11-13 NOTE — PLAN OF CARE
Problem: Patient Care Overview  Goal: Plan of Care/Patient Progress Review  Outcome: No Change  VSS on RA. BS low pitched RU/LUQ, tinkling RL/LLQ. Abd distention/fullness. Pain managed with IV Morphine x1. Denies N/V. NG to LIS, output clear with some gastric sediment. Generalized trace edema noted. Up SBA to BR, voided. Report given to pre-op RN. Transferred to surgery @0900. Dose of IV Invanz sent with pt.

## 2017-11-13 NOTE — PLAN OF CARE
Problem: Patient Care Overview  Goal: Plan of Care/Patient Progress Review  Outcome: No Change  [id number] RN. A&O. VSS. Pain reduced with IV morphine. BS tinkling in UR,ULQ. Low pitched LR,LLQ. Up SBA D/T NG tube. Denies N,V. NPO, Ice chips. Plan for Surgery at 1000 tomorrow.

## 2017-11-13 NOTE — BRIEF OP NOTE
[facility] Brief Operative Note    Pre-operative diagnosis: unknown   Post-operative diagnosis * No post-op diagnosis entered *  Obstructing diverticulitis.   Procedure: Procedure(s):  EXPLORATORY LAPAROTOMY, OPEN SUBTOTAL  COLECTOMY, END ILEOSTOMY - Wound Class: II-Clean Contaminated   - Wound Class: II-Clean Contaminated   - Wound Class: II-Clean Contaminated   Surgeon(s): Surgeon(s) and Role:     * May Spain MD - Primary     * Dae Bingham MD - Assisting     * Reji Fink MD - Assisting     * Al Ritchie MD - Assisting   Estimated blood loss: 250 mL    Specimens:   ID Type Source Tests Collected by Time Destination   A : Colon Tissue Colon SURGICAL PATHOLOGY EXAM May Spain MD 11/13/2017 12:44 PM       Findings: Obstructing lesion of distal sigmoid colon consistent with diverticular disease. Paracolonic abscess. Grossly dilated ischemic cecum with multiple longitudinal serosal tears. S/p Total abdominal colectomy with Kate's pouch and end ileostomy.

## 2017-11-13 NOTE — ANESTHESIA PREPROCEDURE EVALUATION
Procedure: Procedure(s):  COLECTOMY WITHOUT COLOSTOMY  Preop diagnosis: unknown    Allergies   Allergen Reactions     Bupropion Other (See Comments)     Jittery and HA     Erythromycin GI Disturbance     Oxycodone Nausea and Vomiting     Past Medical History:   Diagnosis Date     Cancer (H)     breast     Diverticulitis      Hypertension      Nontoxic uninodular goiter 1/28/2013     Pure hypercholesterolemia 1/28/2013     TMJ (temporomandibular joint syndrome) 7/1/2013     Past Surgical History:   Procedure Laterality Date     BIOPSY       BIOPSY NODE SENTINEL Bilateral 10/16/2017    Procedure: BIOPSY NODE SENTINEL;;  Surgeon: Van Salazar MD;  Location: [facility]     bladder dilitation[  age 17     DENTAL SURGERY      wisdom tooth extraction     MASTECTOMY SIMPLE BILATERAL Bilateral 10/16/2017    Procedure: MASTECTOMY SIMPLE BILATERAL;  BILATERAL SIMPLE MASTECTOMY, BILATERAL AXILLARY SENTINEL NODE BIOPSY;  Surgeon: Van Salazar MD;  Location: [facility]     Social History   Substance Use Topics     Smoking status: Current Every Day Smoker     Packs/day: 0.50     Years: 40.00     Types: Cigarettes     Smokeless tobacco: Never Used      Comment: 3/4 PPD     Alcohol use 0.0 oz/week     0 Standard drinks or equivalent per week      Comment: weekends     Prior to Admission medications    Medication Sig Start Date End Date Taking? Authorizing Provider   levothyroxine (SYNTHROID) 75 MCG tablet Take 1 tablet (75 mcg) by mouth every morning 11/6/17  Yes Owen Jean MD   ciprofloxacin (CIPRO) 500 MG tablet Take 1 tablet (500 mg) by mouth 2 times daily for 10 days 11/3/17 11/13/17 Yes Sanjeev Jimenez MD   metroNIDAZOLE (FLAGYL) 500 MG tablet Take 1 tablet (500 mg) by mouth 3 times daily for 10 days 11/3/17 11/13/17 Yes Sanjeev Jimenez MD   Pravastatin Sodium (PRAVACHOL PO) Take 20 mg by mouth At Bedtime   Yes Reported, Patient   Ibuprofen (ADVIL PO) Take 200 mg by mouth every 4 hours as needed for moderate  pain   Yes Reported, Patient   VITAMIN D, CHOLECALCIFEROL, PO Take 1,000 Units by mouth every morning   Yes Reported, Patient   Loratadine (CLARITIN PO) Take 10 mg by mouth daily as needed    Yes Reported, Patient   triamterene-hydrochlorothiazide (MAXZIDE-25) 37.5-25 MG per tablet Take 1 tablet by mouth every morning 11/6/17   Owen Jean MD     Current Facility-Administered Medications Ordered in Epic   Medication Dose Route Frequency Last Rate Last Dose     [Auto Hold] traMADol (ULTRAM) tablet 50 mg  50 mg Oral Q6H PRN   50 mg at 11/12/17 0834     dextrose 5% and 0.9% NaCl with potassium chloride 20 mEq infusion   Intravenous Continuous 125 mL/hr at 11/13/17 0804       [Auto Hold] benzocaine-menthol (CHLORASEPTIC) 6-10 MG lozenge 1-2 lozenge  1-2 lozenge Buccal Q1H PRN   1 lozenge at 11/12/17 2142     [Auto Hold] ertapenem (INVanz) 1 g vial to attach to  mL bag  1 g Intravenous Q24H 100 mL/hr at 11/08/17 1210 1 g at 11/12/17 1129     [Auto Hold] levothyroxine (SYNTHROID/LEVOTHROID) injection 37.5 mcg  37.5 mcg Intravenous Daily   37.5 mcg at 11/12/17 1408     [Auto Hold] metroNIDAZOLE (FLAGYL) infusion 500 mg  500 mg Intravenous Q6H 100 mL/hr at 11/13/17 0624 500 mg at 11/13/17 0624     [Auto Hold] potassium chloride SA (K-DUR/KLOR-CON M) CR tablet 20-40 mEq  20-40 mEq Oral Q2H PRN         [Auto Hold] potassium chloride (KLOR-CON) Packet 20-40 mEq  20-40 mEq Oral or Feeding Tube Q2H PRN         [Auto Hold] potassium chloride 10 mEq in 100 mL sterile water intermittent infusion (premix)  10 mEq Intravenous Q1H PRN         [Auto Hold] potassium chloride 10 mEq in 100 mL intermittent infusion with 10 mg lidocaine  10 mEq Intravenous Q1H  mL/hr at 11/08/17 0000 10 mEq at 11/08/17 0119     [Auto Hold] magnesium sulfate 4 g in 100 mL sterile water (premade)  4 g Intravenous Q4H PRN         [Auto Hold] naloxone (NARCAN) injection 0.1-0.4 mg  0.1-0.4 mg Intravenous Q2 Min PRN         [Auto Hold]  ondansetron (ZOFRAN-ODT) ODT tab 4 mg  4 mg Oral Q6H PRN        Or     [Auto Hold] ondansetron (ZOFRAN) injection 4 mg  4 mg Intravenous Q6H PRN   4 mg at 11/09/17 1137     [Auto Hold] prochlorperazine (COMPAZINE) injection 10 mg  10 mg Intravenous Q6H PRN        Or     [Auto Hold] prochlorperazine (COMPAZINE) tablet 10 mg  10 mg Oral Q6H PRN        Or     [Auto Hold] prochlorperazine (COMPAZINE) Suppository 25 mg  25 mg Rectal Q12H PRN         [Auto Hold] morphine (PF) injection 2-4 mg  2-4 mg Intravenous Q2H PRN   4 mg at 11/13/17 0842     No current [facility]-ordered outpatient prescriptions on file.       dextrose 5% and 0.9% NaCl with potassium chloride 20 mEq 125 mL/hr at 11/13/17 0804     Wt Readings from Last 1 Encounters:   11/07/17 55.8 kg (123 lb)     Temp Readings from Last 1 Encounters:   11/13/17 36.8  C (98.3  F) (Oral)     BP Readings from Last 6 Encounters:   11/13/17 126/83   11/06/17 126/70   11/03/17 115/90   11/03/17 94/72   10/17/17 103/63   10/12/17 111/78     Pulse Readings from Last 4 Encounters:   11/12/17 67   11/06/17 73   11/03/17 76   10/12/17 78     Resp Readings from Last 1 Encounters:   11/13/17 16     SpO2 Readings from Last 1 Encounters:   11/13/17 92%     Recent Labs   Lab Test  11/12/17   0940  11/11/17   0744  11/09/17   0930  11/08/17   0500   NA   --    --   137  134   POTASSIUM   --    --   3.7  3.4   CHLORIDE   --    --   106  103   CO2   --    --   21  24   ANIONGAP   --    --   10  7   GLC   --    --   73  109*   BUN   --    --   12  6*   CR  0.63  0.68  0.77  0.64   CODY   --    --   8.6  8.3*     Recent Labs   Lab Test  11/09/17   0930  11/07/17   1340   AST  14  14   ALT  12  13   ALKPHOS  70  88   BILITOTAL  0.3  0.4   LIPASE   --   59*     Recent Labs   Lab Test  11/09/17   0930  11/08/17   0500   WBC  4.2  5.6   HGB  12.2  12.1   PLT  281  260     Recent Results (from the past 744 hour(s))   NM Lymphoscintigraphy Injection only    Narrative    NUCLEAR MEDICINE  LYMPHOSCINTIGRAPHY INJECTION ONLY  10/16/2017 10:09  AM    INDICATION:  Right breast cancer.    ADDITIONAL INFORMATION: None.    TECHNIQUE:  579 uCi of Tc-99m Lymphoseek (r) was [place] [facility] the right.     576 uCi of Tc- 99m Lymphoseek was delivered for injection on the left.    Images were not obtained as part of the localization procedure.    GEORGE MAYORGA MD   CT Abdomen Pelvis w Contrast    Narrative    CT ABDOMEN/PELVIS WITH CONTRAST November 3, 2017 12:55 PM    HISTORY: Left lower quadrant pain.    TECHNIQUE: Helical axial scans from dome of liver through pubic  symphysis with 62mL Isovue-370 IV contrast. Radiation dose for this  scan was reduced using automated exposure control, adjustment of the  mA and/or kV according to patient size, or iterative reconstruction  technique.    COMPARISON: None.    FINDINGS: The liver, spleen, pancreas and bilateral adrenal glands are  normal. The left kidney is unremarkable. The right kidney contains a  0.8 cm benign cyst anteriorly in the midpole. There is a large amount  of stool throughout the ascending, transverse and descending colon.  Vascular calcifications are seen.    Scans through the pelvis are difficult to interpret because of a  relative lack of pelvic fat. The sigmoid colon appears abnormally  thickened. There is suggestion of an intramural lucency in the mid  sigmoid colon (image 52 of series 2) which could be an intramural  abscess. The most likely diagnosis is diverticulitis but the  thickening of the sigmoid colon is nonspecific and tumor is not  completely excluded so follow-up exam is recommended. In addition,  there is a collection of fluid interposed between the uterus and the  sigmoid colon and extending to the left side which is not definitely  bowel and could be an abscess measuring up to 7.7 x 4 x 2 cm (images  23-32 of series 3). Other very small abnormal collections of fluid  could be present in the right  pelvis also. There is no free fluid.  There is only mild induration of the pericolonic fat in the sigmoid  region. The appendix is identified and appears normal.      Impression    IMPRESSION:  1. Abnormal appearing sigmoid colon, superimposed on diverticulosis.  This is most likely diverticulitis, perhaps with a small intramural  abscess.  2. 7.7 x 4 x 2 cm fluid collection interposed between the uterus and  the sigmoid colon could represent an abscess. Other small fluid  collections in the right pelvis are also possible.  3. Large amount of stool in the colon.    JAMIE CALERO MD   CT Abdomen Pelvis w Contrast    Narrative    CT ABDOMEN AND PELVIS WITH CONTRAST   11/7/2017 3:28 PM     HISTORY: Diffuse abdominal pain, no bowel movement or gas.  Evaluate  for obstruction and re-evaluation of possible abscess.     TECHNIQUE:  62 mL Isovue-370. Radiation dose for this scan was reduced  using automated exposure control, adjustment of the mA and/or kV  according to patient size, or iterative reconstruction technique.    COMPARISON: 11/3/2017    FINDINGS:  There is prominent abnormal wall thickening involving the  distal sigmoid colon. This is now causing obstruction and there is  dilatation of the colon proximal to this region. There is a somewhat  serpiginous perisigmoid fluid collection again seen. As previously  described, this may well represent abscess. This appears mildly  increased and currently has a maximum dimension of about 8 cm. Small  amount of free fluid, which is new. The remaining findings appear  stable over the short time interval. Nothing else acute is seen in the  upper abdominal organs.       Impression    IMPRESSION: Distal sigmoid wall thickening, currently resulting in  colonic obstruction. This could relate to diverticulitis or focal  colitis, but neoplasm cannot be excluded. Serpiginous perisigmoid  fluid collection/abscess appears mildly increased. Small amount of  free fluid.     TAMEKA  MD ALEKSANDAR   XR Abdomen 1 View    Narrative    ABDOMEN ONE VIEW 11/12/2017 11:59 AM     HISTORY: Evaluate bowel distention      COMPARISON: CT abdomen and pelvis 11/7/2017.      Impression    IMPRESSION: Single view abdomen demonstrates a markedly distended  colon with a few mildly distended air-filled loops of small bowel.  Findings are concerning for a distal colonic obstruction. Overall, the  distention appears slightly worse. Findings are concerning for colonic  neoplasm.    VICKI TRIVEDI MD       RECENT LABS:     ECG: NSR, LAE, no acute abnormalities     ECHO:     Anesthesia Evaluation     . Pt has had prior anesthetic. Type: General    History of anesthetic complications   - PONV and motion sickness        ROS/MED HX    ENT/Pulmonary:     (+), . Other pulmonary disease TMJ Syndrome.   (-) tobacco use, asthma, COPD, sleep apnea and recent URI   Neurologic:      (-) seizures and CVA   Cardiovascular:     (+) Dyslipidemia, hypertension----. : . . . :. .      (-) angina, CAD, CHF, orthopnea/PND, arrhythmias, irregular heartbeat/palpitations, valvular problems/murmurs and angina   METS/Exercise Tolerance:  >4 METS   Hematologic:         Musculoskeletal:         GI/Hepatic:     (+) Other GI/Hepatic Diverticulitis     (-) GERD and liver disease   Renal/Genitourinary:      (-) renal disease   Endo:     (+) thyroid problem hypothyroidism, .   (-) Type II DM and chronic steroid usage   Psychiatric:         Infectious Disease:         Malignancy:   (+) Malignancy History of Breast  Breast CA Remission status post Surgery.         Other:                     Physical Exam      Airway   Mallampati: II  TM distance: >3 FB  Neck ROM: full    Dental   (+) upper dentures and lower dentures    Cardiovascular   Rhythm and rate: regular and normal  (-) no murmur    Pulmonary    breath sounds clear to auscultation(-) no wheezes                    Anesthesia Plan      History & Physical Review  History and physical reviewed and  following examination; no interval change.    ASA Status:  3 .    NPO Status:  > 8 hours and full stomach    Plan for General, ETT and RSI with Propofol and Intravenous induction. Maintenance will be TIVA.    PONV prophylaxis:  Ondansetron (or other 5HT-3), Other (See comment) and Scopolamine patch  NGT in place from floor   Zofran 8 mg (divided)   Benadryl 12.5 mg IV  Scopolamine patch       Postoperative Care  Postoperative pain management:  Multi-modal analgesia.      Consents  Anesthetic plan, risks, benefits and alternatives discussed with:  Patient..

## 2017-11-13 NOTE — PLAN OF CARE
Problem: Patient Care Overview  Goal: Plan of Care/Patient Progress Review  Outcome: No Change  Alert and oriented.  Up with SBA.  IV fluids infusing per orders.  Abdomen is distended, bowel sounds are positive, not passing gas, no BM.  NG to LIS with minimal returns.  NPO with ice chips, patient is scheduled for colectomy this morning.  Complains of right side abdominal pain, prn morphine as needed.

## 2017-11-13 NOTE — PROGRESS NOTES
"[facility] Nurse Ostomy Marking and Education         Data:   History:      Suma Calabrese is a 62 year old female who was admitted on 11/7/2017. A bowel obstruction and diverticulitis and possible intra abdominal abscesses     Summary: Bowel obstruction, imrooving  Diverticulitis, being treated  Hypothyroidism, receiving IV L4  Hypertension, not on her diuretic but blood pressure is controlled  Hyperlipidemia, not eating, not a problem now    Pt referred by   Who is present for marking and education: Pt only, seen in pre-op    Type of ostomy surgery:  Owen colostomy  Abdomen:  Distended and firm. No wrinkles, creases scarring noted. Belt line @ waist           Intervention:     Patient's chart evaluated.      Assessments done today:  Belt line, previous abdominal surgeries and scars, abdominal muscles and pt observed lying, sitting , not standing    Education: Briefly reviewed upcoming surgery, stoma construction, post-op expectations, general ostomy cares/concerns including: , diet and hydration, bathing, odor, output,  activity, appliances and emptying.      Patient marked with \"x\" using surgical marker and scratch method then allowed to dry 3 minutes and sealed with 3M No Sting Skin Prep. Pt marked  Mid LLQ that allows for adequate visualization and surrounding pouching surface         Assessment:       Abdomen marked on mid LLQ for colostomy. OK to mirror site on RLQ if pt needs ileostomy    Learning needs/ comprehension: ostomy management and appliance changes post-op         Plan:     Plan:   ? Surgery scheduled for:  Today 10-13-17 @ 10am    Will plan to follow patient post operatively.       EDU stat only: 25 minutes    "

## 2017-11-13 NOTE — ANESTHESIA CARE TRANSFER NOTE
Patient: Suma Calabrese    Procedure(s):  EXPLORATORY LAPAROTOMY, OPEN SUBTOTAL  COLECTOMY, END ILEOSTOMY - Wound Class: II-Clean Contaminated   - Wound Class: II-Clean Contaminated   - Wound Class: II-Clean Contaminated    Diagnosis: unknown  Diagnosis Additional Information: No value filed.    Anesthesia Type:   General, ETT, RSI     Note:  Airway :ETT  Patient transferred to:PACU  Comments: Placed on vent per RT.   +etco2. VSS. Report to RN. Handoff Report: Identifed the Patient, Identified the Reponsible Provider, Reviewed the pertinent medical history, Discussed the surgical course, Reviewed Intra-OP anesthesia mangement and issues during anesthesia, Set expectations for post-procedure period and Allowed opportunity for questions and acknowledgement of understanding      Vitals: (Last set prior to Anesthesia Care Transfer)    CRNA VITALS  11/13/2017 1345 - 11/13/2017 1421      11/13/2017             Pulse: 77    SpO2: 100 %    Resp Rate (observed): 11    Resp Rate (set): 10                Electronically Signed By: JT Dennison CRNA  November 13, 2017  2:21 PM

## 2017-11-14 LAB
ANION GAP SERPL CALCULATED.3IONS-SCNC: 6 MMOL/L (ref 3–14)
BUN SERPL-MCNC: 6 MG/DL (ref 7–30)
CALCIUM SERPL-MCNC: 7.3 MG/DL (ref 8.5–10.1)
CHLORIDE SERPL-SCNC: 114 MMOL/L (ref 94–109)
CO2 SERPL-SCNC: 24 MMOL/L (ref 20–32)
COPATH REPORT: NORMAL
CREAT SERPL-MCNC: 0.9 MG/DL (ref 0.52–1.04)
ERYTHROCYTE [DISTWIDTH] IN BLOOD BY AUTOMATED COUNT: 14 % (ref 10–15)
GFR SERPL CREATININE-BSD FRML MDRD: 63 ML/MIN/1.7M2
GLUCOSE SERPL-MCNC: 143 MG/DL (ref 70–99)
HCT VFR BLD AUTO: 36.7 % (ref 35–47)
HGB BLD-MCNC: 12.4 G/DL (ref 11.7–15.7)
MAGNESIUM SERPL-MCNC: 1.5 MG/DL (ref 1.6–2.3)
MAGNESIUM SERPL-MCNC: 2.5 MG/DL (ref 1.6–2.3)
MCH RBC QN AUTO: 32.3 PG (ref 26.5–33)
MCHC RBC AUTO-ENTMCNC: 33.8 G/DL (ref 31.5–36.5)
MCV RBC AUTO: 96 FL (ref 78–100)
PHOSPHATE SERPL-MCNC: 2.4 MG/DL (ref 2.5–4.5)
PLATELET # BLD AUTO: 175 10E9/L (ref 150–450)
POTASSIUM SERPL-SCNC: 4.1 MMOL/L (ref 3.4–5.3)
RBC # BLD AUTO: 3.84 10E12/L (ref 3.8–5.2)
SODIUM SERPL-SCNC: 144 MMOL/L (ref 133–144)
WBC # BLD AUTO: 10.5 10E9/L (ref 4–11)

## 2017-11-14 PROCEDURE — 25800025 ZZH RX 258: Performed by: INTERNAL MEDICINE

## 2017-11-14 PROCEDURE — 12000007 ZZH R&B INTERMEDIATE

## 2017-11-14 PROCEDURE — 25000132 ZZH RX MED GY IP 250 OP 250 PS 637: Performed by: SURGERY

## 2017-11-14 PROCEDURE — 36415 COLL VENOUS BLD VENIPUNCTURE: CPT | Performed by: INTERNAL MEDICINE

## 2017-11-14 PROCEDURE — 99212 OFFICE O/P EST SF 10 MIN: CPT

## 2017-11-14 PROCEDURE — 25000125 ZZHC RX 250: Performed by: SURGERY

## 2017-11-14 PROCEDURE — 25000128 H RX IP 250 OP 636: Performed by: INTERNAL MEDICINE

## 2017-11-14 PROCEDURE — 80048 BASIC METABOLIC PNL TOTAL CA: CPT | Performed by: SURGERY

## 2017-11-14 PROCEDURE — 36415 COLL VENOUS BLD VENIPUNCTURE: CPT | Performed by: SURGERY

## 2017-11-14 PROCEDURE — 85027 COMPLETE CBC AUTOMATED: CPT | Performed by: SURGERY

## 2017-11-14 PROCEDURE — 83735 ASSAY OF MAGNESIUM: CPT | Performed by: SURGERY

## 2017-11-14 PROCEDURE — 25000125 ZZHC RX 250: Performed by: INTERNAL MEDICINE

## 2017-11-14 PROCEDURE — 25000128 H RX IP 250 OP 636: Performed by: SURGERY

## 2017-11-14 PROCEDURE — 84100 ASSAY OF PHOSPHORUS: CPT | Performed by: INTERNAL MEDICINE

## 2017-11-14 PROCEDURE — 84100 ASSAY OF PHOSPHORUS: CPT | Performed by: SURGERY

## 2017-11-14 RX ORDER — DIAZEPAM 10 MG/2ML
2.5 INJECTION, SOLUTION INTRAMUSCULAR; INTRAVENOUS EVERY 4 HOURS PRN
Status: DISCONTINUED | OUTPATIENT
Start: 2017-11-14 | End: 2017-11-14

## 2017-11-14 RX ORDER — LEVOTHYROXINE SODIUM 75 UG/1
75 TABLET ORAL
Status: DISCONTINUED | OUTPATIENT
Start: 2017-11-15 | End: 2017-11-20 | Stop reason: HOSPADM

## 2017-11-14 RX ORDER — HEPARIN SODIUM 5000 [USP'U]/.5ML
5000 INJECTION, SOLUTION INTRAVENOUS; SUBCUTANEOUS EVERY 12 HOURS
Status: DISCONTINUED | OUTPATIENT
Start: 2017-11-14 | End: 2017-11-20 | Stop reason: HOSPADM

## 2017-11-14 RX ORDER — DIAZEPAM 2 MG
2 TABLET ORAL EVERY 6 HOURS PRN
Status: DISCONTINUED | OUTPATIENT
Start: 2017-11-14 | End: 2017-11-20 | Stop reason: HOSPADM

## 2017-11-14 RX ADMIN — METRONIDAZOLE 500 MG: 500 INJECTION, SOLUTION INTRAVENOUS at 12:22

## 2017-11-14 RX ADMIN — HEPARIN SODIUM 5000 UNITS: 5000 INJECTION, SOLUTION INTRAVENOUS; SUBCUTANEOUS at 14:06

## 2017-11-14 RX ADMIN — DIAZEPAM 2 MG: 2 TABLET ORAL at 17:29

## 2017-11-14 RX ADMIN — POTASSIUM CHLORIDE, DEXTROSE MONOHYDRATE AND SODIUM CHLORIDE: 150; 5; 900 INJECTION, SOLUTION INTRAVENOUS at 07:03

## 2017-11-14 RX ADMIN — SODIUM CHLORIDE 500 ML: 9 INJECTION, SOLUTION INTRAVENOUS at 14:18

## 2017-11-14 RX ADMIN — DIAZEPAM 2 MG: 2 TABLET ORAL at 11:26

## 2017-11-14 RX ADMIN — METRONIDAZOLE 500 MG: 500 INJECTION, SOLUTION INTRAVENOUS at 01:15

## 2017-11-14 RX ADMIN — MAGNESIUM SULFATE IN WATER 4 G: 40 INJECTION, SOLUTION INTRAVENOUS at 08:28

## 2017-11-14 RX ADMIN — METRONIDAZOLE 500 MG: 500 INJECTION, SOLUTION INTRAVENOUS at 06:52

## 2017-11-14 RX ADMIN — ERTAPENEM SODIUM 1 G: 1 INJECTION, POWDER, LYOPHILIZED, FOR SOLUTION INTRAMUSCULAR; INTRAVENOUS at 11:04

## 2017-11-14 RX ADMIN — POTASSIUM PHOSPHATE, MONOBASIC AND POTASSIUM PHOSPHATE, DIBASIC 15 MMOL: 224; 236 INJECTION, SOLUTION INTRAVENOUS at 16:33

## 2017-11-14 NOTE — PLAN OF CARE
Problem: Patient Care Overview  Goal: Plan of Care/Patient Progress Review  Outcome: Improving  A/O. AVSS w/ 1-2L. PCA adequate for pain. Up w/ A2 w/ belt. ML incision CDI, Stoma red/protruding, no output from ileostomy yet. NG d/c'd at 1400. Walked short distances but having abdominal muscle spasms. UOP low to luiza- MD notified and bolus given. BS hypo/ no flatus.

## 2017-11-14 NOTE — PHARMACY
Prescriber Notification Note    The pharmacist has communicated with this patient's provider regarding a concern or therapy recommendation.    Notified Person: Maribel Spain  Date/Time of Notification: 11/14/17 @ 9805  Interaction: phone  Concern/Recommendation: IV valium on very short supply, very little stock in the pharmacy. Ok to change to PO? Also levothyroxine?     Comments/Additional Details:Yes ok to change to PO. MD ordered.   Mary Diaz, PharmD

## 2017-11-14 NOTE — PROVIDER NOTIFICATION
Notified Dr. Bingham regarding low BP 84/58, increased tachycardia to 110s, RR 6-8 but arouses to voice. 1L NS bolus ordered. Dr. Spain called shortly after to bolus, draw Hgb and ABGs.

## 2017-11-14 NOTE — PROVIDER NOTIFICATION
Notified hospitalist regarding low urine output. 100ml dark roddy urine in [place]. MD wanted Creatinine checked.

## 2017-11-14 NOTE — PLAN OF CARE
Problem: Patient Care Overview  Goal: Plan of Care/Patient Progress Review  Outcome: Improving  A&Ox4. Lungs clear but diminished. -BS, not passing flatus. +1 edema in arms, hands and feet. Pain well controlled with PCA. Dressing CDI. Lake patent, low urine output, dark roddy. Hospitalist notified overnight. NG patent to LIS, small green drainage. NPO ex. Ice. Stoma pink, protruding, small green drainage. Ambulating SBA. Bolus given per MD during evening for low BPs and increased tachycardia. BP and HR now return to baseline.

## 2017-11-14 NOTE — PROGRESS NOTES
"General Surgery Progress Note    Subjective: pt hypotensive overnight. Responded well to fluid challenge. Reports muscle spasm across abdomen.     Objective: /61 (BP Location: Right arm)  Pulse 94  Temp 98.4  F (36.9  C) (Oral)  Resp 18  Ht 1.702 m (5' 7\")  Wt 55.8 kg (123 lb)  LMP  (LMP Unknown)  SpO2 96%  BMI 19.26 kg/m2  Gen: alert, no distress  CV: RRR  Pulm: nonlabored breathing  Abd: soft, ostomy pink and patent. Incision c/d/i  Ext: WWP    Assessment/Plan:   Suma Calabrese  is a 62 year old female POD 1 s/p subtotal abdominal colectomy and end ileostomy for sigmoid colon obstruction.   - NPO, NGT, may be able to remove NGT later today if no nausea w clamping.   - d/c IV abx  - dilaudid Pca  -prn valium muscle spasm  - ok for meds PO  - heparin dvt ppx  - continue jarrett for now  - ambulate      May Spain MD  [facility]  [phone]              "

## 2017-11-14 NOTE — PROGRESS NOTES
"CLINICAL NUTRITION SERVICES  -  ASSESSMENT NOTE      Future/Additional Recommendations:   Medical Food Supplement - will order once able to take po     Malnutrition:   Patient does not meet two of the criteria necessary for diagnosing malnutrition          REASON FOR ASSESSMENT  Suma Calabrese is a 62 year old female seen by Registered Dietitian for LOS      NUTRITION HISTORY  - Information obtained from the pt.  She normally follows a regular diet and had been eating well until just before admit.  Her last meal was on 11/6, 8 days ago.      CURRENT NUTRITION ORDERS  Diet Order:     NPO with NGT LIS  Pt has been NPO/clear liquids x 8 days      PHYSICAL FINDINGS  Observed  No nutrition-related physical findings observed  Obtained from Chart/Interdisciplinary Team  None noted    ANTHROPOMETRICS  Height: 5' 7\"  Weight: 123 lbs 0 oz (55.8 kg)  Body mass index is 19.26 kg/(m^2).  Weight Status:  Normal BMI  IBW: 61.4 kg +/- 10%  % IBW: 91%  Weight History: Wt appears stable. Pt denies wt loss.  Wt Readings from Last 10 Encounters:   11/07/17 55.8 kg (123 lb)   11/06/17 57.2 kg (126 lb)   11/03/17 55.8 kg (123 lb)   11/03/17 56 kg (123 lb 8 oz)   10/16/17 54.6 kg (120 lb 6.4 oz)   10/12/17 54 kg (119 lb)   10/11/17 54.4 kg (120 lb)   09/28/17 54.9 kg (121 lb)   09/18/17 54.9 kg (121 lb)   12/09/16 54.9 kg (121 lb)         LABS  Labs reviewed    MEDICATIONS  Medications reviewed    Dosing Weight 55.8 kg - current wt    ASSESSED NUTRITION NEEDS PER APPROVED PRACTICE GUIDELINES:  Estimated Energy Needs: 2649-1950 kcals (30-35 Kcal/Kg)  Justification: underweight  Estimated Protein Needs: 65-85 grams protein (1.2-1.5 g pro/Kg)  Justification: post-op      MALNUTRITION:  % Weight Loss:  None noted  % Intake:  </= 50% for >/= 5 days (severe malnutrition)  Subcutaneous Fat Loss:  None observed  Muscle Loss:  None observed  Fluid Retention:  None noted    Malnutrition Diagnosis: Patient does not meet two of the above criteria " necessary for diagnosing malnutrition      NUTRITION DIAGNOSIS:  Inadequate protein-energy intake related to altered GI function as evidenced by currently meeting 0% of assessed needs x 8 days      NUTRITION INTERVENTIONS  Recommendations / Nutrition Prescription  ADAT per MD.      Implementation  Nutrition education: No education needs assessed at this time  Medical Food Supplement - will order once able to take po.      Nutrition Goals  Pt will begin po diet 1-2 days.      MONITORING AND EVALUATION:  Progress towards goals will be monitored and evaluated per protocol and Practice Guidelines    Danisha Michael RD  Pager [phone] (M-F)            [phone] (W/E & Hol)

## 2017-11-14 NOTE — PROGRESS NOTES
CRS Staff    I was asked by Dr. Spain to see Suma Calabrese regarding need for possible stoma reversal in the future.  Suma presented with a history of abdominal pain on Nov 7 with large bowel obstruction. She initially improved but had worsening pain and was taken to the OR on 11/13/17.  I was called at that time.  Dr. Spain and Dr. Bingham had found that the colon above the obstruction was dilated and that the cecum was nearly perforated.  The colon had been mobilized and the mesentery ligated and divided from the cecum to the upper rectum.  The terminal ileum had been stapled off and divided.  I scrubbed in and assess the upper rectum.  This had been nicely cleared from the mesorectum and I assisted with stapling at this level.  I opened the specimen on the back table and the point of obstruction appeared to be a diverticular stricture.    PMH notable for recent bilateral mastectomy for bilateral breast cancer.  She has a T1a, N0 ductal carcinoma of the left breast and a T1c N0 ductal carcinoma of the right breast.  She had bilateral sentinel lymph node biopsies.  Also notable for HTN, hypothyroid, and hypercholesterolemia      Suma says she is doing ok today.  Had some pain with getting up out of bed.  Vitals and labs reviewed.    I reviewed the intra operative findings.  She is a candidate for ileostomy reversal once recovered from current surgery and mastectomy.  Will need to follow up with oncology regarding adjuvant therapy for breast cancer.  Advised against ileostomy reversal any sooner than 6 months.  I gave her my contact info and will follow up with her as she is getting ready for discharge.    Thank you for asking us to see Suma and for allowing me to participate in her care.    Al Ritchie MD  Colorectal Surgery  [phone] (office)  [phone] (pager)  [email or web address]

## 2017-11-14 NOTE — PROGRESS NOTES
[facility]    Hospitalist Progress Note    Date of Service (when I saw the patient): 11/13/2017    Assessment & Plan   Suma Calabrese is a 62 year old female who was admitted on 11/7/2017. With a bowel obstruction and diverticulitis    Summary: bowel obstruction due to a sigmoid diverticulitis and scar tissue and near perforation of the cecum. The cecum was not viable  Hypothyroidism, being treated  Hypertension, controlled  Hyperlipidemia, will resume treatment when etin      What I did today: reviewed the chart and did a history and exam and spoke to her daughter  -    -    -      DVT Prophylaxis: Pneumatic Compression Devices  Code Status: Full Code    Disposition: Expected discharge when healed from a surgical standpoint.    José Antonio Osuna MD    Interval History   Her abdomen  Hurts. No headache or chest pain. The abdomen feels like she has gas in it. No chills, sweats or fever. Tolerating the NG tube. Breathing is ok    -Data reviewed today: I reviewed all new labs and imaging results over the last 24 hours. I personally reviewed no images or EKG's today.    Physical Exam   Temp: 99.4  F (37.4  C) Temp src: Temporal BP: 110/71   Heart Rate: 98 Resp: 12 SpO2: 97 % O2 Device: Nasal cannula Oxygen Delivery: 3 LPM  Vitals:    11/07/17 1308   Weight: 55.8 kg (123 lb)     Vital Signs with Ranges  Temp:  [98.3  F (36.8  C)-99.4  F (37.4  C)] 99.4  F (37.4  C)  Heart Rate:  [] 98  Resp:  [5-21] 12  BP: (100-133)/(66-94) 110/71  FiO2 (%):  [40 %] 40 %  SpO2:  [92 %-99 %] 97 %  I/O last 3 completed shifts:  In: 5524.5 [I.V.:5024.5]  Out: 1300 [Urine:600; Emesis/NG output:450; Blood:250]    Constitutional: Alert, post op  Respiratory: Lungs are clear  Cardiovascular: regular rhythm, normal S1 and S2, no S3 or murmurs, no edema  GI: quiet, ostomy in the right lower abdomen  Skin/Integumen: unchanged  Other:      Medications     lactated ringers 100 mL/hr at 11/13/17 1610     dextrose 5% and 0.9%  NaCl with potassium chloride 20 mEq 125 mL/hr at 11/13/17 0804       HYDROmorphone   Intravenous PCA     [Auto Hold] ertapenem (INVanz) IV  1 g Intravenous Q24H     [Auto Hold] levothyroxine  37.5 mcg Intravenous Daily     [Auto Hold] metroNIDAZOLE  500 mg Intravenous Q6H       Data     Recent Labs  Lab 11/13/17  1448 11/13/17  0835 11/12/17  0940  11/09/17  0930 11/08/17  0500 11/07/17  1340   WBC 11.6*  --   --   --  4.2 5.6 5.8   HGB 12.7  --   --   --  12.2 12.1 12.8   MCV 95  --   --   --  95 92 91     --   --   --  281 260 286    141  --   --  137 134 129*   POTASSIUM 3.6 3.3*  --   --  3.7 3.4 3.2*   CHLORIDE 111* 111*  --   --  106 103 96   CO2 24 22  --   --  21 24 25   BUN 4* 5*  --   --  12 6* 5*   CR 0.57 0.61 0.63  < > 0.77 0.64 0.72   ANIONGAP 7 8  --   --  10 7 8   CODY 7.8* 7.9*  --   --  8.6 8.3* 8.8   * 128*  --   --  73 109* 115*   ALBUMIN  --   --   --   --  2.5*  --  2.7*   PROTTOTAL  --   --   --   --  5.9*  --  6.2*   BILITOTAL  --   --   --   --  0.3  --  0.4   ALKPHOS  --   --   --   --  70  --  88   ALT  --   --   --   --  12  --  13   AST  --   --   --   --  14  --  14   LIPASE  --   --   --   --   --   --  59*   < > = values in this interval not displayed.    Imaging:  No results found for this or any previous visit (from the past 24 hour(s)).

## 2017-11-14 NOTE — PLAN OF CARE
Problem: Patient Care Overview  Goal: Plan of Care/Patient Progress Review  Outcome: No Change  Pt to floor from pacu, oriented to room and call lights and PCA, pt slightly drowsy, lung sounds diminished, O2 sats mid to high 90's on 3L NC, complains of pain but sleeping between cares, stoma red, bag intact, NG tube in place, scant output, jarrett in place, good urine output,

## 2017-11-15 ENCOUNTER — APPOINTMENT (OUTPATIENT)
Dept: OCCUPATIONAL THERAPY | Facility: CLINIC | Age: 62
End: 2017-11-15
Attending: INTERNAL MEDICINE
Payer: COMMERCIAL

## 2017-11-15 ENCOUNTER — APPOINTMENT (OUTPATIENT)
Dept: PHYSICAL THERAPY | Facility: CLINIC | Age: 62
End: 2017-11-15
Attending: INTERNAL MEDICINE
Payer: COMMERCIAL

## 2017-11-15 LAB
ALBUMIN SERPL-MCNC: 1.5 G/DL (ref 3.4–5)
CREAT SERPL-MCNC: 0.68 MG/DL (ref 0.52–1.04)
GFR SERPL CREATININE-BSD FRML MDRD: 87 ML/MIN/1.7M2
MAGNESIUM SERPL-MCNC: 1.9 MG/DL (ref 1.6–2.3)
PHOSPHATE SERPL-MCNC: 1.8 MG/DL (ref 2.5–4.5)

## 2017-11-15 PROCEDURE — 25000132 ZZH RX MED GY IP 250 OP 250 PS 637: Performed by: SURGERY

## 2017-11-15 PROCEDURE — 97535 SELF CARE MNGMENT TRAINING: CPT | Mod: GO

## 2017-11-15 PROCEDURE — 99233 SBSQ HOSP IP/OBS HIGH 50: CPT | Performed by: INTERNAL MEDICINE

## 2017-11-15 PROCEDURE — 12000007 ZZH R&B INTERMEDIATE

## 2017-11-15 PROCEDURE — 84100 ASSAY OF PHOSPHORUS: CPT | Performed by: SURGERY

## 2017-11-15 PROCEDURE — 40000133 ZZH STATISTIC OT WARD VISIT

## 2017-11-15 PROCEDURE — 97110 THERAPEUTIC EXERCISES: CPT | Mod: GP | Performed by: PHYSICAL THERAPIST

## 2017-11-15 PROCEDURE — 83735 ASSAY OF MAGNESIUM: CPT | Performed by: SURGERY

## 2017-11-15 PROCEDURE — 25000128 H RX IP 250 OP 636: Performed by: SURGERY

## 2017-11-15 PROCEDURE — 97116 GAIT TRAINING THERAPY: CPT | Mod: GP | Performed by: PHYSICAL THERAPIST

## 2017-11-15 PROCEDURE — 25000125 ZZHC RX 250: Performed by: SURGERY

## 2017-11-15 PROCEDURE — 97161 PT EVAL LOW COMPLEX 20 MIN: CPT | Mod: GP | Performed by: PHYSICAL THERAPIST

## 2017-11-15 PROCEDURE — 97530 THERAPEUTIC ACTIVITIES: CPT | Mod: GP | Performed by: PHYSICAL THERAPIST

## 2017-11-15 PROCEDURE — 36415 COLL VENOUS BLD VENIPUNCTURE: CPT | Performed by: SURGERY

## 2017-11-15 PROCEDURE — 84100 ASSAY OF PHOSPHORUS: CPT | Performed by: INTERNAL MEDICINE

## 2017-11-15 PROCEDURE — 97166 OT EVAL MOD COMPLEX 45 MIN: CPT | Mod: GO

## 2017-11-15 PROCEDURE — 97530 THERAPEUTIC ACTIVITIES: CPT | Mod: GO

## 2017-11-15 PROCEDURE — 82565 ASSAY OF CREATININE: CPT | Performed by: SURGERY

## 2017-11-15 PROCEDURE — 25800025 ZZH RX 258: Performed by: INTERNAL MEDICINE

## 2017-11-15 PROCEDURE — 82040 ASSAY OF SERUM ALBUMIN: CPT | Performed by: SURGERY

## 2017-11-15 PROCEDURE — 40000193 ZZH STATISTIC PT WARD VISIT: Performed by: PHYSICAL THERAPIST

## 2017-11-15 RX ORDER — LABETALOL HYDROCHLORIDE 5 MG/ML
10 INJECTION, SOLUTION INTRAVENOUS
Status: DISCONTINUED | OUTPATIENT
Start: 2017-11-15 | End: 2017-11-20 | Stop reason: HOSPADM

## 2017-11-15 RX ORDER — FUROSEMIDE 10 MG/ML
20 INJECTION INTRAMUSCULAR; INTRAVENOUS ONCE
Status: COMPLETED | OUTPATIENT
Start: 2017-11-15 | End: 2017-11-15

## 2017-11-15 RX ADMIN — DIAZEPAM 2 MG: 2 TABLET ORAL at 08:57

## 2017-11-15 RX ADMIN — POTASSIUM CHLORIDE, DEXTROSE MONOHYDRATE AND SODIUM CHLORIDE: 150; 5; 900 INJECTION, SOLUTION INTRAVENOUS at 01:18

## 2017-11-15 RX ADMIN — POTASSIUM PHOSPHATE, MONOBASIC AND POTASSIUM PHOSPHATE, DIBASIC 20 MMOL: 224; 236 INJECTION, SOLUTION INTRAVENOUS at 19:44

## 2017-11-15 RX ADMIN — HEPARIN SODIUM 5000 UNITS: 5000 INJECTION, SOLUTION INTRAVENOUS; SUBCUTANEOUS at 00:37

## 2017-11-15 RX ADMIN — POTASSIUM PHOSPHATE, MONOBASIC AND POTASSIUM PHOSPHATE, DIBASIC 20 MMOL: 224; 236 INJECTION, SOLUTION INTRAVENOUS at 01:41

## 2017-11-15 RX ADMIN — LEVOTHYROXINE SODIUM 75 MCG: 75 TABLET ORAL at 08:47

## 2017-11-15 RX ADMIN — HEPARIN SODIUM 5000 UNITS: 5000 INJECTION, SOLUTION INTRAVENOUS; SUBCUTANEOUS at 11:32

## 2017-11-15 RX ADMIN — POTASSIUM PHOSPHATE, MONOBASIC AND POTASSIUM PHOSPHATE, DIBASIC 20 MMOL: 224; 236 INJECTION, SOLUTION INTRAVENOUS at 11:37

## 2017-11-15 RX ADMIN — POTASSIUM CHLORIDE, DEXTROSE MONOHYDRATE AND SODIUM CHLORIDE: 150; 5; 900 INJECTION, SOLUTION INTRAVENOUS at 09:02

## 2017-11-15 RX ADMIN — FUROSEMIDE 20 MG: 10 INJECTION, SOLUTION INTRAVENOUS at 08:50

## 2017-11-15 ASSESSMENT — ACTIVITIES OF DAILY LIVING (ADL): PREVIOUS_RESPONSIBILITIES: MEAL PREP;HOUSEKEEPING;LAUNDRY;SHOPPING;YARDWORK;MEDICATION MANAGEMENT;FINANCES;DRIVING;WORK

## 2017-11-15 NOTE — PROGRESS NOTES
[facility]  Hospitalist Progress Note  Esther Deluna MD    Assessment & Plan   Ms. Suma Calabrese is a 62 year old lady with PMHx significant for breast cancer, HTN, diverticulitis, DLP, vitamin D deficiency, and hypothyroidism, who presented with abdominal pain and noted to have diverticulitis with large bowel obstruction and possible intra-abdominal abscess. Patient was subsequently admitted for further evaluation and management.      Acute diverticulitis with sigmoid bowel obstruction and near perforated cecum, s/p exploratory laparotomy, subtotal colectomy, and end ileostomy on 11/13, POD# 2:  -Started on IV Cipro and Flagyl on admission, discontinued 11/04  -Cont NPO  -Cont post-op surgical management per surgery service    Volume overload:  Patient is positive about 17 liters since admission and diffusely edematous..  -Consider IV Lasix if OK with surgery  -Check albumin  -Cont to monitor volume status    Physical deconditioning:  -PT/OT    Hypertension:  BP is currently controlled.  -Cont to hold PTA MAxzide  -PRN IV labetalol available for SBP>170  -Cont to monitor hypertension    Hypothyroidism:  -Cont on PTA levothyroxine 75 mcg po daily    Hyperlipidemia:  -Cont to hold PTA pravastatin     DVT Prophylaxis: Pneumatic Compression Devices/heparin sc  Code Status: Full Code    Disposition: Expected discharge in several days.    D/W RN.    Interval History   Patient has generalized swelling. Surgical pain is controlled. No issue overnight.    Data reviewed today: I reviewed all new labs and imaging over the last 24 hours.    Physical Exam   Temp: 99.4  F (37.4  C) Temp src: Oral BP: 103/63 Pulse: 86 Heart Rate: 84 Resp: 16 SpO2: 93 % O2 Device: Nasal cannula Oxygen Delivery: 1 LPM  Vitals:    11/07/17 1308   Weight: 55.8 kg (123 lb)     Vital Signs with Ranges  Temp:  [98.4  F (36.9  C)-99.6  F (37.6  C)] 99.4  F (37.4  C)  Pulse:  [] 86  Heart Rate:  [84-87] 84  Resp:  [16-18]  16  BP: (103-117)/(59-67) 103/63  SpO2:  [93 %-97 %] 93 %  I/O's Last 24 hours  I/O last 3 completed shifts:  In: 2739 [I.V.:2739]  Out: 850 [Urine:850]    Constitutional: Comfortable, no acute distress  HEENT: No conjunctival pallor.  Neurologic: Awake, alert, fully oriented  Neck: Supple, no elevated JVP  Respiratory: Clear to auscultation  Cardiovascular: Normal S1 and S2. Regular rhythm and rate  GI: Abdomen soft, not distended, ileostomy stump noted  Extremities: No calf tenderness, no edema  Skin/integument: No acute rash, no cyanosis    Medications   All medications were reviewed.    dextrose 5% and 0.9% NaCl with potassium chloride 20 mEq 125 mL/hr at 11/15/17 0118       levothyroxine  75 mcg Oral QAM AC     heparin  5,000 Units Subcutaneous Q12H     HYDROmorphone   Intravenous PCA        Data     Recent Labs  Lab 11/14/17  0655 11/13/17  2130 11/13/17  1448 11/13/17  0835  11/09/17  0930   WBC 10.5  --  11.6*  --   --  4.2   HGB 12.4 12.9 12.7  --   --  12.2   MCV 96  --  95  --   --  95     --  182  --   --  281     --  142 141  --  137   POTASSIUM 4.1  --  3.6 3.3*  --  3.7   CHLORIDE 114*  --  111* 111*  --  106   CO2 24  --  24 22  --  21   BUN 6*  --  4* 5*  --  12   CR 0.90  --  0.57 0.61  < > 0.77   ANIONGAP 6  --  7 8  --  10   CODY 7.3*  --  7.8* 7.9*  --  8.6   *  --  112* 128*  --  73   ALBUMIN  --   --   --   --   --  2.5*   PROTTOTAL  --   --   --   --   --  5.9*   BILITOTAL  --   --   --   --   --  0.3   ALKPHOS  --   --   --   --   --  70   ALT  --   --   --   --   --  12   AST  --   --   --   --   --  14   < > = values in this interval not displayed.    No results found for this or any previous visit (from the past 24 hour(s)).

## 2017-11-15 NOTE — PROGRESS NOTES
"[facility] Nurse Inpatient Ostomy Assessment     Assessment of ostomy and needs for:  New Ileostomy      Data:   History:    Ms. Suma Calabrese is a 62 year old lady with PMHx significant for breast cancer, HTN, diverticulitis, DLP, vitamin D deficiency, and hypothyroidism, who presented with abdominal pain and noted to have diverticulitis with large bowel obstruction and possible intra-abdominal abscess. Patient was subsequently admitted for further evaluation and management.      Acute diverticulitis with sigmoid bowel obstruction and near perforated cecum, s/p exploratory laparotomy, subtotal colectomy, and end ileostomy on     Type of ostomy: Ileostomy  Stoma assessment:   ? Approx 1 1/2\" x 1 1/4\" oval, red, moist, protrudes, lumen @ apex  ? A bridge in place?: No  ? Stent(s) in place?: No  ? Catheter secured? Yes  Mucocutaneous Junction (MCJ): intact with sutures  Peristomal skin:  Intact, no erythema  Abdominal assessment: remains distended and firm.  Stoma sits in small bowl. Surgical dressing changed secondary to leaking ostomy appliance.  Incision intact, well-approximated with staples, no erythema, no drainage  ? N/G still in place? Yes   Output:  Small bloody    I/O last 3 completed shifts:  In: 2207 [I.V.:2207]  Out: 600 [Urine:575; Emesis/NG output:25]    Current pouching system:  Lian Premier flat cut to fit post-op appliance lifting mediall    Pain:  Spasms,    ? Is pt still on a PCA?Yes    Diet:       Active Diet Order      NPO for Medical/Clinical Reasons Except for: Meds, Ice Chips  Labs:   Recent Labs   Lab Test  11/14/17   0655   11/09/17   0930   ALBUMIN   --    --   2.5*   HGB  12.4   < >  12.2   WBC  10.5   < >  4.2    < > = values in this interval not displayed.           Intervention:     Patient's chart evaluated.      Assessments done today:  Stoma and pouching system    Education: Not  Initiated today. Pt sleeping    Prepared for discharge by: No discharge " preps    Pt registered for ostomy supply samples? TBD         Assessment:     Stoma assessment: appears viable, await return of  function    Learning needs/ comprehension: Not initiated today    Prosthetic refitting:  Needs convexity. Flat post-op cut to fit appliance leaking    Abd incision:  Intact, well-approximated with staples, no drainage         Plan:     Plan:   ? Current pouching system: Lian NI (57mm) convex with ring and high output pouch    Education:  ? Education completed:  NOt initiated today  ? Educational needs: Ileostomy management, appliance change and emptying  ? Continue to prepare for discharge No discharge preps initiated  o Supply company: TBD  o Do WO Nurses recommend home care? TBD     Face to face time: 20 minutes

## 2017-11-15 NOTE — PLAN OF CARE
Problem: Patient Care Overview  Goal: Plan of Care/Patient Progress Review  Outcome: Improving  AxO, lethargic. Dressing CDI. Ileostomy WNL, pink, protruding, no output.Pain controlled adequately for pain. BS tingling, no flatus. +2 edema to UEs and LEs. A2 with gb. Lake patent, roddy urine. NPO ex. Ice and meds. IVF 125ml/hr. Phosphate 1.8, replaced, recheck due with morning labs.

## 2017-11-15 NOTE — PROGRESS NOTES
" 11/15/17 1509   Quick Adds   Type of Visit Initial Occupational Therapy Evaluation   Living Environment   Lives With alone   Living Arrangements house   Home Accessibility stairs to enter home;stairs (1 railing present)   Number of Stairs to Enter Home 2   Number of Stairs Within Home (\"I have them, I don't have to use them\")   Transportation Available car;family or friend will provide   Living Environment Comment Pt lives alone in house, 2 stairs to enter, tub/shower, standard toilet   Self-Care   Dominant Hand right   Usual Activity Tolerance good   Current Activity Tolerance moderate   Regular Exercise yes   Equipment Currently Used at Home none   Activity/Exercise/Self-Care Comment Walks to work \"3 blocks\" to and from work every day. Medical Support assistance. Walks when she can just to get up and move.Works full time.   Functional Level Prior   Ambulation 0-->independent   Transferring 0-->independent   Toileting 0-->independent   Bathing 0-->independent   Dressing 0-->independent   Eating 0-->independent   Communication 0-->understands/communicates without difficulty   Swallowing 0-->swallows foods/liquids without difficulty   Cognition 0 - no cognition issues reported   Fall history within last six months no   Which of the above functional risks had a recent onset or change? transferring;toileting;bathing;dressing   Prior Functional Level Comment Pt reports indep in all ADLS, IADLs and mobility tasks with no AD at baseline. 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    General Information   Onset of Illness/Injury or Date of Surgery - Date 11/07/17   Referring Physician Esther Deluna MD   Patient/Family Goals Statement None stated   Additional Occupational Profile Info/Pertinent History of Current Problem Per chart: Pt is a 62 year old lady with PMHx significant for breast cancer, HTN, diverticulitis, DLP, vitamin D deficiency, and hypothyroidism, who presented with abdominal pain and noted to " have diverticulitis with large bowel obstruction and possible intra-abdominal abscess, now s/p exploratory laparotomy, subtotal colectomy, and end ileostomy   Precautions/Limitations fall precautions;abdominal precautions   Cognitive Status Examination   Orientation orientation to person, place and time   Level of Consciousness alert   Able to Follow Commands WNL/WFL   Personal Safety (Cognitive) WNL/WFL   Memory intact   Visual Perception   Visual Perception Wears glasses   Sensory Examination   Sensory Comments Pt denies numbness/tingling in BUEs   Pain Assessment   Patient Currently in Pain Yes, see Vital Sign flowsheet  (3/10 pain in abdomen)   Range of Motion (ROM)   ROM Comment WFL   Strength   Strength Comments Generalized weakness    Hand Strength   Hand Strength Comments WFL   Coordination   Upper Extremity Coordination No deficits were identified   Bed Mobility Skill: Sit to Supine   Level of [place]: Sit/Supine moderate assist (50% patients effort)   Physical Assist/Nonphysical Assist: Sit/Supine 1 person assist   Transfer Skill: Bed to Chair/Chair to Bed   Level of [place]: Bed to Chair contact guard   Physical Assist/Nonphysical Assist: Bed to Chair 1 person assist   Weight-Bearing Restrictions weight-bearing as tolerated   Assistive Device - Transfer Skill Bed to Chair Chair to Bed Rehab Eval standard walker   Transfer Skill: Sit to Stand   Level of [place]: Sit/Stand contact guard   Physical Assist/Nonphysical Assist: Sit/Stand 1 person assist   Transfer Skill: Sit to Stand weight-bearing as tolerated   Assistive Device for Transfer: Sit/Stand standard walker   Balance   Balance Comments CGA while in stance FWW level   Upper Body Dressing   Level of [place]: Dress Upper Body minimum assist (75% patients effort)   Physical Assist/Nonphysical Assist: Dress Upper Body 1 person assist   Lower Body Dressing   Level of [place]: Dress Lower Body maximum assist (25% patients effort)  "  Physical Assist/Nonphysical Assist: Dress Lower Body 1 person assist   Toileting   Level of [place]: Toilet moderate assist (50% patients effort)   Physical Assist/Nonphysical Assist: Toilet 1 person assist   Grooming   Level of [place]: Grooming contact guard   Physical Assist/Nonphysical Assist: Grooming 1 person assist   Instrumental Activities of Daily Living (IADL)   Previous Responsibilities meal prep;housekeeping;laundry;shopping;yardwork;medication management;finances;driving;work   Activities of Daily Living Analysis   Impairments Contributing to Impaired Activities of Daily Living balance impaired;pain;post surgical precautions;strength decreased   ADL Comments Pt presents to OT below baseline level of functioning in all areas of self cares including bathing, dressing, grooming, toileting   Clinical Impression   Criteria for Skilled Therapeutic Interventions Met yes, treatment indicated   OT Diagnosis Impaired ADLs, IADLs and mobility tasks   Influenced by the following impairments Decreased strength and functional activity tolerance, impaired balance, post-surgical precautions, pain   Assessment of Occupational Performance 5 or more Performance Deficits   Identified Performance Deficits Bathing, dressing, grooming, toileting, homemaking, transfers, work   Clinical Decision Making (Complexity) Moderate complexity   Therapy Frequency daily   Predicted Duration of Therapy Intervention (days/wks) 5 days   Anticipated Discharge Disposition Transitional Care Facility;Home with Home Therapy;Home with Assist   Risks and Benefits of Treatment have been explained. Yes   Patient, Family & other staff in agreement with plan of care Yes   Clinical Impression Comments Pt would benefit from skilled OT to maximize safety and indep in all ADLs, IADLs and mobility tasks due to current deficits impacting function    [facility] AM-PAC  \"6 Clicks\" Daily Activity Inpatient Short Form   1. Putting on and " taking off regular lower body clothing? 2 - A Lot   2. Bathing (including washing, rinsing, drying)? 2 - A Lot   3. Toileting, which includes using toilet, bedpan or urinal? 2 - A Lot   4. Putting on and taking off regular upper body clothing? 2 - A Lot   5. Taking care of personal grooming such as brushing teeth? 3 - A Little   6. Eating meals? 4 - None   Daily Activity Raw Score (Score out of 24.Lower scores equate to lower levels of function) 15   Total Evaluation Time   Total Evaluation Time (Minutes) 10

## 2017-11-15 NOTE — PLAN OF CARE
Problem: Patient Care Overview  Goal: Plan of Care/Patient Progress Review  OT: Order received, eval completed and treatment initiated. Pt is a 62 year old female who presented with abdominal pain and noted to have diverticulitis with large bowel obstruction and possible intra-abdominal abscess, now s/p exploratory laparotomy, subtotal colectomy, and end ileostomy. Pt lives alone in house, 2 stairs to enter, all needs can be met on main level, tub/shower, standard height toilet. 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    Discharge Planner OT   Patient plan for discharge: None stated  Current status: Pt educated on abdominal precautions, issued handout as reference. Pt completed sit > stand from chair to FWW with CGA for safety, increased time required due to abdominal pain/spasms. Pt completed stand pivot transfer FWW level to EOB with CGA for safety. Pt required mod A for sit > supine using log roll technique for BLE management. Pt required max A for LE dressing, educated on compensatory technique, however unable to attempt due to pain, CGA for grooming tasks.   Barriers to return to prior living situation: Current level of assist, pain, abdominal precautions, lives alone, decreased strength, stairs  Recommendations for discharge: Pending progress, may be able to return home with HH therapies and assist for ADLs/IADLs, however may require TCU if no assist available. Will continue to monitor progress and make appropriate recommendations.  Rationale for recommendations: Pt would benefit from skilled OT to maximize safety and indep in all ADLs, IADLs and mobility tasks as well as continued strengthening and functional activity tolerance training.        Entered by: Concepcion Sherwood 11/15/2017 3:42 PM

## 2017-11-15 NOTE — PROGRESS NOTES
[facility]    General Surgery  Daily Post-Op Note       Assessment and Plan:   Suma Calabrese is a 62 year old female S/P Procedure(s):  COLECTOMY SUBTOTAL  ILEOSTOMY  LAPAROTOMY EXPLORATORY, 2 Days Post-Op  Found to have distal sigmoid diverticular stricture. UOP improving. Tolerating NG removal yesterday.    Pain: Continue PCA for pain control today. Pt states doesn't tolerate oxycodone well. Will likely transition to po dilaudid tomorrow.  Bowel: No significant ostomy output yet. Maintain NPO.  IVFs: Continue while NPO. Replace electrolytes per protocol.  Activity: Frequent ambulation as able. Please encourage incentive spirometer.  Antibiotics: 23 hrs post-op completed.  DVT prophylaxis: Heparin subq.   Dispo: Pending ROBF.   Plan to D/C jarrett later today if Cr improving.        Interval History:   More comfortable today. No nausea or emesis.           Physical Exam:   Temp: 99.4  F (37.4  C) Temp src: Oral BP: 103/63 Pulse: 86 Heart Rate: 84 Resp: 16 SpO2: 93 % O2 Device: Nasal cannula Oxygen Delivery: 1 LPM    I/O last 3 completed shifts:  In: 2739 [I.V.:2739]  Out: 850 [Urine:850]      Constitutional: alert and no distress   Cardiovascular: Regular rate  Respiratory: Breathing easily on minimal supplemental O2.  Abdomen: Soft, nondistended. Appropriately tender. Bowel sweat in ostomy appliance. Incision clean intact.    Reji Fink MD  General Surgery PGY4  [phone]

## 2017-11-16 ENCOUNTER — APPOINTMENT (OUTPATIENT)
Dept: OCCUPATIONAL THERAPY | Facility: CLINIC | Age: 62
End: 2017-11-16
Attending: INTERNAL MEDICINE
Payer: COMMERCIAL

## 2017-11-16 ENCOUNTER — APPOINTMENT (OUTPATIENT)
Dept: ULTRASOUND IMAGING | Facility: CLINIC | Age: 62
End: 2017-11-16
Attending: INTERNAL MEDICINE
Payer: COMMERCIAL

## 2017-11-16 ENCOUNTER — APPOINTMENT (OUTPATIENT)
Dept: PHYSICAL THERAPY | Facility: CLINIC | Age: 62
End: 2017-11-16
Attending: INTERNAL MEDICINE
Payer: COMMERCIAL

## 2017-11-16 LAB
ANION GAP SERPL CALCULATED.3IONS-SCNC: 3 MMOL/L (ref 3–14)
BUN SERPL-MCNC: 5 MG/DL (ref 7–30)
CALCIUM SERPL-MCNC: 8 MG/DL (ref 8.5–10.1)
CHLORIDE SERPL-SCNC: 109 MMOL/L (ref 94–109)
CO2 SERPL-SCNC: 26 MMOL/L (ref 20–32)
CREAT SERPL-MCNC: 0.66 MG/DL (ref 0.52–1.04)
ERYTHROCYTE [DISTWIDTH] IN BLOOD BY AUTOMATED COUNT: 13.8 % (ref 10–15)
GFR SERPL CREATININE-BSD FRML MDRD: >90 ML/MIN/1.7M2
GLUCOSE SERPL-MCNC: 103 MG/DL (ref 70–99)
HCT VFR BLD AUTO: 31.1 % (ref 35–47)
HGB BLD-MCNC: 10.2 G/DL (ref 11.7–15.7)
MCH RBC QN AUTO: 31.4 PG (ref 26.5–33)
MCHC RBC AUTO-ENTMCNC: 32.8 G/DL (ref 31.5–36.5)
MCV RBC AUTO: 96 FL (ref 78–100)
PHOSPHATE SERPL-MCNC: 2.2 MG/DL (ref 2.5–4.5)
PHOSPHATE SERPL-MCNC: 2.5 MG/DL (ref 2.5–4.5)
PLATELET # BLD AUTO: 213 10E9/L (ref 150–450)
POTASSIUM SERPL-SCNC: 4.1 MMOL/L (ref 3.4–5.3)
RBC # BLD AUTO: 3.25 10E12/L (ref 3.8–5.2)
SODIUM SERPL-SCNC: 138 MMOL/L (ref 133–144)
WBC # BLD AUTO: 7.7 10E9/L (ref 4–11)

## 2017-11-16 PROCEDURE — 97530 THERAPEUTIC ACTIVITIES: CPT | Mod: GP | Performed by: PHYSICAL THERAPIST

## 2017-11-16 PROCEDURE — 97110 THERAPEUTIC EXERCISES: CPT | Mod: GP | Performed by: PHYSICAL THERAPIST

## 2017-11-16 PROCEDURE — 40000193 ZZH STATISTIC PT WARD VISIT: Performed by: PHYSICAL THERAPIST

## 2017-11-16 PROCEDURE — 25000132 ZZH RX MED GY IP 250 OP 250 PS 637: Performed by: INTERNAL MEDICINE

## 2017-11-16 PROCEDURE — 25000128 H RX IP 250 OP 636: Performed by: INTERNAL MEDICINE

## 2017-11-16 PROCEDURE — 25000128 H RX IP 250 OP 636: Performed by: SURGERY

## 2017-11-16 PROCEDURE — 97530 THERAPEUTIC ACTIVITIES: CPT | Mod: GO

## 2017-11-16 PROCEDURE — 25000125 ZZHC RX 250: Performed by: SURGERY

## 2017-11-16 PROCEDURE — 80048 BASIC METABOLIC PNL TOTAL CA: CPT | Performed by: INTERNAL MEDICINE

## 2017-11-16 PROCEDURE — 25000132 ZZH RX MED GY IP 250 OP 250 PS 637: Performed by: SURGERY

## 2017-11-16 PROCEDURE — 93971 EXTREMITY STUDY: CPT | Mod: RT

## 2017-11-16 PROCEDURE — 25800025 ZZH RX 258: Performed by: SURGERY

## 2017-11-16 PROCEDURE — 97535 SELF CARE MNGMENT TRAINING: CPT | Mod: GO

## 2017-11-16 PROCEDURE — 40000133 ZZH STATISTIC OT WARD VISIT

## 2017-11-16 PROCEDURE — 99212 OFFICE O/P EST SF 10 MIN: CPT

## 2017-11-16 PROCEDURE — 84100 ASSAY OF PHOSPHORUS: CPT | Performed by: INTERNAL MEDICINE

## 2017-11-16 PROCEDURE — 99233 SBSQ HOSP IP/OBS HIGH 50: CPT | Performed by: INTERNAL MEDICINE

## 2017-11-16 PROCEDURE — 85027 COMPLETE CBC AUTOMATED: CPT | Performed by: INTERNAL MEDICINE

## 2017-11-16 PROCEDURE — 36415 COLL VENOUS BLD VENIPUNCTURE: CPT | Performed by: INTERNAL MEDICINE

## 2017-11-16 PROCEDURE — 12000007 ZZH R&B INTERMEDIATE

## 2017-11-16 RX ORDER — PRAVASTATIN SODIUM 20 MG
20 TABLET ORAL AT BEDTIME
Status: DISCONTINUED | OUTPATIENT
Start: 2017-11-16 | End: 2017-11-20 | Stop reason: HOSPADM

## 2017-11-16 RX ORDER — HYDROMORPHONE HYDROCHLORIDE 1 MG/ML
.3-.5 INJECTION, SOLUTION INTRAMUSCULAR; INTRAVENOUS; SUBCUTANEOUS
Status: DISCONTINUED | OUTPATIENT
Start: 2017-11-16 | End: 2017-11-20 | Stop reason: HOSPADM

## 2017-11-16 RX ORDER — ACETAMINOPHEN 325 MG/1
650 TABLET ORAL EVERY 6 HOURS
Status: DISCONTINUED | OUTPATIENT
Start: 2017-11-16 | End: 2017-11-20 | Stop reason: HOSPADM

## 2017-11-16 RX ORDER — HYDROMORPHONE HYDROCHLORIDE 2 MG/1
2 TABLET ORAL
Status: DISCONTINUED | OUTPATIENT
Start: 2017-11-16 | End: 2017-11-20 | Stop reason: HOSPADM

## 2017-11-16 RX ORDER — OXYCODONE HYDROCHLORIDE 5 MG/1
5-10 TABLET ORAL
Status: DISCONTINUED | OUTPATIENT
Start: 2017-11-16 | End: 2017-11-16

## 2017-11-16 RX ADMIN — HYDROMORPHONE HYDROCHLORIDE 2 MG: 2 TABLET ORAL at 20:32

## 2017-11-16 RX ADMIN — LEVOTHYROXINE SODIUM 75 MCG: 75 TABLET ORAL at 09:41

## 2017-11-16 RX ADMIN — HEPARIN SODIUM 5000 UNITS: 5000 INJECTION, SOLUTION INTRAVENOUS; SUBCUTANEOUS at 00:16

## 2017-11-16 RX ADMIN — POTASSIUM PHOSPHATE, MONOBASIC AND POTASSIUM PHOSPHATE, DIBASIC 15 MMOL: 224; 236 INJECTION, SOLUTION INTRAVENOUS at 04:43

## 2017-11-16 RX ADMIN — PROCHLORPERAZINE MALEATE 10 MG: 5 TABLET, FILM COATED ORAL at 23:56

## 2017-11-16 RX ADMIN — PRAVASTATIN SODIUM 20 MG: 20 TABLET ORAL at 21:17

## 2017-11-16 RX ADMIN — HEPARIN SODIUM 5000 UNITS: 5000 INJECTION, SOLUTION INTRAVENOUS; SUBCUTANEOUS at 12:03

## 2017-11-16 RX ADMIN — POTASSIUM CHLORIDE, DEXTROSE MONOHYDRATE AND SODIUM CHLORIDE: 150; 5; 900 INJECTION, SOLUTION INTRAVENOUS at 03:17

## 2017-11-16 RX ADMIN — ACETAMINOPHEN 650 MG: 325 TABLET, FILM COATED ORAL at 09:41

## 2017-11-16 ASSESSMENT — PAIN DESCRIPTION - DESCRIPTORS: DESCRIPTORS: TENDER

## 2017-11-16 NOTE — PROGRESS NOTES
[facility]    General Surgery  Daily Post-Op Note       Assessment and Plan:   Suma Calabrese is a 62 year old female S/P Procedure(s):  COLECTOMY SUBTOTAL  ILEOSTOMY  LAPAROTOMY EXPLORATORY, 3 Days Post-Op  For obstructing diverticular lesion in distal sigmoid. Progressing appropriately from surgical standpoint.  Right arm swelling > U/S for DVT.     Pain: Managed on PCA. Will transition to po artis, tylenol. PRN diladid for breakthrough.  Bowel: Ostomy output noted.  Diet: Advance to regular today.  Activity: UP ad cathryn. Appreciate therapies ongoing assistance.  D/C IVFs.  Antibiotics: None indicated. Afebrile.  DVT prophylaxis: Heparin subq.  Dispo: Once pain controlled and tolerating adequate po.        Interval History:   Comfortable overnight.            Physical Exam:   Temp: 98.9  F (37.2  C) Temp src: Oral BP: 120/71 Pulse: 93 Heart Rate: 87 Resp: 16 SpO2: 94 % O2 Device: Nasal cannula Oxygen Delivery: 1 LPM    I/O last 3 completed shifts:  In: 2485 [P.O.:310; I.V.:2175]  Out: 2525 [Urine:2400; Stool:125]      Constitutional: healthy, alert and no distress   Cardiovascular: Regular rate.  Respiratory: Minimal supplemental O2. Encourage IS  Abdomen: Abdomen soft, less tender. Incision clean intact. Ostomy pink with semi-solid stool.    Reji Fink MD  General Surgery PGY4  [phone]

## 2017-11-16 NOTE — PLAN OF CARE
Problem: Patient Care Overview  Goal: Plan of Care/Patient Progress Review  Discharge Planner PT   Patient plan for discharge: none stated  Current status: requires min A for supine > sit due to difficulty managing edematous LEs. Close SBA for sit <> stand transfers with cues to recall abdominal precautions. Fatigues with 10 reps of standing LE exercises demonstrating decreased functional activity tolerance  Barriers to return to prior living situation: lives alone, below baseline for functional mobility, requiring use of AD  Recommendations for discharge: TCU  Rationale for recommendations: for continued daily therapy to progress functional strength, balance and endurance for increased safety and indep with household mobility       Entered by: Joyce Wyatt 11/16/2017 3:43 PM

## 2017-11-16 NOTE — PROGRESS NOTES
" 11/15/17 1340   Quick Adds   Type of Visit Initial PT Evaluation   Living Environment   Lives With alone   Living Arrangements house   Home Accessibility stairs to enter home;stairs (1 railing present)   Number of Stairs to Enter Home 2   Number of Stairs Within Home (\"I have them, don't have to use them.\")   Transportation Available car;family or friend will provide   Self-Care   Dominant Hand right   Regular Exercise yes   Activity/Exercise/Self-Care Comment Walks to work \"3 blocks\" to and from work every day. Medial Support assistance. Walks when she can just to get up and move.Works full time.   Functional Level Prior   Ambulation 0-->independent   Transferring 0-->independent   Toileting 0-->independent   Bathing 0-->independent   Dressing 0-->independent   Eating 0-->independent   Communication 0-->understands/communicates without difficulty   Swallowing 0-->swallows foods/liquids without difficulty   Cognition 0 - no cognition issues reported   Fall history within last six months no   Which of the above functional risks had a recent onset or change? ambulation;transferring   General Information   Onset of Illness/Injury or Date of Surgery - Date 11/07/17   Referring Physician Esther Deluna MD   Patient/Family Goals Statement TO go home   Pertinent History of Current Problem (include personal factors and/or comorbidities that impact the POC) Pt is a 62 year old female who presented with abdominal pain and noted to have diverticulitis with large bowel obstruction and possible intra-abdominal abscess, now s/p exploratory laparotomy, subtotal colectomy, and end ileostomy. Pt lives alone in house, 2 stairs to enter, all needs can be met on main level, tub/shower, standard height toilet. 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   Precautions/Limitations fall precautions;abdominal precautions   Cognitive Status Examination " "  Orientation orientation to person, place and time   Level of Consciousness alert   Follows Commands and Answers Questions 100% of the time;able to follow multistep instructions   Personal Safety and Judgment intact   Cognitive Comment flat affect   Pain Assessment   Patient Currently in Pain Yes, see Vital Sign flowsheet  (Not rated, \"spasms in the stomach.\")   Integumentary/Edema   Integumentary/Edema Comments B hands puffy, arms andlegs with increased edema. ? edema wrap placement   Posture    Posture Forward head position;Protracted shoulders  (Trunk flexion, guards against the sore stomach)   Range of Motion (ROM)   ROM Comment Pain limites full hip flexoin B, limits hip extension in gait. Litied trunk rotation secondary to pain   Strength   Strength Comments Demonstrates antigravity strength, but fatigues quickly, moves very slow    Bed Mobility   Bed Mobility Comments Sup>sit CGA at trunk, increased time   Transfer Skills   Transfer Comments Sit<>stand CGA, increased time   Gait   Gait Comments In room ambulation wit use of WW and CGA at gait belt. Moves slow, use of walker helps with balance and activity tolerance.   Balance   Balance Comments Sitting balance unable to reach to the floor secondary to abdoimal discomfort, standing improved with B UE support from walker.   General Therapy Interventions   Planned Therapy Interventions balance training;bed mobility training;gait training;neuromuscular re-education;ROM;strengthening;stretching;transfer training;progressive activity/exercise;home program guidelines   Clinical Impression   Criteria for Skilled Therapeutic Intervention yes, treatment indicated   PT Diagnosis Impaired Gait   Influenced by the following impairments Decreased strength, balance, activity tolerance and ROM   Functional limitations due to impairments Decreased funtional independence   Clinical Presentation Stable/Uncomplicated   Clinical Presentation Rationale see MR   Clinical Decision " "Making (Complexity) Low complexity   Therapy Frequency` daily   Predicted Duration of Therapy Intervention (days/wks) 5 days   Anticipated Discharge Disposition Transitional Care Facility   Risk & Benefits of therapy have been explained Yes   Patient, Family & other staff in agreement with plan of care Yes   Clinical Impression Comments Rec TCU at this time, however, if pt able to improve pace and activity tolerance may be able to dicharge home wht home care.   [facility] AM-PAC TM \"6 Clicks\"   2016, Trustees of [facility], under license to [employer].  All rights reserved.   6 Clicks Short Forms Basic Mobility Inpatient Short Form   [facility] AM-PAC  \"6 Clicks\" V.2 Basic Mobility Inpatient Short Form   1. Turning from your back to your side while in a flat bed without using bedrails? 3 - A Little   2. Moving from lying on your back to sitting on the side of a flat bed without using bedrails? 3 - A Little   3. Moving to and from a bed to a chair (including a wheelchair)? 3 - A Little   4. Standing up from a chair using your arms (e.g., wheelchair, or bedside chair)? 3 - A Little   5. To walk in hospital room? 3 - A Little   6. Climbing 3-5 steps with a railing? 3 - A Little   Basic Mobility Raw Score (Score out of 24.Lower scores equate to lower levels of function) 18   Total Evaluation Time   Total Evaluation Time (Minutes) 8     "

## 2017-11-16 NOTE — PLAN OF CARE
Problem: Bowel Obstruction (Adult)  Goal: Signs and Symptoms of Listed Potential Problems Will be Absent, Minimized or Managed (Bowel Obstruction)  Signs and symptoms of listed potential problems will be absent, minimized or managed by discharge/transition of care (reference Bowel Obstruction (Adult) CPG).   VSS.  Oxygen slightly low, oxygen on at 1 liter at 0445.  IVF infusing, PCA keeping pt comfortable.  Phos still low at 2.2--replacement hanging.  Right arm swollen up to axilla--denies pain.  Urine output somewhat low per jarrett.  Incisions CDI, ostomy producing dark liquid.

## 2017-11-16 NOTE — PLAN OF CARE
Problem: Patient Care Overview  Goal: Plan of Care/Patient Progress Review  Outcome: Improving  A/O. AVSS. Minimal pain, declined pain meds in afternoon- PCA dc'd. Up w/ SBA w/ belt/walker. ML incision DEE, Stoma red/protruding, moderate watery brown output- 325 out on days. NG d/c'd Tuesday- no nausea. Walked 3x and worked with OT. Lake d/c'd- voiding spontaneously. BS hypo/ no flatus.

## 2017-11-16 NOTE — PLAN OF CARE
Problem: Patient Care Overview  Goal: Plan of Care/Patient Progress Review  Outcome: No Change  A/O x4. VSS on RA. Ambulating halls w/ 1, gait belt. Pain managed with PCA. IIleostomy WDL, pink, protruding. BS active. Midline incision, staples, DEE, no drainage. Breast incision, WDL.  Edema in both hands. Phosphorous 1.8, replaced, lab redraw at 0200 and morning. Tolerating full liquid diet. Lake in place, patent.

## 2017-11-16 NOTE — PLAN OF CARE
Problem: Patient Care Overview  Goal: Plan of Care/Patient Progress Review  Discharge Planner OT   Patient plan for discharge: None stated today.  Current status: Slow moving but SB A and walker for amb in room. Needs A with LE ADL, but I with grooming at sink. C/o muscle spasms.   Barriers to return to prior living situation: Lives alone, not sure if she can have help on discharge.   Recommendations for discharge: TCU.   Rationale for recommendations: Not at baseline.       Entered by: Chrissy Saucedo 11/16/2017 9:21 AM

## 2017-11-16 NOTE — PROGRESS NOTES
[facility]  Hospitalist Progress Note  Esther Deluna MD    Assessment & Plan   Ms. Suma Calabrese is a 62 year old lady with PMHx significant for breast cancer, HTN, diverticulitis, DLP, vitamin D deficiency, and hypothyroidism, who presented with abdominal pain and noted to have diverticulitis with large bowel obstruction and possible intra-abdominal abscess. Patient was subsequently admitted for further evaluation and management.      Acute diverticulitis with sigmoid bowel obstruction and near perforated cecum, s/p exploratory laparotomy, subtotal colectomy, and end ileostomy on 11/13, POD# 3:  Started on IV Cipro and Flagyl on admission, discontinued 11/04.  Patient failed conservative management and underwent exploratory laparotomy, subtotal colectomy, and end ileostomy on 11/13. Path showed diverticulitis with associated perforation and pericolonic abscess, tubular adenoma (x2) with no evidence of high-grade dysplasia or invasive malignancy and negative fourrteen pericolonic lymph nodes for malignancy.    -Diet advanced to full liquid 11/15  -Cont supportive care  -Cont post-op surgical management per surgery service    Volume overload:  Patient is positive about 17.6 liters since admission and diffusely edematous. Alb 1.5.  -Patient received IV furosemide 20 mg x1 11/15  -D/Perfecto IVF 11/16  -Cont to monitor volume status    Right upper extremity swelling:  It is asymmetrical with cordlike hardness in upper arm.  -Obtain Doppler to r/o DVT. IF positive, we will start IV heparin (discussed with Dr. Cobian)    Physical deconditioning:  -Cont PT/OT    Hypophosphatemia:  -Replacement per protocol    Hypertension:  BP is currently controlled.  -Cont to hold PTA Maxzide  -PRN IV labetalol available for SBP>170  -Cont to monitor hypertension    Hypothyroidism:  -Cont on PTA levothyroxine 75 mcg po daily    Hyperlipidemia:  -Cont to hold PTA pravastatin     DVT Prophylaxis: Pneumatic Compression  Devices/heparin sc  Code Status: Full Code    Disposition: Expected discharge in several days.    D/W RN and Dr. Fink.    Time Spent on this Encounter   I spent 35 minutes on the unit/floor managing the care of Suma Calabrese. Over 50% of my time was spent counseling the patient and/or coordinating care regarding services listed in this note.      Interval History   There is asymmetrical swelling of right arm. Surgical pain is controlled. Ileostomy is working. No other issues overnight.    Data reviewed today: I reviewed all new labs and imaging over the last 24 hours.    Physical Exam   Temp: 98.9  F (37.2  C) Temp src: Oral BP: 120/71 Pulse: 93 Heart Rate: 87 Resp: 16 SpO2: 94 % O2 Device: Nasal cannula Oxygen Delivery: 1 LPM  Vitals:    11/07/17 1308   Weight: 55.8 kg (123 lb)     Vital Signs with Ranges  Temp:  [98.7  F (37.1  C)-99.5  F (37.5  C)] 98.9  F (37.2  C)  Pulse:  [80-93] 93  Heart Rate:  [77-89] 87  Resp:  [16] 16  BP: ()/(55-71) 120/71  SpO2:  [87 %-97 %] 94 %  I/O's Last 24 hours  I/O last 3 completed shifts:  In: 2485 [P.O.:310; I.V.:2175]  Out: 2525 [Urine:2400; Stool:125]    Constitutional: Comfortable, no acute distress  HEENT: No conjunctival pallor.  Neurologic: Awake, alert, fully oriented  Neck: Supple, no elevated JVP  Respiratory: Clear to auscultation  Cardiovascular: Normal S1 and S2. Regular rhythm and rate  GI: Abdomen soft, not distended, ileostomy stump noted, BS active  Extremities: No calf tenderness, diffuse edema, RUE>LUE  Skin/integument: No acute rash, no cyanosis    Medications   All medications were reviewed.    dextrose 5% and 0.9% NaCl with potassium chloride 20 mEq 50 mL/hr at 11/16/17 0317       levothyroxine  75 mcg Oral QAM AC     heparin  5,000 Units Subcutaneous Q12H     HYDROmorphone   Intravenous PCA        Data     Recent Labs  Lab 11/15/17  0748 11/14/17  0655 11/13/17  2130 11/13/17  1448 11/13/17  0835  11/09/17  0930   WBC  --  10.5  --  11.6*  --    --  4.2   HGB  --  12.4 12.9 12.7  --   --  12.2   MCV  --  96  --  95  --   --  95   PLT  --  175  --  182  --   --  281   NA  --  144  --  142 141  --  137   POTASSIUM  --  4.1  --  3.6 3.3*  --  3.7   CHLORIDE  --  114*  --  111* 111*  --  106   CO2  --  24  --  24 22  --  21   BUN  --  6*  --  4* 5*  --  12   CR 0.68 0.90  --  0.57 0.61  < > 0.77   ANIONGAP  --  6  --  7 8  --  10   CODY  --  7.3*  --  7.8* 7.9*  --  8.6   GLC  --  143*  --  112* 128*  --  73   ALBUMIN 1.5*  --   --   --   --   --  2.5*   PROTTOTAL  --   --   --   --   --   --  5.9*   BILITOTAL  --   --   --   --   --   --  0.3   ALKPHOS  --   --   --   --   --   --  70   ALT  --   --   --   --   --   --  12   AST  --   --   --   --   --   --  14   < > = values in this interval not displayed.    No results found for this or any previous visit (from the past 24 hour(s)).

## 2017-11-16 NOTE — PLAN OF CARE
"Problem: Patient Care Overview  Goal: Plan of Care/Patient Progress Review  Discharge Planner PT    Evaluation completed, treatment initiated. 62 year old female who presented with abdominal pain and noted to have diverticulitis with large bowel obstruction and possible intra-abdominal abscess, now s/p exploratory laparotomy, subtotal colectomy, and end ileostomy. PMH includes Breast CA.  Patient plan for discharge: Home, not opposed to home care but states \"we will see.\"  Current status: CGA with all mobility, requires increased time to complete all tasks secondary to pain and generalize weakness. Ambulated ~ 12 minutes with use of WW. Negotiated 3 stairs with cGA and slow progression. Limited by weakness, fatigue, impaired balance and decreased activity tolerance.  Barriers to return to prior living situation: Lives alone, walks to work at baseline (6 blocks daily), fall risk, above impairments.  Recommendations for discharge: TCU.   Rationale for recommendations: Pt will benefit from continued skilled PT services to increase functional activity tolerance and strength for optimal safety and independence upon return home.       Entered by: Guillermina Thoams 11/15/2017 6:13 PM           "

## 2017-11-16 NOTE — PROGRESS NOTES
"[facility] Nurse Inpatient Ostomy Assessment     Assessment of ostomy and needs for:  New Ileostomy      Data:   History:    Ms. Suam Calabrese is a 62 year old lady with PMHx significant for breast cancer, HTN, diverticulitis, DLP, vitamin D deficiency, and hypothyroidism, who presented with abdominal pain and noted to have diverticulitis with large bowel obstruction and possible intra-abdominal abscess. Patient was subsequently admitted for further evaluation and management.      Acute diverticulitis with sigmoid bowel obstruction and near perforated cecum, s/p exploratory laparotomy, subtotal colectomy, and end ileostomy on     Type of ostomy: Ileostomy  Stoma assessment:   ? Approx 1 1/2\" x 1 1/4\" oval, red, moist, protrudes, lumen @ apex  ? A bridge in place?: No  Mucocutaneous Junction (MCJ): intact with sutures  Peristomal skin:  Intact, no erythema  Abdominal assessment: remains distended and firm.  Stoma sits in small bowl.  Incision intact not assessed today  ? N/G still in place? Yes   Recent Labs   Lab Test  11/16/17   1040  11/15/17   0748   ALBUMIN   --   1.5*   HGB  10.2*   --    WBC  7.7   --      I/O last 3 completed shifts:  In: 2485 [P.O.:310; I.V.:2175]  Out: 2525 [Urine:2400; Stool:125]    Current pouching system:  Lian Premier flat cut to fit post-op 2 piece appliance     Pain:  Spasms,      Diet:       Active Diet Order      NPO for Medical/Clinical Reasons Except for: Meds, Ice Chips          Intervention:     Patient's chart evaluated.      Assessments done today:  Stoma and pouching system    Education: complete pouch change done, pt observed only with few questions    Prepared for discharge by: No discharge preps    Pt registered for ostomy supply samples? TBD         Assessment:     Stoma assessment: appears viable,  Return of GI function noted    Learning needs/ comprehension: information handouts given to pt., she appears to be engaged and understands ostomy " cares    Prosthetic refitting:  Flat post-op cut to fit appliance with arthur ring applied    Abd incision:  Intact, well-approximated with staples, no drainage         Plan:     Plan:   ? Current pouching system: Lian NI (57mm) flat with ring and high output pouch    Education:  ? Education completed:  Started today  ? Educational needs: Ileostomy management, appliance change and emptying  ? Continue to prepare for discharge No discharge preps initiated  o Supply company: TBD  o Do WOC Nurses recommend home care? TBD     Face to face time: 30 minutes

## 2017-11-16 NOTE — PROGRESS NOTES
SPIRITUAL HEALTH SERVICES  Spiritual Assessment Progress Note  [facility] 31   visited pt due to LOS.  Pt talked about her new breast cancer surgery (10/16/17)  and now this colon surgery.  She is dealing with a lot.  Pt stated that she has good support and is coping well.   Pt stated that she believes in Peter and was ok for a prayer.      Pt appeared calm and named that she was doing ok.     listened to her story and provided emotional support and prayer.      No plans to follow.                                                                                                                                                  Leatha Boles M.Div., [facility]  Staff    Pager [phone]

## 2017-11-17 ENCOUNTER — APPOINTMENT (OUTPATIENT)
Dept: PHYSICAL THERAPY | Facility: CLINIC | Age: 62
End: 2017-11-17
Payer: COMMERCIAL

## 2017-11-17 ENCOUNTER — APPOINTMENT (OUTPATIENT)
Dept: OCCUPATIONAL THERAPY | Facility: CLINIC | Age: 62
End: 2017-11-17
Payer: COMMERCIAL

## 2017-11-17 LAB
ANION GAP SERPL CALCULATED.3IONS-SCNC: 2 MMOL/L (ref 3–14)
BLD PROD TYP BPU: NORMAL
BLD UNIT ID BPU: 0
BLOOD PRODUCT CODE: NORMAL
BPU ID: NORMAL
BUN SERPL-MCNC: 10 MG/DL (ref 7–30)
CALCIUM SERPL-MCNC: 8.7 MG/DL (ref 8.5–10.1)
CHLORIDE SERPL-SCNC: 106 MMOL/L (ref 94–109)
CO2 SERPL-SCNC: 31 MMOL/L (ref 20–32)
CREAT SERPL-MCNC: 0.7 MG/DL (ref 0.52–1.04)
ERYTHROCYTE [DISTWIDTH] IN BLOOD BY AUTOMATED COUNT: 13.7 % (ref 10–15)
GFR SERPL CREATININE-BSD FRML MDRD: 84 ML/MIN/1.7M2
GLUCOSE SERPL-MCNC: 112 MG/DL (ref 70–99)
HCT VFR BLD AUTO: 32.1 % (ref 35–47)
HGB BLD-MCNC: 11 G/DL (ref 11.7–15.7)
MCH RBC QN AUTO: 32.3 PG (ref 26.5–33)
MCHC RBC AUTO-ENTMCNC: 34.3 G/DL (ref 31.5–36.5)
MCV RBC AUTO: 94 FL (ref 78–100)
PLATELET # BLD AUTO: 207 10E9/L (ref 150–450)
PLATELET # BLD AUTO: 228 10E9/L (ref 150–450)
POTASSIUM SERPL-SCNC: 4.8 MMOL/L (ref 3.4–5.3)
RBC # BLD AUTO: 3.41 10E12/L (ref 3.8–5.2)
SODIUM SERPL-SCNC: 139 MMOL/L (ref 133–144)
TRANSFUSION STATUS PATIENT QL: NORMAL
TRANSFUSION STATUS PATIENT QL: NORMAL
WBC # BLD AUTO: 7.5 10E9/L (ref 4–11)

## 2017-11-17 PROCEDURE — 25000128 H RX IP 250 OP 636: Performed by: SURGERY

## 2017-11-17 PROCEDURE — 97116 GAIT TRAINING THERAPY: CPT | Mod: GP | Performed by: PHYSICAL THERAPIST

## 2017-11-17 PROCEDURE — 25000132 ZZH RX MED GY IP 250 OP 250 PS 637: Performed by: SURGERY

## 2017-11-17 PROCEDURE — 36415 COLL VENOUS BLD VENIPUNCTURE: CPT | Performed by: INTERNAL MEDICINE

## 2017-11-17 PROCEDURE — 85027 COMPLETE CBC AUTOMATED: CPT | Performed by: INTERNAL MEDICINE

## 2017-11-17 PROCEDURE — 80048 BASIC METABOLIC PNL TOTAL CA: CPT | Performed by: INTERNAL MEDICINE

## 2017-11-17 PROCEDURE — 99233 SBSQ HOSP IP/OBS HIGH 50: CPT | Performed by: INTERNAL MEDICINE

## 2017-11-17 PROCEDURE — 85049 AUTOMATED PLATELET COUNT: CPT | Performed by: INTERNAL MEDICINE

## 2017-11-17 PROCEDURE — 99207 ZZC CDG-MDM COMPONENT: MEETS MODERATE - UP CODED: CPT | Performed by: INTERNAL MEDICINE

## 2017-11-17 PROCEDURE — 97535 SELF CARE MNGMENT TRAINING: CPT | Mod: GO

## 2017-11-17 PROCEDURE — 25000132 ZZH RX MED GY IP 250 OP 250 PS 637: Performed by: INTERNAL MEDICINE

## 2017-11-17 PROCEDURE — 25000128 H RX IP 250 OP 636: Performed by: INTERNAL MEDICINE

## 2017-11-17 PROCEDURE — 40000133 ZZH STATISTIC OT WARD VISIT

## 2017-11-17 PROCEDURE — 12000007 ZZH R&B INTERMEDIATE

## 2017-11-17 PROCEDURE — 97530 THERAPEUTIC ACTIVITIES: CPT | Mod: GP | Performed by: PHYSICAL THERAPIST

## 2017-11-17 PROCEDURE — 40000193 ZZH STATISTIC PT WARD VISIT: Performed by: PHYSICAL THERAPIST

## 2017-11-17 RX ORDER — DIAZEPAM 2 MG
2 TABLET ORAL EVERY 6 HOURS PRN
Qty: 12 TABLET | Refills: 0 | Status: SHIPPED | OUTPATIENT
Start: 2017-11-17 | End: 2017-12-01

## 2017-11-17 RX ORDER — ONDANSETRON 4 MG/1
4 TABLET, ORALLY DISINTEGRATING ORAL EVERY 6 HOURS PRN
Status: DISCONTINUED | OUTPATIENT
Start: 2017-11-17 | End: 2017-11-20 | Stop reason: HOSPADM

## 2017-11-17 RX ORDER — HYDROMORPHONE HYDROCHLORIDE 2 MG/1
2 TABLET ORAL
Qty: 18 TABLET | Refills: 0 | Status: SHIPPED | OUTPATIENT
Start: 2017-11-17 | End: 2017-12-01

## 2017-11-17 RX ORDER — LIDOCAINE 40 MG/G
CREAM TOPICAL
Status: DISCONTINUED | OUTPATIENT
Start: 2017-11-17 | End: 2017-11-20 | Stop reason: HOSPADM

## 2017-11-17 RX ADMIN — LEVOTHYROXINE SODIUM 75 MCG: 75 TABLET ORAL at 09:00

## 2017-11-17 RX ADMIN — ONDANSETRON 4 MG: 2 SOLUTION INTRAMUSCULAR; INTRAVENOUS at 01:36

## 2017-11-17 RX ADMIN — PSYLLIUM HUSK 1 PACKET: 3.4 POWDER ORAL at 21:33

## 2017-11-17 RX ADMIN — PRAVASTATIN SODIUM 20 MG: 20 TABLET ORAL at 21:33

## 2017-11-17 RX ADMIN — PSYLLIUM HUSK 1 PACKET: 3.4 POWDER ORAL at 08:59

## 2017-11-17 RX ADMIN — HEPARIN SODIUM 5000 UNITS: 5000 INJECTION, SOLUTION INTRAVENOUS; SUBCUTANEOUS at 13:36

## 2017-11-17 RX ADMIN — HEPARIN SODIUM 5000 UNITS: 5000 INJECTION, SOLUTION INTRAVENOUS; SUBCUTANEOUS at 01:20

## 2017-11-17 NOTE — PROGRESS NOTES
CLINICAL NUTRITION SERVICES - REASSESSMENT NOTE       Recommendations Ordered by Registered Dietitian (RD):   1.  Medical Food Supplement: Modifications - 10am Ensure chocolate and 2pm Banana Prostat smoothie   2.  Daily weights        Provider Order - Nutrition Education; s/p end ileostomy and poor nutrition noted on labs     EVALUATION OF PROGRESS TOWARD GOALS   Diet:  Regular diet, since 11/16. Previously intermittent Clear Liquid/ NPS     +Ensure between meals     Intake:    -Pt states that she does not have an appetite. Pt reports that she has not had much solids at this point. Pt was able to drink some fluids/ juices yesterday and one Ensure. Has not ordered breakfast and has not drank Ensure. Pt prefers chocolate Ensure over vanilla  -Per RN flowsheet pt consuming 50%-75% of meals ordered   -Per [employer], pt ordering 1-2 meals daily  -Ostomy output 1750mL (11/16)  -Limited wt data this admission, unable to assess wt trends      Previous Goals: (11/14)   Pt will begin po diet 1-2 days.  Evaluation: Met    Previous Nutrition Diagnosis:   Inadequate protein-energy intake related to altered GI function as evidenced by currently meeting 0% of assessed needs x 8 days  Evaluation: Improving        CURRENT NUTRITION DIAGNOSIS  Inadequate oral intake related to decreased appetite, altered GI  and overall medical status as evidenced by pt reports poor appetite and little PO intake     INTERVENTIONS  Recommendations / Nutrition Prescription  Pt to continue regular diet as ordered  Meals TID  Oral nutrition supplements BID    Implementation  1.  Medical Food Supplement: Modifications - 10am Ensure chocolate and 2pm Banana Prostat smoothie   2.  Daily weights   3.  Provided education on increasing protein, foods that contain protein and oral nutrition supplements   4.  Provided coupons for Boost for home      Goals  Pt to consume at least 75% of meals TID and oral nutrition supplements BID      MONITORING AND  EVALUATION:  Progress towards goals will be monitored and evaluated per protocol and Practice Guidelines    Erma Mckinley RD, LD

## 2017-11-17 NOTE — PLAN OF CARE
Problem: Patient Care Overview  Goal: Plan of Care/Patient Progress Review  Outcome: Improving  VSS on RA. Abd incision with staples are DEE, stoma is pink and protruding. Ostomy bag with large watery greenish output. Pt denies pain , complains of nausea, PRN IV Zofran was given with relief. Up with SBA and walker to BR, voiding adequately. Pt is for nutrition consult today.

## 2017-11-17 NOTE — PROGRESS NOTES
[id number] Patient A/Ox4. VSS on RA. Patient denied SOB, nausea until shift change compazine given due to loss of IV access, no numbness/tingling. PO dilaudid given for pain with relief. + 2 edema in LUE. +1 edema LUE. Incision intact with staples, pink around staples. Stoma pink, moist and protruding. Large amount of dark brown liquid stool out on shift. (+) BS (-) flatus. Up SBA. Now on regular diet as of prior shift. Will continue to monitor

## 2017-11-17 NOTE — PLAN OF CARE
Problem: Patient Care Overview  Goal: Plan of Care/Patient Progress Review  Discharge Planner PT   Patient plan for discharge: Home with HHPT  Current status: Pt with great progress today, ambulating > 300 ft with FWW with supervision only, no overt LOB. When FWW removed requires CGA for gait due to deviations in gait path and lateral lurch. Pt completed up/down 3 stairs x 2 reps with supervision-SBA using 1 railing for balance. Discussed discharge recs and pt feel comfortable with rec for discharge to home using FWW and HHPT.   Barriers to return to prior living situation: None  Recommendations for discharge: use of FWW for gait, HHPT   Rationale for recommendations: continued PT for strength, balance and gait training to progress towards indep ambulation and safe household mobility         Entered by: Joyce Wyatt 11/17/2017 3:21 PM

## 2017-11-17 NOTE — PROGRESS NOTES
[facility]  Hospitalist Progress Note  Esther Deluna MD    Assessment & Plan   Ms. Suma Calabrese is a 62 year old lady with PMHx significant for breast cancer, HTN, diverticulitis, DLP, vitamin D deficiency, and hypothyroidism, who presented with abdominal pain and noted to have diverticulitis with large bowel obstruction and possible intra-abdominal abscess. Patient was subsequently admitted for further evaluation and management.      Acute diverticulitis with sigmoid bowel obstruction and near perforated cecum, s/p exploratory laparotomy, subtotal colectomy, and end ileostomy on 11/13, POD# 4:  Started on IV Cipro and Flagyl on admission (discontinued 11/04).Patient failed conservative management and underwent exploratory laparotomy, subtotal colectomy, and end ileostomy on 11/13. Path showed diverticulitis with associated perforation and pericolonic abscess, tubular adenoma (x2) with no evidence of high-grade dysplasia or invasive malignancy and negative fourrteen pericolonic lymph nodes for malignancy.    -Diet advanced to regular 11/16  -Cont supportive care  -Cont post-op surgical management per surgery service    Acute blood loss anemia:  Due to extensive surgery as outlined above. No indication for transfusion.    Recent Labs  Lab 11/17/17  0747 11/16/17  1040 11/14/17  0655 11/13/17  2130 11/13/17  1448   HGB 11.0* 10.2* 12.4 12.9 12.7     -Cont to monitor Hgb intermittently    Volume overload:  Patient positive about 17.6 liters post-op and diffusely edematous. Alb 1.5.  -Patient received IV furosemide 20 mg x1 11/15  -D/Perfecto IVF 11/16  -Cont to monitor volume status    Right cephalic vein superficial thrombophlebitis:  Noted to have RUE swelling with cordlike hardness in upper arm. Doppler 11/16 showed superficial thrombophlebitis in right cephalic vein but no DVT.  -D/C IV cathter if feasible  -Warm compress  -Cont to monitor    Physical deconditioning:  -Cont  PT/OT    Hypophosphatemia:  -Replaced per protocol    Hypertension:  BP is currently controlled.  -Cont to hold PTA Maxzide till discharge  -PRN IV labetalol available for SBP>170  -Cont to monitor hypertension    Hypothyroidism:  -Cont on PTA levothyroxine 75 mcg po daily    Hyperlipidemia:  -Cont on PTA pravastatin     DVT Prophylaxis: Pneumatic Compression Devices/heparin sc  Code Status: Full Code    Disposition: Expected discharge on 11/18, to TCU.    D/W RN, care coordinator, and Dr. Fink.    Interval History   Patient felt nauseous last night. She reports no cp, sob, or fever. Surgical pain is controlled.    Data reviewed today: I reviewed all new labs and imaging over the last 24 hours.    Physical Exam   Temp: 98.6  F (37  C) Temp src: Oral BP: 125/83 Pulse: 79 Heart Rate: 76 Resp: 16 SpO2: 93 % O2 Device: None (Room air) Oxygen Delivery: 1 LPM  Vitals:    11/07/17 1308   Weight: 55.8 kg (123 lb)     Vital Signs with Ranges  Temp:  [98.2  F (36.8  C)-98.7  F (37.1  C)] 98.6  F (37  C)  Pulse:  [72-79] 79  Heart Rate:  [76] 76  Resp:  [16] 16  BP: (100-125)/(60-83) 125/83  SpO2:  [92 %-94 %] 93 %  I/O's Last 24 hours  I/O last 3 completed shifts:  In: 200 [P.O.:200]  Out: 3025 [Urine:500; Stool:2525]    Constitutional: Comfortable, no acute distress  HEENT: Mild conjunctival pallor.  Neurologic: Awake, alert, fully oriented  Neck: Supple, no elevated JVP  Respiratory: Clear to auscultation  Cardiovascular: Normal S1 and S2. Regular rhythm and rate  GI: Abdomen soft, not distended, ileostomy stump noted, BS active  Extremities: No calf tenderness, mild diffuse edema, RUE>LUE  Skin/integument: No acute rash, no cyanosis    Medications   All medications were reviewed.       pravastatin (PRAVACHOL) tablet 20 mg  20 mg Oral At Bedtime     acetaminophen  650 mg Oral Q6H     levothyroxine  75 mcg Oral QAM AC     heparin  5,000 Units Subcutaneous Q12H        Data     Recent Labs  Lab 11/17/17  0010 11/16/17  1040  11/15/17  0748 11/14/17  0655 11/13/17  2130 11/13/17  1448   WBC  --  7.7  --  10.5  --  11.6*   HGB  --  10.2*  --  12.4 12.9 12.7   MCV  --  96  --  96  --  95    213  --  175  --  182   NA  --  138  --  144  --  142   POTASSIUM  --  4.1  --  4.1  --  3.6   CHLORIDE  --  109  --  114*  --  111*   CO2  --  26  --  24  --  24   BUN  --  5*  --  6*  --  4*   CR  --  0.66 0.68 0.90  --  0.57   ANIONGAP  --  3  --  6  --  7   CODY  --  8.0*  --  7.3*  --  7.8*   GLC  --  103*  --  143*  --  112*   ALBUMIN  --   --  1.5*  --   --   --        Recent Results (from the past 24 hour(s))   US Upper Extremity Venous Duplex Right    Narrative    ULTRASOUND RIGHT UPPER EXTREMITY VENOUS DUPLEX November 16, 2017 2:37  PM     HISTORY: Rule out deep vein thrombosis, right arm swelling.     COMPARISON: None.    TECHNIQUE: Examination of the veins of the upper extremity was  performed with graded compression and 2-D ultrasound and color doppler  spectral waveform analysis.     FINDINGS: There is no evidence of thrombosis of the internal jugular,  subclavian axillary, brachial, or basilic veins. Areas of occlusive  and nonocclusive thrombus are seen throughout the cephalic vein from  the antecubital fossa to the midhumerus and from the level of the IV  insert to the mid forearm.      Impression    IMPRESSION:   1. No evidence of deep vein thrombosis.  2. Superficial thrombophlebitis in the right cephalic vein.    JASS LEZAMA DO

## 2017-11-17 NOTE — PROGRESS NOTES
Care Transition Initial Assessment - MARC  Reason For Consult: discharge planning  Met with: Patient and Family    Active Problems:    Diverticulitis large intestine         DATA  Lives With: alone  Living Arrangements: house  Description of Support System: Supportive, Involved  Who is your support system?: Children  Support Assessment: Adequate family and caregiver support, Adequate social supports.   Quality Of Family Relationships: supportive, involved  Transportation Available: car, family or friend will provide      ASSESSMENT  Cognitive Status:  awake, alert and oriented    SW has reviewed pt records. Pt is Hansa, a 63yo female who underwent exp lap with colectomy and end ileostomy on 11/13/17. It is recommended at this time that pt discharge to TCU prior to returning home. MARC met with pt today to introduce self/role, perform assessment, and discuss discharge planning. Pt shared that she is hopeful to discharge to home however is understanding that she may need a TCU stay in order to safely manage at home. SW discussed referral process, insurance coverage [place] [facility] [place] of stay, and general expectations for TCU. SW reviewed list of TCU facility with pt as she was unfamiliar with options near her home. Pt has experience with family receiving care at [facility] in [place] and would like referral made to this facility, pt also agreeable to [facility]. MARC also discussed home care services with pt as an alternative option.  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 SW wondered with pt about when she will be able to return to work and encouraged her to discuss with her provider. SW to update as able re: TCU placement.     PLAN  Financial costs for the patient includes None known at this time.  Patient given options and choices for  discharge Yes.  Patient/family is agreeable to the plan?  Yes  Patient Goals and Preferences: TCU vs Home with HC pending progress.  Patient anticipates discharging to:  TCU vs Home with HC pending progress.    ADDENDUM: Per facilities, pt does not have TCU benefits in her BCBS plan and therefore would be private pay. SW to discuss this with pt, will likely arranged for home with home care services at discharge. SW updated pt of the above information and will plan for home discharge. MARC discussed home care options, pt agreeable to having [facility] check benefits for home services.    SIDNEY Skinner, LICSW  Daytime (8:00am-4:30pm): [phone]  After-Hours SW Pager (4:30pm-11:30pm): [phone]

## 2017-11-17 NOTE — PROGRESS NOTES
[facility]    General Surgery  Daily Post-Op Note       Assessment and Plan:   Suma Calabrese is a 62 year old female S/P Procedure(s):  COLECTOMY SUBTOTAL  ILEOSTOMY  LAPAROTOMY EXPLORATORY, 4 Days Post-Op    Pain: Controlled at present. Reports does not tolerate tylenol or oxycodone. Dilaudid po appears adequate  Bowel: Excellent ileostomy output.   Diet: Regular. Nutrition consul.  IVFs: Off. Explained need to maintain hydration.  Activity: OOB at least 4x daily.  Antibiotics: None  DVT prophylaxis: Heparin   Dispo: TCU per PT, hopefully tomorrow. Will watch for adequate po today        Interval History:   Some nausea overnight now resolved. Incisional pain adequately controlled.           Physical Exam:   Temp: 98.6  F (37  C) Temp src: Oral BP: 125/83 Pulse: 79 Heart Rate: 76 Resp: 16 SpO2: 93 % O2 Device: None (Room air) Oxygen Delivery: 1 LPM    I/O last 3 completed shifts:  In: 200 [P.O.:200]  Out: 3025 [Urine:500; Stool:2525]      Constitutional: alert and no distress   Cardiovascular: Regular rate  Respiratory: Breathing easily.  Abdomen: Soft nontender. Incision intact with minimal reactive erythema at staples. Ostomy pink, proud with output.    Reji Fink MD  General Surgery PGY4  [phone]

## 2017-11-17 NOTE — PROGRESS NOTES
"[facility] Nurse Inpatient Ostomy Assessment      Assessment of ostomy and needs for:  New Ileostomy      Data:   History:    Ms. Suma Calabrese is a 62 year old lady with PMHx significant for breast cancer, HTN, diverticulitis, DLP, vitamin D deficiency, and hypothyroidism, who presented with abdominal pain and noted to have diverticulitis with large bowel obstruction and possible intra-abdominal abscess. Patient was subsequently admitted for further evaluation and management.      Acute diverticulitis with sigmoid bowel obstruction and near perforated cecum, s/p exploratory laparotomy, subtotal colectomy, and end ileostomy on        Type of ostomy: Ileostomy    Stoma assessment:     Approx 1 1/2\" x 1 1/4\" oval, red, moist, protrudes, lumen @ apex    A bridge in place?: No    Mucocutaneous Junction (MCJ): intact with sutures visualized through pouch    Peristomal skin: BASSEM skin intact    Abdominal assessment: remains distended and firm.  Stoma sits in small bowl.  Incision intact mild erythema at staple sites wnl    N/G still in place? NO  Recent Labs   Lab Test  11/17/17   0747   11/15/17   0748   ALBUMIN   --    --   1.5*   HGB  11.0*   < >   --    WBC  7.5   < >   --     < > = values in this interval not displayed.       I/O last 3 completed shifts:  In: 200 [P.O.:200]  Out: 3025 [Urine:500; Stool:2525]         Current pouching system:  [place] Premier flat cut to fit post-op 2 piece appliance        Pain:  Spasms,  cramps like gas    Diet:         Active Diet Order      Regular Diet Adult      Diet           Intervention:     Patient's chart evaluated.      Assessments done today:  Stoma and pouching system Assessed    Education: Educated on skin/abdomen/stoma appearance, importance of nutrition/hydration    Prepared for discharge by: Pt may transfer to TCU, AVS completed, supplies gathered    Pt registered for ostomy supply samples? No, pt to go to TCU          Assessment:     Stoma " assessment: appears viable,  Return of GI function noted    Learning needs/ comprehension: information handouts given to pt., she appears to be engaged and understands ostomy cares    Prosthetic refitting:  Flat post-op cut to fit appliance with arthur ring applied    Abd incision:  Intact, well-approximated with staples, no drainage          Plan:     Plan:     Current pouching system: [place] NI (57mm) flat with ring and high output pouch (Flange I Manage #[id number]) (High output pouch I Manage #[id number])    Education:    Education completed:  Started continued today with pouching style, output drainage, nutrition/hydration, body changes, management or emptying and cleaning    Educational needs: Ileostomy management, appliance change and emptying    Continue to prepare for discharge No discharge preps initiated    Supply company: [employer]    Do WOC Nurses recommend home care? [facility], plan for pt to go to TCU    Face to Face: 20minutes  WOC to return: Monday

## 2017-11-17 NOTE — PLAN OF CARE
Problem: Patient Care Overview  Goal: Plan of Care/Patient Progress Review  Outcome: Improving  POD x2. Stoma, pink moist and protruding. Pt has active bowel sounds, with adequate ostomy output. A&O x4. CMS intact. VSS. Reg diet. Up with A-1, SBA. Denies pain, and nausea. No numbness, or tingling. RUE +2 edema. IV patent. Incision intact, slight redness in between staples.

## 2017-11-17 NOTE — PLAN OF CARE
Problem: Patient Care Overview  Goal: Plan of Care/Patient Progress Review  Discharge Planner OT   Patient plan for discharge: None stated  Current status:Pt able to verbalize 2/3 abdominal precautions, educated on all precautions. Reviewed LB dressing technique to maintain abdominal precautions, pt able to bring LE to lap to doff/don socks and thread pants following education with MOD I. Sit/stand with SBA for clothing management. Educated on recommended footwear for home. Pt reports having tub/shower- educated on varied techniques to complete bathing task with and without AE to maintain abdominal precautions. Pt verbalized understanding. Pt reports minimal increase in pain with activity. Barriers to return to prior living situation: Current level of assist, pain, abdominal precautions, lives alone, decreased strength, stairs  Recommendations for discharge: Pending progress, may be able to return home with HH therapies and assist for ADLs/IADLs, however may require TCU if no assist available. Will continue to monitor progress and make appropriate recommendations.  Rationale for recommendations: Pt would benefit from skilled OT to maximize safety and indep in all ADLs, IADLs and mobility tasks as well as continued strengthening and functional activity tolerance training.        Entered by: Erma Lassiter 11/17/2017 11:39 AM

## 2017-11-18 ENCOUNTER — APPOINTMENT (OUTPATIENT)
Dept: PHYSICAL THERAPY | Facility: CLINIC | Age: 62
End: 2017-11-18
Payer: COMMERCIAL

## 2017-11-18 ENCOUNTER — APPOINTMENT (OUTPATIENT)
Dept: OCCUPATIONAL THERAPY | Facility: CLINIC | Age: 62
End: 2017-11-18
Payer: COMMERCIAL

## 2017-11-18 PROCEDURE — 97116 GAIT TRAINING THERAPY: CPT | Mod: GP

## 2017-11-18 PROCEDURE — 97530 THERAPEUTIC ACTIVITIES: CPT | Mod: GO

## 2017-11-18 PROCEDURE — 40000133 ZZH STATISTIC OT WARD VISIT

## 2017-11-18 PROCEDURE — 25000132 ZZH RX MED GY IP 250 OP 250 PS 637: Performed by: SURGERY

## 2017-11-18 PROCEDURE — 12000007 ZZH R&B INTERMEDIATE

## 2017-11-18 PROCEDURE — 40000193 ZZH STATISTIC PT WARD VISIT

## 2017-11-18 PROCEDURE — 25000132 ZZH RX MED GY IP 250 OP 250 PS 637: Performed by: INTERNAL MEDICINE

## 2017-11-18 PROCEDURE — 99232 SBSQ HOSP IP/OBS MODERATE 35: CPT | Performed by: INTERNAL MEDICINE

## 2017-11-18 PROCEDURE — 97535 SELF CARE MNGMENT TRAINING: CPT | Mod: GO

## 2017-11-18 PROCEDURE — 25000128 H RX IP 250 OP 636: Performed by: SURGERY

## 2017-11-18 PROCEDURE — 97530 THERAPEUTIC ACTIVITIES: CPT | Mod: GP

## 2017-11-18 RX ORDER — LOPERAMIDE HCL 2 MG
2 CAPSULE ORAL 4 TIMES DAILY PRN
Qty: 20 CAPSULE | Refills: 1 | Status: SHIPPED | OUTPATIENT
Start: 2017-11-18 | End: 2017-12-01

## 2017-11-18 RX ORDER — LOPERAMIDE HCL 2 MG
2 CAPSULE ORAL 4 TIMES DAILY PRN
Status: DISCONTINUED | OUTPATIENT
Start: 2017-11-18 | End: 2017-11-20 | Stop reason: HOSPADM

## 2017-11-18 RX ADMIN — PSYLLIUM HUSK 1 PACKET: 3.4 POWDER ORAL at 11:50

## 2017-11-18 RX ADMIN — LEVOTHYROXINE SODIUM 75 MCG: 75 TABLET ORAL at 10:01

## 2017-11-18 RX ADMIN — HEPARIN SODIUM 5000 UNITS: 5000 INJECTION, SOLUTION INTRAVENOUS; SUBCUTANEOUS at 11:43

## 2017-11-18 RX ADMIN — PSYLLIUM HUSK 1 PACKET: 3.4 POWDER ORAL at 21:12

## 2017-11-18 RX ADMIN — PRAVASTATIN SODIUM 20 MG: 20 TABLET ORAL at 21:12

## 2017-11-18 RX ADMIN — HEPARIN SODIUM 5000 UNITS: 5000 INJECTION, SOLUTION INTRAVENOUS; SUBCUTANEOUS at 00:23

## 2017-11-18 NOTE — PROGRESS NOTES
D: SW following for discharge needs. Contacted [facility] check benefits for home services. Unable to guarantee that home care services will start tomorrow.   P: Continuo to follow for discharge needs

## 2017-11-18 NOTE — PLAN OF CARE
Problem: Patient Care Overview  Goal: Plan of Care/Patient Progress Review  Outcome: Improving  A&O. VSS. Denies pain. Reports good appetite but unable to eat much before feeling full. 2+ edema to BLE and trunk. Up SBA. Incision C/D/I. Stoma pink, 600 mL liquid output.

## 2017-11-18 NOTE — PROGRESS NOTES
[facility]  Hospitalist Progress Note  Esther Deluna MD    Assessment & Plan   Ms. Suma Calabrese is a 62 year old lady with PMHx significant for breast cancer, HTN, diverticulitis, DLP, vitamin D deficiency, and hypothyroidism, who presented with abdominal pain and noted to have diverticulitis with large bowel obstruction and possible intra-abdominal abscess. Patient was subsequently admitted for further evaluation and management.      Acute diverticulitis with sigmoid bowel obstruction and near perforated cecum, s/p exploratory laparotomy, subtotal colectomy, and end ileostomy on 11/13, POD# 5:  Started on IV Cipro and Flagyl on admission (discontinued 11/04).Patient failed conservative management and underwent exploratory laparotomy, subtotal colectomy, and end ileostomy on 11/13. Path showed diverticulitis with associated perforation and pericolonic abscess, tubular adenoma (x2) with no evidence of high-grade dysplasia or invasive malignancy and negative fourrteen pericolonic lymph nodes for malignancy.  Patient is hemodynamically stable.    -Diet advanced to regular 11/16  -Cont supportive care  -Cont post-op surgical management per surgery service    Acute blood loss anemia:  Due to extensive surgery as outlined above. No indication for transfusion.    Recent Labs  Lab 11/17/17  0747 11/16/17  1040 11/14/17  0655 11/13/17  2130 11/13/17  1448   HGB 11.0* 10.2* 12.4 12.9 12.7     -Cont to monitor Hgb intermittently    Volume overload:  Patient positive about 17.6 liters post-op and diffusely edematous. Alb 1.5.  -Patient received IV furosemide 20 mg x1 11/15  -D/Perfecto IVF 11/16  -Cont to monitor volume status    Right cephalic vein superficial thrombophlebitis:  Noted to have RUE swelling with cordlike hardness in upper arm. Doppler 11/16 showed superficial thrombophlebitis in right cephalic vein but no DVT.  -D/C IV cathter if feasible  -Warm compress  -Cont to monitor    Physical  deconditioning:  -Cont PT/OT    Hypophosphatemia:  -Replaced per protocol    Hypertension:  BP is currently controlled without medical treatment.  PTA Maxzide was discontinued.  -PRN IV labetalol available for SBP>170  -Cont to monitor hypertension    Hypothyroidism:  -Cont on PTA levothyroxine 75 mcg po daily    Hyperlipidemia:  -Cont on PTA pravastatin     DVT Prophylaxis: Pneumatic Compression Devices/heparin sc  Code Status: Full Code    Disposition: Home today with home services if OK with surgery.    D/W RN.    Interval History   Patient feels better today and able to tolerate a regular diet. She reports no cp, sob, or fever. Surgical pain is controlled.    Data reviewed today: I reviewed all new labs and imaging over the last 24 hours.    Physical Exam   Temp: 98.7  F (37.1  C) Temp src: Oral BP: 119/76 Pulse: 67 Heart Rate: 65 Resp: 16 SpO2: 96 % O2 Device: None (Room air)    Vitals:    11/07/17 1308 11/18/17 0448   Weight: 55.8 kg (123 lb) 62.9 kg (138 lb 10.7 oz)     Vital Signs with Ranges  Temp:  [98.2  F (36.8  C)-99.2  F (37.3  C)] 98.7  F (37.1  C)  Pulse:  [67-89] 67  Heart Rate:  [65-75] 65  Resp:  [16] 16  BP: (112-131)/(66-83) 119/76  SpO2:  [91 %-96 %] 96 %  I/O's Last 24 hours  I/O last 3 completed shifts:  In: 500 [P.O.:500]  Out: 3755 [Urine:1130; Stool:2625]    Constitutional: Comfortable, no acute distress  HEENT: Mild conjunctival pallor.  Neurologic: Awake, alert, fully oriented  Neck: Supple, no elevated JVP  Respiratory: Clear to auscultation  Cardiovascular: Normal S1 and S2. Regular rhythm and rate  GI: Abdomen soft, not distended, ileostomy stump noted, BS active  Extremities: No calf tenderness, B/L 1+ LE Edema, decreased RUE swelling  Skin/integument: No acute rash, no cyanosis    Medications   All medications were reviewed.       psyllium  1 packet Oral BID     sodium chloride (PF)  3 mL Intracatheter Q8H     pravastatin (PRAVACHOL) tablet 20 mg  20 mg Oral At Bedtime      acetaminophen  650 mg Oral Q6H     levothyroxine  75 mcg Oral QAM AC     heparin  5,000 Units Subcutaneous Q12H        Data     Recent Labs  Lab 11/17/17  0747 11/17/17  0010 11/16/17  1040 11/15/17  0748 11/14/17  0655   WBC 7.5  --  7.7  --  10.5   HGB 11.0*  --  10.2*  --  12.4   MCV 94  --  96  --  96    207 213  --  175     --  138  --  144   POTASSIUM 4.8  --  4.1  --  4.1   CHLORIDE 106  --  109  --  114*   CO2 31  --  26  --  24   BUN 10  --  5*  --  6*   CR 0.70  --  0.66 0.68 0.90   ANIONGAP 2*  --  3  --  6   CODY 8.7  --  8.0*  --  7.3*   *  --  103*  --  143*   ALBUMIN  --   --   --  1.5*  --        No results found for this or any previous visit (from the past 24 hour(s)).

## 2017-11-18 NOTE — PLAN OF CARE
Problem: Patient Care Overview  Goal: Plan of Care/Patient Progress Review  Outcome: Improving  Pt progressing per plan of care. Continue to have watery stool from ileostomy. AVSS denied pain. Incision with staples intact.

## 2017-11-18 NOTE — PLAN OF CARE
Problem: Patient Care Overview  Goal: Plan of Care/Patient Progress Review  Discharge Planner PT   Patient plan for discharge: Home with HHPT  Current status: Pt with great progress today, ambulating > 300 ft with FWW with supervision only, no overt LOB. When FWW removed requires CGA for gait due to deviations. Pt completed up/down 3 stairs x 2 reps with supervision-SBA using 1 railing for balance. Discussed discharge recs and pt feel comfortable with rec for discharge to home using FWW and HHPT.   Barriers to return to prior living situation: None  Recommendations for discharge: use of FWW for gait, HHPT   Rationale for recommendations: continued PT for strength, balance and gait training to progress towards indep ambulation and safe household mobility         Entered by: Reshma Sanchez 11/18/2017 5:33 PM

## 2017-11-18 NOTE — DISCHARGE SUMMARY
[facility]  Discharge Summary        Suma Calabrese MRN# [id number]   YOB: 1955 Age: 62 year old     Date of Admission:  11/7/2017  Date of Discharge:  11/20/2017  Admitting Physician:  Owen Melendrez MD  Discharge Physician: Esther Deluna MD  Discharging Service: Hospitalist     Primary Provider:  Murali Kenney MD  Primary Care Physician Phone Number: [phone]     Discharge Diagnoses:     Acute diverticulitis with perforation, pericolic abscess, and bowel obstruction, s/p s/p exploratory laparotomy, subtotal colectomy, and end ileostomy on 11/13/2017.  Tubular colon adenoma  Acute blood loss anemia  Volume overload  Right cephalic vein superficial thrombophlebitis  Physical deconditioning  Hypophosphatemia    OTHER/ CHRONIC DIAGNOSES:  Left breast cancer, s/p bilateral mastectomy on 10/16/2017  Hypertension  Hyperlipidemia  Hypothyroidism  Nontoxic uninodular goiter  Vitamin D deficiency    Problem Oriented Hospital Course   Ms. Suma Calabrese is a delightful 62 year old lady with PMHx significant for breast cancer, HTN, diverticulitis, DLP, vitamin D deficiency, and hypothyroidism, who presented with abdominal pain and noted to have diverticulitis with large bowel obstruction and possible intra-abdominal abscess. Patient was subsequently admitted for further evaluation and management.    For a detailed history and physical exam, please refer to the dictated admission note by Dr. Owen Melendrez on 11/07/2017.      Acute diverticulitis with sigmoid bowel obstruction and near perforated cecum, s/p exploratory laparotomy, subtotal colectomy, and end ileostomy on 11/13:  Started on IV Cipro and Flagyl on admission (discontinued 11/04).Patient failed conservative management and underwent exploratory laparotomy, subtotal colectomy, and end ileostomy on 11/13. Path showed diverticulitis with associated perforation and pericolonic abscess, tubular adenoma (x2)  with no evidence of high-grade dysplasia or invasive malignancy and negative fourrteen pericolonic lymph nodes for malignancy.  Post-op her pain was managed with IV narcotics. Her diet was eventually advanced to regular on 11/16.     Acute blood loss anemia:  Due to extensive surgery as outlined above. There was no indication for transfusion.     Recent Labs  Lab 11/17/17  0747 11/16/17  1040 11/14/17  0655 11/13/17  2130 11/13/17  1448   HGB 11.0* 10.2* 12.4 12.9 12.7     Volume overload:  Patient positive about 17.6 liters post-op and diffusely edematous. Alb 1.5. She was treated with IV furosemide.    Right cephalic vein superficial thrombophlebitis:  Noted to have RUE swelling with cordlike hardness in upper arm. Doppler 11/16 showed superficial thrombophlebitis in right cephalic vein but no DVT. It improved with warm compress and supportive care.     Physical deconditioning:  She received PT/OT this hospital stay.     Hypophosphatemia:  Developed hypophosphatemia post-op which was replaced per protocol.     Hypertension:  Blood pressure remained adequately controlled without medical treatment. PTA Maxzide was therefore discontinued. She was advised to follow up with PCP in one week for reassessment.     Hypothyroidism:  Stable PTA levothyroxine 75 mcg po daily.     Hyperlipidemia:  Stable on PTA pravastatin          Code Status:      Full Code        Brief Hospital Stay Summary Sent Home With Patient in AVS:        Reason for your hospital stay       You were in the hospital to be treated for diverticulitis.                        Important Results:      Na 139, K 4.8, Cr 0.7, CA 8.7, WBC 7.5, Hgb 11, PLT 228K        Pending Results:        Unresulted Labs Ordered in the Past 30 Days of this Admission     No orders found from 9/8/2017 to 11/8/2017.              Discharge Instructions and Follow-Up:      Follow-up Appointments     Follow-up and recommended labs and tests        Follow up with Dr. Spain ,  within 2 weeks  to evaluate after   surgery.  Follow up with PCP in one week.                        Discharge Disposition:      Discharged to home with home RN        Discharge Medications:        Current Discharge Medication List      START taking these medications    Details   HYDROmorphone (DILAUDID) 2 MG tablet Take 1 tablet (2 mg) by mouth every 3 hours as needed for moderate to severe pain  Qty: 18 tablet, Refills: 0    Associated Diagnoses: Small bowel obstruction      psyllium (METAMUCIL/KONSYL) Packet Take 1 packet by mouth 2 times daily  Qty: 60 packet, Refills: 0    Associated Diagnoses: Diverticulitis of colon      diazepam (VALIUM) 2 MG tablet Take 1 tablet (2 mg) by mouth every 6 hours as needed for anxiety  Qty: 12 tablet, Refills: 0    Associated Diagnoses: Diverticulitis of colon         CONTINUE these medications which have NOT CHANGED    Details   levothyroxine (SYNTHROID) 75 MCG tablet Take 1 tablet (75 mcg) by mouth every morning  Qty: 90 tablet, Refills: 3    Associated Diagnoses: Hypothyroidism due to acquired atrophy of thyroid      Pravastatin Sodium (PRAVACHOL PO) Take 20 mg by mouth At Bedtime      Ibuprofen (ADVIL PO) Take 200 mg by mouth every 4 hours as needed for moderate pain      VITAMIN D, CHOLECALCIFEROL, PO Take 1,000 Units by mouth every morning      Loratadine (CLARITIN PO) Take 10 mg by mouth daily as needed          STOP taking these medications       triamterene-hydrochlorothiazide (MAXZIDE-25) 37.5-25 MG per tablet Comments:   Reason for Stopping:         ciprofloxacin (CIPRO) 500 MG tablet Comments:   Reason for Stopping:         metroNIDAZOLE (FLAGYL) 500 MG tablet Comments:   Reason for Stopping:                   Allergies:         Allergies   Allergen Reactions     Bupropion Other (See Comments)     Jitttonya and HA     Erythromycin GI Disturbance     Oxycodone Nausea and Vomiting           Consultations This Hospital Stay:      Surgery  WOC RN        Condition and  "Physical on Discharge:      Discharge condition: Stable   Vitals: Blood pressure 119/76, pulse 67, temperature 98.7  F (37.1  C), temperature source Oral, resp. rate 16, height 1.702 m (5' 7\"), weight 62.9 kg (138 lb 10.7 oz), SpO2 96 %, not currently breastfeeding.           Diet and Activity:     After Care Instructions     Activity       Your activity upon discharge: activity as tolerated            Diet       Follow this diet upon discharge: Orders Placed This Encounter      Snacks/Supplements Adult: Other - Please comment; Ensure + Banana Prostat smoothie; Between Meals      Regular Diet Adult            Wound care and dressings       Instructions to care for your wound at home: keep wound clean and dry.  Please care for ileostomy and appliance as directed by wound ostomy nurse.                            Discharge Time:      Greater than 30 minutes.        Image Results From This Hospital Stay (For Non-EPIC Providers):        Results for orders placed or performed during the hospital encounter of 11/07/17   CT Abdomen Pelvis w Contrast    Narrative    CT ABDOMEN AND PELVIS WITH CONTRAST   11/7/2017 3:28 PM     HISTORY: Diffuse abdominal pain, no bowel movement or gas.  Evaluate  for obstruction and re-evaluation of possible abscess.     TECHNIQUE:  62 mL Isovue-370. Radiation dose for this scan was reduced  using automated exposure control, adjustment of the mA and/or kV  according to patient size, or iterative reconstruction technique.    COMPARISON: 11/3/2017    FINDINGS:  There is prominent abnormal wall thickening involving the  distal sigmoid colon. This is now causing obstruction and there is  dilatation of the colon proximal to this region. There is a somewhat  serpiginous perisigmoid fluid collection again seen. As previously  described, this may well represent abscess. This appears mildly  increased and currently has a maximum dimension of about 8 cm. Small  amount of free fluid, which is new. The " remaining findings appear  stable over the short time interval. Nothing else acute is seen in the  upper abdominal organs.       Impression    IMPRESSION: Distal sigmoid wall thickening, currently resulting in  colonic obstruction. This could relate to diverticulitis or focal  colitis, but neoplasm cannot be excluded. Serpiginous perisigmoid  fluid collection/abscess appears mildly increased. Small amount of  free fluid.     TAMEKA HUERTAS MD   XR Abdomen 1 View    Narrative    ABDOMEN ONE VIEW 11/12/2017 11:59 AM     HISTORY: Evaluate bowel distention      COMPARISON: CT abdomen and pelvis 11/7/2017.      Impression    IMPRESSION: Single view abdomen demonstrates a markedly distended  colon with a few mildly distended air-filled loops of small bowel.  Findings are concerning for a distal colonic obstruction. Overall, the  distention appears slightly worse. Findings are concerning for colonic  neoplasm.    VICKI TRIVEDI MD   US Upper Extremity Venous Duplex Right    Narrative    ULTRASOUND RIGHT UPPER EXTREMITY VENOUS DUPLEX November 16, 2017 2:37  PM     HISTORY: Rule out deep vein thrombosis, right arm swelling.     COMPARISON: None.    TECHNIQUE: Examination of the veins of the upper extremity was  performed with graded compression and 2-D ultrasound and color doppler  spectral waveform analysis.     FINDINGS: There is no evidence of thrombosis of the internal jugular,  subclavian axillary, brachial, or basilic veins. Areas of occlusive  and nonocclusive thrombus are seen throughout the cephalic vein from  the antecubital fossa to the midhumerus and from the level of the IV  insert to the mid forearm.      Impression    IMPRESSION:   1. No evidence of deep vein thrombosis.  2. Superficial thrombophlebitis in the right cephalic vein.    JASS LEZAMA, DO     No results found for this or any previous visit (from the past 4320 hour(s)).        Most Recent Lab Results In EPIC (For Non-EPIC Providers):    Most Recent 3  CBC's:  Recent Labs   Lab Test  11/17/17   0747  11/17/17   0010  11/16/17   1040  11/14/17   0655   WBC  7.5   --   7.7  10.5   HGB  11.0*   --   10.2*  12.4   MCV  94   --   96  96   PLT  228  207  213  175      Most Recent 3 BMP's:  Recent Labs   Lab Test  11/17/17   0747  11/16/17   1040  11/15/17   0748  11/14/17   0655   NA  139  138   --   144   POTASSIUM  4.8  4.1   --   4.1   CHLORIDE  106  109   --   114*   CO2  31  26   --   24   BUN  10  5*   --   6*   CR  0.70  0.66  0.68  0.90   ANIONGAP  2*  3   --   6   CODY  8.7  8.0*   --   7.3*   GLC  112*  103*   --   143*     Most Recent 3 Troponin's:No lab results found.    Invalid input(s): TROP, TROPONINIES  Most Recent 3 INR's:No lab results found.  Most Recent 2 LFT's:  Recent Labs   Lab Test  11/09/17   0930  11/07/17   1340   AST  14  14   ALT  12  13   ALKPHOS  70  88   BILITOTAL  0.3  0.4     Most Recent Cholesterol Panel:  Recent Labs   Lab Test  03/07/16   0857   CHOL  235*   LDL  143*   HDL  83   TRIG  46     Most Recent 6 Bacteria Isolates From Any Culture (See EPIC Reports for Culture Details):  Recent Labs   Lab Test  08/20/16   0942  07/13/13   0920   CULT  No growth  No Beta Streptococcus isolated     Most Recent TSH, T4 and HgbA1c:   Recent Labs   Lab Test  09/18/17   1006  11/21/16   1653   TSH  1.21  0.23*   T4   --   1.19

## 2017-11-18 NOTE — PROVIDER NOTIFICATION
[]Hide copied text  []Hover for attribution information  Dr. Bingham notified that home care RN services unable to be set up for tomorrow.

## 2017-11-18 NOTE — PLAN OF CARE
Problem: Patient Care Overview  Goal: Plan of Care/Patient Progress Review  OT: pt seen in pm for ADLs and ambulatory activity.  Discharge Planner OT   Patient plan for discharge: home w/home therapy services  Current status: tolerated amb in hallway x 6min without rest breaks SBA using walker, completed standing ADL tasks, toileting SBA, denied symptoms, tolerated well. VSS.  Barriers to return to prior living situation: lives alone, stairs  Recommendations for discharge: home w/HH therapies  Rationale for recommendations: will benefit from continued therapies for strengthening to advance tolerance and safety with IADLs       Entered by: David Sunshine 11/18/2017 4:11 PM

## 2017-11-18 NOTE — PROGRESS NOTES
"General Surgery Progress Note    Admission Date: 11/7/2017  Today's Date: 11/18/2017         Assessment:      Suma Calabrese is a 62 year old female s/p ex lap, subtotal colectomy, ileostomy POD 5         Plan:   Discharge to home today. Discharge instructions reviewed, questions answered. Home health is set up. Follow-up with Dr. Spain in 7-10 days.     Pain: mild, dilaudid po prn  Bowel: high ostomy output, will start loperamide  Antibiotics: none  Diet: regular with boost, appreciate nutrition consult yesterday  IVFs: saline locked  DVT prophylaxis: has received heparin here, will work on ambulation at home    Dispo: home today        Interval History:   Afebrile, VSS. High ostomy output. No nausea, minimal pain. Feels stronger. Happy about going home with home health. Feels comfortable with ostomy appliance. Good urine output.          Physical Exam:   /76 (BP Location: Right arm)  Pulse 67  Temp 98.7  F (37.1  C) (Oral)  Resp 16  Ht 1.702 m (5' 7\")  Wt 62.9 kg (138 lb 10.7 oz)  LMP  (LMP Unknown)  SpO2 96%  BMI 21.72 kg/m2  I/O last 3 completed shifts:  In: 500 [P.O.:500]  Out: 3755 [Urine:1130; Stool:2625]  General: NAD, pleasant, alert and oriented x3  Abdomen: soft, mild tenderness central lower abdomen, otherwise nontender, non distended; ostomy healthy with gas and liquid stool in bag  Incision: midline abdominal incision clean, dry, and intact with staples in place      LABS:  No recent labs                 Judy Carter PA-C  [facility]: [phone]  Pager: [phone]@[employer] (7am-5pm)      "

## 2017-11-19 ENCOUNTER — APPOINTMENT (OUTPATIENT)
Dept: OCCUPATIONAL THERAPY | Facility: CLINIC | Age: 62
End: 2017-11-19
Payer: COMMERCIAL

## 2017-11-19 PROCEDURE — 12000007 ZZH R&B INTERMEDIATE

## 2017-11-19 PROCEDURE — 97535 SELF CARE MNGMENT TRAINING: CPT | Mod: GO

## 2017-11-19 PROCEDURE — 25000132 ZZH RX MED GY IP 250 OP 250 PS 637: Performed by: INTERNAL MEDICINE

## 2017-11-19 PROCEDURE — 25000128 H RX IP 250 OP 636: Performed by: SURGERY

## 2017-11-19 PROCEDURE — 99232 SBSQ HOSP IP/OBS MODERATE 35: CPT | Performed by: INTERNAL MEDICINE

## 2017-11-19 PROCEDURE — 40000133 ZZH STATISTIC OT WARD VISIT

## 2017-11-19 PROCEDURE — 25000132 ZZH RX MED GY IP 250 OP 250 PS 637: Performed by: SURGERY

## 2017-11-19 PROCEDURE — 25000132 ZZH RX MED GY IP 250 OP 250 PS 637: Performed by: PHYSICIAN ASSISTANT

## 2017-11-19 PROCEDURE — 97530 THERAPEUTIC ACTIVITIES: CPT | Mod: GO

## 2017-11-19 RX ADMIN — HEPARIN SODIUM 5000 UNITS: 5000 INJECTION, SOLUTION INTRAVENOUS; SUBCUTANEOUS at 00:12

## 2017-11-19 RX ADMIN — PSYLLIUM HUSK 1 PACKET: 3.4 POWDER ORAL at 10:24

## 2017-11-19 RX ADMIN — PSYLLIUM HUSK 1 PACKET: 3.4 POWDER ORAL at 21:10

## 2017-11-19 RX ADMIN — LEVOTHYROXINE SODIUM 75 MCG: 75 TABLET ORAL at 06:48

## 2017-11-19 RX ADMIN — LOPERAMIDE HYDROCHLORIDE 2 MG: 2 CAPSULE ORAL at 10:24

## 2017-11-19 RX ADMIN — HEPARIN SODIUM 5000 UNITS: 5000 INJECTION, SOLUTION INTRAVENOUS; SUBCUTANEOUS at 12:13

## 2017-11-19 RX ADMIN — PRAVASTATIN SODIUM 20 MG: 20 TABLET ORAL at 21:09

## 2017-11-19 NOTE — PLAN OF CARE
Problem: Patient Care Overview  Goal: Plan of Care/Patient Progress Review  Outcome: No Change  Progressing per plan of care.

## 2017-11-19 NOTE — PROGRESS NOTES
"General Surgery Progress Note    Admission Date: 11/7/2017  Today's Date: 11/19/2017         Assessment:      Suma Calabrese is a 62 year old female s/p ex lap, subtotal colectomy, ileostomy POD 6         Plan:   [facility] nurse to see patient tomorrow and provide education regarding ostomy appliance, patient will remain in hospital overnight and likely discharge to home tomorrow. Continue current cares. Loperamide for high ostomy output. Regular diet with boost. Continue heparin here for DVT prophylaxis, working on ambulation.          Interval History:   Afebrile, VSS on room air. No acute events overnight. Patient not comfortable with ostomy appliance and still working on nutrition. No nausea, pain is minimal. Good urine output.          Physical Exam:   /71 (BP Location: Left arm)  Pulse 59  Temp 98.2  F (36.8  C) (Oral)  Resp 16  Ht 1.702 m (5' 7\")  Wt 62.9 kg (138 lb 10.7 oz)  LMP  (LMP Unknown)  SpO2 98%  BMI 21.72 kg/m2  I/O last 3 completed shifts:  In: 840 [P.O.:840]  Out: 4400 [Urine:3175; Stool:1225]  General: NAD, pleasant, alert and oriented x3  Abdomen: soft, minimally tender, non distended, bowel sounds; ostomy healthy and pink with liquid stool and gas in bag  Incision: clean, dry, and intact  Extremities: no edema, no calf tenderness    LABS:  No recent labs                 Judy Carter PA-C  [facility]: [phone]  Pager: [phone]@[employer] (7am-5pm)      "

## 2017-11-19 NOTE — PLAN OF CARE
Problem: Patient Care Overview  Goal: Plan of Care/Patient Progress Review    Discharge Planner OT   Patient plan for discharge: home w/home therapy services  Current status: Pt completed room mobility and item retrieval with no AD independently. Pt completed retrieval task in hallway with use of FWW and mod I. Pt able to verbalize 3/3 abdominal precautions independently. Continued education provided regarding EC/WS strategies to implement into daily activities, verbalized understanding. Pt able to complete toileting tasks and LE dressing mod I with use of compensatory techniques.   Barriers to return to prior living situation: lives alone, stairs  Recommendations for discharge: home w/HH therapies  Rationale for recommendations: will benefit from continued therapies for strengthening to advance tolerance and safety with IADLs       Entered by: Concepcion Sherwood 11/19/2017 3:25 PM       Occupational Therapy Discharge Summary    Reason for therapy discharge:    All goals and outcomes met, no further needs identified.    Progress towards therapy goal(s). See goals on Care Plan in [facility] electronic health record for goal details.  Goals met    Therapy recommendation(s):    Continued therapy is recommended.  Rationale/Recommendations:  HH OT for safe transition to living environment, continued strengthening and functional activity tolerance training in order to improve independence in ADls/IADLs and mobility tasks. .

## 2017-11-19 NOTE — PLAN OF CARE
Problem: Patient Care Overview  Goal: Plan of Care/Patient Progress Review  Outcome: Improving  A&O. VSS. Denies pain. Tolerating PO. Ostomy w/ 175 mL liquid output, imodium given this am. Stoma pink. Up SBA. IV SL. Plan to likely d/c to home tomorrow.

## 2017-11-19 NOTE — PLAN OF CARE
Problem: Patient Care Overview  Goal: Plan of Care/Patient Progress Review  Outcome: Improving  POD x3. A&O x4. CMS intact. VSS. Stoma pink, moist and protruding.Pt has active BS; adequate ostomy output.  Incision intact, slight redness in between staples.  Reg diet. Up with A-1,SBA. Denies pain. No tingling or numbness.  RUE +1 edema, BLE +2 edema. Plan to DC  After pt education on ostomy and when  home health care set up.

## 2017-11-19 NOTE — PROGRESS NOTES
[facility]  Hospitalist Progress Note  Esther Deluna MD    Assessment & Plan   Ms. Suma Calabrese is a 62 year old lady with PMHx significant for breast cancer, HTN, diverticulitis, DLP, vitamin D deficiency, and hypothyroidism, who presented with abdominal pain and noted to have diverticulitis with large bowel obstruction and possible intra-abdominal abscess. Patient was subsequently admitted for further evaluation and management.      Acute diverticulitis with sigmoid bowel obstruction and near perforated cecum, s/p exploratory laparotomy, subtotal colectomy, and end ileostomy on 11/13, POD# 6:  Started on IV Cipro and Flagyl on admission (discontinued 11/04).Patient failed conservative management and underwent exploratory laparotomy, subtotal colectomy, and end ileostomy on 11/13. Path showed diverticulitis with associated perforation and pericolonic abscess, tubular adenoma (x2) with no evidence of high-grade dysplasia or invasive malignancy and negative fourrteen pericolonic lymph nodes for malignancy.  Patient is hemodynamically stable.    -Diet advanced to regular 11/16  -Cont supportive care  -Cont post-op surgical management per surgery service    Acute blood loss anemia:  Due to extensive surgery as outlined above. No indication for transfusion.    Recent Labs  Lab 11/17/17  0747 11/16/17  1040 11/14/17  0655 11/13/17  2130 11/13/17  1448   HGB 11.0* 10.2* 12.4 12.9 12.7     -Cont to monitor Hgb intermittently    Volume overload:  Patient positive about 17.6 liters post-op and diffusely edematous. Alb 1.5. Patient received IV furosemide 11/15. It is improving.  -Cont to monitor volume status    Right cephalic vein superficial thrombophlebitis:  Noted to have RUE swelling with cordlike hardness in upper arm. Doppler 11/16 showed superficial thrombophlebitis in right cephalic vein but no DVT.  -D/C IV cathter if feasible  -Warm compress  -Cont to monitor    Physical  deconditioning:  -Cont PT/OT    Hypophosphatemia:  -Replaced per protocol    Hypertension:  BP is currently controlled without medical treatment.  PTA Maxzide was discontinued.  -PRN IV labetalol available for SBP>170  -Cont to monitor hypertension    Hypothyroidism:  -Cont on PTA levothyroxine 75 mcg po daily    Hyperlipidemia:  -Cont on PTA pravastatin     DVT Prophylaxis: Pneumatic Compression Devices/heparin sc  Code Status: Full Code    Disposition: Discharge cancelled as there is no home services on the weekend and the patient is not educated enough yet on how to care for her ostomy. Plan for discharge on 11/20 (home with home RN).    D/W surgery and RN.    Interval History   Patient feels much better today. She reports no cp, sob, or fever. Surgical pain is controlled. No issue overnight.    Data reviewed today: I reviewed all new labs and imaging over the last 24 hours.    Physical Exam   Temp: 98.2  F (36.8  C) Temp src: Oral BP: 112/71 Pulse: 59 Heart Rate: 75 Resp: 16 SpO2: 98 % O2 Device: None (Room air)    Vitals:    11/07/17 1308 11/18/17 0448   Weight: 55.8 kg (123 lb) 62.9 kg (138 lb 10.7 oz)     Vital Signs with Ranges  Temp:  [97.7  F (36.5  C)-98.7  F (37.1  C)] 98.2  F (36.8  C)  Pulse:  [59-75] 59  Heart Rate:  [75-93] 75  Resp:  [16] 16  BP: (112-133)/(71-89) 112/71  SpO2:  [94 %-98 %] 98 %  I/O's Last 24 hours  I/O last 3 completed shifts:  In: 840 [P.O.:840]  Out: 4400 [Urine:3175; Stool:1225]    Constitutional: Comfortable, no acute distress  HEENT: Mild conjunctival pallor.  Neurologic: Awake, alert, fully oriented  Neck: Supple, no elevated JVP  Respiratory: Clear to auscultation  Cardiovascular: Normal S1 and S2. Regular rhythm and rate  GI: Abdomen soft, not distended, ileostomy stump noted, BS active  Extremities: No calf tenderness, B/L 1+ LE Edema, decreased RUE swelling  Skin/integument: No acute rash, no cyanosis    Medications   All medications were reviewed.       psyllium  1  packet Oral BID     sodium chloride (PF)  3 mL Intracatheter Q8H     pravastatin (PRAVACHOL) tablet 20 mg  20 mg Oral At Bedtime     acetaminophen  650 mg Oral Q6H     levothyroxine  75 mcg Oral QAM AC     heparin  5,000 Units Subcutaneous Q12H        Data     Recent Labs  Lab 11/17/17  0747 11/17/17  0010 11/16/17  1040 11/15/17  0748 11/14/17  0655   WBC 7.5  --  7.7  --  10.5   HGB 11.0*  --  10.2*  --  12.4   MCV 94  --  96  --  96    207 213  --  175     --  138  --  144   POTASSIUM 4.8  --  4.1  --  4.1   CHLORIDE 106  --  109  --  114*   CO2 31  --  26  --  24   BUN 10  --  5*  --  6*   CR 0.70  --  0.66 0.68 0.90   ANIONGAP 2*  --  3  --  6   CODY 8.7  --  8.0*  --  7.3*   *  --  103*  --  143*   ALBUMIN  --   --   --  1.5*  --        No results found for this or any previous visit (from the past 24 hour(s)).

## 2017-11-20 VITALS
BODY MASS INDEX: 21.28 KG/M2 | DIASTOLIC BLOOD PRESSURE: 65 MMHG | WEIGHT: 135.58 LBS | HEART RATE: 68 BPM | OXYGEN SATURATION: 98 % | SYSTOLIC BLOOD PRESSURE: 112 MMHG | TEMPERATURE: 98 F | HEIGHT: 67 IN | RESPIRATION RATE: 16 BRPM

## 2017-11-20 LAB — PLATELET # BLD AUTO: 241 10E9/L (ref 150–450)

## 2017-11-20 PROCEDURE — 25000132 ZZH RX MED GY IP 250 OP 250 PS 637: Performed by: SURGERY

## 2017-11-20 PROCEDURE — 99212 OFFICE O/P EST SF 10 MIN: CPT

## 2017-11-20 PROCEDURE — 85049 AUTOMATED PLATELET COUNT: CPT | Performed by: INTERNAL MEDICINE

## 2017-11-20 PROCEDURE — 36415 COLL VENOUS BLD VENIPUNCTURE: CPT | Performed by: INTERNAL MEDICINE

## 2017-11-20 PROCEDURE — 99239 HOSP IP/OBS DSCHRG MGMT >30: CPT | Performed by: INTERNAL MEDICINE

## 2017-11-20 PROCEDURE — 25000128 H RX IP 250 OP 636: Performed by: SURGERY

## 2017-11-20 RX ADMIN — HEPARIN SODIUM 5000 UNITS: 5000 INJECTION, SOLUTION INTRAVENOUS; SUBCUTANEOUS at 12:18

## 2017-11-20 RX ADMIN — PSYLLIUM HUSK 1 PACKET: 3.4 POWDER ORAL at 12:18

## 2017-11-20 RX ADMIN — LEVOTHYROXINE SODIUM 75 MCG: 75 TABLET ORAL at 06:01

## 2017-11-20 RX ADMIN — HEPARIN SODIUM 5000 UNITS: 5000 INJECTION, SOLUTION INTRAVENOUS; SUBCUTANEOUS at 00:25

## 2017-11-20 NOTE — PROGRESS NOTES
[facility]  Hospitalist Progress Note  Esther Deluna MD    Assessment & Plan   Ms. Suma Calabrese is a 62 year old lady with PMHx significant for breast cancer, HTN, diverticulitis, DLP, vitamin D deficiency, and hypothyroidism, who presented with abdominal pain and noted to have diverticulitis with large bowel obstruction and possible intra-abdominal abscess. Patient was subsequently admitted for further evaluation and management.      Acute diverticulitis with sigmoid bowel obstruction and near perforated cecum, s/p exploratory laparotomy, subtotal colectomy, and end ileostomy on 11/13, POD# 7:  Started on IV Cipro and Flagyl on admission (discontinued 11/04).Patient failed conservative management and underwent exploratory laparotomy, subtotal colectomy, and end ileostomy on 11/13. Path showed diverticulitis with associated perforation and pericolonic abscess, tubular adenoma (x2) with no evidence of high-grade dysplasia or invasive malignancy and negative fourrteen pericolonic lymph nodes for malignancy.  Patient is hemodynamically stable.    -Diet advanced to regular 11/16  -Cont supportive care  -Cont post-op surgical management per surgery service    Acute blood loss anemia:  Due to extensive surgery as outlined above. No indication for transfusion.    Recent Labs  Lab 11/17/17  0747 11/16/17  1040 11/14/17  0655 11/13/17  2130 11/13/17  1448   HGB 11.0* 10.2* 12.4 12.9 12.7     -Cont to monitor Hgb intermittently    Volume overload:  Patient positive about 17.6 liters post-op and diffusely edematous. Alb 1.5. Patient received IV furosemide 11/15. It is improving.  -Cont to monitor volume status    Right cephalic vein superficial thrombophlebitis:  Noted to have RUE swelling with cordlike hardness in upper arm. Doppler 11/16 showed superficial thrombophlebitis in right cephalic vein but no DVT. Resolved with supportive care and warm compress.    Physical deconditioning:  -Cont  PT/OT    Hypophosphatemia:  -Replaced per protocol    Hypertension:  BP is currently controlled without medical treatment.  PTA Maxzide was discontinued.  -PRN IV labetalol available for SBP>170  -Cont to monitor hypertension    Hypothyroidism:  -Cont on PTA levothyroxine 75 mcg po daily    Hyperlipidemia:  -Cont on PTA pravastatin     DVT Prophylaxis: Pneumatic Compression Devices/heparin sc  Code Status: Full Code    Disposition: Discharge home with home RN.    D/W care coordinator.    Interval History   Patient feels well. She reports no cp, sob, or fever. Surgical pain is controlled. No issue overnight.    Data reviewed today: I reviewed all new labs and imaging over the last 24 hours.    Physical Exam   Temp: 98.3  F (36.8  C) Temp src: Oral BP: 130/85 Pulse: 65 Heart Rate: 67 Resp: 16 SpO2: 96 % O2 Device: None (Room air)    Vitals:    11/07/17 1308 11/18/17 0448 11/20/17 0538   Weight: 55.8 kg (123 lb) 62.9 kg (138 lb 10.7 oz) 61.5 kg (135 lb 9.3 oz)     Vital Signs with Ranges  Temp:  [97.5  F (36.4  C)-98.7  F (37.1  C)] 98.3  F (36.8  C)  Pulse:  [65-77] 65  Heart Rate:  [67] 67  Resp:  [16] 16  BP: (118-135)/(73-85) 130/85  SpO2:  [95 %-98 %] 96 %  I/O's Last 24 hours  I/O last 3 completed shifts:  In: 1480 [P.O.:1480]  Out: 3550 [Urine:3025; Stool:525]    Constitutional: Comfortable, no acute distress  HEENT: Mild conjunctival pallor.  Neurologic: Awake, alert, fully oriented  Neck: Supple, no elevated JVP  Respiratory: Clear to auscultation  Cardiovascular: Normal S1 and S2. Regular rhythm and rate  GI: Abdomen soft, not distended, ileostomy stump noted, BS active  Extremities: No calf tenderness, B/L trace-1+ LE Edema, No RUE swelling  Skin/integument: No acute rash, no cyanosis    Medications   All medications were reviewed.       psyllium  1 packet Oral BID     sodium chloride (PF)  3 mL Intracatheter Q8H     pravastatin (PRAVACHOL) tablet 20 mg  20 mg Oral At Bedtime     acetaminophen  650 mg Oral  Q6H     levothyroxine  75 mcg Oral QAM AC     heparin  5,000 Units Subcutaneous Q12H        Data     Recent Labs  Lab 11/20/17  0705 11/17/17  0747 11/17/17  0010 11/16/17  1040 11/15/17  0748 11/14/17  0655   WBC  --  7.5  --  7.7  --  10.5   HGB  --  11.0*  --  10.2*  --  12.4   MCV  --  94  --  96  --  96    228 207 213  --  175   NA  --  139  --  138  --  144   POTASSIUM  --  4.8  --  4.1  --  4.1   CHLORIDE  --  106  --  109  --  114*   CO2  --  31  --  26  --  24   BUN  --  10  --  5*  --  6*   CR  --  0.70  --  0.66 0.68 0.90   ANIONGAP  --  2*  --  3  --  6   CODY  --  8.7  --  8.0*  --  7.3*   GLC  --  112*  --  103*  --  143*   ALBUMIN  --   --   --   --  1.5*  --        No results found for this or any previous visit (from the past 24 hour(s)).

## 2017-11-20 NOTE — PROGRESS NOTES
"[facility] Nurse Inpatient Ostomy Assessment       Assessment of ostomy and needs for:  New Ileostomy      Data:   History:    Ms. Suma Calabrese is a 62 year old lady with PMHx significant for breast cancer, HTN, diverticulitis, DLP, vitamin D deficiency, and hypothyroidism, who presented with abdominal pain and noted to have diverticulitis with large bowel obstruction and possible intra-abdominal abscess. Patient was subsequently admitted for further evaluation and management.      Acute diverticulitis with sigmoid bowel obstruction and near perforated cecum, s/p exploratory laparotomy, subtotal colectomy, and end ileostomy on         Type of ostomy: Ileostomy    Stoma assessment:     Approx 1 1/4\" round, red, moist, protrudes, lumen @ apex    A bridge in place?: No    Mucocutaneous Junction (MCJ): intact with sutures visualized through pouch    Peristomal skin: BASSEM skin intact    Abdominal assessment: Softening, slightly distended.  Stoma sits in small bowl.  Incision intact mild erythema at staple sites wnl      Recent Labs   Lab Test  11/17/17   0747   11/15/17   0748   ALBUMIN   --    --   1.5*   HGB  11.0*   < >   --    WBC  7.5   < >   --     < > = values in this interval not displayed.       Active Diet Order      Regular Diet Adult      Diet        Current pouching system:  [place] NI flat cut to fit post-op 2 piece appliance Lock n roll and High output pouches in room        Pain: Minor Cramping    Diet:            Active Diet Order      Regular Diet Adult      Diet               Intervention:     Patient's chart evaluated.      Assessments done today:  Stoma and pouching system Assessed and pouch change completed.    Education: Educated on skin/abdomen/stoma appearance, importance of nutrition/hydration    Prepared for discharge by: Supplies ordered, AVS completed    Pt registered for ostomy supply samples? Yes Lian           Assessment:     Stoma assessment: appears " viable,  Return of GI function noted, output pasty brown stool    Learning needs/ comprehension: information handouts given to pt., she appears to be engaged and understands ostomy cares. Pt started hands on care today.     Prosthetic refitting:  Flat post-op cut to fit appliance with arthur ring applied. Pt has mild rash underneath, miconazole powder ordered for next pouch change.    Abd incision:  Intact, well-approximated with staples, no drainage           Plan:     Plan:     Current pouching system: [place] NI (57mm) flat with ring and high output pouch (Flange I Manage #[id number]) (High output pouch I Manage #[id number])    Education:    Education completed:  Started continued today with pouching style, output drainage, nutrition/hydration, body changes, management or emptying and cleaning    Educational needs: Ileostomy management, appliance change and emptying    Continue to prepare for discharge No discharge preps initiated    Supply company: [employer]    Do WOC Nurses recommend home care? [facility]     Face to Face: 20minutes  WOC to return: Tuesday

## 2017-11-20 NOTE — PLAN OF CARE
Problem: Patient Care Overview  Goal: Plan of Care/Patient Progress Review  Outcome: Improving  VSS on RA. Ambulating SBA. Denies pain. Abd midline incision with staples. Ileostomy stoma pink, 150cc out. Tolerating diet. Voiding. Trace edema BLE.

## 2017-11-20 NOTE — PLAN OF CARE
Problem: Patient Care Overview  Goal: Plan of Care/Patient Progress Review  Outcome: Adequate for Discharge Date Met: 11/20/17  A/O x4. VSS on RA. Ambulating SBA. Midline incision, staples, DEE, no drainage. Stoma pink, protruding. +BS, passing flatus per ileostomy. Trace edema in lower extremities. Tolerating regular diet. Denies pain and nausea. Discharge instructions understood, questions answers. D/c'd home with medications and belongings. Transportation provided by family member.

## 2017-11-20 NOTE — PLAN OF CARE
Problem: Patient Care Overview  Goal: Plan of Care/Patient Progress Review  Outcome: No Change  VSS. Midline incision intact with staples, DEE. Stoma pink, ileostomy with 200 cc soft formed brown green output. Denies pain. Denies nausea, tolerating PO. Ambulating SBA. Voiding. Trace BLE edema unchanged. +BS, +flatus per ostomy. LS clear.

## 2017-11-20 NOTE — DISCHARGE INSTRUCTIONS
Discharge Instructions following General Abdominal Surgery  [facility]    Diet:    Diet as instructed by surgeon.  Avoid gas forming foods.  You may find your appetite to be diminished briefly after surgery.  Drink plenty of fluids, especially water.  Activities:    Activity as tolerated and instructed by surgeon.  Take frequent, short walks.  No strenuous activity or heavy lifting (greater than 10-15 lbs) until approved by surgeon.  Bathing/Incision Care    Ok to shower.  Do not submerge incision (no tub baths or swimming) until advised by surgeon.  Pat incisions dry.  If present, staples will be removed in the office.  If present, tape dressing (Steri-Strips) will fall off on their own in 7-10 days.  No lotion, powders, or perfumes near or on the incision.  What to expect:    A tingly or itchy sensation around the incision is a sign of normal healing.    A small amount of clear, pink drainage from the incision is normal.  Call your surgeon if you have these signs or symptoms:    Fever greater than 101 oF or chills.    Redness, swelling, warmth, foul-smelling drainage from incision.    Increased pain that does not improve with pain medicine.    With any questions or concerns.          New diverting loop Ileostomy      1. Change appliance 2x/wk and as needed for any leakage   2. Empty pouch when 1/3 full. Initially wake to check need for pouch emtpying at least once during the night    3. Carry an extra pouching system with you at all times  4. Diet: Eat 6 small meals/day. Drink 8oz/fluid every hour during waking hours. Chew all food well.    5. Diet: Eat pasta, rice, potatoes, hot/dry cereal, toast, breads, pretzels, chips, crackers; yogurt, meat, fish, eggs, cheese, peanut butter, bananas to thicken the stool. Eat 4-6 servings of protien/day.  6. Avoid eating: Seeds, nuts, peels, raw vegetables, chinese vegetables, casings on meats, grape/prume juice, grapefruit, oranges,  "pineapple, mushrooms.   7. NO laxatives. NO timed released medications   8. May shower with appliance on. Blow dry (on cool setting) cloth backing and tape following bathing.   9. Change your appliance in the morning before breakfast.  10. Walk, no heavy lifting  11. Use an odor eliminator (Ex: Febreeze) in the room prior to emptying or changing appliance  12. Initially monitor your output to avoid dehydration.   Call Physician  if your output exceeds 1500cc/24hrs.      Supplies: Lian New Image 2-pc (20 pouch changes/month)   1. Barrier New Image: Flat                                                    #11963 =  4 bx/month   2.  Pouch: high output                                                          #[id number]  = 2bx/month     and/or  3. Pouch: drainable lock n roll ultra clear                                #71504  = 2bx/month  4.  Optional if leakage Regan Cera ring                           #8805    =  2bx/month  5.  Optional if skin irritation:3M No Sting skin Prep                 #3342 = 1bx/month     Procedure for Ileostomy Pouch Change  1. Draw and cut opening in barrier/pouch following pattern. Cut outside the line that was drawn, approx 1 3/8\"   2. Remove plastic backing   3. Optional if leakage:  Open ring and stretch to approx size of opening in barrier. Apply around opening on sticky side of barrier. Press into place.   4. Fold back edges of paper that covers the tape to create a \"tab\". This assists with paper removal in Step #9. OK to snap pouch of choice on barrier at this step and apply as a 1-pc pouch  5. Set barrier aside.   6. Remove pouch from around stoma. Discard.   7. Cleanse skin around stoma with water. Pat dry.  8. Center stoma in barrier opening.      Press barrier down to skin  9 .Pull on \"tab\" to remove paper that covers the tape. Press tape to skin   10.Snap on pouch of choice  11. Close lock n roll closure on end of the drainable pouch or plug on high output " pouch      Follow-up  Danita Porter/Marvin Judd/ Cynthia Mercedes  CWOCN's (ostomy nurse)   [phone]   Call for any ?/concerns   You are being discharged with home care services through [facility]. [facility] will contact you to schedule initial visit. If you have any questions prior to this call, please contact the 24 hour patient line at [phone].

## 2017-11-20 NOTE — PROGRESS NOTES
didn't have supplies for pt to be able to d/c. [facility] nurse notified, attempting to find a back up solution for pt    Dyllan from [facility] f/ stating it's okay to d/c pt with supplies she has now and will follow up with her

## 2017-11-20 NOTE — PROGRESS NOTES
"General Surgery Progress Note    Admission Date: 11/7/2017  Today's Date: 11/20/2017         Assessment:      Suma Calabrese is a 62 year old female   S/P ex lap, subtotal colectomy, ileostomy  POD 7         Plan:   Discharge to home today after meeting with [facility] nurse. Ostomy output improved, loperamide as needed. Regular diet with boost, patient understands importance of nutrition in her healing process. Work on increasing ambulation at home. Home health set up.           Interval History:   Afebrile, VSS on room air. Patient feels well today, eager to go home. Does not yet feel comfortable with ostomy appliance, appreciates plan for training here. High ostomy output decreasing, took one dose of loperamide yesterday morning. 525cc out yesterday vs 1225cc the day prior. Denies pain or nausea, tolerating regular diet.          Physical Exam:   /85 (BP Location: Left arm)  Pulse 65  Temp 98.3  F (36.8  C) (Oral)  Resp 16  Ht 1.702 m (5' 7\")  Wt 61.5 kg (135 lb 9.3 oz)  LMP  (LMP Unknown)  SpO2 96%  BMI 21.24 kg/m2  I/O last 3 completed shifts:  In: 1480 [P.O.:1480]  Out: 3550 [Urine:3025; Stool:525]  General: NAD, pleasant, alert and oriented x3  Abdomen: soft, minimally tender, non distended; ostomy healthy and pink with soft stool and gas in bag  Incision: midline abdominal incision clean, dry, and intact with staples in place  Extremities: no edema    LABS:  Recent Labs   Lab Test  11/20/17   0705  11/17/17   0747  11/17/17   0010  11/16/17   1040  11/14/17   0655   WBC   --   7.5   --   7.7  10.5   HGB   --   11.0*   --   10.2*  12.4   MCV   --   94   --   96  96   PLT  241  228  207  213  175                    Judy Carter PA-C  [facility]: [phone]  Pager: [phone]@[email or web address] (7am-5pm)      "

## 2017-11-20 NOTE — PLAN OF CARE
Problem: Patient Care Overview  Goal: Plan of Care/Patient Progress Review    Physical Therapy Discharge Summary    Reason for therapy discharge:    Discharged to home with home therapy.    Progress towards therapy goal(s). See goals on Care Plan in [facility] electronic health record for goal details.  Goals partially met.  Barriers to achieving goals:   discharge from facility and Goals with progress, pt requires supervision with the use of a walker vs CGA without walker for long distance gait. Able to manuver in the room with independence no AD. Negotiates stairs with supervision and completes transfers with Mod I to supervision. TUG not assessed..    Therapy recommendation(s):    Continued therapy is recommended.  Rationale/Recommendations:  Pt will beneift from contineud PT services to improve balance and strength for optimal safety and indepenendence with functional mobiltiy.

## 2017-11-21 ENCOUNTER — TELEPHONE (OUTPATIENT)
Dept: FAMILY MEDICINE | Facility: CLINIC | Age: 62
End: 2017-11-21

## 2017-11-21 ENCOUNTER — TELEPHONE (OUTPATIENT)
Dept: NURSING | Facility: CLINIC | Age: 62
End: 2017-11-21

## 2017-11-21 NOTE — TELEPHONE ENCOUNTER
"ED/Discharge Protocol    \"Hi, my name is Demetrice MAGED Barnes, a registered nurse, and I am calling on behalf of Dr.Kenneth Kenney's office at [place].  I am calling to follow up and see how things are going for you after your recent visit.\"    \"I see that you were in the (ER/UC/IP) on 11/13/2017to 11/20/2017.    How are you doing now that you are home?\" ok.    Is patient experiencing symptoms that may require a hospital visit?  no    Discharge Instructions    \"Let's review your discharge instructions.  What is/are the follow-up recommendations?  Pt. Response: reviewed discharge instructions. Homecare will be starting today    \"Were you instructed to make a follow-up appointment?\"  Pt. Response: No. Pt will start making appts for oncology, surgeon and Dr Kenney next week.      \"When you see the provider, I would recommend that you bring your discharge instructions with you.    Medications    \"How many new medications are you on since your hospitalization/ED visit?\"    0-1 and 2 or more - Epic MTM referral needed  \"How many of your current medicines changed (dose, timing, name, etc.) while you were in the hospital/ED visit?\"   2 or more - Epic MTM referral needed  \"Do you have questions about your medications?\"   No  \"Were you newly diagnosed with heart failure, COPD, diabetes or did you have a heart attack?\"   No  For patients on insulin: \"Did you start on insulin in the hospital or did you have your insulin dose changed?\"   No    Medication reconciliation completed? Yes    Was MTM referral placed (*Make sure to put transitions as reason for referral)?   No    Call Summary    \"Do you have any questions or concerns about your condition or care plan at the moment?\"    No  Triage nurse advice given: she will have help from Home care nurse and call if she has an questions.  She will make an appt with Dr Kenney    Patient was in ER 3 X in the past year (assess appropriateness of ER visits.)      \"If you have " "questions or things don't continue to improve, we encourage you contact us through the main clinic number,  [phone].  Even if the clinic is not open, triage nurses are available 24/7 to help you.     We would like you to know that our clinic has extended hours (provide information).  We also have urgent care (provide details on closest location and hours/contact info)\"      \"Thank you for your time and take care!\"        "

## 2017-11-21 NOTE — TELEPHONE ENCOUNTER
[facility]  [phone] called for ok on orders:    Skilled ursing 2 X week for 3 weeks and 3 prn's   PT & OT evaluation and treatment   OK to change ostomy dressing 2 X week and prn    OK given and she will send over orders for signature

## 2017-11-22 ENCOUNTER — TELEPHONE (OUTPATIENT)
Dept: FAMILY MEDICINE | Facility: CLINIC | Age: 62
End: 2017-11-22

## 2017-11-22 ENCOUNTER — OFFICE VISIT (OUTPATIENT)
Dept: SURGERY | Facility: CLINIC | Age: 62
End: 2017-11-22
Payer: COMMERCIAL

## 2017-11-22 ENCOUNTER — TELEPHONE (OUTPATIENT)
Dept: SURGERY | Facility: CLINIC | Age: 62
End: 2017-11-22

## 2017-11-22 VITALS
DIASTOLIC BLOOD PRESSURE: 68 MMHG | BODY MASS INDEX: 21.19 KG/M2 | HEART RATE: 73 BPM | HEIGHT: 67 IN | RESPIRATION RATE: 16 BRPM | WEIGHT: 135 LBS | SYSTOLIC BLOOD PRESSURE: 122 MMHG | OXYGEN SATURATION: 99 %

## 2017-11-22 DIAGNOSIS — Z09 SURGICAL FOLLOWUP VISIT: Primary | ICD-10-CM

## 2017-11-22 DIAGNOSIS — N64.89 SEROMA OF BREAST: ICD-10-CM

## 2017-11-22 PROCEDURE — 10140 I&D HMTMA SEROMA/FLUID COLLJ: CPT | Mod: 78 | Performed by: SURGERY

## 2017-11-22 PROCEDURE — 99024 POSTOP FOLLOW-UP VISIT: CPT | Performed by: SURGERY

## 2017-11-22 NOTE — MR AVS SNAPSHOT
After Visit Summary   11/22/2017    Suma Calabrese    MRN: [id number]           Patient Information     Date Of Birth          1955        Visit Information        Provider Department      11/22/2017 12:30 PM Van Salazar MD [facility] Surgery      Today's Diagnoses     Surgical followup visit    -  1       Follow-ups after your visit        Your next 10 appointments already scheduled     Nov 30, 2017 10:00 AM CST   Post Op with Van Salazar MD   [facility] ([facility])    [address]  [facility] [id number] [phone]              Who to contact     If you have questions or need follow up information about today's clinic visit or your schedule please contact [facility] directly at [phone].  Normal or non-critical lab and imaging results will be communicated to you by MyChart, letter or phone within 4 business days after the clinic has received the results. If you do not hear from us within 7 days, please contact the clinic through [employer] or phone. If you have a critical or abnormal lab result, we will notify you by phone as soon as possible.  Submit refill requests through [employer] or call your pharmacy and they will forward the refill request to us. Please allow 3 business days for your refill to be completed.          Additional Information About Your Visit        MyChart Information     [employer] gives you secure access to your electronic health record. If you see a primary care provider, you can also send messages to your care team and make appointments. If you have questions, please call your primary care clinic.  If you do not have a primary care provider, please call [phone] and they will assist you.        Care EveryWhere ID     This is your Care EveryWhere ID. This could be used by other organizations to access your [facility]  "records  [id number]        Your Vitals Were     Pulse Respirations Height Last Period Pulse Oximetry BMI (Body Mass Index)    73 16 5' 7\" (1.702 m) (LMP Unknown) 99% 21.14 kg/m2       Blood Pressure from Last 3 Encounters:   11/22/17 122/68   11/20/17 112/65   11/06/17 126/70    Weight from Last 3 Encounters:   11/22/17 135 lb (61.2 kg)   11/20/17 135 lb 9.3 oz (61.5 kg)   11/06/17 126 lb (57.2 kg)              Today, you had the following     No orders found for display       Primary Care Provider Office Phone # Fax #    Murali Kenney [phone] [address] [facility] [address]        Equal Access to Services     [facility]: Hadii beata edmonds hadasho Soomaali, waaxda luqadaha, qaybta kaalmada adeegyada, omar aguayo . So [facility] [phone].    ATENCIÓN: Si habla español, tiene a yepez disposición servicios gratuitos de asistencia lingüística. Llame al [phone].    We comply with applicable federal civil rights laws and [place] laws. We do not discriminate on the basis of race, color, national origin, age, disability, sex, sexual orientation, or gender identity.            Thank you!     Thank you for choosing [facility]  for your care. Our goal is always to provide you with excellent care. Hearing back from our patients is one way we can continue to improve our services. Please take a few minutes to complete the written survey that you may receive in the mail after your visit with us. Thank you!             Your Updated Medication List - Protect others around you: Learn how to safely use, store and throw away your medicines at www.disposemymeds.org.          This list is accurate as of: 11/22/17  1:17 PM.  Always use your most recent med list.                   Brand Name Dispense Instructions for use Diagnosis    ADVIL PO      Take 200 mg by mouth every 4 hours as needed for moderate pain        CLARITIN PO      Take 10 mg by " mouth daily as needed        diazepam 2 MG tablet    VALIUM    12 tablet    Take 1 tablet (2 mg) by mouth every 6 hours as needed for anxiety    Diverticulitis of colon       HYDROmorphone 2 MG tablet    DILAUDID    18 tablet    Take 1 tablet (2 mg) by mouth every 3 hours as needed for moderate to severe pain    Small bowel obstruction       levothyroxine 75 MCG tablet    SYNTHROID    90 tablet    Take 1 tablet (75 mcg) by mouth every morning    Hypothyroidism due to acquired atrophy of thyroid       loperamide 2 MG capsule    IMODIUM    20 capsule    Take 1 capsule (2 mg) by mouth 4 times daily as needed for diarrhea    Functional diarrhea       PRAVACHOL PO      Take 20 mg by mouth At Bedtime        psyllium Packet    METAMUCIL/KONSYL    60 packet    Take 1 packet by mouth 2 times daily    Diverticulitis of colon       VITAMIN D (CHOLECALCIFEROL) PO      Take 1,000 Units by mouth every morning

## 2017-11-22 NOTE — LETTER
2017    Re: Suma Calabrese - 1955  Suma returns after a hospitalization for severe diverticulitis requiring subtotal colectomy and end ileostomy. After she was discharged she noticed some swelling in her left breast mastectomy site. She does not have any significant pain or tenderness. She is other replies recovering well from her surgery. She still has a decreased appetite but it is improving. Her ostomy is functioning well. She feels strong and able to perform activities. She does not have any pain at this time.     Breast-right chest incision completely healed without seroma. Left-some fluctuation and obvious seroma. No erythema.  Abdomen Soft Non-Tender. Ostomy patent. Incision healing well.     A/P  Suma Calabrese returns with left breast seroma after prolonged hospitalization requiring subtotal colectomy. She is recovering well from her recent surgery and is improving daily. I would recommend aspiration of seroma in the office today. I will have her return in 1 week for staple removal and possible re-aspiration of seroma.     Procedure  Consent was obtained. A surgical time out was then performed. The area was cleaned with chloraprep.  Using a sterile technique a 18-gauge needle was inserted near the seroma site. Approximate 60 cc of straw-colored fluid was removed. This collapsed the cavity completely. There was no bleeding. A sterile Band-Aid was applied. Patient tolerated the  procedure well without complications.        Thank you for the opportunity to help in her care.     Van Salazar M.D.  [facility]  [phone]

## 2017-11-22 NOTE — PROGRESS NOTES
Surgery Postoperative Note    Suma returns after a hospitalization for severe diverticulitis requiring subtotal colectomy and end ileostomy. After she was discharged she noticed some swelling in her left breast mastectomy site. She does not have any significant pain or tenderness. She is other replies recovering well from her surgery. She still has a decreased appetite but it is improving. Her ostomy is functioning well. She feels strong and able to perform activities. She does not have any pain at this time.    Breast-right chest incision completely healed without seroma. Left-some fluctuation and obvious seroma. No erythema.  Abdomen Soft Non-Tender. Ostomy patent. Incision healing well.    A/P  Suma Calabrese returns with left breast seroma after prolonged hospitalization requiring subtotal colectomy. She is recovering well from her recent surgery and is improving daily. I would recommend aspiration of seroma in the office today. I will have her return in 1 week for staple removal and possible re-aspiration of seroma.    Procedure  Consent was obtained. A surgical time out was then performed. The area was cleaned with chloraprep.  Using a sterile technique a 18-gauge needle was inserted near the seroma site. Approximate 60 cc of straw-colored fluid was removed. This collapsed the cavity completely. There was no bleeding. A sterile Band-Aid was applied. Patient tolerated the  procedure well without complications.      Thank you for the opportunity to help in her care.    Van Salazar M.D.  [facility]  [phone]    Please route or send letter to:  Primary Care Provider (PCP) and Referring Provider

## 2017-11-22 NOTE — TELEPHONE ENCOUNTER
Name of caller: Patient    Reason for Call:  questions    Surgeon:  Dr. Salazar    Recent Surgery:  Yes.    If yes, when & what type:  Bilateral mastectomies  10/26/17      Best phone number to reach pt at is: [phone]  Ok to leave a message with medical info? Yes.    Pharmacy preferred (if calling for a refill): na

## 2017-11-22 NOTE — TELEPHONE ENCOUNTER
Lashanda PT from [facility] called, eval completed without any concerns. Pt noticed vein in the right bicep look bigger this morning. PT felt like it was getting bigger and firm in bicep. Not warm and not tight.     11/16/17 Ultrasound     IMPRESSION:   1. No evidence of deep vein thrombosis.  2. Superficial thrombophlebitis in the right cephalic vein.    Patient was called. This AM the vein looked more swollen and bigger on the inside of the bicep closer the elbow. She did go into the surgeon today to get her left breast drained. He said to put at heat pack on it and take some advil, but did not see a concern. If painful or red he would suspect infection there. Double masectomy on oct 16th. Now, it looks about the same as it did this morning. Area is somewhat tender, a little warm, not red, no red streaks, no fever. Pt able to move and raise the arm. Did not have a chance to put a warm pack on it yet.     NURSING PLAN: Nursing advice to patient given    RECOMMENDED DISPOSITION:  See in 24 hours - Pt will call back for an OV or go to urgent care today if red, more tender, or more swollen.   Will comply with recommendation: Yes  If further questions/concerns or if symptoms do not improve, worsen or new symptoms develop, call your PCP or [facility] as soon as possible.      Guideline used:  Telephone Triage Protocols for Nurses, Fifth Edition, Danita Saini RN

## 2017-11-22 NOTE — TELEPHONE ENCOUNTER
Patient calling reporting significant amount of fluid accumulation on left side. She denies pain, fever, redness, or warmth. She would like it to be drained today if possible.      Per Dr. Salazar, pt. Should come in after lunch and he should be paged when she arrives.    Carie Juarez RN

## 2017-11-25 NOTE — ANESTHESIA POSTPROCEDURE EVALUATION
Patient: Suma Calabrese    Procedure(s):  EXPLORATORY LAPAROTOMY, OPEN SUBTOTAL  COLECTOMY, END ILEOSTOMY - Wound Class: II-Clean Contaminated   - Wound Class: II-Clean Contaminated   - Wound Class: II-Clean Contaminated    Diagnosis:unknown  Diagnosis Additional Information: No value filed.    Anesthesia Type:  General, ETT, RSI    Note:  Anesthesia Post Evaluation    Patient location during evaluation: PACU  Patient participation: Able to fully participate in evaluation  Level of consciousness: responsive to verbal stimuli and sleepy but conscious  Pain management: adequate  Airway patency: patent  Cardiovascular status: acceptable  Respiratory status: acceptable  Hydration status: acceptable  PONV: none     Anesthetic complications: None          Last vitals:  Vitals:    11/20/17 0021 11/20/17 0735 11/20/17 1231   BP: 120/80 130/85 112/65   Pulse:  65 68   Resp: 16 16 16   Temp: 37  C (98.6  F) 36.8  C (98.3  F) 36.7  C (98  F)   SpO2: 95% 96% 98%         Electronically Signed By: Mike Arce MD  November 24, 2017  10:22 PM

## 2017-11-27 ENCOUNTER — OFFICE VISIT (OUTPATIENT)
Dept: FAMILY MEDICINE | Facility: CLINIC | Age: 62
End: 2017-11-27
Payer: COMMERCIAL

## 2017-11-27 VITALS
OXYGEN SATURATION: 100 % | SYSTOLIC BLOOD PRESSURE: 120 MMHG | TEMPERATURE: 98.2 F | WEIGHT: 115 LBS | HEART RATE: 73 BPM | DIASTOLIC BLOOD PRESSURE: 78 MMHG | BODY MASS INDEX: 18.01 KG/M2

## 2017-11-27 DIAGNOSIS — R20.2 NUMBNESS AND TINGLING OF RIGHT UPPER EXTREMITY: Primary | ICD-10-CM

## 2017-11-27 DIAGNOSIS — I80.8 THROMBOPHLEBITIS ARM: ICD-10-CM

## 2017-11-27 DIAGNOSIS — R20.0 NUMBNESS AND TINGLING OF RIGHT UPPER EXTREMITY: Primary | ICD-10-CM

## 2017-11-27 PROCEDURE — 99213 OFFICE O/P EST LOW 20 MIN: CPT | Performed by: PHYSICIAN ASSISTANT

## 2017-11-27 NOTE — NURSING NOTE
"Chief Complaint   Patient presents with     Musculoskeletal Problem     IV spot pain       Initial /78  Pulse 73  Temp 98.2  F (36.8  C) (Tympanic)  Wt 115 lb (52.2 kg)  LMP  (LMP Unknown)  SpO2 100%  BMI 18.01 kg/m2 Estimated body mass index is 18.01 kg/(m^2) as calculated from the following:    Height as of 11/22/17: 5' 7\" (1.702 m).    Weight as of this encounter: 115 lb (52.2 kg).  Medication Reconciliation: complete   .Silviano SINGLETON      "

## 2017-11-27 NOTE — PROGRESS NOTES
SUBJECTIVE:   Suma Calabrese is a 62 year old female who presents to clinic today for the following health issues:      IV location numbness and pain      Duration: 1 week    Description (location/character/radiation): right arm, elbow, forearm    Intensity:  moderate    Accompanying signs and symptoms: pain, numbness    History (similar episodes/previous evaluation): None    Precipitating or alleviating factors: None    Therapies tried and outcome: None       HPI additional notes:  Chief Complaint   Patient presents with     Musculoskeletal Problem     IV spot pain      Hansa presents today with pain and numbness in RUE x1 week. Patient underwent partial colectomy 11/13/2017. US RUE obtained 11/16/2017 due to swelling and pain; found to have superficial thrombophlebitis. Patient has been applying heat and using ibuprofen for pain. Area is  and swollen, but no redness or streaking. Notes onset of intermittent pain and numbness along lateral Rt forearm x1 week; patient also had IV placed in this area after IV in antecubital area needed to be replaced.     ROS:    A Comprehensive greater than 10 system review of systems was carried out.    Pertinent positives and negatives are noted above.  Otherwise negative for contributory information.    Chart Review:  History   Smoking Status     Former Smoker     Packs/day: 0.50     Years: 40.00     Types: Cigarettes     Quit date: 11/6/2017   Smokeless Tobacco     Never Used     Comment: 3/4 PPD       Patient Active Problem List   Diagnosis     Nontoxic uninodular goiter     Rectal prolapse     Hypertension goal BP (blood pressure) < 140/80     Internal hemorrhoids with other complication     Tobacco use disorder     Hypothyroidism due to acquired atrophy of thyroid     Diverticulitis of colon     Malignant neoplasm of upper-outer quadrant of right breast in female, estrogen receptor positive (H)     S/P bilateral mastectomy     Hyponatremia     Hypokalemia      Diverticulitis large intestine     Past Surgical History:   Procedure Laterality Date     BIOPSY       BIOPSY NODE SENTINEL Bilateral 10/16/2017    Procedure: BIOPSY NODE SENTINEL;;  Surgeon: Van Salazar MD;  Location: [facility]     bladder dilitation[  age 17     COLECTOMY SUBTOTAL Left 11/13/2017    Procedure: COLECTOMY SUBTOTAL;  EXPLORATORY LAPAROTOMY, OPEN SUBTOTAL  COLECTOMY, END ILEOSTOMY;  Surgeon: May Spain MD;  Location:  OR     DENTAL SURGERY      wisdom tooth extraction     ILEOSTOMY N/A 11/13/2017    Procedure: ILEOSTOMY;;  Surgeon: May Spain MD;  Location:  OR     LAPAROTOMY EXPLORATORY N/A 11/13/2017    Procedure: LAPAROTOMY EXPLORATORY;;  Surgeon: May Spain MD;  Location:  OR     MASTECTOMY SIMPLE BILATERAL Bilateral 10/16/2017    Procedure: MASTECTOMY SIMPLE BILATERAL;  BILATERAL SIMPLE MASTECTOMY, BILATERAL AXILLARY SENTINEL NODE BIOPSY;  Surgeon: Van Salazar MD;  Location: [facility]     Problem list, Medication list, Allergies, Medical/Social/Surg hx reviewed in [facility], updated as appropriate.   OBJECTIVE:                                                    /78  Pulse 73  Temp 98.2  F (36.8  C) (Tympanic)  Wt 115 lb (52.2 kg)  LMP  (LMP Unknown)  SpO2 100%  BMI 18.01 kg/m2  Body mass index is 18.01 kg/(m^2).  GENERAL:  WDWN, no acute distress  PSYCH: pleasant, cooperative  EYES: no discharge, no injection  HENT:  Normocephalic. Moist mucus membranes.  NECK:  Supple, symmetric  EXTREMITIES:  No gross deformities, moves all 4 limbs spontaneously, no peripheral edema. RUE ROM intact and non-painful, no TTP, sensation grossly intact, strength 5/5 throughout.  SKIN: Enlarged, soft, tender vein in antecubital fossa; no erythema, warmth, or lymphangitic streaking.    NEUROLOGIC: alert, sensation grossly intact.    Diagnostic test results: none     ASSESSMENT/PLAN:                                                          ICD-10-CM    1. Numbness and  "tingling of right upper extremity R20.0     R20.2    2. Thrombophlebitis arm I80.8      Thrombophlebitis appears to be resolving, continue use of heat at least twice daily to encourage resorption and minimize swelling. Continue use of ibuprofen prn pain. Pain issues likely due to thrombophlebitis and venous congestion \"down stream.\" Discussed pathophysiology. Recommend keeping affected extremity elevated several times per day to help with swelling and congestion. Will improve gradually over the course of the next month or so.    Please see patient instructions for treatment details.    Follow up in 7-10 days if not improving as anticipated, sooner PRN.    Trish Benoit PA-C  [facility]       "

## 2017-11-27 NOTE — MR AVS SNAPSHOT
After Visit Summary   11/27/2017    Suma Calabrese    MRN: [id number]           Patient Information     Date Of Birth          1955        Visit Information        Provider Department      11/27/2017 11:30 AM Trish Benoit PA-C [facility]        Today's Diagnoses     Numbness and tingling of right upper extremity    -  1    Thrombophlebitis arm           Follow-ups after your visit        Your next 10 appointments already scheduled     Nov 30, 2017 10:00 AM CST   Post Op with Van Salazar MD   [facility]    [address]  [facility] [address]   [phone]            Dec 01, 2017 11:00 AM CST   New Visit with Celi Powers MD   [facility] ([facility])    [facility]  [address]  [facility] [address]   [phone]            Dec 01, 2017  2:15 PM CST   Office Visit with Murali Kenney MD   [facility]    [address]  [address]  [facility] [address] [phone]           Bring a current list of meds and any records pertaining to this visit. For Physicals, please bring immunization records and any forms needing to be filled out. Please arrive 10 minutes early to complete paperwork.              Who to contact     If you have questions or need follow up information about today's clinic visit or your schedule please contact [facility] directly at [phone].  Normal or non-critical lab and imaging results will be communicated to you by MyChart, letter or phone within 4 business days after the clinic has received the results. If you do not hear from us within 7 days, please contact the clinic through MyChart or phone. If you have a critical or abnormal lab result, we will notify you by phone  as soon as possible.  Submit refill requests through [employer] or call your pharmacy and they will forward the refill request to us. Please allow 3 business days for your refill to be completed.          Additional Information About Your Visit        [employer] Information     [employer] gives you secure access to your electronic health record. If you see a primary care provider, you can also send messages to your care team and make appointments. If you have questions, please call your primary care clinic.  If you do not have a primary care provider, please call [phone] and they will assist you.        Care EveryWhere ID     This is your Care EveryWhere ID. This could be used by other organizations to access your [place] medical records  [id number]        Your Vitals Were     Pulse Temperature Last Period Pulse Oximetry BMI (Body Mass Index)       73 98.2  F (36.8  C) (Tympanic) (LMP Unknown) 100% 18.01 kg/m2        Blood Pressure from Last 3 Encounters:   11/27/17 120/78   11/22/17 122/68   11/20/17 112/65    Weight from Last 3 Encounters:   11/27/17 115 lb (52.2 kg)   11/22/17 135 lb (61.2 kg)   11/20/17 135 lb 9.3 oz (61.5 kg)              Today, you had the following     No orders found for display       Primary Care Provider Office Phone # Fax #    Murali Aria Kenney [phone] [address] [facility] [address]        Equal Access to Services     [facility] : Hadii beata ruckero Soguy, waaxda luqadaha, qaybta kaalmada staryaashleigh, omar moraes. So [facility] [phone].    ATENCIÓN: Si habla español, tiene a yepez disposición servicios gratuitos de asistencia lingüística. Llame al [phone].    We comply with applicable federal civil rights laws and [place] laws. We do not discriminate on the basis of race, color, national origin, age, disability, sex, sexual orientation, or gender identity.            Thank you!     Thank you for choosing  [facility]  for your care. Our goal is always to provide you with excellent care. Hearing back from our patients is one way we can continue to improve our services. Please take a few minutes to complete the written survey that you may receive in the mail after your visit with us. Thank you!             Your Updated Medication List - Protect others around you: Learn how to safely use, store and throw away your medicines at www.disposemymeds.org.          This list is accurate as of: 11/27/17 12:23 PM.  Always use your most recent med list.                   Brand Name Dispense Instructions for use Diagnosis    ADVIL PO      Take 200 mg by mouth every 4 hours as needed for moderate pain        CLARITIN PO      Take 10 mg by mouth daily as needed        diazepam 2 MG tablet    VALIUM    12 tablet    Take 1 tablet (2 mg) by mouth every 6 hours as needed for anxiety    Diverticulitis of colon       HYDROmorphone 2 MG tablet    DILAUDID    18 tablet    Take 1 tablet (2 mg) by mouth every 3 hours as needed for moderate to severe pain    Small bowel obstruction       levothyroxine 75 MCG tablet    SYNTHROID    90 tablet    Take 1 tablet (75 mcg) by mouth every morning    Hypothyroidism due to acquired atrophy of thyroid       loperamide 2 MG capsule    IMODIUM    20 capsule    Take 1 capsule (2 mg) by mouth 4 times daily as needed for diarrhea    Functional diarrhea       PRAVACHOL PO      Take 20 mg by mouth At Bedtime        psyllium Packet    METAMUCIL/KONSYL    60 packet    Take 1 packet by mouth 2 times daily    Diverticulitis of colon       VITAMIN D (CHOLECALCIFEROL) PO      Take 1,000 Units by mouth every morning

## 2017-11-30 ENCOUNTER — OFFICE VISIT (OUTPATIENT)
Dept: SURGERY | Facility: CLINIC | Age: 62
End: 2017-11-30
Payer: COMMERCIAL

## 2017-11-30 ENCOUNTER — TELEPHONE (OUTPATIENT)
Dept: ONCOLOGY | Facility: CLINIC | Age: 62
End: 2017-11-30

## 2017-11-30 VITALS
DIASTOLIC BLOOD PRESSURE: 76 MMHG | WEIGHT: 115 LBS | HEIGHT: 67 IN | BODY MASS INDEX: 18.05 KG/M2 | SYSTOLIC BLOOD PRESSURE: 132 MMHG | HEART RATE: 74 BPM

## 2017-11-30 DIAGNOSIS — Z09 SURGICAL FOLLOWUP VISIT: Primary | ICD-10-CM

## 2017-11-30 PROCEDURE — 99024 POSTOP FOLLOW-UP VISIT: CPT | Performed by: SURGERY

## 2017-11-30 NOTE — PROGRESS NOTES
Surgery Postoperative Note    Feeling much better this week. Ostomy output stable with Metamucil. No pain. Minimal swelling of her breast. Energy level increasing.       Breast-right chest incision completely healed without seroma. Left- Very small fluctuation and obvious seroma. No erythema.  Abdomen Soft Non-Tender. Ostomy patent. Incision healing well.Staples removed    A/P  Doing well and improving. She would like to go back to work in approximately 2 weeks.  She has an appointment with oncology tomorrow to discuss her breast malignancy. I will have her return in 2-3 weeks.    Procedure  Consent was obtained. A surgical time out was then performed. The area was cleaned with chloraprep.  Using a sterile technique a 18-gauge needle was inserted near the seroma site. Approximate 20 cc of straw-colored fluid was removed. This collapsed the cavity completely. There was no bleeding. A sterile Band-Aid was applied. Patient tolerated the  procedure well without complications.      Thank you for the opportunity to help in her care.    Van Salazar M.D.  [facility]  [phone]    Please route or send letter to:  Primary Care Provider (PCP) and Referring Provider

## 2017-11-30 NOTE — LETTER
[facility]  [address]  [facility] [id number] [phone]          November 30, 2017    RE:  Suma Calabrese                                                                                                                                                       [address] [facility] [address]            To whom it may concern:    Suma Calabrese is under my professional care for surgery. She may return to work with the following: The employee is UNABLE to return to work until 12/11/17    When the patient returns to work, she will be free of restrictions.       Sincerely,        Van Salazar MD/Magdalena Cole MA

## 2017-11-30 NOTE — MR AVS SNAPSHOT
After Visit Summary   11/30/2017    Suma Calabrese    MRN: [id number]           Patient Information     Date Of Birth          1955        Visit Information        Provider Department      11/30/2017 10:00 AM Van Salazar MD [facility]       Follow-ups after your visit        Your next 10 appointments already scheduled     Dec 01, 2017 11:00 AM CST   New Visit with Celi Powers MD   [facility] ([facility])    [facility]  [address]  [facility] [address] [phone]            Dec 01, 2017  2:15 PM CST   Office Visit with Murali Kenney MD   [facility] ([facility])    [address]  [address]  [facility] [id number] [phone]           Bring a current list of meds and any records pertaining to this visit. For Physicals, please bring immunization records and any forms needing to be filled out. Please arrive 10 minutes early to complete paperwork.            Dec 14, 2017 10:00 AM CST   Post Op with Van Salazar MD   [facility]    [address]  [facility] [id number] [phone]              Who to contact     If you have questions or need follow up information about today's clinic visit or your schedule please contact [facility] directly at [phone].  Normal or non-critical lab and imaging results will be communicated to you by MyChart, letter or phone within 4 business days after the clinic has received the results. If you do not hear from us within 7 days, please contact the clinic through [employer] or phone. If you have a critical or abnormal lab result, we will notify you by phone as soon as possible.  Submit refill requests through makexyz or call your pharmacy and they will  "forward the refill request to us. Please allow 3 business days for your refill to be completed.          Additional Information About Your Visit        [employer] Information     [employer] gives you secure access to your electronic health record. If you see a primary care provider, you can also send messages to your care team and make appointments. If you have questions, please call your primary care clinic.  If you do not have a primary care provider, please call [phone] and they will assist you.        Care EveryWhere ID     This is your Care EveryWhere ID. This could be used by other organizations to access your [place] medical records  [id number]        Your Vitals Were     Pulse Height Last Period BMI (Body Mass Index)          74 5' 6.5\" (1.689 m) (LMP Unknown) 18.28 kg/m2         Blood Pressure from Last 3 Encounters:   11/30/17 132/76   11/27/17 120/78   11/22/17 122/68    Weight from Last 3 Encounters:   11/30/17 115 lb (52.2 kg)   11/27/17 115 lb (52.2 kg)   11/22/17 135 lb (61.2 kg)              Today, you had the following     No orders found for display       Primary Care Provider Office Phone # Fax #    Murali Aria Kenney [phone] [address] [facility] [address]        Equal Access to Services     SANJUANA ROMERO : Hadii aad ku hadasho Soomaali, waaxda luqadaha, qaybta kaalmada adeegyada, omar moraes. So [facility] [phone].    ATENCIÓN: Si habla español, tiene a yepez disposición servicios gratuitos de asistencia lingüística. Llpetra al [phone].    We comply with applicable federal civil rights laws and [place] laws. We do not discriminate on the basis of race, color, national origin, age, disability, sex, sexual orientation, or gender identity.            Thank you!     Thank you for choosing [facility]  for your care. Our goal is always to provide you with excellent care. Hearing back from our patients is one way we " can continue to improve our services. Please take a few minutes to complete the written survey that you may receive in the mail after your visit with us. Thank you!             Your Updated Medication List - Protect others around you: Learn how to safely use, store and throw away your medicines at www.disposemymeds.org.          This list is accurate as of: 11/30/17 10:41 AM.  Always use your most recent med list.                   Brand Name Dispense Instructions for use Diagnosis    ADVIL PO      Take 200 mg by mouth every 4 hours as needed for moderate pain        CLARITIN PO      Take 10 mg by mouth daily as needed        diazepam 2 MG tablet    VALIUM    12 tablet    Take 1 tablet (2 mg) by mouth every 6 hours as needed for anxiety    Diverticulitis of colon       HYDROmorphone 2 MG tablet    DILAUDID    18 tablet    Take 1 tablet (2 mg) by mouth every 3 hours as needed for moderate to severe pain    Small bowel obstruction       levothyroxine 75 MCG tablet    SYNTHROID    90 tablet    Take 1 tablet (75 mcg) by mouth every morning    Hypothyroidism due to acquired atrophy of thyroid       loperamide 2 MG capsule    IMODIUM    20 capsule    Take 1 capsule (2 mg) by mouth 4 times daily as needed for diarrhea    Functional diarrhea       PRAVACHOL PO      Take 20 mg by mouth At Bedtime        psyllium Packet    METAMUCIL/KONSYL    60 packet    Take 1 packet by mouth 2 times daily    Diverticulitis of colon       VITAMIN D (CHOLECALCIFEROL) PO      Take 1,000 Units by mouth every morning

## 2017-11-30 NOTE — LETTER
2017    Re: Suma Calabrese - 1955    Feeling much better this week. Ostomy output stable with Metamucil. No pain. Minimal swelling of her breast. Energy level increasing.       Breast-right chest incision completely healed without seroma. Left- Very small fluctuation and obvious seroma. No erythema.  Abdomen Soft Non-Tender. Ostomy patent. Incision healing well.Staples removed     A/P  Doing well and improving. She would like to go back to work in approximately 2 weeks.  She has an appointment with oncology tomorrow to discuss her breast malignancy. I will have her return in 2-3 weeks.     Procedure  Consent was obtained. A surgical time out was then performed. The area was cleaned with chloraprep. Using a sterile technique a 18-gauge needle was inserted near the seroma site. Approximate 20 cc of straw-colored fluid was removed. This collapsed the cavity completely. There was no bleeding. A sterile Band-Aid was applied. Patient tolerated the  procedure well without complications.     Thank you for the opportunity to help in her care.     Van Salazar M.D.  [facility]  [phone]

## 2017-11-30 NOTE — TELEPHONE ENCOUNTER
Patient called left message on her voice mail confirming her appointment tomorrow with Dr. Powers at 1100. Verified location of clinic and directions on patient's voice mail. Glenda Floyd RN

## 2017-12-01 ENCOUNTER — ONCOLOGY VISIT (OUTPATIENT)
Dept: ONCOLOGY | Facility: CLINIC | Age: 62
End: 2017-12-01
Attending: INTERNAL MEDICINE
Payer: COMMERCIAL

## 2017-12-01 ENCOUNTER — OFFICE VISIT (OUTPATIENT)
Dept: FAMILY MEDICINE | Facility: CLINIC | Age: 62
End: 2017-12-01
Payer: COMMERCIAL

## 2017-12-01 VITALS
SYSTOLIC BLOOD PRESSURE: 146 MMHG | TEMPERATURE: 97.2 F | BODY MASS INDEX: 18.28 KG/M2 | OXYGEN SATURATION: 100 % | RESPIRATION RATE: 22 BRPM | WEIGHT: 115 LBS | DIASTOLIC BLOOD PRESSURE: 92 MMHG | HEART RATE: 83 BPM

## 2017-12-01 VITALS
HEART RATE: 84 BPM | WEIGHT: 114 LBS | BODY MASS INDEX: 18.12 KG/M2 | DIASTOLIC BLOOD PRESSURE: 72 MMHG | SYSTOLIC BLOOD PRESSURE: 144 MMHG | TEMPERATURE: 98.3 F | OXYGEN SATURATION: 98 % | RESPIRATION RATE: 16 BRPM

## 2017-12-01 DIAGNOSIS — E87.1 HYPONATREMIA: ICD-10-CM

## 2017-12-01 DIAGNOSIS — E87.6 HYPOKALEMIA: ICD-10-CM

## 2017-12-01 DIAGNOSIS — N95.1 SYMPTOMATIC MENOPAUSAL OR FEMALE CLIMACTERIC STATES: ICD-10-CM

## 2017-12-01 DIAGNOSIS — Z93.3 COLOSTOMY IN PLACE (H): ICD-10-CM

## 2017-12-01 DIAGNOSIS — Z17.0 BILATERAL MALIGNANT NEOPLASM OF BREAST IN FEMALE, ESTROGEN RECEPTOR POSITIVE, UNSPECIFIED SITE OF BREAST (H): ICD-10-CM

## 2017-12-01 DIAGNOSIS — K57.20 DIVERTICULITIS OF LARGE INTESTINE WITH PERFORATION AND ABSCESS WITHOUT BLEEDING: Primary | ICD-10-CM

## 2017-12-01 DIAGNOSIS — Z90.13 S/P BILATERAL MASTECTOMY: ICD-10-CM

## 2017-12-01 DIAGNOSIS — Z17.0 MALIGNANT NEOPLASM OF UPPER-OUTER QUADRANT OF RIGHT BREAST IN FEMALE, ESTROGEN RECEPTOR POSITIVE (H): ICD-10-CM

## 2017-12-01 DIAGNOSIS — E03.4 HYPOTHYROIDISM DUE TO ACQUIRED ATROPHY OF THYROID: Chronic | ICD-10-CM

## 2017-12-01 DIAGNOSIS — Z90.49 H/O PARTIAL RESECTION OF COLON: ICD-10-CM

## 2017-12-01 DIAGNOSIS — Z90.13 ABSENCE OF BREAST, BILATERAL: ICD-10-CM

## 2017-12-01 DIAGNOSIS — C50.911 LOBULAR CARCINOMA OF RIGHT BREAST (H): ICD-10-CM

## 2017-12-01 DIAGNOSIS — F17.200 TOBACCO USE DISORDER: Chronic | ICD-10-CM

## 2017-12-01 DIAGNOSIS — K62.3 RECTAL PROLAPSE: ICD-10-CM

## 2017-12-01 DIAGNOSIS — Z78.0 MENOPAUSE: Primary | ICD-10-CM

## 2017-12-01 DIAGNOSIS — C50.911 BILATERAL MALIGNANT NEOPLASM OF BREAST IN FEMALE, ESTROGEN RECEPTOR POSITIVE, UNSPECIFIED SITE OF BREAST (H): ICD-10-CM

## 2017-12-01 DIAGNOSIS — C50.411 MALIGNANT NEOPLASM OF UPPER-OUTER QUADRANT OF RIGHT BREAST IN FEMALE, ESTROGEN RECEPTOR POSITIVE (H): ICD-10-CM

## 2017-12-01 DIAGNOSIS — C50.912 BILATERAL MALIGNANT NEOPLASM OF BREAST IN FEMALE, ESTROGEN RECEPTOR POSITIVE, UNSPECIFIED SITE OF BREAST (H): ICD-10-CM

## 2017-12-01 DIAGNOSIS — I10 HYPERTENSION GOAL BP (BLOOD PRESSURE) < 140/80: ICD-10-CM

## 2017-12-01 DIAGNOSIS — E44.0 MODERATE MALNUTRITION (H): ICD-10-CM

## 2017-12-01 PROCEDURE — 99212 OFFICE O/P EST SF 10 MIN: CPT

## 2017-12-01 PROCEDURE — 99495 TRANSJ CARE MGMT MOD F2F 14D: CPT | Performed by: FAMILY MEDICINE

## 2017-12-01 PROCEDURE — 99205 OFFICE O/P NEW HI 60 MIN: CPT | Performed by: INTERNAL MEDICINE

## 2017-12-01 RX ORDER — ANASTROZOLE 1 MG/1
1 TABLET ORAL DAILY
Qty: 90 TABLET | Refills: 1 | Status: SHIPPED | OUTPATIENT
Start: 2017-12-01 | End: 2018-03-14

## 2017-12-01 RX ORDER — MULTIVIT WITH MINERALS/LUTEIN
1 TABLET ORAL DAILY
COMMUNITY
End: 2021-10-08

## 2017-12-01 RX ORDER — CYANOCOBALAMIN (VITAMIN B-12) 2500 MCG
2500 TABLET, SUBLINGUAL SUBLINGUAL DAILY
Qty: 30 TABLET | Refills: 11 | Status: SHIPPED | OUTPATIENT
Start: 2017-12-01 | End: 2021-01-21

## 2017-12-01 RX ORDER — NICOTINE POLACRILEX 2 MG
1 GUM BUCCAL 2 TIMES DAILY
Qty: 60 TABLET | Refills: 11 | Status: SHIPPED | OUTPATIENT
Start: 2017-12-01 | End: 2018-12-24

## 2017-12-01 RX ORDER — BLOOD PRESSURE TEST KIT-MEDIUM
1 KIT MISCELLANEOUS 2 TIMES DAILY PRN
Qty: 1 EACH | Refills: 0 | Status: SHIPPED | OUTPATIENT
Start: 2017-12-01 | End: 2018-12-24

## 2017-12-01 ASSESSMENT — PAIN SCALES - GENERAL: PAINLEVEL: NO PAIN (0)

## 2017-12-01 NOTE — LETTER
"    12/1/2017         RE: Suma Calabrese  [address] [address]        Dear Colleague,    Thank you for referring your patient, Suma Calabrese, to the [facility]. Please see a copy of my visit note below.    Oncology Rooming Note    December 1, 2017 11:11 AM   Suma Calabrese is a 62 year old female who presents for:    Chief Complaint   Patient presents with     Oncology Clinic Visit     NEW- Malignant neoplasm of upper-outer quadrant of right breast in female, estrogen receptor positive      Initial Vitals: BP (!) 146/92 (BP Location: Left arm, Patient Position: Chair, Cuff Size: Adult Small)  Pulse 83  Temp 97.2  F (36.2  C)  Resp 22  Wt 52.2 kg (115 lb)  LMP  (LMP Unknown)  SpO2 100%  BMI 18.28 kg/m2 Estimated body mass index is 18.28 kg/(m^2) as calculated from the following:    Height as of 11/30/17: 1.689 m (5' 6.5\").    Weight as of this encounter: 52.2 kg (115 lb). Body surface area is 1.56 meters squared.  No Pain (0) Comment: Data Unavailable   No LMP recorded (lmp unknown). Patient is postmenopausal.  Allergies reviewed: Yes  Medications reviewed: Yes    Medications: Medication refills not needed today.  Pharmacy name entered into [id number]: [email or web address] [id number] [place] [address] - [address] [place] [address] AT [address]    Clinical concerns: none     5 minutes for nursing intake (face to face time)     Marvel Donnelly CMA    DISCHARGE PLAN:  1.) Patient will schedule her bone density exam at [facility]. Writer to follow up on results.   2.) Patient will start her Arimidex after bone density. Patient given information on Arimidex through [employer]  3.) Patient scheduled for follow up in 3 months, labs will be done at her primary care clinic.   Next appointments: See patient instruction section  Departure Mode: Ambulatory  Accompanied by: self  15 minutes for nursing discharge (face to face time)   Glenda Floyd, " RN              Again, thank you for allowing me to participate in the care of your patient.        Sincerely,        Celi Powers MD, MD

## 2017-12-01 NOTE — PROGRESS NOTES
SUBJECTIVE:   Suma Calabrese is a 62 year old female who presents to clinic today for the following health issues:          Hospital Follow-up Visit:    Hospital/Nursing Home/IP Rehab Facility: [facility]  Date of Admission: 11/7/2017  Date of Discharge: 11/21/2017  Reason(s) for Admission: bowel obstruction            Problems taking medications regularly:  None       Medication changes since discharge: None       Problems adhering to non-medication therapy:  None    Summary of hospitalization:  [facility] discharge summary reviewed  Diagnostic Tests/Treatments reviewed.  Follow up needed: none  Other Healthcare Providers Involved in Patient s Care:  GASTROINTESTINAL AND BREAST SURGERY   ONCOLOGY   Update since discharge: improved.     Post Discharge Medication Reconciliation:  Yes   Plan of care communicated with patient     Coding guidelines for this visit:  Type of Medical   Decision Making Face-to-Face Visit       within 7 Days of discharge Face-to-Face Visit        within 14 days of discharge   Moderate Complexity [id number]   High Complexity [id number]            BILATERAL BREAST REMOVAL     HORMONAL TREATMENT     NO CHEMO TREATMENT ACCORDING TO PATIENT     LOBULAR CANCER ON RIGHT AND DUCTAL BREAST CANCER ON THE LEFT IN SITU    ACUTE DIVERTICULITIS WITH ABSCESS AND MAY REQUIRE PERMANENT COLOSTOMY    PROPERLY FUNCTION COLOSTOMY      .BEKAH KAUR MD .12/1/2017 6:06 AM .December 2, 2017    Suma Calabrees is a 62 year old female who is who presents with  LIMITED DIET AND LACK OF PROTEIN  NEAR TOTAL COLECTOMY   AT RISK FOR B12 ISSUES   MARKEDLY UNDER WEIGHT   LIMITED DIET  POOR TEETH   Onset : OVER LAST 2 MONTHS   Severity: RECENT COLOSTOMY AND ABSCESS HOSPITALIZATION    NON COOPERATIVE CONCERNING SURGICAL RECOMMENDED TREATMENT IN EMERGENCY ROOM   EVENTUALLY WENT IN WITH WORSENING ABSCESS   VITAMIN D REPLACEMENT    SEASONAL ALLERGIC RHINITIS TREATMENT WITH LORATADIND  LDL OR  "\"BAD\" CHOLESTEROL TREATMENT WITH PRAVASTATIN   THYROID RELPLACEMENT WITHOUT COMPLICATION   RECENT HEAVY ANTIBIOTIC EXPOSURE     Home treatments  DIET AND EXERCISE HORMONAL TREATMENT AND MONITORING ARE PLANNED    Additional Symptoms: FATIGUE AND LIMITED FOOD    Course IMPROVING SLOWLY         .  Current Outpatient Prescriptions   Medication Sig Dispense Refill     Multiple Vitamins-Minerals (CENTRUM SILVER) per tablet Take 1 tablet by mouth daily       Blood Pressure Monitoring (WRIST BLOOD PRESSURE MONITOR) MISC 1 each 2 times daily as needed 1 each 0     Lactobacillus (ACIDOPHILUS PROBIOTIC FORMULA) TABS Take 1 each by mouth 2 times daily 60 tablet 11     cyabnocobalamin (VITAMIN B-12) 2500 MCG sublingual tablet Place 2,500 mcg under the tongue daily 30 tablet 11     psyllium (METAMUCIL/KONSYL) Packet Take 1 packet by mouth 2 times daily 60 packet 0     levothyroxine (SYNTHROID) 75 MCG tablet Take 1 tablet (75 mcg) by mouth every morning 90 tablet 3     Pravastatin Sodium (PRAVACHOL PO) Take 20 mg by mouth At Bedtime       Ibuprofen (ADVIL PO) Take 200 mg by mouth every 4 hours as needed for moderate pain       VITAMIN D, CHOLECALCIFEROL, PO Take 1,000 Units by mouth every morning       Loratadine (CLARITIN PO) Take 10 mg by mouth daily as needed        anastrozole (ARIMIDEX) 1 MG tablet Take 1 tablet (1 mg) by mouth daily (Patient not taking: Reported on 2017) 90 tablet 1          Allergies   Allergen Reactions     Bupropion Other (See Comments)     Jittery and HA     Erythromycin GI Disturbance     Oxycodone Nausea and Vomiting       Immunization History   Administered Date(s) Administered     TD (ADULT, 7+) 2005     TDAP Vaccine (Adacel) 2015         reports that she drinks alcohol.      reports that she does not use illicit drugs.    family history includes Unknown/Adopted in her father and mother.    indicated that her mother is . She indicated that her father is . She " indicated that her maternal grandmother is . She indicated that her maternal grandfather is . She indicated that her paternal grandmother is . She indicated that her paternal grandfather is . She indicated that her daughter is alive. She indicated that her son is alive.      has a past surgical history that includes bladder dilitation[ (age 17); Dental surgery; biopsy; Mastectomy simple bilateral (Bilateral, 10/16/2017); Biopsy node sentinel (Bilateral, 10/16/2017); Colectomy subtotal (Left, 2017); Ileostomy (N/A, 2017); and Laparotomy exploratory (N/A, 2017).     reports that she does not engage in sexual activity.  .  Pediatric History   Patient Guardian Status     Not on file.     Other Topics Concern     Parent/Sibling W/ Cabg, Mi Or Angioplasty Before 65f 55m? No     Social History Narrative         reports that she quit smoking about 3 weeks ago. Her smoking use included Cigarettes. She has a 20.00 pack-year smoking history. She has never used smokeless tobacco.    Medical, social, surgical, and family histories reviewed.    Labs reviewed in EPIC  Patient Active Problem List   Diagnosis     Nontoxic uninodular goiter     Rectal prolapse     Hypertension goal BP (blood pressure) < 140/80     Internal hemorrhoids with other complication     Tobacco use disorder     Hypothyroidism due to acquired atrophy of thyroid     Diverticulitis of colon     Malignant neoplasm of upper-outer quadrant of right breast in female, estrogen receptor positive (H)     S/P bilateral mastectomy     Hyponatremia     Hypokalemia     Diverticulitis large intestine     Past Surgical History:   Procedure Laterality Date     BIOPSY       BIOPSY NODE SENTINEL Bilateral 10/16/2017    Procedure: BIOPSY NODE SENTINEL;;  Surgeon: Van Salazar MD;  Location: [facility]     bladder dilitation[  age 17     COLECTOMY SUBTOTAL Left 2017    Procedure: COLECTOMY SUBTOTAL;  EXPLORATORY  LAPAROTOMY, OPEN SUBTOTAL  COLECTOMY, END ILEOSTOMY;  Surgeon: May Spain MD;  Location:  OR     DENTAL SURGERY      wisdom tooth extraction     ILEOSTOMY N/A 11/13/2017    Procedure: ILEOSTOMY;;  Surgeon: May Spain MD;  Location:  OR     LAPAROTOMY EXPLORATORY N/A 11/13/2017    Procedure: LAPAROTOMY EXPLORATORY;;  Surgeon: May Spain MD;  Location:  OR     MASTECTOMY SIMPLE BILATERAL Bilateral 10/16/2017    Procedure: MASTECTOMY SIMPLE BILATERAL;  BILATERAL SIMPLE MASTECTOMY, BILATERAL AXILLARY SENTINEL NODE BIOPSY;  Surgeon: Van Salazar MD;  Location: [facility]       Social History   Substance Use Topics     Smoking status: Former Smoker     Packs/day: 0.50     Years: 40.00     Types: Cigarettes     Quit date: 11/6/2017     Smokeless tobacco: Never Used      Comment: 3/4 PPD     Alcohol use 0.0 oz/week     0 Standard drinks or equivalent per week      Comment: weekends     Family History   Problem Relation Age of Onset     Unknown/Adopted Mother      Unknown/Adopted Father          Allergies   Allergen Reactions     Bupropion Other (See Comments)     Jittery and HA     Erythromycin GI Disturbance     Oxycodone Nausea and Vomiting     Recent Labs   Lab Test  11/17/17   0747  11/16/17   1040   11/09/17   0930   11/07/17   1340  11/03/17   1125   09/18/17   1006  11/21/16   1653   03/07/16   0857   05/05/15   1030  07/30/14   0814   LDL   --    --    --    --    --    --    --    --    --    --    --   143*   --   99  138*   HDL   --    --    --    --    --    --    --    --    --    --    --   83   --   73  87   TRIG   --    --    --    --    --    --    --    --    --    --    --   46   --   45  49   ALT   --    --    --   12   --   13  13   --    --    --    --   18   --   14   --    CR  0.70  0.66   < >  0.77   < >  0.72  0.66   < >   --   1.15*   < >  0.90   --   0.80  1.00   GFRESTIMATED  84  >90   < >  76   < >  82  >90   < >   --   48*   < >  63   --   73  56*   GFRESTBLACK   ">90  >90   < >  >90   < >  >90  >90   < >   --   58*   < >  77   --   88  68   POTASSIUM  4.8  4.1   < >  3.7   < >  3.2*  3.1*   < >   --   4.5   < >  3.6   --   4.0  3.7   TSH   --    --    --    --    --    --    --    --   1.21  0.23*   < >  49.11*   < >   --   96.93*    < > = values in this interval not displayed.        BP Readings from Last 6 Encounters:   12/01/17 144/72   12/01/17 (!) 146/92   11/30/17 132/76   11/27/17 120/78   11/22/17 122/68   11/20/17 112/65       Wt Readings from Last 3 Encounters:   12/01/17 114 lb (51.7 kg)   12/01/17 115 lb (52.2 kg)   11/30/17 115 lb (52.2 kg)         Positive symptoms or findings indicated by bold designation:     ROS: 10 point ROS neg other than the symptoms noted above in the HPI.except  has Nontoxic uninodular goiter; Rectal prolapse; Hypertension goal BP (blood pressure) < 140/80; Internal hemorrhoids with other complication; Tobacco use disorder; Hypothyroidism due to acquired atrophy of thyroid; Diverticulitis of colon; Malignant neoplasm of upper-outer quadrant of right breast in female, estrogen receptor positive (H); S/P bilateral mastectomy; Hyponatremia; Hypokalemia; and Diverticulitis large intestine on her problem list.   Constitutional: The patient denied fatigue, fever, insomnia, night sweats, recent illness and weight loss.  WEIGHT LOSS      Eyes: The patient denied blindness, eye pain, eye tearing, photophobia, vision change and visual disturbance. PRESBYOPIA       Ears/Nose/Throat/Neck: The patient denied dizziness, facial pain, hearing loss, nasal discharge, oral pain, otalgia, postnasal drip, sinus congestion, sore throat, tinnitus and voice change.   NORMAL HEARING AND BALANCE     Cardiovascular: The patient denied arrhythmia, chest pain/pressure, claudication, edema, exercise intolerance, fatigue, orthopnea, palpitations and syncope.  LDL OR \"BAD\" CHOLESTEROL  GOAL < 100     Respiratory: The patient denied asthma, chest congestion, cough, " dyspnea on exertion, dyspnea/shortness of breath, hemoptysis, pedal edema, pleuritic pain, productive sputum, snoring and wheezing. NORMAL     Gastrointestinal: The patient denied abdominal pain, anorexia, constipation, diarrhea, dysphagia, gastroesophageal reflux, hematochezia, hemorrhoids, melena, nausea and vomiting . GASTROESOPHAGEAL REFLUX DISEASE WITHOUT ESOPHAGITIS     Genitourinary/Nephrology: The patient denied breast complaint, dysuria, nocturia sexual dysfunction, t, urinary frequency, urinary incontinence, urinary urgency        Musculoskeletal: The patient denied arthralgia(s), back pain, joint complaint, muscle weakness, myalgias, osteoporosis, sciatica, stiffness and swelling.  MUSCLES ACHES     Dermatoligic:: The patient denied acne, dermatitis, ecchymosis, itching, mole change, rash, skin cancer, skin lesion and sores.  NORMAL     Neurologic: The patient denied dizziness, gait abnormality, headache, memory loss, mental status change, paresis, paresthesia, seizure, syncope, tremor and vision change. NORMAL     Psychiatric: The patient denied anxiety, depression, disturbances of memory, drug abuse, insomnia, mood swings and relationship difficulties.  NORMAL     Endocrine: The patient denied , goiter, obesity, polyuria and thyroid disease.  LOW THRYROID ON REPLACEMENT     Hematologic/Lymphatic: The patient denied abnormal bleeding and bruising, abnormal ecchymoses, anemia, lymph node enlargement/mass, petechiae and venous  Thrombosis.      Allergy/Immunology: The patient denied food allergy and  Allergic rhinitis or conjunctivitis.        PE:  /72  Pulse 84  Temp 98.3  F (36.8  C) (Tympanic)  Resp 16  Wt 114 lb (51.7 kg)  LMP  (LMP Unknown)  SpO2 98%  BMI 18.12 kg/m2 Body mass index is 18.12 kg/(m^2).   REPEAT BLOOD PRESSURE 134/84     Constitutional: general appearance, well nourished, well developed, in no acute distress, well developed, appears stated age, normal body habitus,   EXTREMELY SLENDER LOW BODY MASS    Eyes:; The patient has normal eyelids sclerae and conjunctivae :      Ears/Nose/Throat: external ear, overall: normal appearance; external nose, overall: benign appearance, normal moujth gums and lips  The patient has:      Neck: thyroid, overall: normal size, normal consistency, nontender,      Respiratory:  palpation of chest, overall: normal excursion,    Clear to percussion and auscultation  NORMAL      Tachypnea  NORMAL   Color   NORMAL     Cardiovascular:  Good color with no peripheral edema  NORMAL   Regular sinus rhythm without murmur. Physiologic heart sounds Heart is unelarged  .   Chest/Breast: BILATERAL SURGICALLY ABSENT   WELL HEALED SCARS     Abdominal exam,  Liver and spleen are  unenlarged  NORMAL       Tenderness  NONE   Scars  COLOSTOMY PROPERLY FUNCTIONING AND MAINTAINED    Urogenital; no renal, flank or bladder  tenderness;      Lymphatic: neck nodes,     Other nodes NOT APPLICABLE     Musculoskeletal:  Brief ortho exam normal except:   NORMAL UPPER EXTREMETIES AND LOWER EXTREMITY   NORMAL RANGE OF MOTION OF BACK AND SHOULDERS     Integument: inspection of skin, no rash, lesions; and, palpation, no induration, no tenderness.  SCARS FROM BREAST AND COLOSTOMY AND DIVERTICULAR OPERATION HEALING PROPERLY     Neurologic mental status, overall: alert and oriented; gait, no ataxia, no unsteadiness; coordination, no tremors; cranial nerves, overall: normal motor, overall: normal bulk, tone.      Psychiatric: orientation/consciousness, overall: oriented to person, place and time; behavior/psychomotor activity, no tics, normal psychomotor activity; mood and affect, overall: normal mood and affect; appearance, overall: well-groomed, good eye contact; speech, overall: normal quality, no aphasia and normal quality, quantity, intact.      Diagnostic Test Results:  Results for orders placed or performed during the hospital encounter of 11/07/17   CT Abdomen Pelvis w Contrast     Narrative    CT ABDOMEN AND PELVIS WITH CONTRAST   11/7/2017 3:28 PM     HISTORY: Diffuse abdominal pain, no bowel movement or gas.  Evaluate  for obstruction and re-evaluation of possible abscess.     TECHNIQUE:  62 mL Isovue-370. Radiation dose for this scan was reduced  using automated exposure control, adjustment of the mA and/or kV  according to patient size, or iterative reconstruction technique.    COMPARISON: 11/3/2017    FINDINGS:  There is prominent abnormal wall thickening involving the  distal sigmoid colon. This is now causing obstruction and there is  dilatation of the colon proximal to this region. There is a somewhat  serpiginous perisigmoid fluid collection again seen. As previously  described, this may well represent abscess. This appears mildly  increased and currently has a maximum dimension of about 8 cm. Small  amount of free fluid, which is new. The remaining findings appear  stable over the short time interval. Nothing else acute is seen in the  upper abdominal organs.       Impression    IMPRESSION: Distal sigmoid wall thickening, currently resulting in  colonic obstruction. This could relate to diverticulitis or focal  colitis, but neoplasm cannot be excluded. Serpiginous perisigmoid  fluid collection/abscess appears mildly increased. Small amount of  free fluid.     TAMEKA HUERTAS MD   XR Abdomen 1 View    Narrative    ABDOMEN ONE VIEW 11/12/2017 11:59 AM     HISTORY: Evaluate bowel distention      COMPARISON: CT abdomen and pelvis 11/7/2017.      Impression    IMPRESSION: Single view abdomen demonstrates a markedly distended  colon with a few mildly distended air-filled loops of small bowel.  Findings are concerning for a distal colonic obstruction. Overall, the  distention appears slightly worse. Findings are concerning for colonic  neoplasm.    VICKI TRIVEDI MD   US Upper Extremity Venous Duplex Right    Narrative    ULTRASOUND RIGHT UPPER EXTREMITY VENOUS DUPLEX November 16, 2017  2:37  PM     HISTORY: Rule out deep vein thrombosis, right arm swelling.     COMPARISON: None.    TECHNIQUE: Examination of the veins of the upper extremity was  performed with graded compression and 2-D ultrasound and color doppler  spectral waveform analysis.     FINDINGS: There is no evidence of thrombosis of the internal jugular,  subclavian axillary, brachial, or basilic veins. Areas of occlusive  and nonocclusive thrombus are seen throughout the cephalic vein from  the antecubital fossa to the midhumerus and from the level of the IV  insert to the mid forearm.      Impression    IMPRESSION:   1. No evidence of deep vein thrombosis.  2. Superficial thrombophlebitis in the right cephalic vein.    JASS LEZAMA DO   CBC with platelets + differential   Result Value Ref Range    WBC 5.8 4.0 - 11.0 10e9/L    RBC Count 3.92 3.8 - 5.2 10e12/L    Hemoglobin 12.8 11.7 - 15.7 g/dL    Hematocrit 35.7 35.0 - 47.0 %    MCV 91 78 - 100 fl    MCH 32.7 26.5 - 33.0 pg    MCHC 35.9 31.5 - 36.5 g/dL    RDW 12.2 10.0 - 15.0 %    Platelet Count 286 150 - 450 10e9/L    Diff Method Automated Method     % Neutrophils 73.7 %    % Lymphocytes 17.5 %    % Monocytes 7.9 %    % Eosinophils 0.5 %    % Basophils 0.2 %    % Immature Granulocytes 0.2 %    Nucleated RBCs 0 0 /100    Absolute Neutrophil 4.3 1.6 - 8.3 10e9/L    Absolute Lymphocytes 1.0 0.8 - 5.3 10e9/L    Absolute Monocytes 0.5 0.0 - 1.3 10e9/L    Absolute Eosinophils 0.0 0.0 - 0.7 10e9/L    Absolute Basophils 0.0 0.0 - 0.2 10e9/L    Abs Immature Granulocytes 0.0 0 - 0.4 10e9/L    Absolute Nucleated RBC 0.0    Comprehensive metabolic panel   Result Value Ref Range    Sodium 129 (L) 133 - 144 mmol/L    Potassium 3.2 (L) 3.4 - 5.3 mmol/L    Chloride 96 94 - 109 mmol/L    Carbon Dioxide 25 20 - 32 mmol/L    Anion Gap 8 3 - 14 mmol/L    Glucose 115 (H) 70 - 99 mg/dL    Urea Nitrogen 5 (L) 7 - 30 mg/dL    Creatinine 0.72 0.52 - 1.04 mg/dL    GFR Estimate 82 >60 mL/min/1.7m2     GFR Estimate If Black >90 >60 mL/min/1.7m2    Calcium 8.8 8.5 - 10.1 mg/dL    Bilirubin Total 0.4 0.2 - 1.3 mg/dL    Albumin 2.7 (L) 3.4 - 5.0 g/dL    Protein Total 6.2 (L) 6.8 - 8.8 g/dL    Alkaline Phosphatase 88 40 - 150 U/L    ALT 13 0 - 50 U/L    AST 14 0 - 45 U/L   Lactic acid   Result Value Ref Range    Lactic Acid 0.9 0.7 - 2.0 mmol/L   Lipase   Result Value Ref Range    Lipase 59 (L) 73 - 393 U/L   Basic metabolic panel   Result Value Ref Range    Sodium 134 133 - 144 mmol/L    Potassium 3.4 3.4 - 5.3 mmol/L    Chloride 103 94 - 109 mmol/L    Carbon Dioxide 24 20 - 32 mmol/L    Anion Gap 7 3 - 14 mmol/L    Glucose 109 (H) 70 - 99 mg/dL    Urea Nitrogen 6 (L) 7 - 30 mg/dL    Creatinine 0.64 0.52 - 1.04 mg/dL    GFR Estimate >90 >60 mL/min/1.7m2    GFR Estimate If Black >90 >60 mL/min/1.7m2    Calcium 8.3 (L) 8.5 - 10.1 mg/dL   CBC with platelets   Result Value Ref Range    WBC 5.6 4.0 - 11.0 10e9/L    RBC Count 3.70 (L) 3.8 - 5.2 10e12/L    Hemoglobin 12.1 11.7 - 15.7 g/dL    Hematocrit 34.1 (L) 35.0 - 47.0 %    MCV 92 78 - 100 fl    MCH 32.7 26.5 - 33.0 pg    MCHC 35.5 31.5 - 36.5 g/dL    RDW 12.5 10.0 - 15.0 %    Platelet Count 260 150 - 450 10e9/L   Comprehensive metabolic panel   Result Value Ref Range    Sodium 137 133 - 144 mmol/L    Potassium 3.7 3.4 - 5.3 mmol/L    Chloride 106 94 - 109 mmol/L    Carbon Dioxide 21 20 - 32 mmol/L    Anion Gap 10 3 - 14 mmol/L    Glucose 73 70 - 99 mg/dL    Urea Nitrogen 12 7 - 30 mg/dL    Creatinine 0.77 0.52 - 1.04 mg/dL    GFR Estimate 76 >60 mL/min/1.7m2    GFR Estimate If Black >90 >60 mL/min/1.7m2    Calcium 8.6 8.5 - 10.1 mg/dL    Bilirubin Total 0.3 0.2 - 1.3 mg/dL    Albumin 2.5 (L) 3.4 - 5.0 g/dL    Protein Total 5.9 (L) 6.8 - 8.8 g/dL    Alkaline Phosphatase 70 40 - 150 U/L    ALT 12 0 - 50 U/L    AST 14 0 - 45 U/L   CBC with platelets   Result Value Ref Range    WBC 4.2 4.0 - 11.0 10e9/L    RBC Count 3.77 (L) 3.8 - 5.2 10e12/L    Hemoglobin 12.2 11.7 - 15.7  g/dL    Hematocrit 35.7 35.0 - 47.0 %    MCV 95 78 - 100 fl    MCH 32.4 26.5 - 33.0 pg    MCHC 34.2 31.5 - 36.5 g/dL    RDW 12.9 10.0 - 15.0 %    Platelet Count 281 150 - 450 10e9/L   Magnesium   Result Value Ref Range    Magnesium 2.0 1.6 - 2.3 mg/dL   Creatinine   Result Value Ref Range    Creatinine 0.68 0.52 - 1.04 mg/dL    GFR Estimate 88 >60 mL/min/1.7m2    GFR Estimate If Black >90 >60 mL/min/1.7m2   Creatinine   Result Value Ref Range    Creatinine 0.63 0.52 - 1.04 mg/dL    GFR Estimate >90 >60 mL/min/1.7m2    GFR Estimate If Black >90 >60 mL/min/1.7m2   Basic metabolic panel   Result Value Ref Range    Sodium 141 133 - 144 mmol/L    Potassium 3.3 (L) 3.4 - 5.3 mmol/L    Chloride 111 (H) 94 - 109 mmol/L    Carbon Dioxide 22 20 - 32 mmol/L    Anion Gap 8 3 - 14 mmol/L    Glucose 128 (H) 70 - 99 mg/dL    Urea Nitrogen 5 (L) 7 - 30 mg/dL    Creatinine 0.61 0.52 - 1.04 mg/dL    GFR Estimate >90 >60 mL/min/1.7m2    GFR Estimate If Black >90 >60 mL/min/1.7m2    Calcium 7.9 (L) 8.5 - 10.1 mg/dL   Surgical pathology exam   Result Value Ref Range    Copath Report       Patient Name: DIANE ESTRADA  MR#: [id number]  Specimen #: [id number]  Collected: 11/13/2017  Received: 11/13/2017  Reported: 11/14/2017 14:17  Ordering Phy(s): DELMAR RAY    For improved result formatting, select 'View Enhanced Report Format'  under Linked Documents section.    SPECIMEN(S):  Colon    FINAL DIAGNOSIS:  Colon, sigmoid colectomy  - Diverticulitis with associated perforation and pericolonic abscess  - Tubular adenoma (x2) with no evidence of high-grade dysplasia or  invasive malignancy- Appendix with no significant histopathologic  abnormality  - Fourteen pericolonic lymph nodes negative for malignancy (0/14)  - Surgical margins viable    Electronically signed out by:    Judy Carranza M.D.    CLINICAL HISTORY:  62-year-old woman with perforated colon secondary to sigmoid bowel  obstruction thought to be severe  "diverticulitis    GROSS:  The specimen is received fresh with the patient's name and proper  identification a labeled \"colon\".  The specimen consists of re d dull  subtotal colectomy(105 cm in length x 7.7 cm in circumference) with  terminal ileum(5.3 cm in length x 4.8 cm in circumference) with  appendix(7.2 x 0.7 x 0.7 cm) and mesenteric fat.  On section there is a  7 cm portion of distal colon with thickened bowel wall measuring up to  2.2 cm.  In this area of bowel wall thickening are multiple probe patent  diverticula.  There is a soft tissue abscess measuring 0.8 x 0.7 x 0.5  cm, 6.5 cm from the distal colon surgical resection margin.  The cecum  has a red dome-shaped polyp (0.8 x 0.7 x 0.4 cm).  Grossly this polyp is  limited to the mucosa.  The polyp is 7.8 cm from the proximal surgical  resection margin.  There is an additional polyp (0.5 x 0.3 x 0.2 cm), 14  cm from the proximal resection margin.  This additional polyp is limited  to the mucosa.  The soft tissue is sampled for lymph nodes.  Representative sections are submitted.    Cassette [id number] bowel wall with diverticulum  Cassette 5-abscess  Cassette 6-cecum polyp-bi sected  Cassette 7-additional colon polyp  Cassette 8-appendix  Cassette 9-distal colon margin  Cassette 10-proximal bowel margin  Cassette 11-12-lymph nodes (Dictated by: Mike Capone 11/13/2017 03:16  PM)    MICROSCOPIC:  A formal microscopic examination was performed.    CPT Codes:  A: 24712-EO7    TESTING LAB LOCATION:  [address]  [id number] [phone]    COLLECTION SITE:  Client: [facility]  Location: [facility] (S)     CBC with platelets   Result Value Ref Range    WBC 11.6 (H) 4.0 - 11.0 10e9/L    RBC Count 3.95 3.8 - 5.2 10e12/L    Hemoglobin 12.7 11.7 - 15.7 g/dL    Hematocrit 37.4 35.0 - 47.0 %    MCV 95 78 - 100 fl    MCH 32.2 26.5 - 33.0 pg    MCHC 34.0 31.5 - 36.5 g/dL    RDW 13.8 10.0 - 15.0 %    " Platelet Count 182 150 - 450 10e9/L   Basic metabolic panel   Result Value Ref Range    Sodium 142 133 - 144 mmol/L    Potassium 3.6 3.4 - 5.3 mmol/L    Chloride 111 (H) 94 - 109 mmol/L    Carbon Dioxide 24 20 - 32 mmol/L    Anion Gap 7 3 - 14 mmol/L    Glucose 112 (H) 70 - 99 mg/dL    Urea Nitrogen 4 (L) 7 - 30 mg/dL    Creatinine 0.57 0.52 - 1.04 mg/dL    GFR Estimate >90 >60 mL/min/1.7m2    GFR Estimate If Black >90 >60 mL/min/1.7m2    Calcium 7.8 (L) 8.5 - 10.1 mg/dL   Hemoglobin   Result Value Ref Range    Hemoglobin 12.9 11.7 - 15.7 g/dL   Blood gas arterial with oxyhemoglobin   Result Value Ref Range    pH Arterial 7.32 (L) 7.35 - 7.45 pH    pCO2 Arterial 45 35 - 45 mm Hg    pO2 Arterial 85 80 - 105 mm Hg    Bicarbonate Arterial 23 21 - 28 mmol/L    Oxyhemoglobin Arterial 95 92 - 100 %    Base Deficit Art 3.0 mmol/L   CBC with platelets   Result Value Ref Range    WBC 10.5 4.0 - 11.0 10e9/L    RBC Count 3.84 3.8 - 5.2 10e12/L    Hemoglobin 12.4 11.7 - 15.7 g/dL    Hematocrit 36.7 35.0 - 47.0 %    MCV 96 78 - 100 fl    MCH 32.3 26.5 - 33.0 pg    MCHC 33.8 31.5 - 36.5 g/dL    RDW 14.0 10.0 - 15.0 %    Platelet Count 175 150 - 450 10e9/L   Basic metabolic panel   Result Value Ref Range    Sodium 144 133 - 144 mmol/L    Potassium 4.1 3.4 - 5.3 mmol/L    Chloride 114 (H) 94 - 109 mmol/L    Carbon Dioxide 24 20 - 32 mmol/L    Anion Gap 6 3 - 14 mmol/L    Glucose 143 (H) 70 - 99 mg/dL    Urea Nitrogen 6 (L) 7 - 30 mg/dL    Creatinine 0.90 0.52 - 1.04 mg/dL    GFR Estimate 63 >60 mL/min/1.7m2    GFR Estimate If Black 77 >60 mL/min/1.7m2    Calcium 7.3 (L) 8.5 - 10.1 mg/dL   Magnesium   Result Value Ref Range    Magnesium 1.5 (L) 1.6 - 2.3 mg/dL   Phosphorus   Result Value Ref Range    Phosphorus 2.4 (L) 2.5 - 4.5 mg/dL   Magnesium   Result Value Ref Range    Magnesium 2.5 (H) 1.6 - 2.3 mg/dL   Phosphorus   Result Value Ref Range    Phosphorus 1.8 (L) 2.5 - 4.5 mg/dL   Creatinine   Result Value Ref Range    Creatinine  0.68 0.52 - 1.04 mg/dL    GFR Estimate 87 >60 mL/min/1.7m2    GFR Estimate If Black >90 >60 mL/min/1.7m2     *Note: Due to a large number of results and/or encounters for the requested time period, some results have not been displayed. A complete set of results can be found in Results Review.         ICD-10-CM    1. Diverticulitis of large intestine with perforation and abscess without bleeding K57.20    2. S/P bilateral mastectomy Z90.13    3. Lobular carcinoma of right breast (H) C50.911    4. Moderate malnutrition (H) E44.0    5. 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 H/O partial resection of colon Z90.49    7. Colostomy in place (H) Z93.3    8. Hypertension goal BP (blood pressure) < 140/80 I10 Blood Pressure Monitoring (WRIST BLOOD PRESSURE MONITOR) MISC   9. Hypothyroidism due to acquired atrophy of thyroid E03.4    10. Rectal prolapse K62.3    11. Hyponatremia E87.1    12. Hypokalemia E87.6    13. Tobacco use disorder F17.200     QUIT AT BREAST OPERATIONM   14. Malignant neoplasm of upper-outer quadrant of right breast in female, estrogen receptor positive (H) C50.411 DEXA - HIM Scan    Z17.0    15. Symptomatic menopausal or female climacteric states N95.1 DEXA - HIM Scan        .  Side effects benefits and risks thoroughly discussed. .she may come in early if unimproved or getting worse      Importance of adhering to regimen discussed and if medications were dispensed, the importance of taking medications discussed and bringing in the medications after every visit for chronic problems     Please drink 2 glasses of water prior to meals and walk 15-30 minutes after meals    I spent 25 MINUTES SPENT  with patient discussing the following issues    The primary encounter diagnosis was Diverticulitis of large intestine with perforation and abscess without bleeding. Diagnoses of S/P bilateral mastectomy, Lobular carcinoma of right breast (H), Moderate malnutrition (H), Intraductal malignant  neoplasm of left breast, H/O partial resection of colon, Colostomy in place (H), Hypertension goal BP (blood pressure) < 140/80, Hypothyroidism due to acquired atrophy of thyroid, Rectal prolapse, Hyponatremia, Hypokalemia, Tobacco use disorder, Malignant neoplasm of upper-outer quadrant of right breast in female, estrogen receptor positive (H), and Symptomatic menopausal or female climacteric states were also pertinent to this visit. over half of which involved counseling and coordination of care.    Patient Instructions   (K57.20) Diverticulitis of large intestine with perforation and abscess without bleeding  (primary encounter diagnosis)  Comment:  COLOSTOMY FUNCTIONING ON SPECIAL DIET   Plan:  AVODING SKINS NEEDS HIGH PROTEIN HIGH CALORY DIET     (Z90.13) S/P bilateral mastectomy  Comment:  RIGHT LOBULAR LEFT INTRADUCTAL CANCAER   Plan:  PER ONGOLOGY     (C50.911) Lobular carcinoma of right breast (H)  Comment:    Plan:  HORMONAL TREATMENT PER ONCOLOGY     (E44.0) Moderate malnutrition (H)  Comment:    Plan:  HIGH PROTEIN CALORY DIET     (D05.12) Intraductal malignant neoplasm of left breast  Comment:    Plan:      (Z90.49) H/O partial resection of colon  Comment:    Plan:      (Z93.3) Colostomy in place (H)  Comment:  DOING WELL THIS   Plan:  NO COMPLICATION     (I10) Hypertension goal BP (blood pressure) < 140/80  Comment:    Plan: Blood Pressure Monitoring (WRIST BLOOD PRESSURE        MONITOR) MISC         WELL CONTROLLED UNTIL RECENTLY     (E03.4) Hypothyroidism due to acquired atrophy of thyroid  Comment:    Plan:      (K62.3) Rectal prolapse  Comment:  IMPROVED AND NOT FUNCTIONING   Plan:      (E87.1) Hyponatremia  Comment:  OFF DIURETIC   Plan:      (E87.6) Hypokalemia  Comment:  OFF DIURETIC   Plan:  MONITOR HIGH BLOOD PRESSURE     (F17.200) Tobacco use disorder  Comment: QUIT AT BREAST OPERATIONM  Plan:      (C50.411,  Z17.0) Malignant neoplasm of upper-outer quadrant of right breast in female,  "estrogen receptor positive (H)  Comment:    Plan: DEXA - HIM Scan             (N95.1) Symptomatic menopausal or female climacteric states  Comment:    Plan: DEXA - HIM Scan  PRIOR TO STARTING HORMONAL TREATMENT   AT RISK FOR OSTEOPOROSIS                              What You Can Do to Reduce Cholesterol Levels  and Reduce Risk of Diabetes, Heart, and Blood Vessel Disease  1. Eat six to eight servings of green or root vegetables or fruit (raw or cooked) per day. You need 35 grams of fiber per day.  Examples: carrots apples  broccoli oranges  spinach grapefruit  lettuce pears  bananas  2. Use up to 2 cup of walnuts, almonds, or pecans as a source of \"good\" fat per day.  3. Drink one to three servings of soy milk (great for cereal) or 2 cup of soynuts per day.  4. Use margarine with reduced trans or saturated fats (e.g. Benecol, Smart Balance, olive oil margarine) as replacement for butter or margarine.  5. Eat fewer or half-portions of desserts, baked goods, chips, cookies, processed flour and sugar, pasta, butter, cream, non-skim dairy, ice cream.  6. Use olive or canola oil as the only oils for cooking and dressings.  7. Use one tablespoon of ground flaxseed or Natural Ovens \"Energy Mix\" per day (great on cereal or over salad).  8. Eat one to two servings per week of wild salmon or water-packed tuna.  9. Limit beef, lamb, and pork to at most one serving per day. (Range-fed beef, if you can get it, is much preferred and allows for greater use in diet).  10. Eat three to four servings per day of whole grains (legumes, rice, wheat, oats).  11. Limit sodas and beer at most to three per week (only special occasions).  12. 65 percent of us need to lose weight and all of us need to keep moving! You should be walking at least 150 minutes per week. You should lose approximately seven percent of your weight in the next year if overweight. Check with me for more details.  1. Andrew Weil's paperback book, The Healthy Kitchen, " is a great addition to your healthy lifestyle library.  2. American Diabetic Association (www.diabetes.org) - a wealth of information on nutritional excellence.  3. Other great websites for Mediterranean diets are available.          ALL THE ABOVE PROBLEMS ARE STABLE AND MED CHANGES AS NOTED    Diet:  SKINLESS FRUITS AND VEGGIES EVENTUAL MEDITERRANEAN DIET   UNDERWT AND UNDER PROTEIN INTAKE   TREATMENT PROGNOSIS BENEFITS AND RISKS DISCUSSED  SIDE EFFECTS BENEFITS AND RISKS DISCUSSED    MEDICATION RISKS SIDE EFFECTS BENEFITS AND RISKS DISCUSSED     Exercise:  UPPER EXTREMETIES AND LOWER EXTREMITY   Exercises Range of motion, balance, isometric, and strengthening exercises 30 repetitions twice daily of involved joints      .BEKAH KAUR MD 12/1/2017 6:06 AM  December 2, 2017

## 2017-12-01 NOTE — PROGRESS NOTES
"Oncology Rooming Note    December 1, 2017 11:11 AM   Suma Calabrese is a 62 year old female who presents for:    Chief Complaint   Patient presents with     Oncology Clinic Visit     NEW- Malignant neoplasm of upper-outer quadrant of right breast in female, estrogen receptor positive      Initial Vitals: BP (!) 146/92 (BP Location: Left arm, Patient Position: Chair, Cuff Size: Adult Small)  Pulse 83  Temp 97.2  F (36.2  C)  Resp 22  Wt 52.2 kg (115 lb)  LMP  (LMP Unknown)  SpO2 100%  BMI 18.28 kg/m2 Estimated body mass index is 18.28 kg/(m^2) as calculated from the following:    Height as of 11/30/17: 1.689 m (5' 6.5\").    Weight as of this encounter: 52.2 kg (115 lb). Body surface area is 1.56 meters squared.  No Pain (0) Comment: Data Unavailable   No LMP recorded (lmp unknown). Patient is postmenopausal.  Allergies reviewed: Yes  Medications reviewed: Yes    Medications: Medication refills not needed today.  Pharmacy name entered into [employer]: [facility] [address] [place] [address] AV AT [address]    Clinical concerns: none     5 minutes for nursing intake (face to face time)     Marvel Donnelly CMA    DISCHARGE PLAN:  1.) Patient will schedule her bone density exam at [facility]. Writer to follow up on results.   2.) Patient will start her Arimidex after bone density. Patient given information on Arimidex through [employer]  3.) Patient scheduled for follow up in 3 months, labs will be done at her primary care clinic.   Next appointments: See patient instruction section  Departure Mode: Ambulatory  Accompanied by: self  15 minutes for nursing discharge (face to face time)   Glenda Floyd RN            "

## 2017-12-01 NOTE — PATIENT INSTRUCTIONS
(K57.20) Diverticulitis of large intestine with perforation and abscess without bleeding  (primary encounter diagnosis)    Comment:  COLOSTOMY FUNCTIONING ON SPECIAL DIET     Plan:  AVODING SKINS NEEDS HIGH PROTEIN HIGH CALORY DIET         (Z90.13) S/P bilateral mastectomy    Comment:  RIGHT LOBULAR LEFT INTRADUCTAL CANCAER     Plan:  PER ONGOLOGY         (C50.911) Lobular carcinoma of right breast (H)    Comment:      Plan:  HORMONAL TREATMENT PER ONCOLOGY         (E44.0) Moderate malnutrition (H)    Comment:      Plan:  HIGH PROTEIN CALORY DIET         (D05.12) Intraductal malignant neoplasm of left breast    Comment:      Plan:          (Z90.49) H/O partial resection of colon    Comment:      Plan:          (Z93.3) Colostomy in place (H)    Comment:  DOING WELL THIS     Plan:  NO COMPLICATION         (I10) Hypertension goal BP (blood pressure) < 140/80    Comment:      Plan: Blood Pressure Monitoring (WRIST BLOOD PRESSURE          MONITOR) MISC           WELL CONTROLLED UNTIL RECENTLY         (E03.4) Hypothyroidism due to acquired atrophy of thyroid    Comment:      Plan:          (K62.3) Rectal prolapse    Comment:  IMPROVED AND NOT FUNCTIONING     Plan:          (E87.1) Hyponatremia    Comment:  OFF DIURETIC     Plan:          (E87.6) Hypokalemia    Comment:  OFF DIURETIC     Plan:  MONITOR HIGH BLOOD PRESSURE         (F17.200) Tobacco use disorder    Comment: QUIT AT BREAST OPERATIONM    Plan:          (C50.411,  Z17.0) Malignant neoplasm of upper-outer quadrant of right breast in female, estrogen receptor positive (H)    Comment:      Plan: DEXA - HIM Scan                   (N95.1) Symptomatic menopausal or female climacteric states    Comment:      Plan: DEXA - HIM Scan    PRIOR TO STARTING HORMONAL TREATMENT     AT RISK FOR OSTEOPOROSIS                                            What You Can Do to Reduce Cholesterol Levels    and Reduce Risk of Diabetes, Heart, and Blood Vessel Disease    1. Eat six to  "eight servings of green or root vegetables or fruit (raw or cooked) per day. You need 35 grams of fiber per day.    Examples: carrots apples    broccoli oranges    spinach grapefruit    lettuce pears    bananas    2. Use up to 2 cup of walnuts, almonds, or pecans as a source of \"good\" fat per day.    3. Drink one to three servings of soy milk (great for cereal) or 2 cup of soynuts per day.    4. Use margarine with reduced trans or saturated fats (e.g. Benecol, Smart Balance, olive oil margarine) as replacement for butter or margarine.    5. Eat fewer or half-portions of desserts, baked goods, chips, cookies, processed flour and sugar, pasta, butter, cream, non-skim dairy, ice cream.    6. Use olive or canola oil as the only oils for cooking and dressings.    7. Use one tablespoon of ground flaxseed or Natural Ovens \"Energy Mix\" per day (great on cereal or over salad).    8. Eat one to two servings per week of wild salmon or water-packed tuna.    9. Limit beef, lamb, and pork to at most one serving per day. (Range-fed beef, if you can get it, is much preferred and allows for greater use in diet).    10. Eat three to four servings per day of whole grains (legumes, rice, wheat, oats).    11. Limit sodas and beer at most to three per week (only special occasions).    12. 65 percent of us need to lose weight and all of us need to keep moving! You should be walking at least 150 minutes per week. You should lose approximately seven percent of your weight in the next year if overweight. Check with me for more details.    1. Andrew Weil's paperback book, The Healthy Kitchen, is a great addition to your healthy lifestyle library.    2. American Diabetic Association (www.diabetes.org)   a wealth of information on nutritional excellence.    3. Other great websites for Mediterranean diets are available.          "

## 2017-12-01 NOTE — PATIENT INSTRUCTIONS
dexa today at Dr. Cook's office? Will call pt on Dexa result.   Start AI after dexa result is ok.   F/u in 3 months with labs.

## 2017-12-01 NOTE — NURSING NOTE
"Chief Complaint   Patient presents with     Hospital F/U       Initial /72  Pulse 84  Temp 98.3  F (36.8  C) (Tympanic)  Resp 16  Wt 114 lb (51.7 kg)  LMP  (LMP Unknown)  SpO2 98%  BMI 18.12 kg/m2 Estimated body mass index is 18.12 kg/(m^2) as calculated from the following:    Height as of 11/30/17: 5' 6.5\" (1.689 m).    Weight as of this encounter: 114 lb (51.7 kg).  Medication Reconciliation: complete   Marlye Lion CMA    "

## 2017-12-01 NOTE — MR AVS SNAPSHOT
After Visit Summary   12/1/2017    Suma Calabrese    MRN: [id number]           Patient Information     Date Of Birth          1955        Visit Information        Provider Department      12/1/2017 2:15 PM Murali Kenney MD [facility]        Today's Diagnoses     Hypothyroidism due to acquired atrophy of thyroid    -  1    Hypertension goal BP (blood pressure) < 140/80        Diverticulitis of large intestine without perforation or abscess without bleeding        Rectal prolapse        Hyponatremia        Hypokalemia        Tobacco use disorder        S/P bilateral mastectomy        Malignant neoplasm of upper-outer quadrant of right breast in female, estrogen receptor positive (H)        Symptomatic menopausal or female climacteric states          Care Instructions      (E03.4) Hypothyroidism due to acquired atrophy of thyroid  (primary encounter diagnosis)    Comment:      Plan:          (I10) Hypertension goal BP (blood pressure) < 140/80    Comment:      Plan: Blood Pressure Monitoring (WRIST BLOOD PRESSURE          MONITOR) MISC                   (K57.32) Diverticulitis of large intestine without perforation or abscess without bleeding    Comment:      Plan: Lactobacillus (ACIDOPHILUS PROBIOTIC FORMULA)           TABS                   (K62.3) Rectal prolapse    Comment:      Plan:          (E87.1) Hyponatremia    Comment:       Plan:          (E87.6) Hypokalemia    Comment:      Plan:          (F17.200) Tobacco use disorder    Comment:      Plan:          (Z90.13) S/P bilateral mastectomy    Comment:      Plan:          (C50.411,  Z17.0) Malignant neoplasm of upper-outer quadrant of right breast in female, estrogen receptor positive (H)    Comment:      Plan: DEXA - HIM Scan                   (N95.1) Symptomatic menopausal or female climacteric states    Comment:      Plan: DEXA - HIM Scan                        What You Can Do to Reduce  "Cholesterol Levels    and Reduce Risk of Diabetes, Heart, and Blood Vessel Disease    1. Eat six to eight servings of green or root vegetables or fruit (raw or cooked) per day. You need 35 grams of fiber per day.    Examples: carrots apples    broccoli oranges    spinach grapefruit    lettuce pears    bananas    2. Use up to 2 cup of walnuts, almonds, or pecans as a source of \"good\" fat per day.    3. Drink one to three servings of soy milk (great for cereal) or 2 cup of soynuts per day.    4. Use margarine with reduced trans or saturated fats (e.g. Benecol, Smart Balance, olive oil margarine) as replacement for butter or margarine.    5. Eat fewer or half-portions of desserts, baked goods, chips, cookies, processed flour and sugar, pasta, butter, cream, non-skim dairy, ice cream.    6. Use olive or canola oil as the only oils for cooking and dressings.    7. Use one tablespoon of ground flaxseed or Natural Ovens \"Energy Mix\" per day (great on cereal or over salad).    8. Eat one to two servings per week of wild salmon or water-packed tuna.    9. Limit beef, lamb, and pork to at most one serving per day. (Range-fed beef, if you can get it, is much preferred and allows for greater use in diet).    10. Eat three to four servings per day of whole grains (legumes, rice, wheat, oats).    11. Limit sodas and beer at most to three per week (only special occasions).    12. 65 percent of us need to lose weight and all of us need to keep moving! You should be walking at least 150 minutes per week. You should lose approximately seven percent of your weight in the next year if overweight. Check with me for more details.    1. Andrew Weil's paperback book, The Healthy Kitchen, is a great addition to your healthy lifestyle library.    2. American Diabetic Association (www.diabetes.org) - a wealth of information on nutritional excellence.    3. Other great websites for Mediterranean diets are available.                  Follow-ups " after your visit        Your next 10 appointments already scheduled     Dec 14, 2017 10:00 AM CST   Post Op with Van Salazar MD   [facility] [place] [facility] [place]    [address]  [place] [address] [phone]            Mar 07, 2018 11:45 AM CST   Return Visit with Celi Powers MD   [facility] ([facility])    [facility]  [address]  [place] [address] [phone]              Future tests that were ordered for you today     Open Future Orders        Priority Expected Expires Ordered    DX Hip/Pelvis/Spine Routine  12/1/2018 12/1/2017            Who to contact     If you have questions or need follow up information about today's clinic visit or your schedule please contact [facility] directly at [phone].  Normal or non-critical lab and imaging results will be communicated to you by Pink Rebel Shoeshart, letter or phone within 4 business days after the clinic has received the results. If you do not hear from us within 7 days, please contact the clinic through Everpayt or phone. If you have a critical or abnormal lab result, we will notify you by phone as soon as possible.  Submit refill requests through [email or web address] or call your pharmacy and they will forward the refill request to us. Please allow 3 business days for your refill to be completed.          Additional Information About Your Visit        Pink Rebel ShoesharWaysGo Information     [email or web address] gives you secure access to your electronic health record. If you see a primary care provider, you can also send messages to your care team and make appointments. If you have questions, please call your primary care clinic.  If you do not have a primary care provider, please call [phone] and they will assist you.        Care EveryWhere ID     This is your Care EveryWhere ID. This could be used by other organizations to access your [facility]  medical records  [id number]        Your Vitals Were     Pulse Temperature Respirations Last Period Pulse Oximetry BMI (Body Mass Index)    84 98.3  F (36.8  C) (Tympanic) 16 (LMP Unknown) 98% 18.12 kg/m2       Blood Pressure from Last 3 Encounters:   12/01/17 144/72   12/01/17 (!) 146/92   11/30/17 132/76    Weight from Last 3 Encounters:   12/01/17 114 lb (51.7 kg)   12/01/17 115 lb (52.2 kg)   11/30/17 115 lb (52.2 kg)              We Performed the Following     DEXA - HIM Scan          Today's Medication Changes          These changes are accurate as of: 12/1/17  3:16 PM.  If you have any questions, ask your nurse or doctor.               Start taking these medicines.        Dose/Directions    ACIDOPHILUS PROBIOTIC FORMULA Tabs   Used for:  Diverticulitis of large intestine without perforation or abscess without bleeding   Started by:  Murali Kenney MD        Dose:  1 each   Take 1 each by mouth 2 times daily   Quantity:  60 tablet   Refills:  11       anastrozole 1 MG tablet   Commonly known as:  ARIMIDEX   Used for:  Bilateral malignant neoplasm of breast in female, estrogen receptor positive, unspecified site of breast (H)   Started by:  Celi Powers MD        Dose:  1 mg   Take 1 tablet (1 mg) by mouth daily   Quantity:  90 tablet   Refills:  1       cyabnocobalamin 2500 MCG sublingual tablet   Commonly known as:  VITAMIN B-12   Used for:  Diverticulitis of large intestine without perforation or abscess without bleeding   Started by:  Murali Kenney MD        Dose:  2500 mcg   Place 2,500 mcg under the tongue daily   Quantity:  30 tablet   Refills:  11       Wrist Blood Pressure Monitor Misc   Used for:  Hypertension goal BP (blood pressure) < 140/80   Started by:  Murali Kenney MD        Dose:  1 each   1 each 2 times daily as needed   Quantity:  1 each   Refills:  0            Where to get your medicines      These medications were sent to [id number] -  [place] [address] [address] AT [facility] & [address] [place], [place] [address], [facility] [address]     Phone:  [phone]     ACIDOPHILUS PROBIOTIC FORMULA Tabs    anastrozole 1 MG tablet    cyabnocobalamin 2500 MCG sublingual tablet    Wrist Blood Pressure Monitor Misc                Primary Care Provider Office Phone # Fax #    Murali Aria Kenney [phone] [address] [facility] [address]        Equal Access to Services     [facility]: Hadii aad ku hadasho Soomaali, waaxda luqadaha, qaybta kaalmada adeegyada, waxay idiin hayaan adeeg kharash laflory . So [facility] [phone].    ATENCIÓN: Si habla español, tiene a yepez disposición servicios gratuitos de asistencia lingüística. [facility] [phone].    We comply with applicable federal civil rights laws and [place] laws. We do not discriminate on the basis of race, color, national origin, age, disability, sex, sexual orientation, or gender identity.            Thank you!     Thank you for choosing [facility]  for your care. Our goal is always to provide you with excellent care. Hearing back from our patients is one way we can continue to improve our services. Please take a few minutes to complete the written survey that you may receive in the mail after your visit with us. Thank you!             Your Updated Medication List - Protect others around you: Learn how to safely use, store and throw away your medicines at www.disposemymeds.org.          This list is accurate as of: 12/1/17  3:16 PM.  Always use your most recent med list.                   Brand Name Dispense Instructions for use Diagnosis    ACIDOPHILUS PROBIOTIC FORMULA Tabs     60 tablet    Take 1 each by mouth 2 times daily    Diverticulitis of large intestine without perforation or abscess without bleeding       ADVIL PO      Take 200 mg by mouth every 4 hours as needed for moderate pain        anastrozole 1 MG  tablet    ARIMIDEX    90 tablet    Take 1 tablet (1 mg) by mouth daily    Bilateral malignant neoplasm of breast in female, estrogen receptor positive, unspecified site of breast (H)       CENTRUM SILVER per tablet      Take 1 tablet by mouth daily        CLARITIN PO      Take 10 mg by mouth daily as needed        cyabnocobalamin 2500 MCG sublingual tablet    VITAMIN B-12    30 tablet    Place 2,500 mcg under the tongue daily    Diverticulitis of large intestine without perforation or abscess without bleeding       levothyroxine 75 MCG tablet    SYNTHROID    90 tablet    Take 1 tablet (75 mcg) by mouth every morning    Hypothyroidism due to acquired atrophy of thyroid       PRAVACHOL PO      Take 20 mg by mouth At Bedtime        psyllium Packet    METAMUCIL/KONSYL    60 packet    Take 1 packet by mouth 2 times daily    Diverticulitis of colon       VITAMIN D (CHOLECALCIFEROL) PO      Take 1,000 Units by mouth every morning        Wrist Blood Pressure Monitor Misc     1 each    1 each 2 times daily as needed    Hypertension goal BP (blood pressure) < 140/80

## 2017-12-01 NOTE — MR AVS SNAPSHOT
After Visit Summary   12/1/2017    Suma Calabrese    MRN: [id number]           Patient Information     Date Of Birth          1955        Visit Information        Provider Department      12/1/2017 11:00 AM Celi Powers MD [facility]        Today's Diagnoses     Menopause    -  1    Bilateral malignant neoplasm of breast in female, estrogen receptor positive, unspecified site of breast (H)          Care Instructions    dexa today at Dr. Cook's office? Will call pt on Dexa result.   Start AI after dexa result is ok.   F/u in 3 months with labs.           Follow-ups after your visit        Your next 10 appointments already scheduled     Dec 01, 2017  2:15 PM CST   Office Visit with Murali Kenney MD   [facility]    [address]  [address]  [facility] [address] [phone]           Bring a current list of meds and any records pertaining to this visit. For Physicals, please bring immunization records and any forms needing to be filled out. Please arrive 10 minutes early to complete paperwork.            Dec 14, 2017 10:00 AM CST   Post Op with Van Salazar MD   [facility] [place]    [address]  [facility] [address] [phone]            Mar 07, 2018 11:45 AM CST   Return Visit with Celi Powers MD   [facility] ([facility])    [facility]  [address]  [facility] [address]   [phone]              Future tests that were ordered for you today     Open Future Orders        Priority Expected Expires Ordered    DX Hip/Pelvis/Spine Routine  12/1/2018 12/1/2017            Who to contact     If you have questions or need follow up information about today's clinic visit or your schedule please contact [facility] directly at  [phone].  Normal or non-critical lab and imaging results will be communicated to you by Monsciergehart, letter or phone within 4 business days after the clinic has received the results. If you do not hear from us within 7 days, please contact the clinic through Monsciergehart or phone. If you have a critical or abnormal lab result, we will notify you by phone as soon as possible.  Submit refill requests through MedArkive or call your pharmacy and they will forward the refill request to us. Please allow 3 business days for your refill to be completed.          Additional Information About Your Visit        Monsciergehart Information     MedArkive gives you secure access to your electronic health record. If you see a primary care provider, you can also send messages to your care team and make appointments. If you have questions, please call your primary care clinic.  If you do not have a primary care provider, please call [phone] and they will assist you.        Care EveryWhere ID     This is your Care EveryWhere ID. This could be used by other organizations to access your [place] medical records  [id number]        Your Vitals Were     Pulse Temperature Respirations Last Period Pulse Oximetry BMI (Body Mass Index)    83 97.2  F (36.2  C) 22 (LMP Unknown) 100% 18.28 kg/m2       Blood Pressure from Last 3 Encounters:   12/01/17 (!) 146/92   11/30/17 132/76   11/27/17 120/78    Weight from Last 3 Encounters:   12/01/17 52.2 kg (115 lb)   11/30/17 52.2 kg (115 lb)   11/27/17 52.2 kg (115 lb)                 Today's Medication Changes          These changes are accurate as of: 12/1/17 12:24 PM.  If you have any questions, ask your nurse or doctor.               Start taking these medicines.        Dose/Directions    anastrozole 1 MG tablet   Commonly known as:  ARIMIDEX   Used for:  Bilateral malignant neoplasm of breast in female, estrogen receptor positive, unspecified site of breast (H)   Started by:  Celi Powers MD        Dose:   1 mg   Take 1 tablet (1 mg) by mouth daily   Quantity:  90 tablet   Refills:  1            Where to get your medicines      These medications [address] [facility] [address] [facility] & [address] [place], [place] [address] [address]     Phone:  [phone]     anastrozole 1 MG tablet                Primary Care Provider Office Phone # Fax #    Murali Aria Kenney [phone] [address] [facility] [address]        Equal Access to Services     [facility]: Hadii aad ku hadasho Soomaali, waaxda luqadaha, qaybta kaalmada adeegyada, omar aguayo . So [facility] [phone].    ATENCIÓN: Si habla español, tiene a yepez disposición servicios gratuitos de asistencia lingüística. Llame al [phone].    We comply with applicable federal civil rights laws and [place] laws. We do not discriminate on the basis of race, color, national origin, age, disability, sex, sexual orientation, or gender identity.            Thank you!     Thank you for choosing [facility]  for your care. Our goal is always to provide you with excellent care. Hearing back from our patients is one way we can continue to improve our services. Please take a few minutes to complete the written survey that you may receive in the mail after your visit with us. Thank you!             Your Updated Medication List - Protect others around you: Learn how to safely use, store and throw away your medicines at www.disposemymeds.org.          This list is accurate as of: 12/1/17 12:24 PM.  Always use your most recent med list.                   Brand Name Dispense Instructions for use Diagnosis    ADVIL PO      Take 200 mg by mouth every 4 hours as needed for moderate pain        anastrozole 1 MG tablet    ARIMIDEX    90 tablet    Take 1 tablet (1 mg) by mouth daily    Bilateral malignant neoplasm of breast in female, estrogen  receptor positive, unspecified site of breast (H)       CENTRUM SILVER per tablet      Take 1 tablet by mouth daily        CLARITIN PO      Take 10 mg by mouth daily as needed        levothyroxine 75 MCG tablet    SYNTHROID    90 tablet    Take 1 tablet (75 mcg) by mouth every morning    Hypothyroidism due to acquired atrophy of thyroid       PRAVACHOL PO      Take 20 mg by mouth At Bedtime        psyllium Packet    METAMUCIL/KONSYL    60 packet    Take 1 packet by mouth 2 times daily    Diverticulitis of colon       VITAMIN D (CHOLECALCIFEROL) PO      Take 1,000 Units by mouth every morning

## 2017-12-02 NOTE — PROGRESS NOTES
NEW PATIENT ONCOLOGY CONSULTATION      REQUESTING PHYSICIAN: Van Salazar MD, breast surgeon      CHIEF COMPLAINT AND REASON FOR VISIT:  Bilateral breast cancer dx fall 2017, s/p double mastectomy.      HISTORY OF PRESENT ILLNESS:  Suma Calabrese is a 62-year-old postmenopausal woman, self-felt a breast lump on the right side in 09/2017.    She was due her annual screening mammogram, which was done in September identified 11 o'clock, 3 cm from the nipple on the right breast, which is right upper outer quadrant, 1.1 cm hypoechoic mass.    This was biopsied in 09/2017, identified grade 2 invasive lobular cancer, ER greater than 95% positive, OK greater than 25% positive; HER-2/selena was negative.    he met with Dr. Salazar, discussed about surgical options at that time.  Breast preop MRI incidentally picked up a left breast nodule 3 to 4 o'clock position 4 cm from the nipple, 4   mm enhancing focus.  This is at the central lateral portion of the left breast.  Second look ultrasound confirmed the lesion, subcentimeter in size.    Biopsy was performed in October 2017 on the left side, identified radial scar, no evidence of malignancy.      She subsequently had  double mastectomy with Dr. Salazar mid 10/2017.  On the left side found invasive grade 1 IDC  0.5 cm at 3 o'clock position 9 cm from the nipple, % positive, [place] 80% positive, HER-2/selena negative. negative DCIS, negative LCIS.  Margins were clear.  Lymphovascular invasion is absent. 2 lymph nodes taken out.  Both of them were negative.  This is staged at T1a N0.    On the right side, found 2 cm invasive lobular cancer was identified, grade 1 associated with LCIS, absent lymphovascular invasion.  One sentinel lymph node was taken out, was negative.  This is staged T1c N0 disease.  RS is 13, corresponds to 10-year risk of distal recurrence with surgery, radiation, and tamoxifen is about 8%.       She was admitted in Nov for acute diverticulitis with perforation,  pericolic abscess, and bowel obstruction, s/p s/p exploratory laparotomy, subtotal colectomy, and end ileostomy on 11/13/2017.     PAST MEDICAL HISTORY:  History of hyponatremia, hypokalemia, hypothyroidism, nontoxic uninodular goiter, rectal prolapse, hypertension, internal hemorrhoid.Eextensive diverticulitis which required subtotal colectomy and end ileostomy bag in 10/2017.     FAMILY HISTORY:  She is adopted.      SOCIAL HISTORY:  She works in the [employer].  Quit smoking 09/2017 when she was diagnosed with breast cancer.  Denies alcohol abuse.      REVIEW OF SYSTEMS:   GENERAL: She is still recovering from her colectomy surgery and getting used to taking care of ostomy bag.  She had postop seroma on the left mastectomy scar area which was aspirated by Dr. Salazar yesterday.     NEURO:   No headache, double vision, or focal weakness.  No neuropathy.   SKIN:  No chronic skin rash or skin infection.   CARDIOVASCULAR:  Hypertension.     PULMONARY:  No shortness of breath, no pleurisy, no cough, no hemoptysis.   GI:  Diverticulitis flareup, required subtotal colectomy in 10/2017; internal hemorrhoid; rectal prolapse.   :  No urgency, frequency.  No recurrent urinary tract infection.  No kidney problems.   RHEUMATOLOGY/MUSCULOSKELETAL SYSTEM:  No arthritis.  No joint pain.  No muscle ache.   ENDOCRINE:  No history of diabetes or thyroid problem.  No complaints of hot flashes.   HEMATOLOGY:  No history of bleeding or thrombosis episode.   IMMUNOLOGY:  No recurrent fever or chills episode.  No recurrent infectious episode.   BREASTS/GYN:  Postmenopausal, diagnosed with bilateral breast cancer at age 62.      PHYSICAL EXAMINATION:   VITAL SIGNS:  Stable.   GENERAL APPEARANCE:  Middle-aged woman, very thin, not in acute distress.     HEENT: The patient is normocephalic, atraumatic. Pupils are equally reactive to light.  Sclerae are anicteric.  Moist oral mucosa.  Negative pharynx.  No oral thrush.    NECK:  Supple.  No jugular venous distention.  Thyroid is not palpable.   LYMPH NODES:  Superficial lymphadenopathy is not appreciable in the bilateral cervical, supraclavicular, axillary or inguinal areas.   CARDIOVASCULAR:  S1, S2 regular with no murmurs or gallops.  No carotid or abdominal bruits.   PULMONARY:  Lungs are clear to auscultation and percussion bilaterally.  There is no wheezing or rhonchi.   GASTROINTESTINAL:  Well-healed vertical abdominal scar with well situated right-sided ostomy bag.   MUSCULOSKELETAL/EXTREMITIES:  No edema.  No cyanotic changes.  No signs of joint deformity.  No lymphedema.  No spinal or paraspinal tenderness.  No CVA tenderness.   NEUROLOGIC:  Cranial nerves II-XII are grossly intact.  Sensation intact.  Muscle strength and muscle tone symmetrical, 5/5 throughout.   SKIN:  No petechiae.  No rash.  No signs of cellulitis.   BREASTS:  Bilateral mastectomy scars well healed on the chest wall exam portion.  There is minimal edema around the left mastectomy scar.      CURRENT LAB DATA  REVIEWED.    CBC, CMP satisfactory with normal baseline kidney and liver function tests 11/2017.      Bilateral mastectomy pathology 10/2017 -  On the left side found invasive grade 1 IDC  0.5 cm at 3 o'clock position 9 cm from the nipple, % positive, [place] 80% positive, HER-2/selena negative. negative DCIS, negative LCIS.  Margins were clear.  Lymphovascular invasion is absent. 2 lymph nodes taken out.  Both of them were negative.  This is staged at T1a N0.    On the right side, found 2 cm invasive lobular cancer was identified, grade 1 associated with LCIS, absent lymphovascular invasion.  One sentinel lymph node was taken out, was negative.  This is staged T1c N0 disease.      Recurrence score on the right-sided invasive lobular cancer came back 13, corresponds to 10-year risk of distal recurrence with surgery, radiation, and tamoxifen is about 8%.        CURRENT IMAGE DATA  REVIEWED  CT a/p  11/2017: Distal sigmoid wall thickening, currently resulting in colonic obstruction. This could relate to diverticulitis or focal  colitis, but neoplasm cannot be excluded. Serpiginous perisigmoid fluid collection/abscess appears mildly increased. Small amount of  free fluid.     US arm 11/2017  1. No evidence of deep vein thrombosis.  2. Superficial thrombophlebitis in the right cephalic vein.       OLD DATA REVIEWED TODAY WITH SUMMARY  Bilateral mastectomy pathology 10/2017 -  On the left side found invasive grade 1 IDC  0.5 cm at 3 o'clock position 9 cm from the nipple, % positive, [place] 80% positive, HER-2/selena negative. negative DCIS, negative LCIS.  Margins were clear.  Lymphovascular invasion is absent. 2 lymph nodes taken out.  Both of them were negative.  This is staged at T1a N0.    On the right side, found 2 cm invasive lobular cancer was identified, grade 1 associated with LCIS, absent lymphovascular invasion.  One sentinel lymph node was taken out, was negative.  This is staged T1c N0 disease.      Recurrence score on the right-sided invasive lobular cancer came back 13, corresponds to 10-year risk of distal recurrence with surgery, radiation, and tamoxifen is about 8%.       Mammogram September identified 11 o'clock, 3 cm from the nipple on the right breast, which is right upper outer quadrant, 1.1 cm hypoechoic mass.    This was biopsied in 09/2017, identified grade 2 invasive lobular cancer, ER greater than 95% positive, W[place] greater than 25% positive; HER-2/selena was negative.    Breast preop MRI incidentally picked up a left breast nodule 3 to 4 o'clock position 4 cm from the nipple, 4   mm enhancing focus.  This is at the central lateral portion of the left breast.  Second look ultrasound confirmed the lesion, subcentimeter in size.  Biopsy was performed in October 2017 on the left side, identified radial scar, no evidence of malignancy.            ASSESSMENT/PLAN:   09/2017,10/20/17 diagnosed  bilateral breast cancer.  Right-side is T1cN0, 2 cm invasive lobular, grade 1, ER/KS positive, HER-2/selena negative.  Left-side invasive ductal, T1aN0, grade 1, ER/KS positive, HER-2/selena negative.  She had double mastectomy.  All the nodes are negative.  Margins were clean.  She also had lobular carcinoma in situ on the right side.  Recurrence score on the right side of 13 corresponds to 10-year risk of distal recurrence 8% with surgery, radiation, and tamoxifen.     Recommend her to consider baseline bone density study because of the side effect of anti-hormonal therapy.      I discussed with her the recurrence score testing results,  it provides us prognostic information, also predictive for the benefit of chemotherapy.  Because she belongs to low risk group, her benefit from adjuvant chemotherapy is low, potential risks outweigh benefits so would not recommend that.      Due to the double mastectomy, leta negative status, and size of her lesions, she would not benefit from post-mastectomy radiation.      She will benefit from anti-hormonal therapy in the adjuvant setting, more for the right-sided invasive lobular cancer 2 cm T1c lesion which was palpable on presentation.    We discussed in detail about tamoxifen versus aromatase inhibitor, different side effect profile.  Recommend her to consider aromatase inhibitor unless she has severe osteoporosis.     We also discussed ER positive breast cancer recurrence pattern and future followup plan.  We also discussed about 5 years versus 10 years anti-hormonal therapy data and how this may apply to her situation.     Bilateral breast cancer presented at age 62.  We discussed the indication of genetic counseling; she is not interested at this point.         All questions are answered to her satisfaction regarding her newly diagnosed bilateral breast cancer, stage, adjuvant treatment options, side effect profile of anti-hormonal therapy, followup plan, etc., as above.          L CECI AG MD             D: 2017 12:14   T: 2017 12:22   MT: ANGELICA      Name:     DIANE ESTRADA   MRN:      [id number]        Account:      [id number]   :      1955           Visit Date:   2017      Document: [id number]       cc: Van Salazar MD

## 2017-12-04 ENCOUNTER — RADIANT APPOINTMENT (OUTPATIENT)
Dept: BONE DENSITY | Facility: CLINIC | Age: 62
End: 2017-12-04
Attending: INTERNAL MEDICINE
Payer: COMMERCIAL

## 2017-12-04 DIAGNOSIS — Z78.0 MENOPAUSE: ICD-10-CM

## 2017-12-04 PROCEDURE — 77080 DXA BONE DENSITY AXIAL: CPT | Performed by: FAMILY MEDICINE

## 2017-12-06 ENCOUNTER — TELEPHONE (OUTPATIENT)
Dept: FAMILY MEDICINE | Facility: CLINIC | Age: 62
End: 2017-12-06

## 2017-12-06 NOTE — TELEPHONE ENCOUNTER
Our goal is to have forms completed within 72 hours, however some forms may require a visit or additional information.    What clinic location was the form placed at [facility] or [place].?     Who is the form from? [employer] [facility] Ost[facility]  Where did the form come from? Faxed to clinic   The form was placed in the inbox of Murali Kenney Jr MD      Please fax to [phone]    Additional comments: sponge, gauze, pouch    Call take on 12/6/2017 at 1:34 PM by Ada Montaño

## 2017-12-10 ENCOUNTER — DOCUMENTATION ONLY (OUTPATIENT)
Dept: CARE COORDINATION | Facility: CLINIC | Age: 62
End: 2017-12-10

## 2017-12-11 NOTE — PROGRESS NOTES
[facility] utilizes an encounter to take the place of a direct phone call to your office. Please take a moment to review the below request. Your reply to this encounter will act as a verbal OK of orders if requested below. Thank you.    DISCHARGE SUMMARY  SUMMARY TO MD  SITUATION  Patient had hospital admit 11/7 to 11/20/2017, dx diverticulitis. Patient seen by [facility] nurse for ileostomy education and care. Pt has ileostomy to RLQ and is currently in [employer] convex lock and roll pouch with precut 32mm pouch on order. Pt has healthy/viable/protrubent stoma and is draining brown soft pasty stool. Pt remains on metamucil 2xdaily to bulk stools. Pt was educated on how to perform skin care, pouch management and pouch changes. Pt is independent with pouch cares. Pt was having issues with moisture/yeast/leakage. Pt was educated on crusting with desenex, stoma powder and placed in walker belt. All this worked well and pt skin is clean and healthy. Pt educated how to reorder supplies and list left in home. Pt had midline abdominal incision that was approximated with staples. Staples were removed in clinic and incision is healing well without complications. Pt is indepent with all medications, ADLS/IADLS, VS have remained stable. Pt DC to self care with goals met and returning back to work. Pt is capable to f/u in outpatient setting. Pt has all information to f/u with Surgeon and outpatient ostomy clinic if needed.

## 2017-12-18 DIAGNOSIS — E03.4 HYPOTHYROIDISM DUE TO ACQUIRED ATROPHY OF THYROID: Chronic | ICD-10-CM

## 2017-12-19 ENCOUNTER — TELEPHONE (OUTPATIENT)
Dept: FAMILY MEDICINE | Facility: CLINIC | Age: 62
End: 2017-12-19

## 2017-12-19 PROCEDURE — G0180 MD CERTIFICATION HHA PATIENT: HCPCS | Performed by: FAMILY MEDICINE

## 2017-12-19 NOTE — TELEPHONE ENCOUNTER
Our goal is to have forms completed within 72 hours, however some forms may require a visit or additional information.    What clinic location was the form placed at [facility] or [place].?     Who is the form from? [facility] Period 11/21/17 - 1/19/18  Where did the form come from? Faxed to clinic   The form was placed in the inbox of Murali Kenney Jr MD      Please fax to [phone]    Additional comments:     Call take on 12/19/2017 at 9:31 AM by Ada Montaño

## 2017-12-20 RX ORDER — LEVOTHYROXINE SODIUM 75 UG/1
TABLET ORAL
Qty: 90 TABLET | Refills: 2 | Status: SHIPPED | OUTPATIENT
Start: 2017-12-20 | End: 2018-12-26

## 2017-12-20 NOTE — TELEPHONE ENCOUNTER
Last OV 12/01/2017.  Requested Prescriptions   Pending Prescriptions Disp Refills     levothyroxine (SYNTHROID/LEVOTHROID) 75 MCG tablet [Pharmacy Med Name: LEVOTHYROXINE 0.075MG (75MCG) TABS] 90 tablet 0     Sig: TAKE 1 TABLET(75 MCG) BY MOUTH DAILY    Thyroid Protocol Passed    12/18/2017  5:20 PM       Passed - Patient is 12 years or older       Passed - Recent or future visit with authorizing provider's specialty    Patient had office visit in the last year or has a visit in the next 30 days with authorizing provider.  See chart review.              Passed - Normal TSH on file in past 12 months    Recent Labs   Lab Test  09/18/17   1006   TSH  1.21             Passed - No active pregnancy on record    If patient is pregnant or has had a positive pregnancy test, please check TSH.         Passed - No positive pregnancy test in past 12 months    If patient is pregnant or has had a positive pregnancy test, please check TSH.          Prescription approved per [facility].

## 2018-01-04 ENCOUNTER — TELEPHONE (OUTPATIENT)
Dept: FAMILY MEDICINE | Facility: CLINIC | Age: 63
End: 2018-01-04

## 2018-01-04 NOTE — TELEPHONE ENCOUNTER
Our goal is to have forms completed within 72 hours, however some forms may require a visit or additional information.    What clinic location was the form placed at [facility] or [place].?     Who is the form from? [facility]  Where did the form come from? Faxed to clinic   The form was placed in the inbox of Murali Kenney Jr MD      Please fax to [phone]    Additional comments:     Call take on 1/4/2018 at 9:10 AM by Ada Montaño

## 2018-01-15 ENCOUNTER — TRANSFERRED RECORDS (OUTPATIENT)
Dept: HEALTH INFORMATION MANAGEMENT | Facility: CLINIC | Age: 63
End: 2018-01-15

## 2018-01-19 ENCOUNTER — TELEPHONE (OUTPATIENT)
Dept: FAMILY MEDICINE | Facility: CLINIC | Age: 63
End: 2018-01-19

## 2018-01-19 NOTE — TELEPHONE ENCOUNTER
Our goal is to have forms completed within 72 hours, however some forms may require a visit or additional information.    What clinic location was the form placed at [facility] or [place].?     Who is the form from? [facility] Colostomy [facility]  Where did the form come from? Faxed to clinic   The form was placed in the inbox of Murali Kenney Jr MD      Please fax to [phone]    Additional comments:     Call take on 1/19/2018 at 3:18 PM by Ada Montaño

## 2018-03-14 ENCOUNTER — ONCOLOGY VISIT (OUTPATIENT)
Dept: ONCOLOGY | Facility: CLINIC | Age: 63
End: 2018-03-14
Attending: INTERNAL MEDICINE
Payer: COMMERCIAL

## 2018-03-14 ENCOUNTER — HOSPITAL ENCOUNTER (OUTPATIENT)
Facility: CLINIC | Age: 63
Setting detail: SPECIMEN
Discharge: HOME | End: 2018-03-14
Attending: INTERNAL MEDICINE | Admitting: INTERNAL MEDICINE
Payer: COMMERCIAL

## 2018-03-14 VITALS
RESPIRATION RATE: 18 BRPM | OXYGEN SATURATION: 99 % | BODY MASS INDEX: 21.21 KG/M2 | HEIGHT: 66 IN | SYSTOLIC BLOOD PRESSURE: 130 MMHG | WEIGHT: 132 LBS | DIASTOLIC BLOOD PRESSURE: 83 MMHG | HEART RATE: 88 BPM | TEMPERATURE: 98.2 F

## 2018-03-14 DIAGNOSIS — C50.911 BILATERAL MALIGNANT NEOPLASM OF BREAST IN FEMALE, ESTROGEN RECEPTOR POSITIVE, UNSPECIFIED SITE OF BREAST (H): Primary | ICD-10-CM

## 2018-03-14 DIAGNOSIS — C50.912 BILATERAL MALIGNANT NEOPLASM OF BREAST IN FEMALE, ESTROGEN RECEPTOR POSITIVE, UNSPECIFIED SITE OF BREAST (H): Primary | ICD-10-CM

## 2018-03-14 DIAGNOSIS — M85.80 OSTEOPENIA, UNSPECIFIED LOCATION: ICD-10-CM

## 2018-03-14 DIAGNOSIS — R60.0 LOCALIZED EDEMA: ICD-10-CM

## 2018-03-14 DIAGNOSIS — Z17.0 BILATERAL MALIGNANT NEOPLASM OF BREAST IN FEMALE, ESTROGEN RECEPTOR POSITIVE, UNSPECIFIED SITE OF BREAST (H): Primary | ICD-10-CM

## 2018-03-14 LAB
ALBUMIN SERPL-MCNC: 3.8 G/DL (ref 3.4–5)
ALP SERPL-CCNC: 118 U/L (ref 40–150)
ALT SERPL W P-5'-P-CCNC: 22 U/L (ref 0–50)
ANION GAP SERPL CALCULATED.3IONS-SCNC: 6 MMOL/L (ref 3–14)
AST SERPL W P-5'-P-CCNC: 18 U/L (ref 0–45)
BILIRUB SERPL-MCNC: 0.5 MG/DL (ref 0.2–1.3)
BUN SERPL-MCNC: 20 MG/DL (ref 7–30)
CALCIUM SERPL-MCNC: 9.6 MG/DL (ref 8.5–10.1)
CHLORIDE SERPL-SCNC: 106 MMOL/L (ref 94–109)
CO2 SERPL-SCNC: 26 MMOL/L (ref 20–32)
CREAT SERPL-MCNC: 0.89 MG/DL (ref 0.52–1.04)
GFR SERPL CREATININE-BSD FRML MDRD: 64 ML/MIN/1.7M2
GLUCOSE SERPL-MCNC: 88 MG/DL (ref 70–99)
POTASSIUM SERPL-SCNC: 4.1 MMOL/L (ref 3.4–5.3)
PROT SERPL-MCNC: 7.5 G/DL (ref 6.8–8.8)
SODIUM SERPL-SCNC: 138 MMOL/L (ref 133–144)

## 2018-03-14 PROCEDURE — 99214 OFFICE O/P EST MOD 30 MIN: CPT | Performed by: INTERNAL MEDICINE

## 2018-03-14 PROCEDURE — 80053 COMPREHEN METABOLIC PANEL: CPT | Performed by: INTERNAL MEDICINE

## 2018-03-14 PROCEDURE — 36415 COLL VENOUS BLD VENIPUNCTURE: CPT

## 2018-03-14 PROCEDURE — G0463 HOSPITAL OUTPT CLINIC VISIT: HCPCS

## 2018-03-14 RX ORDER — ANASTROZOLE 1 MG/1
1 TABLET ORAL DAILY
Qty: 90 TABLET | Refills: 3 | Status: SHIPPED | OUTPATIENT
Start: 2018-03-14 | End: 2019-04-10

## 2018-03-14 ASSESSMENT — PAIN SCALES - GENERAL: PAINLEVEL: NO PAIN (0)

## 2018-03-14 NOTE — PROGRESS NOTES
ONCOLOGY FOLLOW UP VISIT     CHIEF COMPLAINT AND REASON FOR VISIT:  Bilateral breast cancer dx fall 2017, s/p double mastectomy.      HISTORY OF PRESENT ILLNESS: She self-felt a breast lump on the right side in 09/2017.    She was due her annual screening mammogram, which was done in September identified 11 o'clock, 3 cm from the nipple on the right breast, which is right upper outer quadrant, 1.1 cm hypoechoic mass.    This was biopsied in 09/2017, identified grade 2 invasive lobular cancer, ER greater than 95% positive, [place] greater than 25% positive; HER-2/selena was negative.    She met with Dr. Salazar, discussed about surgical options at that time.  Breast preop MRI incidentally picked up a left breast nodule 3 to 4 o'clock position 4 cm from the nipple, 4   mm enhancing focus.  This is at the central lateral portion of the left breast.  Second look ultrasound confirmed the lesion, subcentimeter in size.    Biopsy was performed in October 2017 on the left side, identified radial scar, no evidence of malignancy.      She subsequently had double mastectomy with Dr. Salazar mid 10/2017.  On the left side found invasive grade 1 IDC  0.5 cm at 3 o'clock position 9 cm from the nipple, % positive, [place] 80% positive, HER-2/selena negative. negative DCIS, negative LCIS.  Margins were clear.  Lymphovascular invasion is absent. 2 lymph nodes taken out.  Both of them were negative.  This was staged at T1a N0.    On the right side, found 2 cm invasive lobular cancer was identified, grade 1 associated with LCIS, absent lymphovascular invasion.  One sentinel lymph node was taken out, was negative.  This is staged T1c N0 disease.  RS is 13, corresponds to 10-year risk of distal recurrence with surgery, radiation, and tamoxifen is about 8%.       She was admitted in Nov 2017 for acute diverticulitis with perforation, pericolic abscess, and bowel obstruction, s/p s/p exploratory laparotomy, subtotal colectomy, and end ileostomy on  "11/13/2017.     PAST MEDICAL HISTORY:  History of hyponatremia, hypokalemia, hypothyroidism, nontoxic uninodular goiter, rectal prolapse, hypertension, internal hemorrhoid.Eextensive diverticulitis which required subtotal colectomy and end ileostomy bag in 10/2017.     FAMILY HISTORY:  She is adopted.      SOCIAL HISTORY:  She works in the [employer] department.  Quit smoking 09/2017 when she was diagnosed with breast cancer.  Denies alcohol abuse.      REVIEW OF SYSTEMS:   Tolerating AI fine with mild arthritic pain and hot flushes.   Reports mild swollen on left chest wall.     PHYSICAL EXAMINATION:   VITAL SIGNS:  Blood pressure 130/83, pulse 88, temperature 98.2  F (36.8  C), resp. rate 18, height 1.676 m (5' 6\"), weight 59.9 kg (132 lb), SpO2 99 %, not currently breastfeeding.    GENERAL APPEARANCE:  Middle-aged woman, very thin, not in acute distress.     HEENT: The patient is normocephalic, atraumatic. Pupils are equally reactive to light.  Sclerae are anicteric.  Moist oral mucosa.  Negative pharynx.  No oral thrush.   NECK:  Supple.  No jugular venous distention.  Thyroid is not palpable.   LYMPH NODES:  Superficial lymphadenopathy is not appreciable in the bilateral cervical, supraclavicular, axillary or inguinal areas.   CARDIOVASCULAR:  S1, S2 regular with no murmurs or gallops.  No carotid or abdominal bruits.   PULMONARY:  Lungs are clear to auscultation and percussion bilaterally.  There is no wheezing or rhonchi.   GASTROINTESTINAL:  Well-healed vertical abdominal scar with well situated right-sided ostomy bag.   MUSCULOSKELETAL/EXTREMITIES:  No edema.  No cyanotic changes.  No signs of joint deformity.  No lymphedema.  No spinal or paraspinal tenderness.  No CVA tenderness.   NEUROLOGIC:  Cranial nerves II-XII are grossly intact.  Sensation intact.  Muscle strength and muscle tone symmetrical, 5/5 throughout.   SKIN:  No petechiae.  No rash.  No signs of cellulitis.   BREASTS:  Bilateral " mastectomy scars well healed on the chest wall exam portion.  There is minimal edema around the left mastectomy scar.      CURRENT LAB DATA  REVIEWED.    CBC, CMP satisfactory with normal baseline kidney and liver function tests 11/2017.      Bilateral mastectomy pathology 10/2017 -  On the left side found invasive grade 1 IDC  0.5 cm at 3 o'clock position 9 cm from the nipple, % positive, [place] 80% positive, HER-2/selena negative. negative DCIS, negative LCIS.  Margins were clear.  Lymphovascular invasion is absent. 2 lymph nodes taken out.  Both of them were negative.  This is staged at T1a N0.    On the right side, found 2 cm invasive lobular cancer was identified, grade 1 associated with LCIS, absent lymphovascular invasion.  One sentinel lymph node was taken out, was negative.  This is staged T1c N0 disease.      Recurrence score on the right-sided invasive lobular cancer came back 13, corresponds to 10-year risk of distal recurrence with surgery, radiation, and tamoxifen is about 8%.        CURRENT IMAGE DATA  REVIEWED  dexa 12/2017 - osteopenia  CT a/p 11/2017: Distal sigmoid wall thickening, currently resulting in colonic obstruction. This could relate to diverticulitis or focal  colitis, but neoplasm cannot be excluded. Serpiginous perisigmoid fluid collection/abscess appears mildly increased. Small amount of  free fluid.     US arm 11/2017  1. No evidence of deep vein thrombosis.  2. Superficial thrombophlebitis in the right cephalic vein.       OLD DATA REVIEWED TODAY WITH SUMMARY  Bilateral mastectomy pathology 10/2017 -  On the left side found invasive grade 1 IDC  0.5 cm at 3 o'clock position 9 cm from the nipple, % positive, [place] 80% positive, HER-2/selena negative. negative DCIS, negative LCIS.  Margins were clear.  Lymphovascular invasion is absent. 2 lymph nodes taken out.  Both of them were negative.  This is staged at T1a N0.    On the right side, found 2 cm invasive lobular cancer was  identified, grade 1 associated with LCIS, absent lymphovascular invasion.  One sentinel lymph node was taken out, was negative.  This is staged T1c N0 disease.      Recurrence score on the right-sided invasive lobular cancer came back 13, corresponds to 10-year risk of distal recurrence with surgery, radiation, and tamoxifen is about 8%.       Mammogram September identified 11 o'clock, 3 cm from the nipple on the right breast, which is right upper outer quadrant, 1.1 cm hypoechoic mass.    This was biopsied in 09/2017, identified grade 2 invasive lobular cancer, ER greater than 95% positive, [place] greater than 25% positive; HER-2/selena was negative.    Breast preop MRI incidentally picked up a left breast nodule 3 to 4 o'clock position 4 cm from the nipple, 4   mm enhancing focus.  This is at the central lateral portion of the left breast.  Second look ultrasound confirmed the lesion, subcentimeter in size.  Biopsy was performed in October 2017 on the left side, identified radial scar, no evidence of malignancy.            ASSESSMENT/PLAN:   1.  09/2017,10/20/17 diagnosed bilateral breast cancer.  Right-side is T1cN0, 2 cm invasive lobular, grade 1, ER/NE positive, HER-2/selena negative.  Left-side invasive ductal, T1aN0, grade 1, ER/NE positive, HER-2/selena negative.  She had double mastectomy.  All the nodes are negative.  Margins were clean.  She also had lobular carcinoma in situ on the right side.  Recurrence score on the right side of 13 corresponds to 10-year risk of distal recurrence 8% with surgery, radiation, and tamoxifen.     Due to the double mastectomy, leta negative status, and size of her lesions, she would not benefit from post-mastectomy radiation.      She made informed decision to proceed with Arimidex 12/2017.    We also discussed ER positive breast cancer recurrence pattern and future followup plan.  We also discussed about 5 years versus 10 years anti-hormonal therapy data and how this may apply to her  situation.     Bilateral breast cancer presented at age 62.  We discussed the indication of genetic counseling; she is not interested at this point.         2. Osteopenia - we discussed the proper use of vit D, dosing, etc.      3. Lymphedema on left chest wall - we dicussed the massage therapy. She wants to do it her self before seeing lymphedema.

## 2018-03-14 NOTE — LETTER
"    3/14/2018         RE: Suma Calabrese  [address]  [facility] [address]        Dear Colleague,    Thank you for referring your patient, Suma Calabrese, to the [facility]. Please see a copy of my visit note below.    Oncology Rooming Note    March 14, 2018 3:59 PM   Suma Calabrese is a 62 year old female who presents for:    Chief Complaint   Patient presents with     Oncology Clinic Visit     RETURN-3 month follow up-Breast Cancer     Initial Vitals: /83 (BP Location: Left arm, Patient Position: Chair, Cuff Size: Adult Regular)  Pulse 88  Temp 98.2  F (36.8  C)  Resp 18  Ht 1.676 m (5' 6\")  Wt 59.9 kg (132 lb)  LMP  (LMP Unknown)  SpO2 99%  BMI 21.31 kg/m2 Estimated body mass index is 21.31 kg/(m^2) as calculated from the following:    Height as of this encounter: 1.676 m (5' 6\").    Weight as of this encounter: 59.9 kg (132 lb). Body surface area is 1.67 meters squared.  No Pain (0) Comment: Data Unavailable   No LMP recorded (lmp unknown). Patient is postmenopausal.  Allergies reviewed: Yes  Medications reviewed: Yes    Medications: Medication refills not needed today.  Pharmacy name entered into fg microtec: [facility] [id number] [place] [address] AT [address]    Clinical concerns: none     5 minutes for nursing intake (face to face time)     Marvel Donnelly CMA          Medical Assistant Note:  Suma Calabrese presents today for Labs only.    Patient seen by provider today: Yes: Dr. Powers.   present during visit today: Not Applicable.    Concerns: No Concerns.    Procedure:  Lab draw site: LAC, Needle type: BF, Gauge: 21 g guaze/coban applied.    Post Assessment:  Labs drawn without difficulty: No.    Discharge Plan:  Departure Mode: Ambulatory.    Face to Face Time: 5 mins.    Marvel Donnelly            ONCOLOGY FOLLOW UP VISIT     CHIEF COMPLAINT AND REASON FOR VISIT:  Bilateral breast cancer dx fall 2017, " s/p double mastectomy.      HISTORY OF PRESENT ILLNESS: She self-felt a breast lump on the right side in 09/2017.    She was due her annual screening mammogram, which was done in September identified 11 o'clock, 3 cm from the nipple on the right breast, which is right upper outer quadrant, 1.1 cm hypoechoic mass.    This was biopsied in 09/2017, identified grade 2 invasive lobular cancer, ER greater than 95% positive, GA greater than 25% positive; HER-2/selena was negative.    She met with Dr. Salazar, discussed about surgical options at that time.  Breast preop MRI incidentally picked up a left breast nodule 3 to 4 o'clock position 4 cm from the nipple, 4   mm enhancing focus.  This is at the central lateral portion of the left breast.  Second look ultrasound confirmed the lesion, subcentimeter in size.    Biopsy was performed in October 2017 on the left side, identified radial scar, no evidence of malignancy.      She subsequently had double mastectomy with Dr. Salazar mid 10/2017.  On the left side found invasive grade 1 IDC  0.5 cm at 3 o'clock position 9 cm from the nipple, % positive, GA 80% positive, HER-2/selena negative. negative DCIS, negative LCIS.  Margins were clear.  Lymphovascular invasion is absent. 2 lymph nodes taken out.  Both of them were negative.  This was staged at T1a N0.    On the right side, found 2 cm invasive lobular cancer was identified, grade 1 associated with LCIS, absent lymphovascular invasion.  One sentinel lymph node was taken out, was negative.  This is staged T1c N0 disease.  RS is 13, corresponds to 10-year risk of distal recurrence with surgery, radiation, and tamoxifen is about 8%.       She was admitted in Nov 2017 for acute diverticulitis with perforation, pericolic abscess, and bowel obstruction, s/p s/p exploratory laparotomy, subtotal colectomy, and end ileostomy on 11/13/2017.     PAST MEDICAL HISTORY:  History of hyponatremia, hypokalemia, hypothyroidism, nontoxic  "uninodular goiter, rectal prolapse, hypertension, internal hemorrhoid.Eextensive diverticulitis which required subtotal colectomy and end ileostomy bag in 10/2017.     FAMILY HISTORY:  She is adopted.      SOCIAL HISTORY:  She works in the [employer] department.  Quit smoking 09/2017 when she was diagnosed with breast cancer.  Denies alcohol abuse.      REVIEW OF SYSTEMS:   Tolerating AI fine with mild arthritic pain and hot flushes.   Reports mild swollen on left chest wall.     PHYSICAL EXAMINATION:   VITAL SIGNS:  Blood pressure 130/83, pulse 88, temperature 98.2  F (36.8  C), resp. rate 18, height 1.676 m (5' 6\"), weight 59.9 kg (132 lb), SpO2 99 %, not currently breastfeeding.    GENERAL APPEARANCE:  Middle-aged woman, very thin, not in acute distress.     HEENT: The patient is normocephalic, atraumatic. Pupils are equally reactive to light.  Sclerae are anicteric.  Moist oral mucosa.  Negative pharynx.  No oral thrush.   NECK:  Supple.  No jugular venous distention.  Thyroid is not palpable.   LYMPH NODES:  Superficial lymphadenopathy is not appreciable in the bilateral cervical, supraclavicular, axillary or inguinal areas.   CARDIOVASCULAR:  S1, S2 regular with no murmurs or gallops.  No carotid or abdominal bruits.   PULMONARY:  Lungs are clear to auscultation and percussion bilaterally.  There is no wheezing or rhonchi.   GASTROINTESTINAL:  Well-healed vertical abdominal scar with well situated right-sided ostomy bag.   MUSCULOSKELETAL/EXTREMITIES:  No edema.  No cyanotic changes.  No signs of joint deformity.  No lymphedema.  No spinal or paraspinal tenderness.  No CVA tenderness.   NEUROLOGIC:  Cranial nerves II-XII are grossly intact.  Sensation intact.  Muscle strength and muscle tone symmetrical, 5/5 throughout.   SKIN:  No petechiae.  No rash.  No signs of cellulitis.   BREASTS:  Bilateral mastectomy scars well healed on the chest wall exam portion.  There is minimal edema around the left " mastectomy scar.      CURRENT LAB DATA  REVIEWED.    CBC, CMP satisfactory with normal baseline kidney and liver function tests 11/2017.      Bilateral mastectomy pathology 10/2017 -  On the left side found invasive grade 1 IDC  0.5 cm at 3 o'clock position 9 cm from the nipple, % positive, TN 80% positive, HER-2/selena negative. negative DCIS, negative LCIS.  Margins were clear.  Lymphovascular invasion is absent. 2 lymph nodes taken out.  Both of them were negative.  This is staged at T1a N0.    On the right side, found 2 cm invasive lobular cancer was identified, grade 1 associated with LCIS, absent lymphovascular invasion.  One sentinel lymph node was taken out, was negative.  This is staged T1c N0 disease.      Recurrence score on the right-sided invasive lobular cancer came back 13, corresponds to 10-year risk of distal recurrence with surgery, radiation, and tamoxifen is about 8%.        CURRENT IMAGE DATA  REVIEWED  dexa 12/2017 - osteopenia  CT a/p 11/2017: Distal sigmoid wall thickening, currently resulting in colonic obstruction. This could relate to diverticulitis or focal  colitis, but neoplasm cannot be excluded. Serpiginous perisigmoid fluid collection/abscess appears mildly increased. Small amount of  free fluid.     US arm 11/2017  1. No evidence of deep vein thrombosis.  2. Superficial thrombophlebitis in the right cephalic vein.       OLD DATA REVIEWED TODAY WITH SUMMARY  Bilateral mastectomy pathology 10/2017 -  On the left side found invasive grade 1 IDC  0.5 cm at 3 o'clock position 9 cm from the nipple, % positive, TN 80% positive, HER-2/selena negative. negative DCIS, negative LCIS.  Margins were clear.  Lymphovascular invasion is absent. 2 lymph nodes taken out.  Both of them were negative.  This is staged at T1a N0.    On the right side, found 2 cm invasive lobular cancer was identified, grade 1 associated with LCIS, absent lymphovascular invasion.  One sentinel lymph node was taken  out, was negative.  This is staged T1c N0 disease.      Recurrence score on the right-sided invasive lobular cancer came back 13, corresponds to 10-year risk of distal recurrence with surgery, radiation, and tamoxifen is about 8%.       Mammogram September identified 11 o'clock, 3 cm from the nipple on the right breast, which is right upper outer quadrant, 1.1 cm hypoechoic mass.    This was biopsied in 09/2017, identified grade 2 invasive lobular cancer, ER greater than 95% positive, NC greater than 25% positive; HER-2/selena was negative.    Breast preop MRI incidentally picked up a left breast nodule 3 to 4 o'clock position 4 cm from the nipple, 4   mm enhancing focus.  This is at the central lateral portion of the left breast.  Second look ultrasound confirmed the lesion, subcentimeter in size.  Biopsy was performed in October 2017 on the left side, identified radial scar, no evidence of malignancy.            ASSESSMENT/PLAN:   1.  09/2017,10/20/17 diagnosed bilateral breast cancer.  Right-side is T1cN0, 2 cm invasive lobular, grade 1, ER/NC positive, HER-2/selena negative.  Left-side invasive ductal, T1aN0, grade 1, ER/NC positive, HER-2/selena negative.  She had double mastectomy.  All the nodes are negative.  Margins were clean.  She also had lobular carcinoma in situ on the right side.  Recurrence score on the right side of 13 corresponds to 10-year risk of distal recurrence 8% with surgery, radiation, and tamoxifen.     Due to the double mastectomy, leta negative status, and size of her lesions, she would not benefit from post-mastectomy radiation.      She made informed decision to proceed with Arimidex 12/2017.    We also discussed ER positive breast cancer recurrence pattern and future followup plan.  We also discussed about 5 years versus 10 years anti-hormonal therapy data and how this may apply to her situation.     Bilateral breast cancer presented at age 62.  We discussed the indication of genetic  counseling; she is not interested at this point.         2. Osteopenia - we discussed the proper use of vit D, dosing, etc.      3. Lymphedema on left chest wall - we dicussed the massage therapy. She wants to do it her self before seeing lymphedema.       Again, thank you for allowing me to participate in the care of your patient.        Sincerely,        Celi Powers MD, MD

## 2018-03-14 NOTE — MR AVS SNAPSHOT
After Visit Summary   3/14/2018    Suma Calabrese    MRN: [id number]           Patient Information     Date Of Birth          1955        Visit Information        Provider Department      3/14/2018 4:15 PM Celi Powers MD [facility]        Today's Diagnoses     Bilateral malignant neoplasm of breast in female, estrogen receptor positive, unspecified site of breast (H)    -  1    Osteopenia, unspecified location        Localized edema          Care Instructions    CMP today, call and mail pt the result.   6 months f/u with labs.           Follow-ups after your visit        Future tests that were ordered for you today     Open Future Orders        Priority Expected Expires Ordered    Comprehensive metabolic panel Routine 9/1/2018 3/14/2019 3/14/2018            Who to contact     If you have questions or need follow up information about today's clinic visit or your schedule please contact [facility] directly at [phone].  Normal or non-critical lab and imaging results will be communicated to you by MyChart, letter or phone within 4 business days after the clinic has received the results. If you do not hear from us within 7 days, please contact the clinic through [employer] or phone. If you have a critical or abnormal lab result, we will notify you by phone as soon as possible.  Submit refill requests through [employer] or call your pharmacy and they will forward the refill request to us. Please allow 3 business days for your refill to be completed.          Additional Information About Your Visit        MyChart Information     [employer] gives you secure access to your electronic health record. If you see a primary care provider, you can also send messages to your care team and make appointments. If you have questions, please call your primary care clinic.  If you do not have a primary care provider, please call [phone] and they will assist you.        Care EveryWhere ID   "   This is your Care EveryWhere ID. This could be used by other organizations to access your [facility] medical records  [id number]        Your Vitals Were     Pulse Temperature Respirations Height Last Period Pulse Oximetry    88 98.2  F (36.8  C) 18 1.676 m (5' 6\") (LMP Unknown) 99%    BMI (Body Mass Index)                   21.31 kg/m2            Blood Pressure from Last 3 Encounters:   03/14/18 130/83   12/01/17 144/72   12/01/17 (!) 146/92    Weight from Last 3 Encounters:   03/14/18 59.9 kg (132 lb)   12/01/17 51.7 kg (114 lb)   12/01/17 52.2 kg (115 lb)              We Performed the Following     Comprehensive metabolic panel          Where to get your medicines      These medications were sent to [facility] [address] [place] [address] AT [address] [address]     Phone:  [phone]     anastrozole 1 MG tablet          Primary Care Provider Office Phone # Fax #    Murali Aria Kenney [phone] [address] [facility] [address]        Equal Access to Services     KIANNA ROMERO : Hadii beata edmonds hadasho Soomaali, waaxda luqadaha, qaybta kaalmada adeegyada, omar moraes. So [facility] [phone].    ATENCIÓN: Si habla español, tiene a yepez disposición servicios gratuitos de asistencia lingüística. ame al [phone].    We comply with applicable federal civil rights laws and [place] laws. We do not discriminate on the basis of race, color, national origin, age, disability, sex, sexual orientation, or gender identity.            Thank you!     Thank you for choosing [facility]  for your care. Our goal is always to provide you with excellent care. Hearing back from our patients is one way we can continue to improve our services. Please take a few minutes to complete the written survey that you may receive in the mail after your visit with us. Thank you!           "   Your Updated Medication List - Protect others around you: Learn how to safely use, store and throw away your medicines at www.disposemymeds.org.          This list is accurate as of 3/14/18 11:59 PM.  Always use your most recent med list.                   Brand Name Dispense Instructions for use Diagnosis    ACIDOPHILUS PROBIOTIC FORMULA Tabs     60 tablet    Take 1 each by mouth 2 times daily        ADVIL PO      Take 200 mg by mouth every 4 hours as needed for moderate pain        anastrozole 1 MG tablet    ARIMIDEX    90 tablet    Take 1 tablet (1 mg) by mouth daily    Bilateral malignant neoplasm of breast in female, estrogen receptor positive, unspecified site of breast (H)       CENTRUM SILVER per tablet      Take 1 tablet by mouth daily        CLARITIN PO      Take 10 mg by mouth daily as needed        cyanocobalamin 2500 MCG sublingual tablet    VITAMIN B-12    30 tablet    Place 2,500 mcg under the tongue daily        * levothyroxine 75 MCG tablet    SYNTHROID    90 tablet    Take 1 tablet (75 mcg) by mouth every morning    Hypothyroidism due to acquired atrophy of thyroid       * levothyroxine 75 MCG tablet    SYNTHROID/LEVOTHROID    90 tablet    TAKE 1 TABLET(75 MCG) BY MOUTH DAILY    Hypothyroidism due to acquired atrophy of thyroid       PRAVACHOL PO      Take 20 mg by mouth At Bedtime        psyllium Packet    METAMUCIL/KONSYL    60 packet    Take 1 packet by mouth 2 times daily    Diverticulitis of colon       VITAMIN D (CHOLECALCIFEROL) PO      Take 1,000 Units by mouth every morning        Wrist Blood Pressure Monitor Misc     1 each    1 each 2 times daily as needed    Hypertension goal BP (blood pressure) < 140/80       * Notice:  This list has 2 medication(s) that are the same as other medications prescribed for you. Read the directions carefully, and ask your doctor or other care provider to review them with you.

## 2018-03-14 NOTE — PROGRESS NOTES
"Oncology Rooming Note    March 14, 2018 3:59 PM   Suma Calabrese is a 62 year old female who presents for:    Chief Complaint   Patient presents with     Oncology Clinic Visit     RETURN-3 month follow up-Breast Cancer     Initial Vitals: /83 (BP Location: Left arm, Patient Position: Chair, Cuff Size: Adult Regular)  Pulse 88  Temp 98.2  F (36.8  C)  Resp 18  Ht 1.676 m (5' 6\")  Wt 59.9 kg (132 lb)  LMP  (LMP Unknown)  SpO2 99%  BMI 21.31 kg/m2 Estimated body mass index is 21.31 kg/(m^2) as calculated from the following:    Height as of this encounter: 1.676 m (5' 6\").    Weight as of this encounter: 59.9 kg (132 lb). Body surface area is 1.67 meters squared.  No Pain (0) Comment: Data Unavailable   No LMP recorded (lmp unknown). Patient is postmenopausal.  Allergies reviewed: Yes  Medications reviewed: Yes    Medications: Medication refills not needed today.  Pharmacy name entered into [employer]: [facility] [address] [place] [address] AT [address]    Clinical concerns: none     5 minutes for nursing intake (face to face time)     Marvel Donnelly CMA          Medical Assistant Note:  Suma Calabrese presents today for Labs only.    Patient seen by provider today: Yes: Dr. Powers.   present during visit today: Not Applicable.    Concerns: No Concerns.    Procedure:  Lab draw site: LAC, Needle type: BF, Gauge: 21 g guaze/coban applied.    Post Assessment:  Labs drawn without difficulty: No.    Discharge Plan:  Departure Mode: Ambulatory.    Face to Face Time: 5 mins.    Marvel Donnelly"

## 2018-03-16 ENCOUNTER — TELEPHONE (OUTPATIENT)
Dept: ONCOLOGY | Facility: CLINIC | Age: 63
End: 2018-03-16

## 2018-03-16 NOTE — TELEPHONE ENCOUNTER
Left voicemail message for patient to return call regarding scheduling 6 month return appointment with lab draw.

## 2018-03-20 ENCOUNTER — TELEPHONE (OUTPATIENT)
Dept: ONCOLOGY | Facility: CLINIC | Age: 63
End: 2018-03-20

## 2018-03-20 NOTE — TELEPHONE ENCOUNTER
Patient called, left message with her normal CMP results. Informed patient to call clinic to schedule her 6 month labs and follow up. Per patient request, her labs will be mailed to her.Glenda Floyd RN,BSN,OCN

## 2018-05-21 ENCOUNTER — TELEPHONE (OUTPATIENT)
Dept: INFUSION THERAPY | Facility: CLINIC | Age: 63
End: 2018-05-21

## 2018-05-21 NOTE — TELEPHONE ENCOUNTER
Left message for patient in regards to the treatment summary that was mailed. Will attempt to call patient again.

## 2018-05-24 ENCOUNTER — TELEPHONE (OUTPATIENT)
Dept: INFUSION THERAPY | Facility: CLINIC | Age: 63
End: 2018-05-24

## 2018-05-29 ENCOUNTER — TELEPHONE (OUTPATIENT)
Dept: FAMILY MEDICINE | Facility: CLINIC | Age: 63
End: 2018-05-29

## 2018-05-29 NOTE — TELEPHONE ENCOUNTER
Ok by me Murali Kenney Jr., MD   Called number and extension and no answer  Is there a form to fax   Murali Kenney Jr., MD

## 2018-05-29 NOTE — TELEPHONE ENCOUNTER
Reason for Call:  Other call back    Detailed comments: [employer] called regarding urgent supply order for patients ostomy supplies. Please call as soon as possible regarding order.     Phone Number Patient can be reached at: Other phone number: Joyce [phone] ext [phone]    Best Time: any    Can we leave a detailed message on this number? YES    Call taken on 5/29/2018 at 9:04 AM by Lyly Hernandez

## 2018-05-30 ENCOUNTER — TELEPHONE (OUTPATIENT)
Dept: FAMILY MEDICINE | Facility: CLINIC | Age: 63
End: 2018-05-30

## 2018-05-30 NOTE — TELEPHONE ENCOUNTER
Reason for Call:  Other call back  Detailed comments: please call patient concerning medication  Phone Number Patient can be reached at: Home number on file [phone] (home)  Best Time: any  Can we leave a detailed message on this number? YES  Call taken on 5/30/2018 at 9:48 AM by DALILA WOLF

## 2018-05-30 NOTE — TELEPHONE ENCOUNTER
Attempted to reach Joyce at given # with ok for whatever supplies patient needs, phone rings and rings without answer.  No fax rec'd for MD signature.  Called general  # [phone] and spoke with Edda ext [phone]. Verbal ok given to her for patient supplies.  Patient to call Edda to let her know patient to pick or mail to patient .

## 2018-07-30 ENCOUNTER — TRANSFERRED RECORDS (OUTPATIENT)
Dept: HEALTH INFORMATION MANAGEMENT | Facility: CLINIC | Age: 63
End: 2018-07-30

## 2018-08-03 ENCOUNTER — TELEPHONE (OUTPATIENT)
Dept: FAMILY MEDICINE | Facility: CLINIC | Age: 63
End: 2018-08-03

## 2018-08-03 NOTE — TELEPHONE ENCOUNTER
Our goal is to have forms completed within 72 hours, however some forms may require a visit or additional information.    What clinic location was the form placed at [facility] or [facility]     Who is the form from? [employer] - Post surgery bra  Where did the form come from? Faxed to clinic   The form was placed in the inbox of Murali Kenney Jr MD      Please fax to [phone]    Additional comments:     Call take on 8/3/2018 at 11:33 AM by Ada Montaño

## 2018-09-12 ENCOUNTER — ONCOLOGY VISIT (OUTPATIENT)
Dept: ONCOLOGY | Facility: CLINIC | Age: 63
End: 2018-09-12
Attending: INTERNAL MEDICINE
Payer: COMMERCIAL

## 2018-09-12 ENCOUNTER — HOSPITAL ENCOUNTER (OUTPATIENT)
Facility: CLINIC | Age: 63
Setting detail: SPECIMEN
Discharge: HOME | End: 2018-09-12
Attending: INTERNAL MEDICINE | Admitting: INTERNAL MEDICINE
Payer: COMMERCIAL

## 2018-09-12 VITALS
SYSTOLIC BLOOD PRESSURE: 130 MMHG | RESPIRATION RATE: 16 BRPM | OXYGEN SATURATION: 97 % | HEART RATE: 85 BPM | DIASTOLIC BLOOD PRESSURE: 85 MMHG | TEMPERATURE: 98.3 F | WEIGHT: 136.8 LBS | BODY MASS INDEX: 22.08 KG/M2

## 2018-09-12 DIAGNOSIS — C50.912 BILATERAL MALIGNANT NEOPLASM OF BREAST IN FEMALE, ESTROGEN RECEPTOR POSITIVE, UNSPECIFIED SITE OF BREAST (H): ICD-10-CM

## 2018-09-12 DIAGNOSIS — Z17.0 BILATERAL MALIGNANT NEOPLASM OF BREAST IN FEMALE, ESTROGEN RECEPTOR POSITIVE, UNSPECIFIED SITE OF BREAST (H): Primary | ICD-10-CM

## 2018-09-12 DIAGNOSIS — M25.60 STIFFNESS IN JOINT: ICD-10-CM

## 2018-09-12 DIAGNOSIS — C50.912 BILATERAL MALIGNANT NEOPLASM OF BREAST IN FEMALE, ESTROGEN RECEPTOR POSITIVE, UNSPECIFIED SITE OF BREAST (H): Primary | ICD-10-CM

## 2018-09-12 DIAGNOSIS — C50.911 BILATERAL MALIGNANT NEOPLASM OF BREAST IN FEMALE, ESTROGEN RECEPTOR POSITIVE, UNSPECIFIED SITE OF BREAST (H): ICD-10-CM

## 2018-09-12 DIAGNOSIS — M85.80 OSTEOPENIA, UNSPECIFIED LOCATION: ICD-10-CM

## 2018-09-12 DIAGNOSIS — C50.911 BILATERAL MALIGNANT NEOPLASM OF BREAST IN FEMALE, ESTROGEN RECEPTOR POSITIVE, UNSPECIFIED SITE OF BREAST (H): Primary | ICD-10-CM

## 2018-09-12 DIAGNOSIS — Z17.0 BILATERAL MALIGNANT NEOPLASM OF BREAST IN FEMALE, ESTROGEN RECEPTOR POSITIVE, UNSPECIFIED SITE OF BREAST (H): ICD-10-CM

## 2018-09-12 LAB
ALBUMIN SERPL-MCNC: 3.5 G/DL (ref 3.4–5)
ALP SERPL-CCNC: 124 U/L (ref 40–150)
ALT SERPL W P-5'-P-CCNC: 15 U/L (ref 0–50)
ANION GAP SERPL CALCULATED.3IONS-SCNC: 5 MMOL/L (ref 3–14)
AST SERPL W P-5'-P-CCNC: 15 U/L (ref 0–45)
BILIRUB SERPL-MCNC: 0.8 MG/DL (ref 0.2–1.3)
BUN SERPL-MCNC: 16 MG/DL (ref 7–30)
CALCIUM SERPL-MCNC: 9.4 MG/DL (ref 8.5–10.1)
CHLORIDE SERPL-SCNC: 108 MMOL/L (ref 94–109)
CO2 SERPL-SCNC: 28 MMOL/L (ref 20–32)
CREAT SERPL-MCNC: 0.79 MG/DL (ref 0.52–1.04)
GFR SERPL CREATININE-BSD FRML MDRD: 73 ML/MIN/1.7M2
GLUCOSE SERPL-MCNC: 96 MG/DL (ref 70–99)
POTASSIUM SERPL-SCNC: 4.1 MMOL/L (ref 3.4–5.3)
PROT SERPL-MCNC: 7.2 G/DL (ref 6.8–8.8)
SODIUM SERPL-SCNC: 141 MMOL/L (ref 133–144)

## 2018-09-12 PROCEDURE — G0463 HOSPITAL OUTPT CLINIC VISIT: HCPCS

## 2018-09-12 PROCEDURE — 80053 COMPREHEN METABOLIC PANEL: CPT | Performed by: INTERNAL MEDICINE

## 2018-09-12 PROCEDURE — 99214 OFFICE O/P EST MOD 30 MIN: CPT | Performed by: INTERNAL MEDICINE

## 2018-09-12 PROCEDURE — 36415 COLL VENOUS BLD VENIPUNCTURE: CPT

## 2018-09-12 ASSESSMENT — PAIN SCALES - GENERAL: PAINLEVEL: NO PAIN (0)

## 2018-09-12 NOTE — PROGRESS NOTES
Medical Assistant Note:  Suma Calabrese presents today for labs.    Patient seen by provider today: Yes.   present during visit today: Not Applicable.    Concerns: No Concerns.    Procedure:  Lab draw site: RAC, Needle type: BF, Gauge: 21.        Discharge Plan:  Departure Mode: Ambulatory.    Face to Face Time: 5 minutes.    Janette Keller MA

## 2018-09-12 NOTE — LETTER
9/12/2018         RE: Suma Calabrese  [address]  [facility] [address]        Dear Colleague,    Thank you for referring your patient, Suma Calabrese, to the [facility] [place]. Please see a copy of my visit note below.    Medical Assistant Note:  Suma Calabrese presents today for labs.    Patient seen by provider today: Yes.   present during visit today: Not Applicable.    Concerns: No Concerns.    Procedure:  Lab draw site: RAC, Needle type: BF, Gauge: 21.        Discharge Plan:  Departure Mode: Ambulatory.    Face to Face Time: 5 minutes.    Janette Keller MA              Again, thank you for allowing me to participate in the care of your patient.        Sincerely,        Oncology Nurse

## 2018-09-12 NOTE — LETTER
"    9/12/2018         RE: Suma Calabrese  [address]  [facility] [address]        Dear Colleague,    Thank you for referring your patient, Suma Calabrese, to the [facility]. Please see a copy of my visit note below.       Oncology Rooming Note    September 12, 2018 2:07 PM   Suma Calabrese is a 63 year old female who presents for:    Chief Complaint   Patient presents with     Oncology Clinic Visit     Malignant neoplasm of upper-outer quadrant of right breast in female, estrogen receptor positive (H)     Initial Vitals: /85 (BP Location: Left arm, Patient Position: Sitting, Cuff Size: Adult Regular)  Pulse 85  Temp 98.3  F (36.8  C) (Oral)  Resp 16  Wt 62.1 kg (136 lb 12.8 oz)  LMP  (LMP Unknown)  SpO2 97%  BMI 22.08 kg/m2 Estimated body mass index is 22.08 kg/(m^2) as calculated from the following:    Height as of 3/14/18: 1.676 m (5' 6\").    Weight as of this encounter: 62.1 kg (136 lb 12.8 oz). Body surface area is 1.7 meters squared.  No Pain (0) Comment: Data Unavailable   No LMP recorded (lmp unknown). Patient is postmenopausal.  Allergies reviewed: Yes  Medications reviewed: Yes    Medications: Medication refills not needed today.  Pharmacy name entered into [facility]: [facility] [address] [place], [place] - [address] AT [address]    Clinical concerns: no    5 minutes for nursing intake (face to face time)          Sheba Wu MA                ONCOLOGY FOLLOW UP VISIT     CHIEF COMPLAINT AND REASON FOR VISIT:  Bilateral breast cancer dx fall 2017, s/p double mastectomy.        HISTORY OF PRESENT ILLNESS: She self-felt a breast lump on the right side in 09/2017.    She was due her annual screening mammogram, which was done in September identified 11 o'clock, 3 cm from the nipple on the right breast, which is right upper outer quadrant, 1.1 cm hypoechoic mass.    This was biopsied in 09/2017, identified grade 2 invasive lobular " cancer, ER greater than 95% positive, [place] greater than 25% positive; HER-2/selena was negative.    She met with Dr. Salazar, discussed about surgical options at that time.  Breast preop MRI incidentally picked up a left breast nodule 3 to 4 o'clock position 4 cm from the nipple, 4   mm enhancing focus.  This is at the central lateral portion of the left breast.  Second look ultrasound confirmed the lesion, subcentimeter in size.    Biopsy was performed in October 2017 on the left side, identified radial scar, no evidence of malignancy.      She subsequently had double mastectomy with Dr. Salazar mid 10/2017.  On the left side found invasive grade 1 IDC  0.5 cm at 3 o'clock position 9 cm from the nipple, % positive, [place] 80% positive, HER-2/selena negative. negative DCIS, negative LCIS.  Margins were clear.  Lymphovascular invasion is absent. 2 lymph nodes taken out.  Both of them were negative.  This was staged at T1a N0.    On the right side, found 2 cm invasive lobular cancer was identified, grade 1 associated with LCIS, absent lymphovascular invasion.  One sentinel lymph node was taken out, was negative.  This is staged T1c N0 disease.    RS is 13, corresponds to 10-year risk of distal recurrence with surgery, radiation, and tamoxifen is about 8%.       She made informed decision to proceed with AI 12/2017.     PAST MEDICAL HISTORY:  History of hyponatremia, hypokalemia, hypothyroidism, nontoxic uninodular goiter, rectal prolapse, hypertension, internal hemorrhoid.Eextensive diverticulitis which required subtotal colectomy and end ileostomy bag in 10/2017.  Nov 2017 for acute diverticulitis with perforation, pericolic abscess, and bowel obstruction, s/p s/p exploratory laparotomy, subtotal colectomy, and end ileostomy on 11/13/2017.     FAMILY HISTORY:  She is adopted.      SOCIAL HISTORY:  She works in the [employer].  Quit smoking 09/2017 when she was diagnosed with breast cancer.  Denies alcohol  abuse.      REVIEW OF SYSTEMS:   Tolerating AI fine with mild arthritic pain and hot flushes. She has stiffness upon changing positions.   Reports mild swollen on left chest wall.     PHYSICAL EXAMINATION:   VITAL SIGNS:  Blood pressure 130/85, pulse 85, temperature 98.3  F (36.8  C), temperature source Oral, resp. rate 16, weight 62.1 kg (136 lb 12.8 oz), SpO2 97 %, not currently breastfeeding.    ECOG 0    GENERAL APPEARANCE:  Middle-aged woman, very thin, not in acute distress.     HEENT: The patient is normocephalic, atraumatic. Pupils are equally reactive to light.  Sclerae are anicteric.  Moist oral mucosa.  Negative pharynx.  No oral thrush.   NECK:  Supple.  No jugular venous distention.  Thyroid is not palpable.   LYMPH NODES:  Superficial lymphadenopathy is not appreciable in the bilateral cervical, supraclavicular, axillary or inguinal areas.   CARDIOVASCULAR:  S1, S2 regular with no murmurs or gallops.  No carotid or abdominal bruits.   PULMONARY:  Lungs are clear to auscultation and percussion bilaterally.  There is no wheezing or rhonchi.   GASTROINTESTINAL:  Well-healed vertical abdominal scar with well situated right-sided ostomy bag.   MUSCULOSKELETAL/EXTREMITIES:  No edema.  No cyanotic changes.  No signs of joint deformity.  No lymphedema.  No spinal or paraspinal tenderness.  No CVA tenderness.   NEUROLOGIC:  Cranial nerves II-XII are grossly intact.  Sensation intact.  Muscle strength and muscle tone symmetrical, 5/5 throughout.   SKIN:  No petechiae.  No rash.  No signs of cellulitis.   BREASTS:  Bilateral mastectomy scars well healed on the chest wall exam portion.  There is minimal edema around the left mastectomy scar.      CURRENT LAB DATA  REVIEWED.    CMP is satisfactory       OLD DATA REVIEWED TODAY WITH SUMMARY  Bilateral mastectomy pathology 10/2017 -  On the left side found invasive grade 1 IDC  0.5 cm at 3 o'clock position 9 cm from the nipple, % positive, [place] 80% positive,  HER-2/selena negative. negative DCIS, negative LCIS.  Margins were clear.  Lymphovascular invasion is absent. 2 lymph nodes taken out.  Both of them were negative.  This is staged at T1a N0.    On the right side, found 2 cm invasive lobular cancer was identified, grade 1 associated with LCIS, absent lymphovascular invasion.  One sentinel lymph node was taken out, was negative.  This is staged T1c N0 disease.      Recurrence score on the right-sided invasive lobular cancer came back 13, corresponds to 10-year risk of distal recurrence with surgery, radiation, and tamoxifen is about 8%.        CURRENT IMAGE DATA  REVIEWED  dexa 12/2017 - osteopenia  CT a/p 11/2017: Distal sigmoid wall thickening, currently resulting in colonic obstruction. This could relate to diverticulitis or focal  colitis, but neoplasm cannot be excluded. Serpiginous perisigmoid fluid collection/abscess appears mildly increased. Small amount of  free fluid.     US arm 11/2017  1. No evidence of deep vein thrombosis.  2. Superficial thrombophlebitis in the right cephalic vein.       Bilateral mastectomy pathology 10/2017 -  On the left side found invasive grade 1 IDC  0.5 cm at 3 o'clock position 9 cm from the nipple, % positive, [place] 80% positive, HER-2/selena negative. negative DCIS, negative LCIS.  Margins were clear.  Lymphovascular invasion is absent. 2 lymph nodes taken out.  Both of them were negative.  This is staged at T1a N0.    On the right side, found 2 cm invasive lobular cancer was identified, grade 1 associated with LCIS, absent lymphovascular invasion.  One sentinel lymph node was taken out, was negative.  This is staged T1c N0 disease.      Recurrence score on the right-sided invasive lobular cancer came back 13, corresponds to 10-year risk of distal recurrence with surgery, radiation, and tamoxifen is about 8%.       Mammogram September identified 11 o'clock, 3 cm from the nipple on the right breast, which is right upper outer  quadrant, 1.1 cm hypoechoic mass.    This was biopsied in 09/2017, identified grade 2 invasive lobular cancer, ER greater than 95% positive, [place] greater than 25% positive; HER-2/selena was negative.    Breast preop MRI incidentally picked up a left breast nodule 3 to 4 o'clock position 4 cm from the nipple, 4   mm enhancing focus.  This is at the central lateral portion of the left breast.  Second look ultrasound confirmed the lesion, subcentimeter in size.  Biopsy was performed in October 2017 on the left side, identified radial scar, no evidence of malignancy.            ASSESSMENT/PLAN:   1.  09/2017,10/20/17 diagnosed bilateral breast cancer.  Right-side is T1cN0, 2 cm invasive lobular, grade 1, ER/FL positive, HER-2/selena negative.  Left-side invasive ductal, T1aN0, grade 1, ER/FL positive, HER-2/selena negative.  She had double mastectomy.  All the nodes are negative.  Margins were clean.  She also had lobular carcinoma in situ on the right side.  Recurrence score on the right side of 13 corresponds to 10-year risk of distal recurrence 8% with surgery, radiation, and tamoxifen.     Due to the double mastectomy, leta negative status, and size of her lesions, she would not benefit from post-mastectomy radiation.      She made informed decision to proceed with Arimidex 12/2017.    We also discussed ER positive breast cancer recurrence pattern and future followup plan.  We also discussed about 5 years versus 10 years anti-hormonal therapy data and how this may apply to her situation.     Bilateral breast cancer presented at age 62.  We discussed the indication of genetic counseling; she is not interested at this point.         She is on 6 months f/u with labs.     2. Osteopenia - we discussed the proper use of vit D, dosing, etc.      3. Lymphedema on left chest wall - we dicussed the massage therapy. She wants to do it her self before seeing lymphedema.     4. Stiffness from AI - we discussed the exercise program,  encourage yoga.       Again, thank you for allowing me to participate in the care of your patient.        Sincerely,        Celi Powers MD, MD

## 2018-09-12 NOTE — MR AVS SNAPSHOT
After Visit Summary   9/12/2018    Suma Calabrese    MRN: [id number]           Patient Information     Date Of Birth          1955        Visit Information        Provider Department      9/12/2018 1:30 PM Nurse, [facility]        Today's Diagnoses     Bilateral malignant neoplasm of breast in female, estrogen receptor positive, unspecified site of breast (H)           Follow-ups after your visit        Your next 10 appointments already scheduled     Sep 12, 2018  2:20 PM CDT   Return Visit with Celi Powers MD   [facility])    [facility]  [address]  [facility] [id number] [phone]              Who to contact     If you have questions or need follow up information about today's clinic visit or your schedule please contact [facility] directly at [phone].  Normal or non-critical lab and imaging results will be communicated to you by [employer], letter or phone within 4 business days after the clinic has received the results. If you do not hear from us within 7 days, please contact the clinic through [employer] or phone. If you have a critical or abnormal lab result, we will notify you by phone as soon as possible.  Submit refill requests through [facility] or call your pharmacy and they will forward the refill request to us. Please allow 3 business days for your refill to be completed.          Additional Information About Your Visit        MyChart Information     [facility] gives you secure access to your electronic health record. If you see a primary care provider, you can also send messages to your care team and make appointments. If you have questions, please call your primary care clinic.  If you do not have a primary care provider, please call [phone] and they will assist you.        Care EveryWhere ID     This is your Care EveryWhere ID. This could be used by other  organizations to access your [facility] medical records  [id number]        Your Vitals Were     Last Period                   (LMP Unknown)            Blood Pressure from Last 3 Encounters:   03/14/18 130/83   12/01/17 144/72   12/01/17 (!) 146/92    Weight from Last 3 Encounters:   03/14/18 59.9 kg (132 lb)   12/01/17 51.7 kg (114 lb)   12/01/17 52.2 kg (115 lb)              We Performed the Following     Comprehensive metabolic panel        Primary Care Provider Office Phone # Fax #    Murali Aria Kenney [phone]       [address] [facility] [address]        Equal Access to Services     KIANNA ROMERO : Hadii beata ruckero Soguy, waaxda luqadaha, qaybta kaalmada adeegyada, omar aguayo . So [facility] [phone].    ATENCIÓN: Si habla español, tiene a yepez disposición servicios gratuitos de asistencia lingüística. Llame al [phone].    We comply with applicable federal civil rights laws and [place] laws. We do not discriminate on the basis of race, color, national origin, age, disability, sex, sexual orientation, or gender identity.            Thank you!     Thank you for choosing [facility]  for your care. Our goal is always to provide you with excellent care. Hearing back from our patients is one way we can continue to improve our services. Please take a few minutes to complete the written survey that you may receive in the mail after your visit with us. Thank you!             Your Updated Medication List - Protect others around you: Learn how to safely use, store and throw away your medicines at www.disposemymeds.org.          This list is accurate as of 9/12/18  1:35 PM.  Always use your most recent med list.                   Brand Name Dispense Instructions for use Diagnosis    ACIDOPHILUS PROBIOTIC FORMULA Tabs     60 tablet    Take 1 each by mouth 2 times daily        ADVIL PO      Take 200 mg by mouth every 4 hours as needed for  moderate pain        anastrozole 1 MG tablet    ARIMIDEX    90 tablet    Take 1 tablet (1 mg) by mouth daily    Bilateral malignant neoplasm of breast in female, estrogen receptor positive, unspecified site of breast (H)       CENTRUM SILVER per tablet      Take 1 tablet by mouth daily        CLARITIN PO      Take 10 mg by mouth daily as needed        cyanocobalamin 2500 MCG sublingual tablet    VITAMIN B-12    30 tablet    Place 2,500 mcg under the tongue daily        * levothyroxine 75 MCG tablet    SYNTHROID    90 tablet    Take 1 tablet (75 mcg) by mouth every morning    Hypothyroidism due to acquired atrophy of thyroid       * levothyroxine 75 MCG tablet    SYNTHROID/LEVOTHROID    90 tablet    TAKE 1 TABLET(75 MCG) BY MOUTH DAILY    Hypothyroidism due to acquired atrophy of thyroid       PRAVACHOL PO      Take 20 mg by mouth At Bedtime        psyllium Packet    METAMUCIL/KONSYL    60 packet    Take 1 packet by mouth 2 times daily    Diverticulitis of colon       VITAMIN D (CHOLECALCIFEROL) PO      Take 1,000 Units by mouth every morning        Wrist Blood Pressure Monitor Misc     1 each    1 each 2 times daily as needed    Hypertension goal BP (blood pressure) < 140/80       * Notice:  This list has 2 medication(s) that are the same as other medications prescribed for you. Read the directions carefully, and ask your doctor or other care provider to review them with you.

## 2018-09-12 NOTE — PROGRESS NOTES
ONCOLOGY FOLLOW UP VISIT     CHIEF COMPLAINT AND REASON FOR VISIT:  Bilateral breast cancer dx fall 2017, s/p double mastectomy.        HISTORY OF PRESENT ILLNESS: She self-felt a breast lump on the right side in 09/2017.    She was due her annual screening mammogram, which was done in September identified 11 o'clock, 3 cm from the nipple on the right breast, which is right upper outer quadrant, 1.1 cm hypoechoic mass.    This was biopsied in 09/2017, identified grade 2 invasive lobular cancer, ER greater than 95% positive, [place] greater than 25% positive; HER-2/selena was negative.    She met with Dr. Salazar, discussed about surgical options at that time.  Breast preop MRI incidentally picked up a left breast nodule 3 to 4 o'clock position 4 cm from the nipple, 4   mm enhancing focus.  This is at the central lateral portion of the left breast.  Second look ultrasound confirmed the lesion, subcentimeter in size.    Biopsy was performed in October 2017 on the left side, identified radial scar, no evidence of malignancy.      She subsequently had double mastectomy with Dr. Salazar mid 10/2017.  On the left side found invasive grade 1 IDC  0.5 cm at 3 o'clock position 9 cm from the nipple, % positive, [place] 80% positive, HER-2/selena negative. negative DCIS, negative LCIS.  Margins were clear.  Lymphovascular invasion is absent. 2 lymph nodes taken out.  Both of them were negative.  This was staged at T1a N0.    On the right side, found 2 cm invasive lobular cancer was identified, grade 1 associated with LCIS, absent lymphovascular invasion.  One sentinel lymph node was taken out, was negative.  This is staged T1c N0 disease.    RS is 13, corresponds to 10-year risk of distal recurrence with surgery, radiation, and tamoxifen is about 8%.       She made informed decision to proceed with AI 12/2017.     PAST MEDICAL HISTORY:  History of hyponatremia, hypokalemia, hypothyroidism, nontoxic uninodular goiter, rectal prolapse,  hypertension, internal hemorrhoid.Eextensive diverticulitis which required subtotal colectomy and end ileostomy bag in 10/2017.  Nov 2017 for acute diverticulitis with perforation, pericolic abscess, and bowel obstruction, s/p s/p exploratory laparotomy, subtotal colectomy, and end ileostomy on 11/13/2017.     FAMILY HISTORY:  She is adopted.      SOCIAL HISTORY:  She works in the [employer].  Quit smoking 09/2017 when she was diagnosed with breast cancer.  Denies alcohol abuse.      REVIEW OF SYSTEMS:   Tolerating AI fine with mild arthritic pain and hot flushes. She has stiffness upon changing positions.   Reports mild swollen on left chest wall.     PHYSICAL EXAMINATION:   VITAL SIGNS:  Blood pressure 130/85, pulse 85, temperature 98.3  F (36.8  C), temperature source Oral, resp. rate 16, weight 62.1 kg (136 lb 12.8 oz), SpO2 97 %, not currently breastfeeding.    ECOG 0    GENERAL APPEARANCE:  Middle-aged woman, very thin, not in acute distress.     HEENT: The patient is normocephalic, atraumatic. Pupils are equally reactive to light.  Sclerae are anicteric.  Moist oral mucosa.  Negative pharynx.  No oral thrush.   NECK:  Supple.  No jugular venous distention.  Thyroid is not palpable.   LYMPH NODES:  Superficial lymphadenopathy is not appreciable in the bilateral cervical, supraclavicular, axillary or inguinal areas.   CARDIOVASCULAR:  S1, S2 regular with no murmurs or gallops.  No carotid or abdominal bruits.   PULMONARY:  Lungs are clear to auscultation and percussion bilaterally.  There is no wheezing or rhonchi.   GASTROINTESTINAL:  Well-healed vertical abdominal scar with well situated right-sided ostomy bag.   MUSCULOSKELETAL/EXTREMITIES:  No edema.  No cyanotic changes.  No signs of joint deformity.  No lymphedema.  No spinal or paraspinal tenderness.  No CVA tenderness.   NEUROLOGIC:  Cranial nerves II-XII are grossly intact.  Sensation intact.  Muscle strength and muscle tone  symmetrical, 5/5 throughout.   SKIN:  No petechiae.  No rash.  No signs of cellulitis.   BREASTS:  Bilateral mastectomy scars well healed on the chest wall exam portion.  There is minimal edema around the left mastectomy scar.      CURRENT LAB DATA  REVIEWED.    CMP is satisfactory       OLD DATA REVIEWED TODAY WITH SUMMARY  Bilateral mastectomy pathology 10/2017 -  On the left side found invasive grade 1 IDC  0.5 cm at 3 o'clock position 9 cm from the nipple, % positive, [place] 80% positive, HER-2/selena negative. negative DCIS, negative LCIS.  Margins were clear.  Lymphovascular invasion is absent. 2 lymph nodes taken out.  Both of them were negative.  This is staged at T1a N0.    On the right side, found 2 cm invasive lobular cancer was identified, grade 1 associated with LCIS, absent lymphovascular invasion.  One sentinel lymph node was taken out, was negative.  This is staged T1c N0 disease.      Recurrence score on the right-sided invasive lobular cancer came back 13, corresponds to 10-year risk of distal recurrence with surgery, radiation, and tamoxifen is about 8%.        CURRENT IMAGE DATA  REVIEWED  dexa 12/2017 - osteopenia  CT a/p 11/2017: Distal sigmoid wall thickening, currently resulting in colonic obstruction. This could relate to diverticulitis or focal  colitis, but neoplasm cannot be excluded. Serpiginous perisigmoid fluid collection/abscess appears mildly increased. Small amount of  free fluid.     US arm 11/2017  1. No evidence of deep vein thrombosis.  2. Superficial thrombophlebitis in the right cephalic vein.       Bilateral mastectomy pathology 10/2017 -  On the left side found invasive grade 1 IDC  0.5 cm at 3 o'clock position 9 cm from the nipple, % positive, [place] 80% positive, HER-2/selena negative. negative DCIS, negative LCIS.  Margins were clear.  Lymphovascular invasion is absent. 2 lymph nodes taken out.  Both of them were negative.  This is staged at T1a N0.    On the right side,  found 2 cm invasive lobular cancer was identified, grade 1 associated with LCIS, absent lymphovascular invasion.  One sentinel lymph node was taken out, was negative.  This is staged T1c N0 disease.      Recurrence score on the right-sided invasive lobular cancer came back 13, corresponds to 10-year risk of distal recurrence with surgery, radiation, and tamoxifen is about 8%.       Mammogram September identified 11 o'clock, 3 cm from the nipple on the right breast, which is right upper outer quadrant, 1.1 cm hypoechoic mass.    This was biopsied in 09/2017, identified grade 2 invasive lobular cancer, ER greater than 95% positive, [place] greater than 25% positive; HER-2/selena was negative.    Breast preop MRI incidentally picked up a left breast nodule 3 to 4 o'clock position 4 cm from the nipple, 4   mm enhancing focus.  This is at the central lateral portion of the left breast.  Second look ultrasound confirmed the lesion, subcentimeter in size.  Biopsy was performed in October 2017 on the left side, identified radial scar, no evidence of malignancy.            ASSESSMENT/PLAN:   1. 09/2017,10/20/17 diagnosed bilateral breast cancer.  Right-side is T1cN0, 2 cm invasive lobular, grade 1, ER/[place] positive, HER-2/selena negative.  Left-side invasive ductal, T1aN0, grade 1, ER/[place] positive, HER-2/selena negative.  She had double mastectomy.  All the nodes are negative.  Margins were clean.  She also had lobular carcinoma in situ on the right side.  Recurrence score on the right side of 13 corresponds to 10-year risk of distal recurrence 8% with surgery, radiation, and tamoxifen.     Due to the double mastectomy, leta negative status, and size of her lesions, she would not benefit from post-mastectomy radiation.      She made informed decision to proceed with Arimidex 12/2017.    We also discussed ER positive breast cancer recurrence pattern and future followup plan.  We also discussed about 5 years versus 10 years anti-hormonal  therapy data and how this may apply to her situation.     Bilateral breast cancer presented at age 62.  We discussed the indication of genetic counseling; she is not interested at this point.         She is on 6 months f/u with labs.     2. Osteopenia - we discussed the proper use of vit D, dosing, etc.      3. Lymphedema on left chest wall - we dicussed the massage therapy. She wants to do it her self before seeing lymphedema.     4. Stiffness from AI - we discussed the exercise program, encourage yoga.

## 2018-09-12 NOTE — PATIENT INSTRUCTIONS
Letter of necessity on sit-stand work station due to AI side effects.   6 months f/u with labs. Scheduled/mary lou       AVS printed & given to patient/mary lou

## 2018-09-12 NOTE — MR AVS SNAPSHOT
After Visit Summary   9/12/2018    Suma Calabrese    MRN: [id number]           Patient Information     Date Of Birth          1955        Visit Information        Provider Department      9/12/2018 2:20 PM Celi Powers MD [facility]        Today's Diagnoses     Bilateral malignant neoplasm of breast in female, estrogen receptor positive, unspecified site of breast (H)    -  1    Osteopenia, unspecified location        Localized edema        Stiffness in joint          Care Instructions    Letter of necessity on sit-stand work station due to AI side effects.   6 months f/u with labs.           Follow-ups after your visit        Your next 10 appointments already scheduled     Mar 20, 2019  1:30 PM CDT   Return Visit with  Oncology Nurse   [facility] ([facility])    [facility]  [address]  [place] [address]   [phone]            Mar 20, 2019  2:20 PM CDT   Return Visit with Celi Powers MD   [facility] ([facility])    [facility]  [address]  [place] [address]   [phone]              Who to contact     If you have questions or need follow up information about today's clinic visit or your schedule please contact [facility] directly at [phone].  Normal or non-critical lab and imaging results will be communicated to you by MyChart, letter or phone within 4 business days after the clinic has received the results. If you do not hear from us within 7 days, please contact the clinic through MyChart or phone. If you have a critical or abnormal lab result, we will notify you by phone as soon as possible.  Submit refill requests through [facility] or call your pharmacy and they will forward the refill request to us. Please allow 3 business days for your refill to be completed.          Additional Information About Your Visit        [facility]  Information     [employer] gives you secure access to your electronic health record. If you see a primary care provider, you can also send messages to your care team and make appointments. If you have questions, please call your primary care clinic.  If you do not have a primary care provider, please call [phone] and they will assist you.        Care EveryWhere ID     This is your Care EveryWhere ID. This could be used by other organizations to access your [place] medical records  [id number]        Your Vitals Were     Pulse Temperature Respirations Last Period Pulse Oximetry BMI (Body Mass Index)    85 98.3  F (36.8  C) (Oral) 16 (LMP Unknown) 97% 22.08 kg/m2       Blood Pressure from Last 3 Encounters:   09/12/18 130/85   03/14/18 130/83   12/01/17 144/72    Weight from Last 3 Encounters:   09/12/18 62.1 kg (136 lb 12.8 oz)   03/14/18 59.9 kg (132 lb)   12/01/17 51.7 kg (114 lb)              Today, you had the following     No orders found for display       Primary Care Provider Office Phone # Fax #    Murali Aria Kenney [phone] [address] [place] [address]        Equal Access to Services     KIANNA ROMERO : Hadii aad ku hadasho Soomaali, waaxda luqadaha, qaybta kaalmada adeegyada, waxay estiven moraes. So [facility] [phone].    ATENCIÓN: Si habla español, tiene a yepez disposición servicios gratuitos de asistencia lingüística. Llame al [phone].    We comply with applicable federal civil rights laws and [place] laws. We do not discriminate on the basis of race, color, national origin, age, disability, sex, sexual orientation, or gender identity.            Thank you!     Thank you for choosing [facility]  for your care. Our goal is always to provide you with excellent care. Hearing back from our patients is one way we can continue to improve our services. Please take a few minutes to complete the written survey that you may receive  in the mail after your visit with us. Thank you!             Your Updated Medication List - Protect others around you: Learn how to safely use, store and throw away your medicines at www.disposemymeds.org.          This list is accurate as of 9/12/18  2:47 PM.  Always use your most recent med list.                   Brand Name Dispense Instructions for use Diagnosis    ACIDOPHILUS PROBIOTIC FORMULA Tabs     60 tablet    Take 1 each by mouth 2 times daily        ADVIL PO      Take 200 mg by mouth every 4 hours as needed for moderate pain        anastrozole 1 MG tablet    ARIMIDEX    90 tablet    Take 1 tablet (1 mg) by mouth daily    Bilateral malignant neoplasm of breast in female, estrogen receptor positive, unspecified site of breast (H)       CENTRUM SILVER per tablet      Take 1 tablet by mouth daily        CLARITIN PO      Take 10 mg by mouth daily as needed        cyanocobalamin 2500 MCG sublingual tablet    VITAMIN B-12    30 tablet    Place 2,500 mcg under the tongue daily        * levothyroxine 75 MCG tablet    SYNTHROID    90 tablet    Take 1 tablet (75 mcg) by mouth every morning    Hypothyroidism due to acquired atrophy of thyroid       * levothyroxine 75 MCG tablet    SYNTHROID/LEVOTHROID    90 tablet    TAKE 1 TABLET(75 MCG) BY MOUTH DAILY    Hypothyroidism due to acquired atrophy of thyroid       PRAVACHOL PO      Take 20 mg by mouth At Bedtime        psyllium Packet    METAMUCIL/KONSYL    60 packet    Take 1 packet by mouth 2 times daily    Diverticulitis of colon       VITAMIN D (CHOLECALCIFEROL) PO      Take 1,000 Units by mouth every morning        Wrist Blood Pressure Monitor Misc     1 each    1 each 2 times daily as needed    Hypertension goal BP (blood pressure) < 140/80       * Notice:  This list has 2 medication(s) that are the same as other medications prescribed for you. Read the directions carefully, and ask your doctor or other care provider to review them with you.

## 2018-09-12 NOTE — LETTER
September 12, 2018      TO: Whom it may concern:       My patient is currently on a medication for her cancer treatment that causes stiffness, pain, achiness in her muscles and joints. She requires a sit-stand work station secondary to these side effects. If you have any questions please contact me at [phone], option 4.         Sincerely,      Celi Powers MD

## 2018-12-24 ENCOUNTER — OFFICE VISIT (OUTPATIENT)
Dept: FAMILY MEDICINE | Facility: CLINIC | Age: 63
End: 2018-12-24
Payer: COMMERCIAL

## 2018-12-24 VITALS
HEIGHT: 66 IN | WEIGHT: 140 LBS | TEMPERATURE: 97.8 F | SYSTOLIC BLOOD PRESSURE: 120 MMHG | DIASTOLIC BLOOD PRESSURE: 70 MMHG | BODY MASS INDEX: 22.5 KG/M2 | RESPIRATION RATE: 20 BRPM | HEART RATE: 86 BPM | OXYGEN SATURATION: 96 %

## 2018-12-24 DIAGNOSIS — R22.31 LOCALIZED SWELLING, MASS, OR LUMP OF UPPER EXTREMITY, RIGHT: Primary | ICD-10-CM

## 2018-12-24 DIAGNOSIS — E53.8 VITAMIN B12 DEFICIENCY (NON ANEMIC): ICD-10-CM

## 2018-12-24 DIAGNOSIS — Z93.3 COLOSTOMY IN PLACE (H): ICD-10-CM

## 2018-12-24 DIAGNOSIS — E03.4 HYPOTHYROIDISM DUE TO ACQUIRED ATROPHY OF THYROID: ICD-10-CM

## 2018-12-24 DIAGNOSIS — D12.6 TUBULAR ADENOMA OF COLON: ICD-10-CM

## 2018-12-24 DIAGNOSIS — E03.4 HYPOTHYROIDISM DUE TO ACQUIRED ATROPHY OF THYROID: Chronic | ICD-10-CM

## 2018-12-24 DIAGNOSIS — E78.5 HYPERLIPIDEMIA LDL GOAL <100: ICD-10-CM

## 2018-12-24 PROCEDURE — 99214 OFFICE O/P EST MOD 30 MIN: CPT | Performed by: INTERNAL MEDICINE

## 2018-12-24 PROCEDURE — 82607 VITAMIN B-12: CPT | Performed by: INTERNAL MEDICINE

## 2018-12-24 PROCEDURE — 85025 COMPLETE CBC W/AUTO DIFF WBC: CPT | Performed by: INTERNAL MEDICINE

## 2018-12-24 PROCEDURE — 80061 LIPID PANEL: CPT | Performed by: INTERNAL MEDICINE

## 2018-12-24 PROCEDURE — 36415 COLL VENOUS BLD VENIPUNCTURE: CPT | Performed by: INTERNAL MEDICINE

## 2018-12-24 PROCEDURE — 84439 ASSAY OF FREE THYROXINE: CPT | Performed by: INTERNAL MEDICINE

## 2018-12-24 PROCEDURE — 84443 ASSAY THYROID STIM HORMONE: CPT | Performed by: INTERNAL MEDICINE

## 2018-12-24 ASSESSMENT — MIFFLIN-ST. JEOR: SCORE: 1196.79

## 2018-12-24 NOTE — LETTER
December 26, 2018      Suma Paez Bharati  [address]  [place] [facility] [address]        Dear ,    We are writing to inform you of your test results.           These labs indicate that either your thyroid dose is a bit too low, or else you have missed taking multiple doses.                         Sometimes when people gain weight, they do need a higher dose of their thyroid medication.  I will order the 88 mcg strength.      Consider rechecking your thyroid levels in approximately 6 months.                Your B12 level is higher than necessary.       You could cut back the B12 to just take it every other day.                              I do not recommend a cholesterol-lowering medication for you based on these readings.            Results for orders placed or performed in visit on 12/24/18   Lipid Profile (Chol, Trig, HDL, LDL calc)   Result Value Ref Range    Cholesterol 221 (H) <200 mg/dL    Triglycerides 115 <150 mg/dL    HDL Cholesterol 71 >49 mg/dL    LDL Cholesterol Calculated 127 (H) <100 mg/dL    Non HDL Cholesterol 150 (H) <130 mg/dL   Vitamin B12   Result Value Ref Range    Vitamin B12 1,476 (H) 193 - 986 pg/mL   TSH with free T4 reflex   Result Value Ref Range    TSH 9.14 (H) 0.40 - 4.00 mU/L   CBC with platelets and differential   Result Value Ref Range    WBC 5.8 4.0 - 11.0 10e9/L    RBC Count 5.29 (H) 3.8 - 5.2 10e12/L    Hemoglobin 17.6 (H) 11.7 - 15.7 g/dL    Hematocrit 52.2 (H) 35.0 - 47.0 %    MCV 99 78 - 100 fl    MCH 33.3 (H) 26.5 - 33.0 pg    MCHC 33.7 31.5 - 36.5 g/dL    RDW 12.8 10.0 - 15.0 %    Platelet Count 165 150 - 450 10e9/L    Diff Method Automated Method     % Neutrophils 59.8 %    % Lymphocytes 30.5 %    % Monocytes 7.6 %    % Eosinophils 1.9 %    % Basophils 0.2 %    Absolute Neutrophil 3.5 1.6 - 8.3 10e9/L    Absolute Lymphocytes 1.8 0.8 - 5.3 10e9/L    Absolute Monocytes 0.4 0.0 - 1.3 10e9/L    Absolute Eosinophils 0.1 0.0 - 0.7 10e9/L    Absolute Basophils 0.0 0.0 -  0.2 10e9/L   T4 free   Result Value Ref Range    T4 Free 0.81 0.76 - 1.46 ng/dL         If you have any questions or concerns, please call the clinic at the number listed above.       Sincerely,        Owen Jean MD

## 2018-12-24 NOTE — PATIENT INSTRUCTIONS
I will let you know your lab results.  I will include comments about your B12 dosage,the thyroid dosage, and the pravastatin.

## 2018-12-24 NOTE — PROGRESS NOTES
SUBJECTIVE:   Suma Calabrese is a 63 year old female who presents to clinic today for the following health issues:      Check inside right elbow area-lump or clot? Has history of arm blood clot. Noticed this past Friday 12/21/2018. Denies any pain.                             This patient has noticed a small area of swelling near the medial epicondyle of the right arm.  She had an episode of superficial      phlebitis in that arm approximately 1 year ago, hence her concern about this situation.               She also had multiple other issues she wanted to review.  She had a subtotal colectomy 1 year ago.  She has an ileostomy.  She has decided against having surgical re-anastomosis.  2 small tubular adenomas were found in the colectomy specimen         .  Her surgeon advised her to have a flexible sigmoidoscopy of the remaining part of her colon, and she was not sure about this advice and she wanted to discuss it.                    She is also due to have thyroid functions rechecked.  She has gained some weight since her major abdominal surgery.  She also wants her lipids rechecked.  At one time someone ordered pravastatin for her, but she did not take it.        She also takes B12.  She is not sure of her dosage.  She tells me the level was low at one time.  She follows with oncology after her bilateral mastectomy.  She takes Arimidex daily.  A recent CMP was normal.    Problem list and histories reviewed & adjusted, as indicated.      Current Outpatient Medications   Medication Sig Dispense Refill     anastrozole (ARIMIDEX) 1 MG tablet Take 1 tablet (1 mg) by mouth daily 90 tablet 3     cyabnocobalamin (VITAMIN B-12) 2500 MCG sublingual tablet Place 2,500 mcg under the tongue daily 30 tablet 11     Ibuprofen (ADVIL PO) Take 200 mg by mouth every 4 hours as needed for moderate pain       levothyroxine (SYNTHROID/LEVOTHROID) 75 MCG tablet TAKE 1 TABLET(75 MCG) BY MOUTH DAILY 90 tablet 2     Multiple  "Vitamins-Minerals (CENTRUM SILVER) per tablet Take 1 tablet by mouth daily       Pravastatin Sodium (PRAVACHOL PO) Take 20 mg by mouth At Bedtime       psyllium (METAMUCIL/KONSYL) Packet Take 1 packet by mouth 2 times daily 60 packet 0     VITAMIN D, CHOLECALCIFEROL, PO Take 1,000 Units by mouth every morning       Allergies   Allergen Reactions     Bupropion Other (See Comments)     Jittery and HA     Erythromycin GI Disturbance     Oxycodone Nausea and Vomiting     BP Readings from Last 3 Encounters:   12/24/18 120/70   09/12/18 130/85   03/14/18 130/83    Wt Readings from Last 3 Encounters:   12/24/18 63.5 kg (140 lb)   09/12/18 62.1 kg (136 lb 12.8 oz)   03/14/18 59.9 kg (132 lb)                    Reviewed and updated as needed this visit by clinical staff  Tobacco  Allergies  Meds  Med Hx  Surg Hx  Fam Hx  Soc Hx      Reviewed and updated as needed this visit by Provider         ROS:  CONSTITUTIONAL:NEGATIVE  for chills and fever   RESP:NEGATIVE for significant cough or SOB    OBJECTIVE:                                                    /70 (BP Location: Left arm, Patient Position: Chair, Cuff Size: Adult Regular)   Pulse 86   Temp 97.8  F (36.6  C)   Resp 20   Ht 1.676 m (5' 6\")   Wt 63.5 kg (140 lb)   LMP  (LMP Unknown)   SpO2 96%   BMI 22.60 kg/m    Body mass index is 22.6 kg/m .  GENERAL APPEARANCE: alert and no distress  CV: regular rates and rhythm, normal S1 S2, no S3 or S4 and no murmur, click or rub  MS: There is minimal soft tissue swelling over the medial aspect of the right elbow in the antecubital fossa, near the medial epicondyle.          This feels like an area of fat, and the arterial pulsation can be felt below it.              There is no evidence of superficial phlebitis at all.  This area is not red or tender.    Diagnostic test results:  Pending     ASSESSMENT/PLAN:                                                        ICD-10-CM    1. Localized swelling, mass, or " lump of upper extremity, right R22.31    2. Vitamin B12 deficiency (non anemic) E53.8 Vitamin B12   3. Hyperlipidemia LDL goal <100 E78.5 Lipid Profile (Chol, Trig, HDL, LDL calc)   4. Hypothyroidism due to acquired atrophy of thyroid E03.4 TSH with free T4 reflex     CBC with platelets and differential   5. Colostomy in place (H) Z93.3    6. Tubular adenoma of colon D12.6        Summary and implications:  We reviewed her situation at length.          She was reassured that there is nothing pathologic going on in her right antecubital fossa area.                     Check labs and adjust medications as indicated.    Patient Instructions   I will let you know your lab results.  I will include comments about your B12 dosage,the thyroid dosage, and the pravastatin.                                Return in about 6 months (around 6/24/2019), or if symptoms worsen or fail to improve, for medication review and refills.      Owen Jean MD  [facility]          Results for orders placed or performed in visit on 12/24/18   Lipid Profile (Chol, Trig, HDL, LDL calc)   Result Value Ref Range    Cholesterol 221 (H) <200 mg/dL    Triglycerides 115 <150 mg/dL    HDL Cholesterol 71 >49 mg/dL    LDL Cholesterol Calculated 127 (H) <100 mg/dL    Non HDL Cholesterol 150 (H) <130 mg/dL   Vitamin B12   Result Value Ref Range    Vitamin B12 1,476 (H) 193 - 986 pg/mL   TSH with free T4 reflex   Result Value Ref Range    TSH 9.14 (H) 0.40 - 4.00 mU/L   CBC with platelets and differential   Result Value Ref Range    WBC 5.8 4.0 - 11.0 10e9/L    RBC Count 5.29 (H) 3.8 - 5.2 10e12/L    Hemoglobin 17.6 (H) 11.7 - 15.7 g/dL    Hematocrit 52.2 (H) 35.0 - 47.0 %    MCV 99 78 - 100 fl    MCH 33.3 (H) 26.5 - 33.0 pg    MCHC 33.7 31.5 - 36.5 g/dL    RDW 12.8 10.0 - 15.0 %    Platelet Count 165 150 - 450 10e9/L    Diff Method Automated Method     % Neutrophils 59.8 %    % Lymphocytes 30.5 %    % Monocytes 7.6 %     % Eosinophils 1.9 %    % Basophils 0.2 %    Absolute Neutrophil 3.5 1.6 - 8.3 10e9/L    Absolute Lymphocytes 1.8 0.8 - 5.3 10e9/L    Absolute Monocytes 0.4 0.0 - 1.3 10e9/L    Absolute Eosinophils 0.1 0.0 - 0.7 10e9/L    Absolute Basophils 0.0 0.0 - 0.2 10e9/L   T4 free   Result Value Ref Range    T4 Free 0.81 0.76 - 1.46 ng/dL             Letter and My chart message sent.                      These labs indicate that either your thyroid dose is a bit too low, or else you have missed taking multiple doses.                         Sometimes when people gain weight, they do need a higher dose of their thyroid medication.  I will order the 88 mcg strength.      Consider rechecking your thyroid levels in approximately 6 months.                Your B12 level is higher than necessary.       You could cut back the B12 to just take it every other day.                              I do not recommend a cholesterol-lowering medication for you based on these readings.

## 2018-12-26 LAB
BASOPHILS # BLD AUTO: 0 10E9/L (ref 0–0.2)
BASOPHILS NFR BLD AUTO: 0.2 %
CHOLEST SERPL-MCNC: 221 MG/DL
DIFFERENTIAL METHOD BLD: ABNORMAL
EOSINOPHIL # BLD AUTO: 0.1 10E9/L (ref 0–0.7)
EOSINOPHIL NFR BLD AUTO: 1.9 %
ERYTHROCYTE [DISTWIDTH] IN BLOOD BY AUTOMATED COUNT: 12.8 % (ref 10–15)
HCT VFR BLD AUTO: 52.2 % (ref 35–47)
HDLC SERPL-MCNC: 71 MG/DL
HGB BLD-MCNC: 17.6 G/DL (ref 11.7–15.7)
LDLC SERPL CALC-MCNC: 127 MG/DL
LYMPHOCYTES # BLD AUTO: 1.8 10E9/L (ref 0.8–5.3)
LYMPHOCYTES NFR BLD AUTO: 30.5 %
MCH RBC QN AUTO: 33.3 PG (ref 26.5–33)
MCHC RBC AUTO-ENTMCNC: 33.7 G/DL (ref 31.5–36.5)
MCV RBC AUTO: 99 FL (ref 78–100)
MONOCYTES # BLD AUTO: 0.4 10E9/L (ref 0–1.3)
MONOCYTES NFR BLD AUTO: 7.6 %
NEUTROPHILS # BLD AUTO: 3.5 10E9/L (ref 1.6–8.3)
NEUTROPHILS NFR BLD AUTO: 59.8 %
NONHDLC SERPL-MCNC: 150 MG/DL
PLATELET # BLD AUTO: 165 10E9/L (ref 150–450)
RBC # BLD AUTO: 5.29 10E12/L (ref 3.8–5.2)
T4 FREE SERPL-MCNC: 0.81 NG/DL (ref 0.76–1.46)
TRIGL SERPL-MCNC: 115 MG/DL
TSH SERPL DL<=0.005 MIU/L-ACNC: 9.14 MU/L (ref 0.4–4)
VIT B12 SERPL-MCNC: 1476 PG/ML (ref 193–986)
WBC # BLD AUTO: 5.8 10E9/L (ref 4–11)

## 2018-12-26 RX ORDER — LEVOTHYROXINE SODIUM 88 UG/1
88 TABLET ORAL DAILY
Qty: 90 TABLET | Refills: 2 | Status: SHIPPED | OUTPATIENT
Start: 2018-12-26 | End: 2019-10-11

## 2019-02-07 ENCOUNTER — OFFICE VISIT (OUTPATIENT)
Dept: FAMILY MEDICINE | Facility: CLINIC | Age: 64
End: 2019-02-07
Payer: COMMERCIAL

## 2019-02-07 VITALS
BODY MASS INDEX: 22.27 KG/M2 | DIASTOLIC BLOOD PRESSURE: 76 MMHG | TEMPERATURE: 99.4 F | HEART RATE: 94 BPM | WEIGHT: 138 LBS | OXYGEN SATURATION: 97 % | RESPIRATION RATE: 16 BRPM | SYSTOLIC BLOOD PRESSURE: 128 MMHG

## 2019-02-07 DIAGNOSIS — C50.912 BILATERAL MALIGNANT NEOPLASM OF BREAST IN FEMALE, ESTROGEN RECEPTOR POSITIVE, UNSPECIFIED SITE OF BREAST (H): ICD-10-CM

## 2019-02-07 DIAGNOSIS — C50.911 BILATERAL MALIGNANT NEOPLASM OF BREAST IN FEMALE, ESTROGEN RECEPTOR POSITIVE, UNSPECIFIED SITE OF BREAST (H): ICD-10-CM

## 2019-02-07 DIAGNOSIS — H92.02 LEFT EAR PAIN: ICD-10-CM

## 2019-02-07 DIAGNOSIS — Z17.0 BILATERAL MALIGNANT NEOPLASM OF BREAST IN FEMALE, ESTROGEN RECEPTOR POSITIVE, UNSPECIFIED SITE OF BREAST (H): ICD-10-CM

## 2019-02-07 DIAGNOSIS — J06.9 VIRAL UPPER RESPIRATORY TRACT INFECTION: Primary | ICD-10-CM

## 2019-02-07 DIAGNOSIS — R50.81 FEVER IN OTHER DISEASES: ICD-10-CM

## 2019-02-07 DIAGNOSIS — Z93.3 COLOSTOMY IN PLACE (H): ICD-10-CM

## 2019-02-07 DIAGNOSIS — H65.22 LEFT CHRONIC SEROUS OTITIS MEDIA: ICD-10-CM

## 2019-02-07 DIAGNOSIS — E44.0 MODERATE MALNUTRITION (H): ICD-10-CM

## 2019-02-07 LAB
FLUAV+FLUBV AG SPEC QL: NEGATIVE
FLUAV+FLUBV AG SPEC QL: NEGATIVE
SPECIMEN SOURCE: NORMAL

## 2019-02-07 PROCEDURE — 87804 INFLUENZA ASSAY W/OPTIC: CPT | Performed by: FAMILY MEDICINE

## 2019-02-07 PROCEDURE — 99213 OFFICE O/P EST LOW 20 MIN: CPT | Performed by: FAMILY MEDICINE

## 2019-02-07 RX ORDER — GUAIFENESIN 600 MG/1
600-1200 TABLET, EXTENDED RELEASE ORAL 2 TIMES DAILY
Qty: 40 TABLET | Refills: 1 | Status: SHIPPED | OUTPATIENT
Start: 2019-02-07 | End: 2019-04-19

## 2019-02-07 NOTE — PROGRESS NOTES
SUBJECTIVE:   Suma Calabrese is a 63 year old female who presents to clinic today for the following health issues:      Acute Illness   Acute illness concerns: COLD, SNEEZING  Onset: 48 hours    Fever: no    Chills/Sweats: YES chills    Headache (location?): YES    Sinus Pressure:YES    Conjunctivitis:  YES- left    Ear Pain: YES- left    Rhinorrhea: YES    Congestion: YES    Sore Throat: no     Cough: no    Wheeze: no    Decreased Appetite: YES    Nausea: no    Vomiting: no    Diarrhea:  no    Dysuria/Freq.: no    Fatigue/Achiness: YES    Sick/Strep Exposure: no     Therapies Tried and outcome: advil cold and sinus    This patient has had an upper respiratory infection with sneezing obstruction of the left nostril cavity for the last 48 hours fever up to 101 degrees therefore I am screening for influenza sinus pressure but no pain conjunctivitis involving the left eye significant rhinorrhea.  Complicated  past history including breast cancer and diverticulitis complicated with colostomy.  This patient has well-controlled hypertension controlled with current medications.  Hyperlipidemia controlled current meds without complication.  Personal history of estrogen receptor positive breast cancer with bilateral mastectomy.    Personal list history of hyponatremia and hyperkalemia  Patient has a colostomy in place and is elected not to take it down although she could she is afraid of complications.  Patient has non-anemic vitamin B12 deficiency and some malnutrition because of the colon cancer surgery.            Topic Date Due     COLON CANCER SCREEN (SYSTEM ASSIGNED)  04/24/2003     PAP SCREENING Q3 YR (SYSTEM ASSIGNED)  01/31/2015               .  Current Outpatient Medications   Medication Sig Dispense Refill     anastrozole (ARIMIDEX) 1 MG tablet Take 1 tablet (1 mg) by mouth daily 90 tablet 3     cyabnocobalamin (VITAMIN B-12) 2500 MCG sublingual tablet Place 2,500 mcg under the tongue daily 30 tablet 11      guaiFENesin (MUCINEX) 600 MG 12 hr tablet Take 1-2 tablets (600-1,200 mg) by mouth 2 times daily for 10 days 40 tablet 1     Ibuprofen (ADVIL PO) Take 200 mg by mouth every 4 hours as needed for moderate pain       levothyroxine (SYNTHROID/LEVOTHROID) 88 MCG tablet Take 1 tablet (88 mcg) by mouth daily 90 tablet 2     Multiple Vitamins-Minerals (CENTRUM SILVER) per tablet Take 1 tablet by mouth daily       psyllium (METAMUCIL/KONSYL) Packet Take 1 packet by mouth 2 times daily 60 packet 0     VITAMIN D, CHOLECALCIFEROL, PO Take 1,000 Units by mouth every morning                Allergies   Allergen Reactions     Bupropion Other (See Comments)     Jittery and HA     Erythromycin GI Disturbance     Oxycodone Nausea and Vomiting           Immunization History   Administered Date(s) Administered     TD (ADULT, 7+) 2005     TDAP Vaccine (Adacel) 2015               reports that she drinks alcohol.          reports that she does not use drugs.        family history includes Unknown/Adopted in her father and mother.        indicated that her mother is . She indicated that her father is . She indicated that her maternal grandmother is . She indicated that her maternal grandfather is . She indicated that her paternal grandmother is . She indicated that her paternal grandfather is . She indicated that her daughter is alive. She indicated that her son is alive.             has a past surgical history that includes bladder dilitation[ (age 17); Dental surgery; biopsy; Mastectomy simple bilateral (Bilateral, 10/16/2017); Biopsy node sentinel (Bilateral, 10/16/2017); Colectomy subtotal (Left, 2017); Ileostomy (N/A, 2017); and Laparotomy exploratory (N/A, 2017).         reports that she does not engage in sexual activity.    .  Pediatric History   Patient Guardian Status     Not on file     Other Topics Concern     Parent/sibling w/ CABG, MI or angioplasty  before 65F 55M? No   Social History Narrative     Not on file               reports that she quit smoking about 15 months ago. Her smoking use included cigarettes. She has a 20.00 pack-year smoking history. she has never used smokeless tobacco.        Medical, social, surgical, and family histories reviewed.        Labs reviewed in EPIC  Patient Active Problem List   Diagnosis     Nontoxic uninodular goiter     Rectal prolapse     Hypertension goal BP (blood pressure) < 140/80     Internal hemorrhoids with other complication     Tobacco use disorder     Hyperlipidemia LDL goal <100     Hypothyroidism due to acquired atrophy of thyroid     Diverticulitis of colon     Malignant neoplasm of upper-outer quadrant of right breast in female, estrogen receptor positive (H)     S/P bilateral mastectomy     Hyponatremia     Hypokalemia     Diverticulitis large intestine     Tubular adenoma of colon     Colostomy in place (H)     Vitamin B12 deficiency (non anemic)     Moderate malnutrition (H)       Past Surgical History:   Procedure Laterality Date     BIOPSY       BIOPSY NODE SENTINEL Bilateral 10/16/2017    Procedure: BIOPSY NODE SENTINEL;;  Surgeon: Van Salazar MD;  Location: [facility]     bladder dilitation[  age 17     COLECTOMY SUBTOTAL Left 11/13/2017    Procedure: COLECTOMY SUBTOTAL;  EXPLORATORY LAPAROTOMY, OPEN SUBTOTAL  COLECTOMY, END ILEOSTOMY;  Surgeon: May Spain MD;  Location:  OR     DENTAL SURGERY      wisdom tooth extraction     ILEOSTOMY N/A 11/13/2017    Procedure: ILEOSTOMY;;  Surgeon: May Spain MD;  Location:  OR     LAPAROTOMY EXPLORATORY N/A 11/13/2017    Procedure: LAPAROTOMY EXPLORATORY;;  Surgeon: May Spain MD;  Location:  OR     MASTECTOMY SIMPLE BILATERAL Bilateral 10/16/2017    Procedure: MASTECTOMY SIMPLE BILATERAL;  BILATERAL SIMPLE MASTECTOMY, BILATERAL AXILLARY SENTINEL NODE BIOPSY;  Surgeon: Van Salazar MD;  Location: [facility]         Social History      Tobacco Use     Smoking status: Former Smoker     Packs/day: 0.50     Years: 40.00     Pack years: 20.00     Types: Cigarettes     Last attempt to quit: 2017     Years since quittin.2     Smokeless tobacco: Never Used     Tobacco comment: 3/4 PPD   Substance Use Topics     Alcohol use: Yes     Alcohol/week: 0.0 oz     Comment: weekends       Family History   Problem Relation Age of Onset     Unknown/Adopted Mother      Unknown/Adopted Father              Current Outpatient Medications   Medication Sig Dispense Refill     anastrozole (ARIMIDEX) 1 MG tablet Take 1 tablet (1 mg) by mouth daily 90 tablet 3     cyabnocobalamin (VITAMIN B-12) 2500 MCG sublingual tablet Place 2,500 mcg under the tongue daily 30 tablet 11     guaiFENesin (MUCINEX) 600 MG 12 hr tablet Take 1-2 tablets (600-1,200 mg) by mouth 2 times daily for 10 days 40 tablet 1     Ibuprofen (ADVIL PO) Take 200 mg by mouth every 4 hours as needed for moderate pain       levothyroxine (SYNTHROID/LEVOTHROID) 88 MCG tablet Take 1 tablet (88 mcg) by mouth daily 90 tablet 2     Multiple Vitamins-Minerals (CENTRUM SILVER) per tablet Take 1 tablet by mouth daily       psyllium (METAMUCIL/KONSYL) Packet Take 1 packet by mouth 2 times daily 60 packet 0     VITAMIN D, CHOLECALCIFEROL, PO Take 1,000 Units by mouth every morning             Recent Labs   Lab Test 18  1151 18  1328 18  1635  17  0930  17  1006  16  0857  05/05/15  1030   *  --   --   --   --   --   --   --  143*  --  99   HDL 71  --   --   --   --   --   --   --  83  --  73   TRIG 115  --   --   --   --   --   --   --  46  --  45   ALT  --  15 22  --  12   < >  --   --  18  --  14   CR  --  0.79 0.89   < > 0.77   < >  --    < > 0.90  --  0.80   GFRESTIMATED  --  73 64   < > 76   < >  --    < > 63  --  73   GFRESTBLACK  --  88 77   < > >90   < >  --    < > 77  --  88   POTASSIUM  --  4.1 4.1   < > 3.7   < >  --    < > 3.6  --  4.0   TSH 9.14*  --    --   --   --   --  1.21   < > 49.11*   < >  --     < > = values in this interval not displayed.            BP Readings from Last 6 Encounters:   02/07/19 128/76   12/24/18 120/70   09/12/18 130/85   03/14/18 130/83   12/01/17 144/72   12/01/17 (!) 146/92           Wt Readings from Last 3 Encounters:   02/07/19 62.6 kg (138 lb)   12/24/18 63.5 kg (140 lb)   09/12/18 62.1 kg (136 lb 12.8 oz)                 Positive symptoms or findings indicated by bold designation:         ROS: 10 point ROS neg other than the symptoms noted above in the HPI.except  has Nontoxic uninodular goiter; Rectal prolapse; Hypertension goal BP (blood pressure) < 140/80; Internal hemorrhoids with other complication; Tobacco use disorder; Hyperlipidemia LDL goal <100; Hypothyroidism due to acquired atrophy of thyroid; Diverticulitis of colon; Malignant neoplasm of upper-outer quadrant of right breast in female, estrogen receptor positive (H); S/P bilateral mastectomy; Hyponatremia; Hypokalemia; Diverticulitis large intestine; Tubular adenoma of colon; Colostomy in place (H); Vitamin B12 deficiency (non anemic); and Moderate malnutrition (H) on their problem list.  Review Of Systems    Skin: negative    Eyes: negative left eye redness and tearing    Ears/Nose/Throat: Left ear pain with pressure and slight decrease in hearing    Respiratory: No shortness of breath, dyspnea on exertion, cough, or hemoptysis    Cardiovascular: negative    Gastrointestinal: negative    Genitourinary: negative    Musculoskeletal: negative    Neurologic: negative    Psychiatric: negative grief with loss of boyfriend    Hematologic/Lymphatic/Immunologic: negative    Endocrine: negative    Personal history of breast cancer with bilateral mastectomy.    Personal history of perforated diverticulitis with colostomy in place.                PE:  /76   Pulse 94   Temp 99.4  F (37.4  C) (Tympanic)   Resp 16   Wt 62.6 kg (138 lb)   LMP  (LMP Unknown)   SpO2 97%    BMI 22.27 kg/m   Body mass index is 22.27 kg/m .        Constitutional: general appearance, well nourished, well developed, in no acute distress, well developed, appears stated age, normal body habitus,          Eyes:; The patient has normal eyelids sclerae and conjunctivae :          Ears/Nose/Throat: external ear, overall: normal appearance; external nose, overall: benign appearance, normal moujth gums and lips   left serous otitis significant severe rhinitis obstruction left nostril.    Pressure pain over the maxillary sinuses and slightly over the forehead        Neck: thyroid, overall: normal size, normal consistency, nontender,          Respiratory:  palpation of chest, overall: normal excursion,    Clear to percussion and auscultation     NO Tachypnea    NORMAL  Color          Cardiovascular:  Good color with no peripheral edema    Regular sinus rhythm without murmur.  Physiologic heart sounds   Heart is unelarged    .     Chest/Breast: normal shape           Abdominal exam,  Liver and spleen are  unenlarged        Tenderness    Scars      Colostomy in place            Urogenital; no renal, flank or bladder  tenderness;          Lymphatic: neck nodes,     Other nodes         Musculoskeletal:  Brief ortho exam normal except:   Within normal limits         Integument: inspection of skin, no rash, lesions; and, palpation, no induration, no tenderness.          Neurologic mental status, overall: alert and oriented; gait, no ataxia, no unsteadiness; coordination, no tremors; cranial nerves, overall: normal motor, overall: normal bulk, tone.          Psychiatric: orientation/consciousness, overall: oriented to person, place and time; behavior/psychomotor activity, no tics, normal psychomotor activity; mood and affect, overall: normal mood and affect; appearance, overall: well-groomed, good eye contact; speech, overall: normal quality, no aphasia and normal quality, quantity, intact.        Diagnostic Test  Results:  Results for orders placed or performed in visit on 12/24/18   Lipid Profile (Chol, Trig, HDL, LDL calc)   Result Value Ref Range    Cholesterol 221 (H) <200 mg/dL    Triglycerides 115 <150 mg/dL    HDL Cholesterol 71 >49 mg/dL    LDL Cholesterol Calculated 127 (H) <100 mg/dL    Non HDL Cholesterol 150 (H) <130 mg/dL   Vitamin B12   Result Value Ref Range    Vitamin B12 1,476 (H) 193 - 986 pg/mL   TSH with free T4 reflex   Result Value Ref Range    TSH 9.14 (H) 0.40 - 4.00 mU/L   CBC with platelets and differential   Result Value Ref Range    WBC 5.8 4.0 - 11.0 10e9/L    RBC Count 5.29 (H) 3.8 - 5.2 10e12/L    Hemoglobin 17.6 (H) 11.7 - 15.7 g/dL    Hematocrit 52.2 (H) 35.0 - 47.0 %    MCV 99 78 - 100 fl    MCH 33.3 (H) 26.5 - 33.0 pg    MCHC 33.7 31.5 - 36.5 g/dL    RDW 12.8 10.0 - 15.0 %    Platelet Count 165 150 - 450 10e9/L    Diff Method Automated Method     % Neutrophils 59.8 %    % Lymphocytes 30.5 %    % Monocytes 7.6 %    % Eosinophils 1.9 %    % Basophils 0.2 %    Absolute Neutrophil 3.5 1.6 - 8.3 10e9/L    Absolute Lymphocytes 1.8 0.8 - 5.3 10e9/L    Absolute Monocytes 0.4 0.0 - 1.3 10e9/L    Absolute Eosinophils 0.1 0.0 - 0.7 10e9/L    Absolute Basophils 0.0 0.0 - 0.2 10e9/L   T4 free   Result Value Ref Range    T4 Free 0.81 0.76 - 1.46 ng/dL           ICD-10-CM    1. Viral upper respiratory tract infection J06.9 Influenza A/B antigen     guaiFENesin (MUCINEX) 600 MG 12 hr tablet   2. Fever in other diseases R50.81    3. Left ear pain H92.02 Influenza A/B antigen     guaiFENesin (MUCINEX) 600 MG 12 hr tablet   4. Colostomy in place (H) Z93.3    5. Moderate malnutrition (H) E44.0    6. Bilateral malignant neoplasm of breast in female, estrogen receptor positive, unspecified site of breast (H) C50.911     Z17.0     C50.912    7. Left chronic serous otitis media H65.22 guaiFENesin (MUCINEX) 600 MG 12 hr tablet              .    Side effects benefits and risks thoroughly discussed. .she may come in  early if unimproved or getting worse          Please drink 2 glasses of water prior to meals and walk 15-30 minutes after meals        I spent 25 minutes   with patient discussing the following issues   The primary encounter diagnosis was Viral upper respiratory tract infection. Diagnoses of Fever in other diseases, Left ear pain, Colostomy in place (H), Moderate malnutrition (H), Bilateral malignant neoplasm of breast in female, estrogen receptor positive, unspecified site of breast (H), and Left chronic serous otitis media were also pertinent to this visit. over half of which involved counseling and coordination of care.      Patient Instructions   HONEY 1/2 TEASPOON Q 4 HOURS OURS  ZINC LOZENGES  1 Q 4-6 HOURS  FOR UPPER RESPIRATORY INFECTION   ZYCAM   She will use Tylenol at bedtime to help her with the left ear pain at sleeve for her left serous otitis sleep in a semi-upright position with cold air vaporizer at this point there is no indication for antibiotics    (J06.9) Viral upper respiratory tract infection  (primary encounter diagnosis)  Comment:    Plan: Influenza A/B antigen, guaiFENesin (MUCINEX)         600 MG 12 hr tablet  One po twice daily 1-2 tablets twice a day side effects benefits and risk discussed patient has left serous otitis acute and sinus pressure but no pain if she does develop symptoms that warrant an antibiotic she will call me she is going to stay off of work for the next several days.  Her influenza test is negative.             (R50.81) Fever in other diseases  Comment:    Plan:      (H92.02) Left ear pain  Comment:    Plan: Influenza A/B antigen, guaiFENesin (MUCINEX)         600 MG 12 hr tablet             (Z93.3) Colostomy in place (H)  Comment:    Plan:      (E44.0) Moderate malnutrition (H)  Comment:    Plan:      (C50.911,  Z17.0,  C50.912) Bilateral malignant neoplasm of breast in female, estrogen receptor positive, unspecified site of breast (H)  Comment:    Plan:       (H65.22) Left acute serous otitis media  Comment:    Plan: guaiFENesin (MUCINEX) 600 MG 12 hr tablet    Bekah Kaur Jr., MD                           ALL THE ABOVE PROBLEMS ARE STABLE AND MED CHANGES AS NOTED        Diet:  Mediterranean diet         Exercise:  AS TOLERATED   Exercises Range of motion, balance, isometric, and strengthening exercises 30 repetitions twice daily of involved joints            .BEKAH KAUR MD 2/7/2019 10:00 AM  February 7, 2019

## 2019-02-07 NOTE — PATIENT INSTRUCTIONS
HONEY 1/2 TEASPOON Q 4 HOURS OURS  ZINC LOZENGES  1 Q 4-6 HOURS  FOR UPPER RESPIRATORY INFECTION   ZYCAM   She will use Tylenol at bedtime to help her with the left ear pain at sleeve for her left serous otitis sleep in a semi-upright position with cold air vaporizer at this point there is no indication for antibiotics left ear pain    (J06.9) Viral upper respiratory tract infection  (primary encounter diagnosis)  Comment:    Plan: Influenza A/B antigen, guaiFENesin (MUCINEX)         600 MG 12 hr tablet  One po twice daily 1-2 tablets twice a day side effects benefits and risk discussed patient has left serous otitis acute and sinus pressure but no pain if she does develop symptoms that warrant an antibiotic she will call me she is going to stay off of work for the next several days.  Her influenza test is negative.             (R50.81) Fever in other diseases  Comment:    Plan:      (H92.02) Left ear pain  Comment:    Plan: Influenza A/B antigen, guaiFENesin (MUCINEX)         600 MG 12 hr tablet             (Z93.3) Colostomy in place (H)  Comment:    Plan:      (E44.0) Moderate malnutrition (H)  Comment:    Plan:      (C50.911,  Z17.0,  C50.912) Bilateral malignant neoplasm of breast in female, estrogen receptor positive, unspecified site of breast (H)  Comment:    Plan:      (H65.22) Left acute serous otitis media  Comment:    Plan: guaiFENesin (MUCINEX) 600 MG 12 hr tablet    Murali Kenney Jr., MD

## 2019-02-07 NOTE — LETTER
February 8, 2019      Suma Ann Bharati  [address]  [facility] [address]        Dear ,    We are writing to inform you of your test results.    Your test results fall within the expected range(s) or remain unchanged from previous results.  Please continue with current treatment plan.    Resulted Orders   Influenza A/B antigen   Result Value Ref Range    Influenza A/B Agn Specimen Nasal     Influenza A Negative NEG^Negative    Influenza B Negative NEG^Negative      Comment:      Test results must be correlated with clinical data. If necessary, results   should be confirmed by a molecular assay or viral culture.         If you have any questions or concerns, please call the clinic at the number listed above.       Sincerely,        BEKAH KAUR MD

## 2019-02-07 NOTE — LETTER
[place] [id number] [facility]  [address]  [facility] [address]  Phone: [phone]  Fax: [phone]    February 7, 2019        Suma Jeannine Calabrese  [address]  [facility] [address]          To whom it may concern:    RE: Sumagerard Calabrees    Patient was seen and treated today at our clinic and missed work.    Please contact me for questions or concerns.      Sincerely,        BEKAH KAUR MD

## 2019-02-09 ENCOUNTER — TELEPHONE (OUTPATIENT)
Dept: FAMILY MEDICINE | Facility: CLINIC | Age: 64
End: 2019-02-09

## 2019-02-09 NOTE — TELEPHONE ENCOUNTER
Reason for call:  Patient reporting a symptom    Symptom or request: cold/headache    Duration (how long have symptoms been present): pt saw Dr AISSATOU Kenney on Feb 7.  She is feeling worse and was told to call back.    Have you been treated for this before? Yes    Additional comments: please call pt    Phone Number patient can be reached at:  Home number on file [phone] (home)    Best Time:  any    Can we leave a detailed message on this number:  YES    Call taken on 2/9/2019 at 8:44 AM by PAUL CASTILLO

## 2019-02-10 ENCOUNTER — NURSE TRIAGE (OUTPATIENT)
Dept: NURSING | Facility: CLINIC | Age: 64
End: 2019-02-10

## 2019-02-10 DIAGNOSIS — J06.9 UPPER RESPIRATORY INFECTION: Primary | ICD-10-CM

## 2019-02-10 RX ORDER — AZITHROMYCIN 250 MG/1
TABLET, FILM COATED ORAL
Qty: 6 TABLET | Refills: 0 | Status: SHIPPED | OUTPATIENT
Start: 2019-02-10 | End: 2019-04-19

## 2019-02-10 NOTE — TELEPHONE ENCOUNTER
"Patient reports she was seen on Thurs by Dr. Kaur and was told to call if symptoms gets worse because she may need an antibiotic.      Patient reports headache, nasal congestion and productive cough for 5 days, pain level 6/10.  Patient says level of pain is worse than when she was seen.  Patient reports sinus pain.  Patient reports she is taking mucinex and honey per doctor's recommendation.  Patient says she feels like there's chest congestion starting.  Patient reports having chills but not feeling feverish.  Patient does not have a thermometer but says she probably has a fever.      FNA advised will contact on-call doctor per her request to see if an antibiotic is recommended.  Patient says she has taken amoxicillin in the past and it is not effective.  Also, erythromycin gives her abdominal pain.  Patient request antibiotic to be sent to [facility].     Paged on-call doctor, Dr. Kaur, at 11:01am via Smart Web to call FNA back re: \"DIANE ESTRADA MRN: [id number]  : 51  PCP: CHRISTAL KAUR   PATIENT IS REQUESTING ANTIBIOTIC; SAYS SYMPTOMS AR WORSE.  GRANT [phone]\".  2nd page sent to Dr. Kaur at 11:27am via page .  11:45am, spoke to page  who called Dr. Cook' cell, no answer.  Spoke to Dr. Kaur who authorized Z-monet; patient to see Dr. Kaur next week if symptoms don't improve.  FNA called and informed patient who verbalized understanding.      Reason for Disposition    [1] Sinus pain (not just congestion) AND [2] fever    Additional Information    Negative: Severe difficulty breathing (e.g., struggling for each breath, speaks in single words)    Negative: Sounds like a life-threatening emergency to the triager    Negative: Runny nose is caused by pollen or other allergies    Negative: Cough is main symptom    Negative: Severe sore throat    Negative: Fever > 104 F (40 C)    Negative: [1] Difficulty breathing AND [2] not severe AND [3] not from " stuffy nose (e.g., not relieved by cleaning out the nose)    Negative: Patient sounds very sick or weak to the triager    Negative: [1] Fever > 101 F (38.3 C) AND [2] age > 60    Negative: [1] Fever > 101 F (38.3 C) AND [2] bedridden (e.g., nursing home patient, CVA, chronic illness, recovering from surgery)    Negative: [1] Fever > 100.5 F (38.1 C) AND [2] diabetes mellitus or weak immune system (e.g., HIV positive, cancer chemo, splenectomy, organ transplant, chronic steroids)    Negative: [1] Fever returns after gone for over 24 hours AND [2] symptoms worse or not improved    Negative: Fever present > 3 days (72 hours)    Protocols used: COMMON COLD-ADULT-AH

## 2019-02-12 NOTE — TELEPHONE ENCOUNTER
Suma WILEY    Ladnuria seen several days ago by myself with signs and symptoms of the upper respiratory infection with pressure and pain in the cheekbones and forehead as well as headache with a very complicated history which includes breast cancer objective findings    None assessment    Upper respiratory infection complicated with maxillary sinus pain and pressure    Plan treatment azithromycin 500 mg daily for 3 days side effects benefits and risks of treatment discussed treatment plan discussed this is done through the [place] nurse advisors    BEKAH KAUR JR., MD

## 2019-04-10 ENCOUNTER — INFUSION THERAPY VISIT (OUTPATIENT)
Dept: INFUSION THERAPY | Facility: CLINIC | Age: 64
End: 2019-04-10
Attending: INTERNAL MEDICINE
Payer: COMMERCIAL

## 2019-04-10 ENCOUNTER — ONCOLOGY VISIT (OUTPATIENT)
Dept: ONCOLOGY | Facility: CLINIC | Age: 64
End: 2019-04-10
Attending: INTERNAL MEDICINE
Payer: COMMERCIAL

## 2019-04-10 ENCOUNTER — HOSPITAL ENCOUNTER (OUTPATIENT)
Facility: CLINIC | Age: 64
Setting detail: SPECIMEN
Discharge: HOME | End: 2019-04-10
Attending: INTERNAL MEDICINE | Admitting: INTERNAL MEDICINE
Payer: COMMERCIAL

## 2019-04-10 VITALS
DIASTOLIC BLOOD PRESSURE: 77 MMHG | WEIGHT: 133.6 LBS | OXYGEN SATURATION: 99 % | HEART RATE: 107 BPM | SYSTOLIC BLOOD PRESSURE: 118 MMHG | BODY MASS INDEX: 21.47 KG/M2 | HEIGHT: 66 IN | TEMPERATURE: 98.5 F | RESPIRATION RATE: 16 BRPM

## 2019-04-10 DIAGNOSIS — C50.411 MALIGNANT NEOPLASM OF UPPER-OUTER QUADRANT OF RIGHT BREAST IN FEMALE, ESTROGEN RECEPTOR POSITIVE (H): ICD-10-CM

## 2019-04-10 DIAGNOSIS — C50.411 MALIGNANT NEOPLASM OF UPPER-OUTER QUADRANT OF RIGHT BREAST IN FEMALE, ESTROGEN RECEPTOR POSITIVE (H): Primary | ICD-10-CM

## 2019-04-10 DIAGNOSIS — Z17.0 MALIGNANT NEOPLASM OF UPPER-OUTER QUADRANT OF RIGHT BREAST IN FEMALE, ESTROGEN RECEPTOR POSITIVE (H): Primary | ICD-10-CM

## 2019-04-10 DIAGNOSIS — R21 RASH: ICD-10-CM

## 2019-04-10 DIAGNOSIS — C50.912 BILATERAL MALIGNANT NEOPLASM OF BREAST IN FEMALE, ESTROGEN RECEPTOR POSITIVE, UNSPECIFIED SITE OF BREAST (H): Primary | ICD-10-CM

## 2019-04-10 DIAGNOSIS — M25.60 STIFFNESS IN JOINT: ICD-10-CM

## 2019-04-10 DIAGNOSIS — Z17.0 BILATERAL MALIGNANT NEOPLASM OF BREAST IN FEMALE, ESTROGEN RECEPTOR POSITIVE, UNSPECIFIED SITE OF BREAST (H): Primary | ICD-10-CM

## 2019-04-10 DIAGNOSIS — C50.911 BILATERAL MALIGNANT NEOPLASM OF BREAST IN FEMALE, ESTROGEN RECEPTOR POSITIVE, UNSPECIFIED SITE OF BREAST (H): Primary | ICD-10-CM

## 2019-04-10 DIAGNOSIS — M85.80 OSTEOPENIA, UNSPECIFIED LOCATION: ICD-10-CM

## 2019-04-10 DIAGNOSIS — Z17.0 MALIGNANT NEOPLASM OF UPPER-OUTER QUADRANT OF RIGHT BREAST IN FEMALE, ESTROGEN RECEPTOR POSITIVE (H): ICD-10-CM

## 2019-04-10 LAB
ALBUMIN SERPL-MCNC: 3.8 G/DL (ref 3.4–5)
ALP SERPL-CCNC: 126 U/L (ref 40–150)
ALT SERPL W P-5'-P-CCNC: 16 U/L (ref 0–50)
ANION GAP SERPL CALCULATED.3IONS-SCNC: 6 MMOL/L (ref 3–14)
AST SERPL W P-5'-P-CCNC: 16 U/L (ref 0–45)
BILIRUB SERPL-MCNC: 0.8 MG/DL (ref 0.2–1.3)
BUN SERPL-MCNC: 12 MG/DL (ref 7–30)
CALCIUM SERPL-MCNC: 9.5 MG/DL (ref 8.5–10.1)
CHLORIDE SERPL-SCNC: 107 MMOL/L (ref 94–109)
CO2 SERPL-SCNC: 27 MMOL/L (ref 20–32)
CREAT SERPL-MCNC: 0.94 MG/DL (ref 0.52–1.04)
GFR SERPL CREATININE-BSD FRML MDRD: 63 ML/MIN/{1.73_M2}
GLUCOSE SERPL-MCNC: 119 MG/DL (ref 70–99)
POTASSIUM SERPL-SCNC: 3.4 MMOL/L (ref 3.4–5.3)
PROT SERPL-MCNC: 7.4 G/DL (ref 6.8–8.8)
SODIUM SERPL-SCNC: 140 MMOL/L (ref 133–144)

## 2019-04-10 PROCEDURE — 36415 COLL VENOUS BLD VENIPUNCTURE: CPT

## 2019-04-10 PROCEDURE — G0463 HOSPITAL OUTPT CLINIC VISIT: HCPCS

## 2019-04-10 PROCEDURE — 80053 COMPREHEN METABOLIC PANEL: CPT | Performed by: INTERNAL MEDICINE

## 2019-04-10 PROCEDURE — 99215 OFFICE O/P EST HI 40 MIN: CPT | Performed by: INTERNAL MEDICINE

## 2019-04-10 RX ORDER — ALENDRONATE SODIUM 35 MG/1
35 TABLET ORAL
Qty: 12 TABLET | Refills: 3 | Status: SHIPPED | OUTPATIENT
Start: 2019-04-10 | End: 2019-10-09 | Stop reason: ALTCHOICE

## 2019-04-10 RX ORDER — ANASTROZOLE 1 MG/1
1 TABLET ORAL DAILY
Qty: 90 TABLET | Refills: 3 | Status: SHIPPED | OUTPATIENT
Start: 2019-04-10 | End: 2020-04-08

## 2019-04-10 ASSESSMENT — MIFFLIN-ST. JEOR: SCORE: 1167.76

## 2019-04-10 ASSESSMENT — PAIN SCALES - GENERAL: PAINLEVEL: NO PAIN (0)

## 2019-04-10 NOTE — PROGRESS NOTES
ONCOLOGY FOLLOW UP VISIT     CHIEF COMPLAINT AND REASON FOR VISIT:  Bilateral breast cancer dx fall 2017, s/p double mastectomy.        HISTORY OF PRESENT ILLNESS: She self-felt a breast lump on the right side in 09/2017.    She was due her annual screening mammogram, which was done in September identified 11 o'clock, 3 cm from the nipple on the right breast, which is right upper outer quadrant, 1.1 cm hypoechoic mass.    This was biopsied in 09/2017, identified grade 2 invasive lobular cancer, ER greater than 95% positive, [place] greater than 25% positive; HER-2/selena was negative.    She met with Dr. Salazar, discussed about surgical options at that time.  Breast preop MRI incidentally picked up a left breast nodule 3 to 4 o'clock position 4 cm from the nipple, 4   mm enhancing focus.  This is at the central lateral portion of the left breast.  Second look ultrasound confirmed the lesion, subcentimeter in size.    Biopsy was performed in October 2017 on the left side, identified radial scar, no evidence of malignancy.      She subsequently had double mastectomy with Dr. Salazar mid 10/2017.  On the left side found invasive grade 1 IDC  0.5 cm at 3 o'clock position 9 cm from the nipple, % positive, [place] 80% positive, HER-2/selena negative. negative DCIS, negative LCIS.  Margins were clear.  Lymphovascular invasion is absent. 2 lymph nodes taken out.  Both of them were negative.  This was staged at T1a N0.    On the right side, found 2 cm invasive lobular cancer was identified, grade 1 associated with LCIS, absent lymphovascular invasion.  One sentinel lymph node was taken out, was negative.  This is staged T1c N0 disease.    RS is 13, corresponds to 10-year risk of distal recurrence with surgery, radiation, and tamoxifen is about 8%.       She made informed decision to proceed with AI 12/2017.     PAST MEDICAL HISTORY:  History of hyponatremia, hypokalemia, hypothyroidism, nontoxic uninodular goiter, rectal prolapse,  "hypertension, internal hemorrhoid.Eextensive diverticulitis which required subtotal colectomy and end ileostomy bag in 10/2017.  Nov 2017 for acute diverticulitis with perforation, pericolic abscess, and bowel obstruction, s/p s/p exploratory laparotomy, subtotal colectomy, and end ileostomy on 11/13/2017.     FAMILY HISTORY:  She is adopted.      SOCIAL HISTORY:  She works in the [employer].  Quit smoking 09/2017 when she was diagnosed with breast cancer.  Denies alcohol abuse.      REVIEW OF SYSTEMS:   Tolerating AI fine with mild arthritic pain and hot flushes. She has stiffness upon changing positions.   Reports mild swollen on left chest wall.     PHYSICAL EXAMINATION:   VITAL SIGNS:  Blood pressure 118/77, pulse 107, temperature 98.5  F (36.9  C), temperature source Oral, resp. rate 16, height 1.676 m (5' 6\"), weight 60.6 kg (133 lb 9.6 oz), SpO2 99 %, not currently breastfeeding.    ECOG 0    GENERAL APPEARANCE:  Middle-aged woman, very thin, not in acute distress.     HEENT: The patient is normocephalic, atraumatic. Pupils are equally reactive to light.  Sclerae are anicteric.  Moist oral mucosa.  Negative pharynx.  No oral thrush.   NECK:  Supple.  No jugular venous distention.  Thyroid is not palpable.   LYMPH NODES:  Superficial lymphadenopathy is not appreciable in the bilateral cervical, supraclavicular, axillary or inguinal areas.   CARDIOVASCULAR:  S1, S2 regular with no murmurs or gallops.  No carotid or abdominal bruits.   PULMONARY:  Lungs are clear to auscultation and percussion bilaterally.  There is no wheezing or rhonchi.   GASTROINTESTINAL:  Well-healed vertical abdominal scar with well situated right-sided ostomy bag.   MUSCULOSKELETAL/EXTREMITIES:  No edema.  No cyanotic changes.  No signs of joint deformity.  No lymphedema.  No spinal or paraspinal tenderness.  No CVA tenderness.   NEUROLOGIC:  Cranial nerves II-XII are grossly intact.  Sensation intact.  Muscle strength " and muscle tone symmetrical, 5/5 throughout.   SKIN:  No petechiae.  No rash.  No signs of cellulitis.   BREASTS:  Bilateral mastectomy scars well healed on the chest wall exam portion.  There is minimal edema around the left mastectomy scar.        CURRENT LAB DATA  REVIEWED.    CMP is pending      OLD DATA REVIEWED TODAY WITH SUMMARY  Bilateral mastectomy pathology 10/2017 -  On the left side found invasive grade 1 IDC  0.5 cm at 3 o'clock position 9 cm from the nipple, % positive, NE 80% positive, HER-2/selena negative. negative DCIS, negative LCIS.  Margins were clear.  Lymphovascular invasion is absent. 2 lymph nodes taken out.  Both of them were negative.  This is staged at T1a N0.    On the right side, found 2 cm invasive lobular cancer was identified, grade 1 associated with LCIS, absent lymphovascular invasion.  One sentinel lymph node was taken out, was negative.  This is staged T1c N0 disease.      Recurrence score on the right-sided invasive lobular cancer came back 13, corresponds to 10-year risk of distal recurrence with surgery, radiation, and tamoxifen is about 8%.        CURRENT IMAGE DATA  REVIEWED  dexa 12/2017 - osteopenia  CT a/p 11/2017: Distal sigmoid wall thickening, currently resulting in colonic obstruction. This could relate to diverticulitis or focal  colitis, but neoplasm cannot be excluded. Serpiginous perisigmoid fluid collection/abscess appears mildly increased. Small amount of  free fluid.     US arm 11/2017  1. No evidence of deep vein thrombosis.  2. Superficial thrombophlebitis in the right cephalic vein.       Bilateral mastectomy pathology 10/2017 -  On the left side found invasive grade 1 IDC  0.5 cm at 3 o'clock position 9 cm from the nipple, % positive, NE 80% positive, HER-2/selena negative. negative DCIS, negative LCIS.  Margins were clear.  Lymphovascular invasion is absent. 2 lymph nodes taken out.  Both of them were negative.  This is staged at T1a N0.    On the  right side, found 2 cm invasive lobular cancer was identified, grade 1 associated with LCIS, absent lymphovascular invasion.  One sentinel lymph node was taken out, was negative.  This is staged T1c N0 disease.      Recurrence score on the right-sided invasive lobular cancer came back 13, corresponds to 10-year risk of distal recurrence with surgery, radiation, and tamoxifen is about 8%.       Mammogram September identified 11 o'clock, 3 cm from the nipple on the right breast, which is right upper outer quadrant, 1.1 cm hypoechoic mass.    This was biopsied in 09/2017, identified grade 2 invasive lobular cancer, ER greater than 95% positive, [place] greater than 25% positive; HER-2/selena was negative.    Breast preop MRI incidentally picked up a left breast nodule 3 to 4 o'clock position 4 cm from the nipple, 4   mm enhancing focus.  This is at the central lateral portion of the left breast.  Second look ultrasound confirmed the lesion, subcentimeter in size.  Biopsy was performed in October 2017 on the left side, identified radial scar, no evidence of malignancy.            ASSESSMENT/PLAN:   1.  09/2017,10/20/17 diagnosed bilateral breast cancer.  Right-side is T1cN0, 2 cm invasive lobular, grade 1, ER/AZ positive, HER-2/selena negative.  Left-side invasive ductal, T1aN0, grade 1, ER/AZ positive, HER-2/selena negative.  She had double mastectomy.  All the nodes are negative.  Margins were clean.  She also had lobular carcinoma in situ on the right side.  Recurrence score on the right side of 13 corresponds to 10-year risk of distal recurrence 8% with surgery, radiation, and tamoxifen.     She made informed decision to proceed with Arimidex 12/2017.    We also discussed ER positive breast cancer recurrence pattern and future followup plan.  We also discussed about 5 years versus 10 years anti-hormonal therapy data and how this may apply to her situation.     Bilateral breast cancer presented at age 62.  We discussed the  indication of genetic counseling; she is not interested at this point.       She is on 6 months f/u with labs.     2. Osteopenia - we discussed the proper use of vit D, dosing, etc.   We discussed the use of bisphosphonate, she is wiling to try fosamax.      3. Stiffness from AI - we discussed the exercise program, encourage yoga.     4. Hyperpigmented skin tag on left base of neck - advice derm evaluation.

## 2019-04-10 NOTE — PROGRESS NOTES
"Oncology Rooming Note    April 10, 2019 11:56 AM   Suma Calabrese is a 63 year old female who presents for:    Chief Complaint   Patient presents with     Oncology Clinic Visit     Initial Vitals: /77 (BP Location: Left arm, Patient Position: Chair, Cuff Size: Adult Large)   Pulse 107   Temp 98.5  F (36.9  C) (Oral)   Ht 1.676 m (5' 6\")   Wt 60.6 kg (133 lb 9.6 oz)   LMP  (LMP Unknown)   SpO2 99%   BMI 21.56 kg/m   Estimated body mass index is 21.56 kg/m  as calculated from the following:    Height as of this encounter: 1.676 m (5' 6\").    Weight as of this encounter: 60.6 kg (133 lb 9.6 oz). Body surface area is 1.68 meters squared.  No Pain (0) Comment: Data Unavailable   No LMP recorded (lmp unknown). Patient is postmenopausal.  Allergies reviewed: Yes  Medications reviewed: Yes    Medications: MEDICATION REFILLS NEEDED TODAY. Provider was notified.  Pharmacy name entered into [employer]: [id number] - [place], [place] - [address] [place] [address] AT [address]    Clinical concerns:  Doctor was notified  Refill on Haritha Boggs CMA            "

## 2019-04-10 NOTE — LETTER
"    4/10/2019         RE: Suma Calabrese  [address]  [facility] [address]        Dear Colleague,    Thank you for referring your patient, Suma Calabrese, to the [facility]. Please see a copy of my visit note below.    Oncology Rooming Note    April 10, 2019 11:56 AM   Suma Calabrese is a 63 year old female who presents for:    Chief Complaint   Patient presents with     Oncology Clinic Visit     Initial Vitals: /77 (BP Location: Left arm, Patient Position: Chair, Cuff Size: Adult Large)   Pulse 107   Temp 98.5  F (36.9  C) (Oral)   Ht 1.676 m (5' 6\")   Wt 60.6 kg (133 lb 9.6 oz)   LMP  (LMP Unknown)   SpO2 99%   BMI 21.56 kg/m    Estimated body mass index is 21.56 kg/m  as calculated from the following:    Height as of this encounter: 1.676 m (5' 6\").    Weight as of this encounter: 60.6 kg (133 lb 9.6 oz). Body surface area is 1.68 meters squared.  No Pain (0) Comment: Data Unavailable   No LMP recorded (lmp unknown). Patient is postmenopausal.  Allergies reviewed: Yes  Medications reviewed: Yes    Medications: MEDICATION REFILLS NEEDED TODAY. Provider was notified.  Pharmacy name entered into [employer]: [facility] [id number] [address] AT [address]    Clinical concerns:  Doctor was notified  Refill on Armaxelx       Eduarda Boggs [facility]              ONCOLOGY FOLLOW UP VISIT     CHIEF COMPLAINT AND REASON FOR VISIT:  Bilateral breast cancer dx fall 2017, s/p double mastectomy.        HISTORY OF PRESENT ILLNESS: She self-felt a breast lump on the right side in 09/2017.    She was due her annual screening mammogram, which was done in September identified 11 o'clock, 3 cm from the nipple on the right breast, which is right upper outer quadrant, 1.1 cm hypoechoic mass.    This was biopsied in 09/2017, identified grade 2 invasive lobular cancer, ER greater than 95% positive, [place] greater than 25% positive; HER-2/selena was negative.  "   She met with Dr. Salazar, discussed about surgical options at that time.  Breast preop MRI incidentally picked up a left breast nodule 3 to 4 o'clock position 4 cm from the nipple, 4   mm enhancing focus.  This is at the central lateral portion of the left breast.  Second look ultrasound confirmed the lesion, subcentimeter in size.    Biopsy was performed in October 2017 on the left side, identified radial scar, no evidence of malignancy.      She subsequently had double mastectomy with Dr. Salazar mid 10/2017.  On the left side found invasive grade 1 IDC  0.5 cm at 3 o'clock position 9 cm from the nipple, % positive, [place] 80% positive, HER-2/selena negative. negative DCIS, negative LCIS.  Margins were clear.  Lymphovascular invasion is absent. 2 lymph nodes taken out.  Both of them were negative.  This was staged at T1a N0.    On the right side, found 2 cm invasive lobular cancer was identified, grade 1 associated with LCIS, absent lymphovascular invasion.  One sentinel lymph node was taken out, was negative.  This is staged T1c N0 disease.    RS is 13, corresponds to 10-year risk of distal recurrence with surgery, radiation, and tamoxifen is about 8%.       She made informed decision to proceed with AI 12/2017.     PAST MEDICAL HISTORY:  History of hyponatremia, hypokalemia, hypothyroidism, nontoxic uninodular goiter, rectal prolapse, hypertension, internal hemorrhoid.Eextensive diverticulitis which required subtotal colectomy and end ileostomy bag in 10/2017.  Nov 2017 for acute diverticulitis with perforation, pericolic abscess, and bowel obstruction, s/p s/p exploratory laparotomy, subtotal colectomy, and end ileostomy on 11/13/2017.     FAMILY HISTORY:  She is adopted.      SOCIAL HISTORY:  She works in the [employer].  Quit smoking 09/2017 when she was diagnosed with breast cancer.  Denies alcohol abuse.      REVIEW OF SYSTEMS:   Tolerating AI fine with mild arthritic pain and hot  "flushes. She has stiffness upon changing positions.   Reports mild swollen on left chest wall.     PHYSICAL EXAMINATION:   VITAL SIGNS:  Blood pressure 118/77, pulse 107, temperature 98.5  F (36.9  C), temperature source Oral, resp. rate 16, height 1.676 m (5' 6\"), weight 60.6 kg (133 lb 9.6 oz), SpO2 99 %, not currently breastfeeding.    ECOG 0    GENERAL APPEARANCE:  Middle-aged woman, very thin, not in acute distress.     HEENT: The patient is normocephalic, atraumatic. Pupils are equally reactive to light.  Sclerae are anicteric.  Moist oral mucosa.  Negative pharynx.  No oral thrush.   NECK:  Supple.  No jugular venous distention.  Thyroid is not palpable.   LYMPH NODES:  Superficial lymphadenopathy is not appreciable in the bilateral cervical, supraclavicular, axillary or inguinal areas.   CARDIOVASCULAR:  S1, S2 regular with no murmurs or gallops.  No carotid or abdominal bruits.   PULMONARY:  Lungs are clear to auscultation and percussion bilaterally.  There is no wheezing or rhonchi.   GASTROINTESTINAL:  Well-healed vertical abdominal scar with well situated right-sided ostomy bag.   MUSCULOSKELETAL/EXTREMITIES:  No edema.  No cyanotic changes.  No signs of joint deformity.  No lymphedema.  No spinal or paraspinal tenderness.  No CVA tenderness.   NEUROLOGIC:  Cranial nerves II-XII are grossly intact.  Sensation intact.  Muscle strength and muscle tone symmetrical, 5/5 throughout.   SKIN:  No petechiae.  No rash.  No signs of cellulitis.   BREASTS:  Bilateral mastectomy scars well healed on the chest wall exam portion.  There is minimal edema around the left mastectomy scar.        CURRENT LAB DATA  REVIEWED.    CMP is pending      OLD DATA REVIEWED TODAY WITH SUMMARY  Bilateral mastectomy pathology 10/2017 -  On the left side found invasive grade 1 IDC  0.5 cm at 3 o'clock position 9 cm from the nipple, % positive, [place] 80% positive, HER-2/selena negative. negative DCIS, negative LCIS.  Margins were " clear.  Lymphovascular invasion is absent. 2 lymph nodes taken out.  Both of them were negative.  This is staged at T1a N0.    On the right side, found 2 cm invasive lobular cancer was identified, grade 1 associated with LCIS, absent lymphovascular invasion.  One sentinel lymph node was taken out, was negative.  This is staged T1c N0 disease.      Recurrence score on the right-sided invasive lobular cancer came back 13, corresponds to 10-year risk of distal recurrence with surgery, radiation, and tamoxifen is about 8%.        CURRENT IMAGE DATA  REVIEWED  dexa 12/2017 - osteopenia  CT a/p 11/2017: Distal sigmoid wall thickening, currently resulting in colonic obstruction. This could relate to diverticulitis or focal  colitis, but neoplasm cannot be excluded. Serpiginous perisigmoid fluid collection/abscess appears mildly increased. Small amount of  free fluid.     US arm 11/2017  1. No evidence of deep vein thrombosis.  2. Superficial thrombophlebitis in the right cephalic vein.       Bilateral mastectomy pathology 10/2017 -  On the left side found invasive grade 1 IDC  0.5 cm at 3 o'clock position 9 cm from the nipple, % positive, [place] 80% positive, HER-2/selena negative. negative DCIS, negative LCIS.  Margins were clear.  Lymphovascular invasion is absent. 2 lymph nodes taken out.  Both of them were negative.  This is staged at T1a N0.    On the right side, found 2 cm invasive lobular cancer was identified, grade 1 associated with LCIS, absent lymphovascular invasion.  One sentinel lymph node was taken out, was negative.  This is staged T1c N0 disease.      Recurrence score on the right-sided invasive lobular cancer came back 13, corresponds to 10-year risk of distal recurrence with surgery, radiation, and tamoxifen is about 8%.       Mammogram September identified 11 o'clock, 3 cm from the nipple on the right breast, which is right upper outer quadrant, 1.1 cm hypoechoic mass.    This was biopsied in 09/2017,  identified grade 2 invasive lobular cancer, ER greater than 95% positive, [place] greater than 25% positive; HER-2/selena was negative.    Breast preop MRI incidentally picked up a left breast nodule 3 to 4 o'clock position 4 cm from the nipple, 4   mm enhancing focus.  This is at the central lateral portion of the left breast.  Second look ultrasound confirmed the lesion, subcentimeter in size.  Biopsy was performed in October 2017 on the left side, identified radial scar, no evidence of malignancy.            ASSESSMENT/PLAN:   1. 09/2017,10/20/17 diagnosed bilateral breast cancer.  Right-side is T1cN0, 2 cm invasive lobular, grade 1, ER/PA positive, HER-2/selena negative.  Left-side invasive ductal, T1aN0, grade 1, ER/PA positive, HER-2/selena negative.  She had double mastectomy.  All the nodes are negative.  Margins were clean.  She also had lobular carcinoma in situ on the right side.  Recurrence score on the right side of 13 corresponds to 10-year risk of distal recurrence 8% with surgery, radiation, and tamoxifen.     She made informed decision to proceed with Arimidex 12/2017.    We also discussed ER positive breast cancer recurrence pattern and future followup plan.  We also discussed about 5 years versus 10 years anti-hormonal therapy data and how this may apply to her situation.     Bilateral breast cancer presented at age 62.  We discussed the indication of genetic counseling; she is not interested at this point.       She is on 6 months f/u with labs.     2. Osteopenia - we discussed the proper use of vit D, dosing, etc.   We discussed the use of bisphosphonate, she is wiling to try fosamax.      3. Stiffness from AI - we discussed the exercise program, encourage yoga.     4. Hyperpigmented skin tag on left base of neck - advice derm evaluation.     Again, thank you for allowing me to participate in the care of your patient.        Sincerely,        Celi Powers MD, MD

## 2019-04-19 ENCOUNTER — OFFICE VISIT (OUTPATIENT)
Dept: FAMILY MEDICINE | Facility: CLINIC | Age: 64
End: 2019-04-19
Payer: COMMERCIAL

## 2019-04-19 VITALS
HEART RATE: 91 BPM | RESPIRATION RATE: 16 BRPM | TEMPERATURE: 98.6 F | OXYGEN SATURATION: 96 % | DIASTOLIC BLOOD PRESSURE: 80 MMHG | SYSTOLIC BLOOD PRESSURE: 130 MMHG | WEIGHT: 132 LBS | BODY MASS INDEX: 21.31 KG/M2

## 2019-04-19 DIAGNOSIS — H66.001 ACUTE SUPPURATIVE OTITIS MEDIA OF RIGHT EAR WITHOUT SPONTANEOUS RUPTURE OF TYMPANIC MEMBRANE, RECURRENCE NOT SPECIFIED: Primary | ICD-10-CM

## 2019-04-19 DIAGNOSIS — J30.2 SEASONAL ALLERGIC RHINITIS DUE TO FUNGAL SPORES: ICD-10-CM

## 2019-04-19 PROCEDURE — 99213 OFFICE O/P EST LOW 20 MIN: CPT | Performed by: FAMILY MEDICINE

## 2019-04-19 RX ORDER — CETIRIZINE HYDROCHLORIDE 10 MG/1
10 TABLET ORAL DAILY
Qty: 30 TABLET | Refills: 11 | Status: SHIPPED | OUTPATIENT
Start: 2019-04-19 | End: 2021-01-21

## 2019-04-19 RX ORDER — AZITHROMYCIN 250 MG/1
TABLET, FILM COATED ORAL
Qty: 6 TABLET | Refills: 0 | Status: SHIPPED | OUTPATIENT
Start: 2019-04-19 | End: 2019-05-13

## 2019-04-19 RX ORDER — GUAIFENESIN 600 MG/1
600-1200 TABLET, EXTENDED RELEASE ORAL 2 TIMES DAILY
Qty: 40 TABLET | Refills: 1 | Status: SHIPPED | OUTPATIENT
Start: 2019-04-19 | End: 2021-01-21

## 2019-04-19 NOTE — PATIENT INSTRUCTIONS
(H66.001) Acute suppurative otitis media of right ear without spontaneous rupture of tympanic membrane, recurrence not specified  (primary encounter diagnosis)  Comment:    Plan: azithromycin (ZITHROMAX) 250 MG tablet,         cetirizine (HM CETIRIZINE HCL) 10 MG tablet             (J30.89) Seasonal allergic rhinitis due to fungal spores  Comment:    Plan:

## 2019-04-19 NOTE — PROGRESS NOTES
SUBJECTIVE:     Suma Calabrese is a 63 year old female who presents to clinic today for the following     health issues:            Ear pain        Duration: 18 hours    Description (location/character/radiation): right ear,     Intensity:  moderate    Accompanying signs and symptoms: sneezing, headache, fatigue    History (similar episodes/previous evaluation): None    Precipitating or alleviating factors: was with grandchild for 2 days    Therapies tried and outcome: None         Right ear pain    advil with some     Decrease hearing right ear     Hearing under water     Right ear    Hearing loss    Right ear pain    Seasonal allergic rhinitis symptoms probably due to mold    History of seasonal allergic rhinitis                        Topic   Date Due         COLON CANCER SCREEN (SYSTEM ASSIGNED)    04/24/2003         PAP SCREENING Q3 YR (SYSTEM ASSIGNED)    01/31/2015                 .    Current Outpatient Medications     Medication   Sig   Dispense   Refill         anastrozole (ARIMIDEX) 1 MG tablet   Take 1 tablet (1 mg) by mouth daily   90 tablet   3         azithromycin (ZITHROMAX) 250 MG tablet   Take 2 tablets (500 mg) by mouth daily for 1 day, THEN 1 tablet (250 mg) daily for 4 days.   6 tablet   0         cetirizine (HM CETIRIZINE HCL) 10 MG tablet   Take 1 tablet (10 mg) by mouth daily   30 tablet   11         cyabnocobalamin (VITAMIN B-12) 2500 MCG sublingual tablet   Place 2,500 mcg under the tongue daily   30 tablet   11         guaiFENesin (MUCINEX) 600 MG 12 hr tablet   Take 1-2 tablets (600-1,200 mg) by mouth 2 times daily   40 tablet   1         Ibuprofen (ADVIL PO)   Take 200 mg by mouth every 4 hours as needed for moderate pain               levothyroxine (SYNTHROID/LEVOTHROID) 88 MCG tablet   Take 1 tablet (88 mcg) by mouth daily   90 tablet   2         Multiple Vitamins-Minerals (CENTRUM SILVER) per tablet   Take 1 tablet by mouth daily               psyllium (METAMUCIL/KONSYL)  Packet   Take 1 packet by mouth 2 times daily   60 packet   0         VITAMIN D, CHOLECALCIFEROL, PO   Take 1,000 Units by mouth every morning               alendronate (FOSAMAX) 35 MG tablet   Take 1 tablet (35 mg) by mouth every 7 days (Patient not taking: Reported on 2019)   12 tablet   3                  Allergies     Allergen   Reactions         Bupropion   Other (See Comments)           Jittery and HA         Erythromycin   GI Disturbance         Oxycodone   Nausea and Vomiting             Immunization History     Administered   Date(s) Administered         TD (ADULT, 7+)   2005         TDAP Vaccine (Adacel)   2015               reports that she drinks alcohol.          reports that she does not use drugs.        family history includes Unknown/Adopted in her father and mother.        indicated that her mother is . She indicated that her father is . She indicated that her maternal grandmother is . She indicated that her maternal grandfather is . She indicated that her paternal grandmother is . She indicated that her paternal grandfather is . She indicated that her daughter is alive. She indicated that her son is alive.             has a past surgical history that includes bladder dilitation[ (age 17); Dental surgery; biopsy; Mastectomy simple bilateral (Bilateral, 10/16/2017); Biopsy node sentinel (Bilateral, 10/16/2017); Colectomy subtotal (Left, 2017); Ileostomy (N/A, 2017); and Laparotomy exploratory (N/A, 2017).         reports that she does not currently engage in sexual activity.    .    Pediatric History     Patient Guardian Status         Not on file         Other Topics   Concern         Parent/sibling w/ CABG, MI or angioplasty before 65F 55M?   No     Social History Narrative         Not on file               reports that she quit smoking about 17 months ago. Her smoking use included cigarettes. She has a 20.00  pack-year smoking history. She has never used smokeless tobacco.        Medical, social, surgical, and family histories reviewed.        Labs reviewed in EPIC    Patient Active Problem List     Diagnosis         Nontoxic uninodular goiter         Rectal prolapse         Hypertension goal BP (blood pressure) < 140/80         Internal hemorrhoids with other complication         Tobacco use disorder         Hyperlipidemia LDL goal <100         Hypothyroidism due to acquired atrophy of thyroid         Diverticulitis of colon         Malignant neoplasm of upper-outer quadrant of right breast in female, estrogen receptor positive (H)         S/P bilateral mastectomy         Hyponatremia         Hypokalemia         Diverticulitis large intestine         Tubular adenoma of colon         Colostomy in place (H)         Vitamin B12 deficiency (non anemic)         Moderate malnutrition (H)         Past Surgical History:     Procedure   Laterality   Date         BIOPSY               BIOPSY NODE SENTINEL   Bilateral   10/16/2017        Procedure: BIOPSY NODE SENTINEL;;  Surgeon: Van Salazar MD;  Location: [facility]         bladder dilitation[      age 17         COLECTOMY SUBTOTAL   Left   11/13/2017        Procedure: COLECTOMY SUBTOTAL;  EXPLORATORY LAPAROTOMY, OPEN SUBTOTAL  COLECTOMY, END ILEOSTOMY;  Surgeon: May Spain MD;  Location:  OR         DENTAL SURGERY              wisdom tooth extraction         ILEOSTOMY   N/A   11/13/2017        Procedure: ILEOSTOMY;;  Surgeon: May Spain MD;  Location:  OR         LAPAROTOMY EXPLORATORY   N/A   11/13/2017        Procedure: LAPAROTOMY EXPLORATORY;;  Surgeon: May Spain MD;  Location:  OR         MASTECTOMY SIMPLE BILATERAL   Bilateral   10/16/2017        Procedure: MASTECTOMY SIMPLE BILATERAL;  BILATERAL SIMPLE MASTECTOMY, BILATERAL AXILLARY SENTINEL NODE BIOPSY;  Surgeon: Van Salazar MD;  Location: [facility]           Social History          Tobacco Use         Smoking status:   Former Smoker           Packs/day:   0.50           Years:   40.00           Pack years:   20.00           Types:   Cigarettes           Last attempt to quit:   2017           Years since quittin.4         Smokeless tobacco:   Never Used         Tobacco comment: 3/4 PPD     Substance Use Topics         Alcohol use:   Yes           Alcohol/week:   0.0 oz           Comment: weekends         Family History     Problem   Relation   Age of Onset         Unknown/Adopted   Mother            Unknown/Adopted   Father                  Current Outpatient Medications     Medication   Sig   Dispense   Refill         anastrozole (ARIMIDEX) 1 MG tablet   Take 1 tablet (1 mg) by mouth daily   90 tablet   3         azithromycin (ZITHROMAX) 250 MG tablet   Take 2 tablets (500 mg) by mouth daily for 1 day, THEN 1 tablet (250 mg) daily for 4 days.   6 tablet   0         cetirizine (HM CETIRIZINE HCL) 10 MG tablet   Take 1 tablet (10 mg) by mouth daily   30 tablet   11         cyabnocobalamin (VITAMIN B-12) 2500 MCG sublingual tablet   Place 2,500 mcg under the tongue daily   30 tablet   11         guaiFENesin (MUCINEX) 600 MG 12 hr tablet   Take 1-2 tablets (600-1,200 mg) by mouth 2 times daily   40 tablet   1         Ibuprofen (ADVIL PO)   Take 200 mg by mouth every 4 hours as needed for moderate pain               levothyroxine (SYNTHROID/LEVOTHROID) 88 MCG tablet   Take 1 tablet (88 mcg) by mouth daily   90 tablet   2         Multiple Vitamins-Minerals (CENTRUM SILVER) per tablet   Take 1 tablet by mouth daily               psyllium (METAMUCIL/KONSYL) Packet   Take 1 packet by mouth 2 times daily   60 packet   0         VITAMIN D, CHOLECALCIFEROL, PO   Take 1,000 Units by mouth every morning               alendronate (FOSAMAX) 35 MG tablet   Take 1 tablet (35 mg) by mouth every 7 days (Patient not taking: Reported on 2019)   12 tablet   3             Recent Labs     Lab  Test   04/10/19    1149   12/24/18    1151   09/12/18    1328   03/14/18    1635      09/18/17    1006      03/07/16    0857      05/05/15    1030     LDL    --    127*    --     --     --     --     --    143*    --    99     HDL    --    71    --     --     --     --     --    83    --    73     TRIG    --    115    --     --     --     --     --    46    --    45     ALT   16    --    15   22     < >    --     --    18    --    14     CR   0.94    --    0.79   0.89     < >    --      < >   0.90    --    0.80     GFRESTIMATED   63    --    73   64     < >    --      < >   63    --    73     GFRESTBLACK   73    --    88   77     < >    --      < >   77    --    88     POTASSIUM   3.4    --    4.1   4.1     < >    --      < >   3.6    --    4.0     TSH    --    9.14*    --     --     --    1.21     < >   49.11*     < >    --       < > = values in this interval not displayed.              BP Readings from Last 6 Encounters:     04/19/19   130/80     04/10/19   118/77     02/07/19   128/76     12/24/18   120/70     09/12/18   130/85     03/14/18   130/83             Wt Readings from Last 3 Encounters:     04/19/19   59.9 kg (132 lb)     04/10/19   60.6 kg (133 lb 9.6 oz)     02/07/19   62.6 kg (138 lb)                 Positive symptoms or findings indicated by bold designation:         ROS: 10 point ROS neg other than the symptoms noted above in the HPI.except  has Nontoxic uninodular goiter; Rectal prolapse; Hypertension goal BP (blood pressure) < 140/80; Internal hemorrhoids with other complication; Tobacco use disorder; Hyperlipidemia LDL goal <100; Hypothyroidism due to acquired atrophy of thyroid; Diverticulitis of colon; Malignant neoplasm of upper-outer quadrant of right breast in female, estrogen receptor positive (H); S/P bilateral mastectomy; Hyponatremia; Hypokalemia; Diverticulitis large intestine; Tubular adenoma of colon; Colostomy in place (H); Vitamin B12 deficiency (non anemic); and Moderate  malnutrition (H) on their problem list.  Review Of Systems    History of goiter    History of rectal prolapse    Patient has a colostomy bag    Hypertension is well controlled current regimen    History of internal hemorrhoids    Ongoing smoking behaviors    Hyperlipidemia controlled current regimen    History of diverticulitis of colon requiring colostomy history of tubular adenoma the colon    History of B12 deficiency    History of moderate malnutrition    Patient is reluctant to have colostomy taken down    Skin: negative    Eyes: negative    Ears/Nose/Throat: Right otitis media with rhinitis    Loss of landmark decreased hearing    Respiratory: No shortness of breath, dyspnea on exertion, cough, or hemoptysis    Cardiovascular: negative    Gastrointestinal: History of colostomy  History of diverticulitis with abscess  History of tubular adenoma the colon    Genitourinary: negative    Musculoskeletal: negative    Neurologic: negative    Psychiatric: negative    Hematologic/Lymphatic/Immunologic: negative    Endocrine: negative                PE:  /80   Pulse 91   Temp 98.6  F (37  C) (Tympanic)   Resp 16   Wt 59.9 kg (132 lb)   LMP  (LMP Unknown)   SpO2 96%   BMI 21.31 kg/m   Body mass index is 21.31 kg/m .        Constitutional: general appearance, well nourished, well developed, in no acute distress, well developed, appears stated age, normal body habitus, slender habitus        Eyes:; The patient has normal eyelids sclerae and conjunctivae :          Ears/Nose/Throat: external ear, overall: normal appearance; external nose, overall: benign appearance, normal moujth gums and lips      Right otitis with loss of landmark    Marked decrease in hearing on the right side with vibration tuning fork testing    The eardrum was not ruptured at this time but he could in the future            Neck: thyroid, overall: normal size, normal consistency, nontender,          Respiratory:  palpation of chest,  overall: normal excursion,    Clear to percussion and auscultation     NO Tachypnea    NORMAL  Color          Cardiovascular:  Good color with no peripheral edema    Regular sinus rhythm without murmur.  Physiologic heart sounds   Heart is unelarged    .     Chest/Breast: normal shape           Abdominal exam,  Liver and spleen are  unenlarged        Tenderness    Scars              Urogenital; no renal, flank or bladder  tenderness;          Lymphatic: neck nodes,     Other nodes         Musculoskeletal:  Brief ortho exam normal except:           Integument: inspection of skin, no rash, lesions; and, palpation, no induration, no tenderness.          Neurologic mental status, overall: alert and oriented; gait, no ataxia, no unsteadiness; coordination, no tremors; cranial nerves, overall: normal motor, overall: normal bulk, tone.          Psychiatric: orientation/consciousness, overall: oriented to person, place and time; behavior/psychomotor activity, no tics, normal psychomotor activity; mood and affect, overall: normal mood and affect; appearance, overall: well-groomed, good eye contact; speech, overall: normal quality, no aphasia and normal quality, quantity, intact.        Diagnostic Test Results:  Results for orders placed or performed in visit on 04/10/19   Comprehensive metabolic panel   Result Value Ref Range    Sodium 140 133 - 144 mmol/L    Potassium 3.4 3.4 - 5.3 mmol/L    Chloride 107 94 - 109 mmol/L    Carbon Dioxide 27 20 - 32 mmol/L    Anion Gap 6 3 - 14 mmol/L    Glucose 119 (H) 70 - 99 mg/dL    Urea Nitrogen 12 7 - 30 mg/dL    Creatinine 0.94 0.52 - 1.04 mg/dL    GFR Estimate 63 >60 mL/min/[1.73_m2]    GFR Estimate If Black 73 >60 mL/min/[1.73_m2]    Calcium 9.5 8.5 - 10.1 mg/dL    Bilirubin Total 0.8 0.2 - 1.3 mg/dL    Albumin 3.8 3.4 - 5.0 g/dL    Protein Total 7.4 6.8 - 8.8 g/dL    Alkaline Phosphatase 126 40 - 150 U/L    ALT 16 0 - 50 U/L    AST 16 0 - 45 U/L                 ICD-10-CM        1.    Acute suppurative otitis media of right ear without spontaneous rupture of tympanic membrane, recurrence not specified   H66.001   azithromycin (ZITHROMAX) 250 MG tablet           cetirizine (HM CETIRIZINE HCL) 10 MG tablet     2.   Seasonal allergic rhinitis due to fungal spores   J30.89                 .    Side effects benefits and risks thoroughly discussed. .she may come in early if unimproved or getting worse          Please drink 2 glasses of water prior to meals and walk 15-30 minutes after meals        I spent  15 MINUTES   with patient discussing the following issues   The primary encounter diagnosis was Acute suppurative otitis media of right ear without spontaneous rupture of tympanic membrane, recurrence not specified. A diagnosis of Seasonal allergic rhinitis due to fungal spores was also pertinent to this visit. over half of which involved counseling and coordination of care.        Patient Instructions     (H66.001) Acute suppurative otitis media of right ear without spontaneous rupture of tympanic membrane, recurrence not specified  (primary encounter diagnosis)    Comment:      Plan: azithromycin (ZITHROMAX) 250 MG tablet,           cetirizine (HM CETIRIZINE HCL) 10 MG tablet                   (J30.89) Seasonal allergic rhinitis due to fungal spores    Comment:      Plan:                     I read it recommended amoxicillin but she refused    Recommended Keflex which she had diarrhea with that    Azithromycin was a third choice 500 start and 500 daily for 2 more days he might not be as effective but long discussion        ALL THE ABOVE PROBLEMS ARE STABLE AND MED CHANGES AS NOTED        Diet:  MEDITERRANEAN DIET         Exercise:  AS TOLERATED   Exercises Range of motion, balance, isometric, and strengthening exercises 30 repetitions twice daily of involved joints            .BEKAH KAUR MD 4/19/2019 2:11 PM  April 19, 2019

## 2019-04-25 ENCOUNTER — TELEPHONE (OUTPATIENT)
Dept: FAMILY MEDICINE | Facility: CLINIC | Age: 64
End: 2019-04-25

## 2019-04-25 DIAGNOSIS — H66.001 ACUTE SUPPURATIVE OTITIS MEDIA OF RIGHT EAR WITHOUT SPONTANEOUS RUPTURE OF TYMPANIC MEMBRANE, RECURRENCE NOT SPECIFIED: ICD-10-CM

## 2019-04-25 RX ORDER — AMOXICILLIN 500 MG/1
500 CAPSULE ORAL 3 TIMES DAILY
Qty: 30 CAPSULE | Refills: 0 | Status: SHIPPED | OUTPATIENT
Start: 2019-04-25 | End: 2019-05-13

## 2019-04-25 NOTE — TELEPHONE ENCOUNTER
I sent Rx for Amoxicillin she should start that.  She should switch the antihistamine to the Cetirizine, she can take it at night to avoid drowsiness.  She should recheck later next week if not improving

## 2019-04-25 NOTE — TELEPHONE ENCOUNTER
Plugged and fluid filled ears.     Met with provider 4/19 for office visit for this issue started on:        States she started and finished the Z-monet and did not start on Cetirizine because this can cause drowsiness. Per conversation with the pharmacist started taking an OTC allergy that does not cause drowsiness and Musinex.     States that saturday left ear started to hurt as well, one day after meeting with provider and starting on medication. The left ear has never been as bad as the right one.     Reviewed her symptoms from 4/19, states most of the pain, headache, sneezing, and fatigue have subsided. Also still having trouble hearing out of both ears, but believes it is because they are constantly plugged.     Today states that both ears are plugged and states they feel like they are filled with fluid. States that she can hear and feel the fluid build up in her bilateral ears.         Patient is wondering what her next steps would be in this case? States that the last time this happened had to do two rounds of Abx before full relief. Would also be open to Amoxicillin at this time since Azithromycin did get rid of symptoms completely.       Changed to high priority per patient request

## 2019-04-25 NOTE — TELEPHONE ENCOUNTER
Reason for call:  Patient reporting a symptom    Symptom or request: ears plugged    Duration (how long have symptoms been present): pt saw Dr AISSATOU Kenney on 4-19 and started o antibiotics.  Now feels like fluid in both ears.    Have you been treated for this before? Yes    Additional comments: none    Phone Number patient can be reached at:  Home number on file [phone] (home)    Best Time:  any    Can we leave a detailed message on this number:  YES    Call taken on 4/25/2019 at 3:07 PM by PAUL CASTILLO

## 2019-05-11 ENCOUNTER — NURSE TRIAGE (OUTPATIENT)
Dept: NURSING | Facility: CLINIC | Age: 64
End: 2019-05-11

## 2019-05-11 NOTE — TELEPHONE ENCOUNTER
"\"I have an ear infection that doesn't seem to be clearing\".  Caller was dx with an ear infection on 4/19/19.  She was placed on a z pack with no results.  Then she was placed on Amoxicillin for 10 days and advised to take an antihistamine.  If not improving within a week, be seen.    Offered to see if clinic had any openings this morning, she declined, her son is graduating at 10:00 am this morning.  Gave caller [facility] hours and locations to be seen later today.    Reason for Disposition    Ear congestion present > 48 hours    Additional Information    Negative: Ear pain is main symptom    Negative: Hearing loss (complete or partial) is main symptom    Negative: Earwax is the main concern    Negative: [1] Has nasal allergies AND [2] they are acting up    Negative: Earache persists > 1 hour    Negative: Pus or cloudy discharge from ear canal    Protocols used: EAR - CONGESTION-A-    Gracia Whelan RN  [facility]      "

## 2019-05-13 ENCOUNTER — OFFICE VISIT (OUTPATIENT)
Dept: FAMILY MEDICINE | Facility: CLINIC | Age: 64
End: 2019-05-13
Payer: COMMERCIAL

## 2019-05-13 VITALS
DIASTOLIC BLOOD PRESSURE: 76 MMHG | HEIGHT: 66 IN | RESPIRATION RATE: 20 BRPM | HEART RATE: 85 BPM | WEIGHT: 130 LBS | TEMPERATURE: 98.7 F | SYSTOLIC BLOOD PRESSURE: 132 MMHG | OXYGEN SATURATION: 100 % | BODY MASS INDEX: 20.89 KG/M2

## 2019-05-13 DIAGNOSIS — H69.91 DYSFUNCTION OF RIGHT EUSTACHIAN TUBE: Primary | ICD-10-CM

## 2019-05-13 DIAGNOSIS — H91.91 HEARING LOSS OF RIGHT EAR, UNSPECIFIED HEARING LOSS TYPE: ICD-10-CM

## 2019-05-13 PROCEDURE — 99213 OFFICE O/P EST LOW 20 MIN: CPT | Performed by: INTERNAL MEDICINE

## 2019-05-13 RX ORDER — FLUTICASONE PROPIONATE 50 MCG
2 SPRAY, SUSPENSION (ML) NASAL DAILY
Qty: 16 G | Refills: 3 | Status: SHIPPED | OUTPATIENT
Start: 2019-05-13 | End: 2021-01-21

## 2019-05-13 ASSESSMENT — MIFFLIN-ST. JEOR: SCORE: 1146.43

## 2019-05-13 NOTE — PROGRESS NOTES
SUBJECTIVE:   Suma Calabrese is a 64 year old female who presents to clinic today for the following   health issues:      Follow up on both ears infection- some pain in right    S/p zithromax and then amoxicillin.                 Main sx now if severe hearing loss R>>L, and a mild pressure sensation R>> L ears.              No hx of similar problems.               No air travel.                     Tried sudafed; did not help and had SE.             Reviewed  and updated as needed this visit by clinical staff  Tobacco  Allergies  Meds  Med Hx  Surg Hx  Fam Hx  Soc Hx        Reviewed and updated as needed this visit by Provider        Current Outpatient Medications   Medication     anastrozole (ARIMIDEX) 1 MG tablet     cetirizine (HM CETIRIZINE HCL) 10 MG tablet     cyabnocobalamin (VITAMIN B-12) 2500 MCG sublingual tablet     Ibuprofen (ADVIL PO)     levothyroxine (SYNTHROID/LEVOTHROID) 88 MCG tablet     Multiple Vitamins-Minerals (CENTRUM SILVER) per tablet     psyllium (METAMUCIL/KONSYL) Packet     VITAMIN D, CHOLECALCIFEROL, PO     alendronate (FOSAMAX) 35 MG tablet     guaiFENesin (MUCINEX) 600 MG 12 hr tablet     No current facility-administered medications for this visit.          Allergies   Allergen Reactions     Bupropion Other (See Comments)     Jittery and HA     Erythromycin GI Disturbance     Oxycodone Nausea and Vomiting     BP Readings from Last 3 Encounters:   05/13/19 132/76   04/19/19 130/80   04/10/19 118/77    Wt Readings from Last 3 Encounters:   05/13/19 59 kg (130 lb)   04/19/19 59.9 kg (132 lb)   04/10/19 60.6 kg (133 lb 9.6 oz)                    ROS:  CONSTITUTIONAL:NEGATIVE for fever, chills, change in weight  ENT/MOUTH: NEGATIVE for postnasal drainage, rhinorrhea-purulent and sinus pressure  RESP:NEGATIVE for significant cough or SOB    OBJECTIVE:                                                    /76 (BP Location: Left arm, Patient Position: Chair, Cuff Size: Adult  "Regular)   Pulse 85   Temp 98.7  F (37.1  C)   Resp 20   Ht 1.676 m (5' 6\")   Wt 59 kg (130 lb)   LMP  (LMP Unknown)   SpO2 100%   Breastfeeding? No   BMI 20.98 kg/m    Body mass index is 20.98 kg/m .  GENERAL APPEARANCE: alert and no distress  HENT: R TM obscured landmarks. No cerumen in the canal.     L ok.           Diagnostic test results:  none      ASSESSMENT/PLAN:                                                        ICD-10-CM    1. Dysfunction of right eustachian tube H69.81 fluticasone (FLONASE) 50 MCG/ACT nasal spray     OTOLARYNGOLOGY REFERRAL   2. Hearing loss of right ear, unspecified hearing loss type H91.91 fluticasone (FLONASE) 50 MCG/ACT nasal spray     OTOLARYNGOLOGY REFERRAL       I am doubtful that further antibiotics will solve this.                Patient Instructions   Use the Flonase; 2 sprays in each nostril daily.          Call for an ENT appointment.               Owen Jean MD  [facility]      "

## 2019-05-20 ENCOUNTER — TRANSFERRED RECORDS (OUTPATIENT)
Dept: HEALTH INFORMATION MANAGEMENT | Facility: CLINIC | Age: 64
End: 2019-05-20

## 2019-09-30 ENCOUNTER — TELEPHONE (OUTPATIENT)
Dept: FAMILY MEDICINE | Facility: CLINIC | Age: 64
End: 2019-09-30

## 2019-09-30 DIAGNOSIS — E03.4 HYPOTHYROIDISM DUE TO ACQUIRED ATROPHY OF THYROID: Primary | Chronic | ICD-10-CM

## 2019-09-30 NOTE — TELEPHONE ENCOUNTER
Reason for Call:  Other     Detailed comments: would like orders for thyroid test put in.  Only has 2 week of medication and was changed from 7.5 to 8. Wanted labs before more medication.     Phone Number Patient can be reached at: Home number on file [phone] (home) can leave message    Best Time: today    Can we leave a detailed message on this number? YES    Call taken on 9/30/2019 at 1:32 PM by ALEX RANKIN

## 2019-10-07 DIAGNOSIS — C50.911 BILATERAL MALIGNANT NEOPLASM OF BREAST IN FEMALE, ESTROGEN RECEPTOR POSITIVE, UNSPECIFIED SITE OF BREAST (H): ICD-10-CM

## 2019-10-07 DIAGNOSIS — E03.4 HYPOTHYROIDISM DUE TO ACQUIRED ATROPHY OF THYROID: Chronic | ICD-10-CM

## 2019-10-07 DIAGNOSIS — Z17.0 BILATERAL MALIGNANT NEOPLASM OF BREAST IN FEMALE, ESTROGEN RECEPTOR POSITIVE, UNSPECIFIED SITE OF BREAST (H): ICD-10-CM

## 2019-10-07 DIAGNOSIS — C50.912 BILATERAL MALIGNANT NEOPLASM OF BREAST IN FEMALE, ESTROGEN RECEPTOR POSITIVE, UNSPECIFIED SITE OF BREAST (H): ICD-10-CM

## 2019-10-07 PROCEDURE — 36415 COLL VENOUS BLD VENIPUNCTURE: CPT | Performed by: INTERNAL MEDICINE

## 2019-10-07 PROCEDURE — 84443 ASSAY THYROID STIM HORMONE: CPT | Performed by: INTERNAL MEDICINE

## 2019-10-07 PROCEDURE — 84439 ASSAY OF FREE THYROXINE: CPT | Performed by: INTERNAL MEDICINE

## 2019-10-07 PROCEDURE — 80053 COMPREHEN METABOLIC PANEL: CPT | Performed by: INTERNAL MEDICINE

## 2019-10-08 ENCOUNTER — TELEPHONE (OUTPATIENT)
Dept: FAMILY MEDICINE | Facility: CLINIC | Age: 64
End: 2019-10-08

## 2019-10-08 LAB
ALBUMIN SERPL-MCNC: 3.9 G/DL (ref 3.4–5)
ALP SERPL-CCNC: 120 U/L (ref 40–150)
ALT SERPL W P-5'-P-CCNC: 14 U/L (ref 0–50)
ANION GAP SERPL CALCULATED.3IONS-SCNC: 7 MMOL/L (ref 3–14)
AST SERPL W P-5'-P-CCNC: 15 U/L (ref 0–45)
BILIRUB SERPL-MCNC: 0.5 MG/DL (ref 0.2–1.3)
BUN SERPL-MCNC: 18 MG/DL (ref 7–30)
CALCIUM SERPL-MCNC: 9.7 MG/DL (ref 8.5–10.1)
CHLORIDE SERPL-SCNC: 104 MMOL/L (ref 94–109)
CO2 SERPL-SCNC: 26 MMOL/L (ref 20–32)
CREAT SERPL-MCNC: 0.72 MG/DL (ref 0.52–1.04)
GFR SERPL CREATININE-BSD FRML MDRD: 86 ML/MIN/{1.73_M2}
GLUCOSE SERPL-MCNC: 81 MG/DL (ref 70–99)
POTASSIUM SERPL-SCNC: 4.6 MMOL/L (ref 3.4–5.3)
PROT SERPL-MCNC: 7.2 G/DL (ref 6.8–8.8)
SODIUM SERPL-SCNC: 137 MMOL/L (ref 133–144)
T4 FREE SERPL-MCNC: 1.16 NG/DL (ref 0.76–1.46)
TSH SERPL DL<=0.005 MIU/L-ACNC: 0.18 MU/L (ref 0.4–4)

## 2019-10-08 NOTE — LETTER
October 8, 2019    Suma Paez Bharati  [address]  [facility] [address]    Dear Sandee Santo cares about your health and your health plan.  I have reviewed your medical conditions, medication list and lab results, and am making recommendations based on this review to better manage your health.    You are in particular need of attention regarding:  -Wellness (Physical) Visit     I am recommending that you:     -schedule a COLONOSCOPY to look for colon cancer (due every 10 years or 5 years in higher risk situations.)        Colon cancer is now the second leading cause of cancer-related deaths in the [place] for both men and women and there are over 130,000 new cases and 50,000 deaths per year from colon cancer.  Colonoscopies can prevent 90-95% of these deaths.  Problem lesions can be removed before they ever become cancer.  This test is not only looking for cancer, but also getting rid of precancerious lesions.    If you are under/uninsured, we recommend you contact the [employer] program. [employer] is a free colorectal cancer screening program that provides colonoscopies for eligible under/uninsured [place] men and women. If you are interested in receiving a free colonoscopy, please call [employer] at [phone] (mention code [employer]) to see if you re eligible.      If you do not wish to do a colonoscopy or cannot afford to do one, at this time, there is another option. It is called a FIT test or Fecal Immunochemical Occult Blood Test (take home stool sample kit).  It does not replace the colonoscopy for colorectal cancer screening, but it can detect hidden bleeding in the lower colon.  It does need to be repeated every year and if a positive result is obtained, you would be referred for a colonoscopy.          If you have completed either one of these tests at another facility, please call with the details of when and where the tests were done and if they were normal or not. Or have  the records sent to our clinic so that we can best coordinate your care.      Please call us at the [place] location:  [phone] or use [employer] to address the above recommendations.     Thank you for trusting [facility].  We appreciate the opportunity to serve you and look forward to supporting your healthcare in the future.    If you have (or plan to have) any of these tests done at a facility other than a [facility]) or a [facility], please have the results sent to the [facility] location noted above.      Best Regards,    Murali Kenney Jr MD

## 2019-10-09 ENCOUNTER — INFUSION THERAPY VISIT (OUTPATIENT)
Dept: INFUSION THERAPY | Facility: CLINIC | Age: 64
End: 2019-10-09
Attending: INTERNAL MEDICINE
Payer: COMMERCIAL

## 2019-10-09 ENCOUNTER — ONCOLOGY VISIT (OUTPATIENT)
Dept: ONCOLOGY | Facility: CLINIC | Age: 64
End: 2019-10-09
Attending: INTERNAL MEDICINE
Payer: COMMERCIAL

## 2019-10-09 VITALS
HEART RATE: 67 BPM | DIASTOLIC BLOOD PRESSURE: 81 MMHG | SYSTOLIC BLOOD PRESSURE: 128 MMHG | TEMPERATURE: 97.7 F | RESPIRATION RATE: 16 BRPM

## 2019-10-09 VITALS
BODY MASS INDEX: 19.8 KG/M2 | HEIGHT: 66 IN | TEMPERATURE: 98.5 F | SYSTOLIC BLOOD PRESSURE: 113 MMHG | DIASTOLIC BLOOD PRESSURE: 76 MMHG | HEART RATE: 76 BPM | OXYGEN SATURATION: 99 % | RESPIRATION RATE: 18 BRPM | WEIGHT: 123.2 LBS

## 2019-10-09 DIAGNOSIS — C50.411 MALIGNANT NEOPLASM OF UPPER-OUTER QUADRANT OF RIGHT BREAST IN FEMALE, ESTROGEN RECEPTOR POSITIVE (H): ICD-10-CM

## 2019-10-09 DIAGNOSIS — Z17.0 BILATERAL MALIGNANT NEOPLASM OF BREAST IN FEMALE, ESTROGEN RECEPTOR POSITIVE, UNSPECIFIED SITE OF BREAST (H): Primary | ICD-10-CM

## 2019-10-09 DIAGNOSIS — R60.0 LOCALIZED EDEMA: ICD-10-CM

## 2019-10-09 DIAGNOSIS — C50.912 BILATERAL MALIGNANT NEOPLASM OF BREAST IN FEMALE, ESTROGEN RECEPTOR POSITIVE, UNSPECIFIED SITE OF BREAST (H): Primary | ICD-10-CM

## 2019-10-09 DIAGNOSIS — M85.80 OSTEOPENIA, UNSPECIFIED LOCATION: Primary | ICD-10-CM

## 2019-10-09 DIAGNOSIS — Z79.811 AROMATASE INHIBITOR USE: ICD-10-CM

## 2019-10-09 DIAGNOSIS — C50.911 BILATERAL MALIGNANT NEOPLASM OF BREAST IN FEMALE, ESTROGEN RECEPTOR POSITIVE, UNSPECIFIED SITE OF BREAST (H): Primary | ICD-10-CM

## 2019-10-09 DIAGNOSIS — Z17.0 MALIGNANT NEOPLASM OF UPPER-OUTER QUADRANT OF RIGHT BREAST IN FEMALE, ESTROGEN RECEPTOR POSITIVE (H): ICD-10-CM

## 2019-10-09 DIAGNOSIS — M85.80 OSTEOPENIA, UNSPECIFIED LOCATION: ICD-10-CM

## 2019-10-09 PROCEDURE — 25000128 H RX IP 250 OP 636: Performed by: INTERNAL MEDICINE

## 2019-10-09 PROCEDURE — 96365 THER/PROPH/DIAG IV INF INIT: CPT

## 2019-10-09 PROCEDURE — G0463 HOSPITAL OUTPT CLINIC VISIT: HCPCS | Mod: 25

## 2019-10-09 PROCEDURE — 99214 OFFICE O/P EST MOD 30 MIN: CPT | Performed by: INTERNAL MEDICINE

## 2019-10-09 RX ORDER — ZOLEDRONIC ACID 0.04 MG/ML
4 INJECTION, SOLUTION INTRAVENOUS ONCE
Status: COMPLETED | OUTPATIENT
Start: 2019-10-09 | End: 2019-10-09

## 2019-10-09 RX ORDER — ZOLEDRONIC ACID 0.04 MG/ML
4 INJECTION, SOLUTION INTRAVENOUS ONCE
Status: CANCELLED | OUTPATIENT
Start: 2019-10-09

## 2019-10-09 RX ADMIN — ZOLEDRONIC ACID 4 MG: 0.04 INJECTION, SOLUTION INTRAVENOUS at 16:15

## 2019-10-09 ASSESSMENT — PAIN SCALES - GENERAL
PAINLEVEL: NO PAIN (0)
PAINLEVEL: NO PAIN (0)

## 2019-10-09 ASSESSMENT — MIFFLIN-ST. JEOR: SCORE: 1115.58

## 2019-10-09 NOTE — PROGRESS NOTES
"Oncology Rooming Note    October 9, 2019 3:11 PM   Suma Calabrese is a 64 year old female who presents for:    Chief Complaint   Patient presents with     Oncology Clinic Visit     Tubular adenoma of colon     Initial Vitals: /76 (BP Location: Left arm, Patient Position: Sitting, Cuff Size: Adult Regular)   Pulse 76   Temp 98.5  F (36.9  C) (Oral)   Resp 18   Ht 1.676 m (5' 6\")   Wt 55.9 kg (123 lb 3.2 oz)   LMP  (LMP Unknown)   SpO2 99%   BMI 19.89 kg/m   Estimated body mass index is 19.89 kg/m  as calculated from the following:    Height as of this encounter: 1.676 m (5' 6\").    Weight as of this encounter: 55.9 kg (123 lb 3.2 oz). Body surface area is 1.61 meters squared.  No Pain (0) Comment: Data Unavailable   No LMP recorded (lmp unknown). Patient is postmenopausal.  Allergies reviewed: Yes  Medications reviewed: Yes    Medications: Medication refills not needed today.  Pharmacy name entered into [facility]: [facility] #[id number] - [facility] [address] [facility] [address]    Clinical concerns: no          Sheba Wu CMA               "

## 2019-10-09 NOTE — PROGRESS NOTES
ONCOLOGY FOLLOW UP VISIT     CHIEF COMPLAINT AND REASON FOR VISIT:  Bilateral breast cancer dx fall 2017, s/p double mastectomy.        HISTORY OF ONCOLOGY ILLNESS: She self-felt a breast lump on the right side in 09/2017.    She was due her annual screening mammogram, which was done in September identified 11 o'clock, 3 cm from the nipple on the right breast, which is right upper outer quadrant, 1.1 cm hypoechoic mass.    This was biopsied in 09/2017, identified grade 2 invasive lobular cancer, ER greater than 95% positive, SC greater than 25% positive; HER-2/selena was negative.    She met with Dr. Salazar, discussed about surgical options at that time.  Breast preop MRI incidentally picked up a left breast nodule 3 to 4 o'clock position 4 cm from the nipple, 4   mm enhancing focus.  This is at the central lateral portion of the left breast.  Second look ultrasound confirmed the lesion, subcentimeter in size.    Biopsy was performed in October 2017 on the left side, identified radial scar, no evidence of malignancy.      She subsequently had double mastectomy with Dr. Salazar mid 10/2017.  On the left side found invasive grade 1 IDC  0.5 cm at 3 o'clock position 9 cm from the nipple, % positive, SC 80% positive, HER-2/selena negative. negative DCIS, negative LCIS.  Margins were clear.  Lymphovascular invasion is absent. 2 lymph nodes taken out.  Both of them were negative.  This was staged at T1a N0.    On the right side, found 2 cm invasive lobular cancer was identified, grade 1 associated with LCIS, absent lymphovascular invasion.  One sentinel lymph node was taken out, was negative.  This is staged T1c N0 disease.    RS is 13, corresponds to 10-year risk of distal recurrence with surgery, radiation, and tamoxifen is about 8%.       She made informed decision to proceed with AI 12/2017.         INTERVAL HISTORY:   She could not tolerates fosamax. So stopped it.   She tolerates AI fine so far.       PAST MEDICAL  "HISTORY:  History of hyponatremia, hypokalemia, hypothyroidism, nontoxic uninodular goiter, rectal prolapse, hypertension, internal hemorrhoid.Eextensive diverticulitis which required subtotal colectomy and end ileostomy bag in 10/2017.  Nov 2017 for acute diverticulitis with perforation, pericolic abscess, and bowel obstruction, s/p s/p exploratory laparotomy, subtotal colectomy, and end ileostomy on 11/13/2017.     FAMILY HISTORY:  She is adopted.      SOCIAL HISTORY:  She works in the [employer].  Quit smoking 09/2017 when she was diagnosed with breast cancer.  Denies alcohol abuse.        REVIEW OF SYSTEMS:   Tolerating AI fine with mild arthritic pain and hot flushes. She has stiffness upon changing positions.   Reports mild swollen on left chest wall.   She could not tolerates fosamax.          PHYSICAL EXAMINATION:   VITAL SIGNS:  Blood pressure 113/76, pulse 76, temperature 98.5  F (36.9  C), temperature source Oral, resp. rate 18, height 1.676 m (5' 6\"), weight 55.9 kg (123 lb 3.2 oz), SpO2 99 %, not currently breastfeeding.    ECOG 0    GENERAL APPEARANCE:  Middle-aged woman, very thin, not in acute distress.     HEENT: The patient is normocephalic, atraumatic. Pupils are equally reactive to light.  Sclerae are anicteric.  Moist oral mucosa.  Negative pharynx.  No oral thrush.   NECK:  Supple.  No jugular venous distention.  Thyroid is not palpable.   LYMPH NODES:  Superficial lymphadenopathy is not appreciable in the bilateral cervical, supraclavicular, axillary or inguinal areas.   CARDIOVASCULAR:  S1, S2 regular with no murmurs or gallops.  No carotid or abdominal bruits.   PULMONARY:  Lungs are clear to auscultation and percussion bilaterally.  There is no wheezing or rhonchi.   GASTROINTESTINAL:  Well-healed vertical abdominal scar with well situated right-sided ostomy bag.   MUSCULOSKELETAL/EXTREMITIES:  No edema.  No cyanotic changes.  No signs of joint deformity.  No lymphedema.  " No spinal or paraspinal tenderness.  No CVA tenderness.   NEUROLOGIC:  Cranial nerves II-XII are grossly intact.  Sensation intact.  Muscle strength and muscle tone symmetrical, 5/5 throughout.   SKIN:  No petechiae.  No rash.  No signs of cellulitis.   BREASTS:  Bilateral mastectomy scars well healed on the chest wall exam portion.  There is minimal edema below left mastectomy scar.        CURRENT LAB DATA  REVIEWED.    CMP is good    OLD DATA REVIEWED TODAY WITH SUMMARY  Bilateral mastectomy pathology 10/2017 -  On the left side found invasive grade 1 IDC  0.5 cm at 3 o'clock position 9 cm from the nipple, % positive, CA 80% positive, HER-2/selena negative. negative DCIS, negative LCIS.  Margins were clear.  Lymphovascular invasion is absent. 2 lymph nodes taken out.  Both of them were negative.  This is staged at T1a N0.    On the right side, found 2 cm invasive lobular cancer was identified, grade 1 associated with LCIS, absent lymphovascular invasion.  One sentinel lymph node was taken out, was negative.  This is staged T1c N0 disease.      Recurrence score on the right-sided invasive lobular cancer came back 13, corresponds to 10-year risk of distal recurrence with surgery, radiation, and tamoxifen is about 8%.        CURRENT IMAGE DATA  REVIEWED  dexa 12/2017 - osteopenia  CT a/p 11/2017: Distal sigmoid wall thickening, currently resulting in colonic obstruction. This could relate to diverticulitis or focal  colitis, but neoplasm cannot be excluded. Serpiginous perisigmoid fluid collection/abscess appears mildly increased. Small amount of  free fluid.     US arm 11/2017  1. No evidence of deep vein thrombosis.  2. Superficial thrombophlebitis in the right cephalic vein.       Bilateral mastectomy pathology 10/2017 -  On the left side found invasive grade 1 IDC  0.5 cm at 3 o'clock position 9 cm from the nipple, % positive, CA 80% positive, HER-2/selena negative. negative DCIS, negative LCIS.  Margins  were clear.  Lymphovascular invasion is absent. 2 lymph nodes taken out.  Both of them were negative.  This is staged at T1a N0.    On the right side, found 2 cm invasive lobular cancer was identified, grade 1 associated with LCIS, absent lymphovascular invasion.  One sentinel lymph node was taken out, was negative.  This is staged T1c N0 disease.      Recurrence score on the right-sided invasive lobular cancer came back 13, corresponds to 10-year risk of distal recurrence with surgery, radiation, and tamoxifen is about 8%.       Mammogram September identified 11 o'clock, 3 cm from the nipple on the right breast, which is right upper outer quadrant, 1.1 cm hypoechoic mass.    This was biopsied in 09/2017, identified grade 2 invasive lobular cancer, ER greater than 95% positive, NJ greater than 25% positive; HER-2/selena was negative.    Breast preop MRI incidentally picked up a left breast nodule 3 to 4 o'clock position 4 cm from the nipple, 4   mm enhancing focus.  This is at the central lateral portion of the left breast.  Second look ultrasound confirmed the lesion, subcentimeter in size.  Biopsy was performed in October 2017 on the left side, identified radial scar, no evidence of malignancy.            ASSESSMENT/PLAN:   1.  09/2017,10/20/17 diagnosed bilateral breast cancer.  Right-side is T1cN0, 2 cm invasive lobular, grade 1, ER/NJ positive, HER-2/selena negative.  Left-side invasive ductal, T1aN0, grade 1, ER/NJ positive, HER-2/selena negative.  She had double mastectomy.  All the nodes are negative.  Margins were clean.  She also had lobular carcinoma in situ on the right side.  Recurrence score on the right side of 13 corresponds to 10-year risk of distal recurrence 8% with surgery, radiation, and tamoxifen.     She made informed decision to proceed with Arimidex 12/2017.    We also discussed ER positive breast cancer recurrence pattern and future followup plan.  We also discussed about 5 years versus 10 years  anti-hormonal therapy data and how this may not apply to her situation.     She is on 6 months f/u with labs.     2. Osteopenia - we discussed the proper use of vit D, dosing, etc.   We discussed the use of bisphosphonate, she is tried fosamax.  She had severe heart burn.   Advice her to consider IV zometa. We discussed the ASCO guideline on this use.       3. Left chest wall edema below left mastectomy site.   PE is negative. Advice gentle massage and observe for now.

## 2019-10-09 NOTE — PROGRESS NOTES
Infusion Nursing Note:  Suma Calabrese presents today for zometa.    Patient seen by provider today: Yes: Dr. Powers   present during visit today: Not Applicable.    Note: Pt has not started a calcium supplement but will start today    Intravenous Access:  Peripheral IV placed.    Treatment Conditions:  Lab Results   Component Value Date     10/07/2019                   Lab Results   Component Value Date    POTASSIUM 4.6 10/07/2019           Lab Results   Component Value Date    MAG 1.9 11/15/2017            Lab Results   Component Value Date    CR 0.72 10/07/2019                   Lab Results   Component Value Date    CODY 9.7 10/07/2019                Lab Results   Component Value Date    BILITOTAL 0.5 10/07/2019           Lab Results   Component Value Date    ALBUMIN 3.9 10/07/2019                    Lab Results   Component Value Date    ALT 14 10/07/2019           Lab Results   Component Value Date    AST 15 10/07/2019       Results reviewed, labs MET treatment parameters, ok to proceed with treatment.      Post Infusion Assessment:  Patient tolerated infusion without incident.  Blood return noted pre and post infusion.  Site patent and intact, free from redness, edema or discomfort.  No evidence of extravasations.  Access discontinued per protocol.       Discharge Plan:   Patient declined prescription refills.  Discharge instructions reviewed with: Patient.  Patient and/or family verbalized understanding of discharge instructions and all questions answered.  AVS to patient via [employer].  Patient will return 6 months- pt to call and schedule for next appointment.   Patient discharged in stable condition accompanied by: self.  Departure Mode: Ambulatory.    Gina Montenegro, RN, RN

## 2019-10-11 ENCOUNTER — TELEPHONE (OUTPATIENT)
Dept: FAMILY MEDICINE | Facility: CLINIC | Age: 64
End: 2019-10-11

## 2019-10-11 DIAGNOSIS — E03.4 HYPOTHYROIDISM DUE TO ACQUIRED ATROPHY OF THYROID: Chronic | ICD-10-CM

## 2019-10-11 RX ORDER — LEVOTHYROXINE SODIUM 88 UG/1
88 TABLET ORAL DAILY
Qty: 90 TABLET | Refills: 2 | Status: CANCELLED | OUTPATIENT
Start: 2019-10-11

## 2019-10-11 RX ORDER — LEVOTHYROXINE SODIUM 75 UG/1
75 TABLET ORAL DAILY
Qty: 90 TABLET | Refills: 0 | Status: SHIPPED | OUTPATIENT
Start: 2019-10-11 | End: 2019-12-20

## 2019-10-11 NOTE — TELEPHONE ENCOUNTER
Pt was informed of new levothyroxine dose 75 MCG tablet and follow up in 8-10 wks for lab recheck. Pt verbalized agreement with plan.

## 2019-10-11 NOTE — TELEPHONE ENCOUNTER
Reason for Call:  Medication or medication refill:    Do you use a [facility]?  Name of the pharmacy and phone number for the current request:  [facility]    Name of the medication requested: levothyroxine (SYNTHROID/LEVOTHROID) 88 MCG tablet    Other request: pt states has had labs done    Can we leave a detailed message on this number? YES    Phone number patient can be reached at: Home number on file [phone] (home)    Best Time:     Call taken on 10/11/2019 at 8:26 AM by PIETRO BOWENS

## 2019-10-11 NOTE — TELEPHONE ENCOUNTER
Reason for Call:  Other call back    Detailed comments: The patient got a letter in the mail with results from recent lab work. She has questions about the results. She would like a call back to discuss this.     Phone Number Patient can be reached at: Home number on file [phone] (home)    Best Time: Any    Can we leave a detailed message on this number? YES    Call taken on 10/11/2019 at 9:51 AM by Arabella Vega

## 2019-10-11 NOTE — TELEPHONE ENCOUNTER
"  Requested Prescriptions   Pending Prescriptions Disp Refills     levothyroxine (SYNTHROID/LEVOTHROID) 88 MCG tablet  DISCONTINUED  Last Written Prescription Date:  12/26/2018 END: 10/11/2019  Last Fill Quantity: 90 tablet,  # refills: 2   Last office visit: 5/13/2019 with prescribing provider:  Ted   Future Office Visit:     90 tablet 2     Sig: Take 1 tablet (88 mcg) by mouth daily       Thyroid Protocol Failed - 10/11/2019 10:35 AM        Failed - Normal TSH on file in past 12 months     Recent Labs   Lab Test 10/07/19  1605   TSH 0.18*              Passed - Patient is 12 years or older        Passed - Recent (12 mo) or future (30 days) visit within the authorizing provider's specialty     Patient has had an office visit with the authorizing provider or a provider within the authorizing providers department within the previous 12 mos or has a future within next 30 days. See \"Patient Info\" tab in inbasket, or \"Choose Columns\" in Meds & Orders section of the refill encounter.              Passed - Medication is active on med list        Passed - No active pregnancy on record     If patient is pregnant or has had a positive pregnancy test, please check TSH.          Passed - No positive pregnancy test in past 12 months     If patient is pregnant or has had a positive pregnancy test, please check TSH.             "

## 2019-10-11 NOTE — TELEPHONE ENCOUNTER
Pt is returning call following lab letter. Doctor Kenney has message -Your thyroid level shows a slight over replacement   But please call me to discuss this we could either have you hold 1 day a week such as a Sunday   Which might be the easiest.  You should change the dosage to 75 mcg/day   Please call me to discuss this in the near future   Or you could send me a my chart message.       Please see thyroid results below and advice. Should pt hold on dose weekly or can a new levothyroxine dose be sent for her. Pt is needs refill for levothyroxine.      TSH 0.18 (L) 0.40 - 4.00 mU/L   T4 free   Result Value Ref Range     T4 Free 1.16 0.76 - 1.46 ng/dL

## 2019-10-15 RX ORDER — LEVOTHYROXINE SODIUM 88 UG/1
88 TABLET ORAL DAILY
Qty: 90 TABLET | Refills: 2 | OUTPATIENT
Start: 2019-10-15

## 2019-10-17 ENCOUNTER — PATIENT OUTREACH (OUTPATIENT)
Dept: ONCOLOGY | Facility: CLINIC | Age: 64
End: 2019-10-17

## 2019-10-17 NOTE — PROGRESS NOTES
Hansa called clinic inquiring what dose of Zometa she received  She stated that when she received her Zometa she was told to take a calcium supplement. Explained to patient that she should be taking calcium supplement daily. Recommended 600 mg daily. Glenda Floyd RN,BSN,OCN

## 2019-12-08 ENCOUNTER — HEALTH MAINTENANCE LETTER (OUTPATIENT)
Age: 64
End: 2019-12-08

## 2019-12-19 DIAGNOSIS — E03.4 HYPOTHYROIDISM DUE TO ACQUIRED ATROPHY OF THYROID: Chronic | ICD-10-CM

## 2019-12-19 PROCEDURE — 84443 ASSAY THYROID STIM HORMONE: CPT | Performed by: PHYSICIAN ASSISTANT

## 2019-12-19 PROCEDURE — 36415 COLL VENOUS BLD VENIPUNCTURE: CPT | Performed by: PHYSICIAN ASSISTANT

## 2019-12-20 DIAGNOSIS — E03.4 HYPOTHYROIDISM DUE TO ACQUIRED ATROPHY OF THYROID: Chronic | ICD-10-CM

## 2019-12-20 LAB — TSH SERPL DL<=0.005 MIU/L-ACNC: 0.78 MU/L (ref 0.4–4)

## 2019-12-20 RX ORDER — LEVOTHYROXINE SODIUM 75 UG/1
75 TABLET ORAL DAILY
Qty: 90 TABLET | Refills: 3 | Status: SHIPPED | OUTPATIENT
Start: 2019-12-20 | End: 2021-01-13

## 2019-12-20 NOTE — RESULT ENCOUNTER NOTE
Andres Santo,    I just wanted to let you know that your lab results have been reviewed and are attached.    - Your TSH was normal, indicating that you are on the correct dose of thyroid medication.  I sent additional refills of your current medication dose to your pharmacy.    Please let me know if you have any questions.    Sincerely,    Trish Benoit PA-C    [facility] [address] [address] [phone] (p)

## 2020-03-15 ENCOUNTER — HEALTH MAINTENANCE LETTER (OUTPATIENT)
Age: 65
End: 2020-03-15

## 2020-03-23 ENCOUNTER — NURSE TRIAGE (OUTPATIENT)
Dept: NURSING | Facility: CLINIC | Age: 65
End: 2020-03-23

## 2020-03-23 ENCOUNTER — TELEPHONE (OUTPATIENT)
Dept: FAMILY MEDICINE | Facility: CLINIC | Age: 65
End: 2020-03-23

## 2020-03-23 NOTE — TELEPHONE ENCOUNTER
Reason for Call:  Other     Detailed comments: thinks sinus infection, cough, started Saturday, not sure about temp chilled, ha,     Phone Number Patient can be reached at: Home number on file [phone] (home)    Best Time: asap    Can we leave a detailed message on this number? YES    Call taken on 3/23/2020 at 7:11 AM by ALEX RANKIN

## 2020-03-23 NOTE — TELEPHONE ENCOUNTER
"Called patient. She states she woke up Saturday with really bad headache and started sneezing. This time of year, she usually gets a cold/sinus infection. States she has had \"fever and chills\" but does not have thermometer at home. Headache is right above bridge of nose. Stronger on right side. When she blows nose, it is really thick yellow. Coughing sputum that is thick and yellow.     Has been taking Flonase and Zyrtec to help with sneezing.      Due to cough and fever, recommended patient start oncare visit. She states she does not have computer. Provided phone number, but patient still requests that RN get advise from Dr. Kenney. Routing to provider to advise.            "

## 2020-03-23 NOTE — TELEPHONE ENCOUNTER
"  Works at the [employer]       Not have internet access at home       No fever       R sided face issues - started Saturday        > R ear congestion    > Some sinus pain / congestion - forehead area - around eye      > Yes runny nose - \"thick yellow\"      A/P:   > Discussed home care as noted - I did discuss netti pot with her \"they usually dont work for me\"   > Humidifier - steam showers - encouraged fluids - blow nose as needed     CB as needed if worsens / changes         I did relate that message is in to PCP / team to review and follow up with you as able as well        el johnston rn                Additional Information    Negative: Sounds like a life-threatening emergency to the triager    Negative: Difficulty breathing, and not from stuffy nose (e.g., not relieved by cleaning out the nose)    Negative: SEVERE headache and has fever    Negative: Patient sounds very sick or weak to the triager    Negative: SEVERE sinus pain    Negative: Severe headache    Negative: Redness or swelling on the cheek, forehead, or around the eye    Negative: Fever > 103 F (39.4 C)    Negative: Fever > 101 F (38.3 C) and over 60 years of age    Negative: Fever > 100.0 F (37.8 C) and has diabetes mellitus or a weak immune system (e.g., HIV positive, cancer chemotherapy, organ transplant, splenectomy, chronic steroids)    Negative: Fever > 100.0 F (37.8 C) and bedridden (e.g., nursing home patient, stroke, chronic illness, recovering from surgery)    Negative: Fever present > 3 days (72 hours)    Negative: Fever returns after gone for over 24 hours and symptoms worse or not improved    Negative: Sinus pain (not just congestion) and fever    Negative: Earache    Negative: Sinus congestion (pressure, fullness) present > 10 days    Negative: Nasal discharge present > 10 days    Negative: Using nasal washes and pain medicine > 24 hours and sinus pain (lower forehead, cheekbone, or eye) persists    Negative: Lots of coughing    Negative: " Patient wants to be seen    Sinus congestion as part of a cold, present < 10 days    Protocols used: SINUS PAIN AND CONGESTION-A-OH

## 2020-03-31 ENCOUNTER — TELEPHONE (OUTPATIENT)
Dept: ONCOLOGY | Facility: CLINIC | Age: 65
End: 2020-03-31

## 2020-03-31 NOTE — TELEPHONE ENCOUNTER
Appointment changed to telephone visit per patient request. Patient declined rescheduling zometa and labs for 6-2020 at this time due to side effects she would like to speak with Dr Gio cuevas.

## 2020-03-31 NOTE — TELEPHONE ENCOUNTER
Left voicemail message for patient requesting a return call regarding future appointments. Ok to reschedule infusion to June per Dr Powers. Ask patient to convert appointment with Dr Powers to phone or video.

## 2020-04-08 ENCOUNTER — VIRTUAL VISIT (OUTPATIENT)
Dept: ONCOLOGY | Facility: CLINIC | Age: 65
End: 2020-04-08
Attending: INTERNAL MEDICINE
Payer: COMMERCIAL

## 2020-04-08 DIAGNOSIS — C50.911 BILATERAL MALIGNANT NEOPLASM OF BREAST IN FEMALE, ESTROGEN RECEPTOR POSITIVE, UNSPECIFIED SITE OF BREAST (H): Primary | ICD-10-CM

## 2020-04-08 DIAGNOSIS — C50.912 BILATERAL MALIGNANT NEOPLASM OF BREAST IN FEMALE, ESTROGEN RECEPTOR POSITIVE, UNSPECIFIED SITE OF BREAST (H): Primary | ICD-10-CM

## 2020-04-08 DIAGNOSIS — Z17.0 BILATERAL MALIGNANT NEOPLASM OF BREAST IN FEMALE, ESTROGEN RECEPTOR POSITIVE, UNSPECIFIED SITE OF BREAST (H): Primary | ICD-10-CM

## 2020-04-08 DIAGNOSIS — M85.80 OSTEOPENIA, UNSPECIFIED LOCATION: ICD-10-CM

## 2020-04-08 DIAGNOSIS — Z78.0 MENOPAUSE: ICD-10-CM

## 2020-04-08 PROCEDURE — 99214 OFFICE O/P EST MOD 30 MIN: CPT | Mod: 95 | Performed by: INTERNAL MEDICINE

## 2020-04-08 RX ORDER — ANASTROZOLE 1 MG/1
1 TABLET ORAL DAILY
Qty: 90 TABLET | Refills: 3 | Status: SHIPPED | OUTPATIENT
Start: 2020-04-08 | End: 2021-06-09

## 2020-04-08 ASSESSMENT — PAIN SCALES - GENERAL: PAINLEVEL: NO PAIN (0)

## 2020-04-08 NOTE — PROGRESS NOTES
"Suma Calabrese is a 64 year old female who is being evaluated via a billable telephone visit.      The patient has been notified of following:     \"This telephone visit will be conducted via a call between you and your physician/provider. We have found that certain health care needs can be provided without the need for a physical exam.  This service lets us provide the care you need with a short phone conversation.  If a prescription is necessary we can send it directly to your pharmacy.  If lab work is needed we can place an order for that and you can then stop by our lab to have the test done at a later time.    Telephone visits are billed at different rates depending on your insurance coverage. During this emergency period, for some insurers they may be billed the same as an in-person visit.  Please reach out to your insurance provider with any questions.    If during the course of the call the physician/provider feels a telephone visit is not appropriate, you will not be charged for this service.\"    Patient has given verbal consent for Telephone visit?  Yes    Suma Calabrese complains of    Chief Complaint   Patient presents with     telephone visit     phone visit       I have reviewed and updated the patient's Past Medical History, Social History, Family History and Medication List.    ALLERGIES  Bupropion; Erythromycin; and Oxycodone    Additional provider notes: patients phone number [phone]        Mariza Cervantes  "

## 2020-05-14 NOTE — TELEPHONE ENCOUNTER
9/8/2017    Attempt 1    Contacted patient in regards to scheduling VIP mammogram  Message on voicemail     Patient is also due for - Preventive Health Screening Colonoscopy    Comments:       Outreach   KV     Denies known Latex allergy or symptoms of Latex sensitivity.  Medications verified with patient.  See med tab.  Tobacco history verified.    Health Maintenance Due   Topic Date Due   • Pneumococcal Vaccine 0-64 (1 of 1 - PPSV23) 11/05/1968   • Shingles Vaccine (1 of 2) 11/05/2012   • Lung Cancer Screening  12/27/2019

## 2020-09-02 ENCOUNTER — INFUSION THERAPY VISIT (OUTPATIENT)
Dept: INFUSION THERAPY | Facility: CLINIC | Age: 65
End: 2020-09-02
Attending: INTERNAL MEDICINE
Payer: COMMERCIAL

## 2020-09-02 ENCOUNTER — HOSPITAL ENCOUNTER (OUTPATIENT)
Facility: CLINIC | Age: 65
Setting detail: SPECIMEN
End: 2020-09-02
Attending: INTERNAL MEDICINE
Payer: COMMERCIAL

## 2020-09-02 ENCOUNTER — HOSPITAL ENCOUNTER (OUTPATIENT)
Dept: BONE DENSITY | Facility: CLINIC | Age: 65
Discharge: HOME | End: 2020-09-02
Attending: INTERNAL MEDICINE | Admitting: INTERNAL MEDICINE
Payer: COMMERCIAL

## 2020-09-02 DIAGNOSIS — Z17.0 BILATERAL MALIGNANT NEOPLASM OF BREAST IN FEMALE, ESTROGEN RECEPTOR POSITIVE, UNSPECIFIED SITE OF BREAST (H): ICD-10-CM

## 2020-09-02 DIAGNOSIS — Z78.0 MENOPAUSE: ICD-10-CM

## 2020-09-02 DIAGNOSIS — C50.911 BILATERAL MALIGNANT NEOPLASM OF BREAST IN FEMALE, ESTROGEN RECEPTOR POSITIVE, UNSPECIFIED SITE OF BREAST (H): ICD-10-CM

## 2020-09-02 DIAGNOSIS — C50.912 BILATERAL MALIGNANT NEOPLASM OF BREAST IN FEMALE, ESTROGEN RECEPTOR POSITIVE, UNSPECIFIED SITE OF BREAST (H): ICD-10-CM

## 2020-09-02 LAB
ALBUMIN SERPL-MCNC: 3.5 G/DL (ref 3.4–5)
ALP SERPL-CCNC: 105 U/L (ref 40–150)
ALT SERPL W P-5'-P-CCNC: 18 U/L (ref 0–50)
ANION GAP SERPL CALCULATED.3IONS-SCNC: 2 MMOL/L (ref 3–14)
AST SERPL W P-5'-P-CCNC: 19 U/L (ref 0–45)
BILIRUB SERPL-MCNC: 0.9 MG/DL (ref 0.2–1.3)
BUN SERPL-MCNC: 17 MG/DL (ref 7–30)
CALCIUM SERPL-MCNC: 9 MG/DL (ref 8.5–10.1)
CANCER AG27-29 SERPL-ACNC: 8 U/ML (ref 0–39)
CHLORIDE SERPL-SCNC: 108 MMOL/L (ref 94–109)
CO2 SERPL-SCNC: 29 MMOL/L (ref 20–32)
CREAT SERPL-MCNC: 1.01 MG/DL (ref 0.52–1.04)
GFR SERPL CREATININE-BSD FRML MDRD: 57 ML/MIN/{1.73_M2}
GLUCOSE SERPL-MCNC: 92 MG/DL (ref 70–99)
POTASSIUM SERPL-SCNC: 4.2 MMOL/L (ref 3.4–5.3)
PROT SERPL-MCNC: 7 G/DL (ref 6.8–8.8)
SODIUM SERPL-SCNC: 139 MMOL/L (ref 133–144)

## 2020-09-02 PROCEDURE — 77080 DXA BONE DENSITY AXIAL: CPT

## 2020-09-02 PROCEDURE — 36415 COLL VENOUS BLD VENIPUNCTURE: CPT

## 2020-09-02 PROCEDURE — 86300 IMMUNOASSAY TUMOR CA 15-3: CPT | Performed by: INTERNAL MEDICINE

## 2020-09-02 PROCEDURE — 80053 COMPREHEN METABOLIC PANEL: CPT | Performed by: INTERNAL MEDICINE

## 2020-09-02 NOTE — PROGRESS NOTES
Medical Assistant Note:  Suma Jeannine Bharati presents today for blood draw.    Patient seen by provider today: No.   present during visit today: Not Applicable.    Concerns: No Concerns.    Procedure:  Lab draw site: rac, Needle type: bf, Gauge: 23.    Post Assessment:  Labs drawn without difficulty: Yes.    Discharge Plan:  Departure Mode: Ambulatory.    Face to Face Time: 5 min  .    Eduarda Boggs, CMA

## 2020-09-09 ENCOUNTER — VIRTUAL VISIT (OUTPATIENT)
Dept: ONCOLOGY | Facility: CLINIC | Age: 65
End: 2020-09-09
Attending: INTERNAL MEDICINE
Payer: COMMERCIAL

## 2020-09-09 VITALS — BODY MASS INDEX: 20.18 KG/M2 | WEIGHT: 125 LBS

## 2020-09-09 DIAGNOSIS — C50.911 BILATERAL MALIGNANT NEOPLASM OF BREAST IN FEMALE, ESTROGEN RECEPTOR POSITIVE, UNSPECIFIED SITE OF BREAST (H): Primary | ICD-10-CM

## 2020-09-09 DIAGNOSIS — M85.80 OSTEOPENIA, UNSPECIFIED LOCATION: ICD-10-CM

## 2020-09-09 DIAGNOSIS — C50.912 BILATERAL MALIGNANT NEOPLASM OF BREAST IN FEMALE, ESTROGEN RECEPTOR POSITIVE, UNSPECIFIED SITE OF BREAST (H): Primary | ICD-10-CM

## 2020-09-09 DIAGNOSIS — Z17.0 BILATERAL MALIGNANT NEOPLASM OF BREAST IN FEMALE, ESTROGEN RECEPTOR POSITIVE, UNSPECIFIED SITE OF BREAST (H): Primary | ICD-10-CM

## 2020-09-09 DIAGNOSIS — M25.50 MULTIPLE JOINT PAIN: ICD-10-CM

## 2020-09-09 PROCEDURE — 40001009 ZZH VIDEO/TELEPHONE VISIT; NO CHARGE

## 2020-09-09 PROCEDURE — 99214 OFFICE O/P EST MOD 30 MIN: CPT | Mod: 95 | Performed by: INTERNAL MEDICINE

## 2020-09-09 ASSESSMENT — PAIN SCALES - GENERAL: PAINLEVEL: MILD PAIN (2)

## 2020-09-09 NOTE — LETTER
"    9/9/2020         RE: Suma Calabrese  [address]  [facility] [address]        Dear Colleague,    Thank you for referring your patient, Suma Calabrese, to the [facility]CER [facility]. Please see a copy of my visit note below.    Suma Calabrese is a 65 year old female who is being evaluated via a billable telephone visit.      The patient has been notified of following:     \"This telephone visit will be conducted via a call between you and your physician/provider. We have found that certain health care needs can be provided without the need for a physical exam.  This service lets us provide the care you need with a short phone conversation.  If a prescription is necessary we can send it directly to your pharmacy.  If lab work is needed we can place an order for that and you can then stop by our lab to have the test done at a later time.    Telephone visits are billed at different rates depending on your insurance coverage. During this emergency period, for some insurers they may be billed the same as an in-person visit.  Please reach out to your insurance provider with any questions.    If during the course of the call the physician/provider feels a telephone visit is not appropriate, you will not be charged for this service.\"    Patient has given verbal consent for Telephone visit?  Yes    What phone number would you like to be contacted at? [phone]    How would you like to obtain your AVS? Mail a copy    Phone call duration:         Sheba Wu, [facility]      ONCOLOGY FOLLOW UP VISIT       TELEPHONE VISIT      CHIEF COMPLAINT AND REASON FOR VISIT:  Bilateral breast cancer dx fall 2017, s/p double mastectomy.        HISTORY OF ONCOLOGY ILLNESS:   She self-felt a breast lump on the right side in 09/2017.    She was due her annual screening mammogram, which was done in September identified 11 o'clock, 3 cm from the nipple on the right breast, which is right upper outer quadrant, 1.1 cm " hypoechoic mass.    This was biopsied in 09/2017, identified grade 2 invasive lobular cancer, ER greater than 95% positive, VA greater than 25% positive; HER-2/selena was negative.    She met with Dr. Salazar, discussed about surgical options at that time.  Breast preop MRI incidentally picked up a left breast nodule 3 to 4 o'clock position 4 cm from the nipple, 4   mm enhancing focus.  This is at the central lateral portion of the left breast.  Second look ultrasound confirmed the lesion, subcentimeter in size.    Biopsy was performed in October 2017 on the left side, identified radial scar, no evidence of malignancy.      She subsequently had double mastectomy with Dr. Salazar mid 10/2017.  On the left side found invasive grade 1 IDC  0.5 cm at 3 o'clock position 9 cm from the nipple, % positive, VA 80% positive, HER-2/selena negative. negative DCIS, negative LCIS.  Margins were clear.  Lymphovascular invasion is absent. 2 lymph nodes taken out.  Both of them were negative.  This was staged at T1a N0.    On the right side, found 2 cm invasive lobular cancer was identified, grade 1 associated with LCIS, absent lymphovascular invasion.  One sentinel lymph node was taken out, was negative.  This is staged T1c N0 disease.    RS is 13, corresponds to 10-year risk of distal recurrence with surgery, radiation, and tamoxifen is about 8%.       She made informed decision to proceed with AI 12/2017.     She could not tolerates fosamax. So stopped it. Had bad reaction to IV zometa, and refused it further.       INTERVAL HISTORY:   She tolerates AI fine so far.       PAST MEDICAL HISTORY:  History of hyponatremia, hypokalemia, hypothyroidism, nontoxic uninodular goiter, rectal prolapse, hypertension, internal hemorrhoid.Eextensive diverticulitis which required subtotal colectomy and end ileostomy bag in 10/2017.  Nov 2017 for acute diverticulitis with perforation, pericolic abscess, and bowel obstruction, s/p s/p exploratory  laparotomy, subtotal colectomy, and end ileostomy on 11/13/2017.     FAMILY HISTORY:  She is adopted.      SOCIAL HISTORY:  She works in the [employer].  Quit smoking 09/2017 when she was diagnosed with breast cancer.  Denies alcohol abuse.        REVIEW OF SYSTEMS:   Less pain in hips and knees in summer.          PHYSICAL EXAMINATION LIMITED OVER THE PHONE  Sounded  very pleasant, not in acute distress.   Neuro: Oriented to time, person, and places.  Respiratory: talk nl, no sob during conversation.  PSYCHIATRIC: Oriented to time, person, and places. Normal mood and affect. Good memory. Proper insight and judgement.        CURRENT LAB DATA  REVIEWED TODAY RENETTA WITH PATIENT DURING VISIT    CMP/marker are good      CURRENT IMAGE DATA REVIEWED WITH PATIENT DURING VISIT:  DEXA 9/2020 - osteopenia, improving at L spine, and right hip. Worse at left hip.       OLD DATA REVIEWED TODAY WITH SUMMARY TODAY WITH PATIENT DURING THE VISIT:     dexa 12/2017 - osteopenia  CT a/p 11/2017: Distal sigmoid wall thickening, currently resulting in colonic obstruction. This could relate to diverticulitis or focal colitis, but neoplasm cannot be excluded. Serpiginous perisigmoid fluid collection/abscess appears mildly increased. Small amount of free fluid.     US arm 11/2017  1. No evidence of deep vein thrombosis.  2. Superficial thrombophlebitis in the right cephalic vein.             ASSESSMENT/PLAN DISCUSSED WITH PATIENT TODAY DURING THE VISIT:   1.  09/2017,10/20/17 diagnosed bilateral breast cancer.  Right-side is T1cN0, 2 cm invasive lobular, grade 1, ER/CO positive, HER-2/selena negative.  Left-side invasive ductal, T1aN0, grade 1, ER/CO positive, HER-2/selena negative.    She had double mastectomy.  All the nodes are negative.  Margins were clean.  She also had lobular carcinoma in situ on the right side.  Recurrence score on the right side of 13 corresponds to 10-year risk of distal recurrence 8% with surgery,  "radiation, and tamoxifen.     She made informed decision to proceed with Arimidex 12/2017.    It seems she is having some muscular side effects from AI.     We also discussed ER positive breast cancer recurrence pattern and future followup plan.  We also discussed about 5 years versus 10 years anti-hormonal therapy data and how this may not apply to her situation.     She is on 6 months f/u with labs.       2. Osteopenia - we discussed the proper use of vit D, dosing, etc.   We discussed the use of bisphosphonate, she tried fosamax.  She had severe heart burn. She then tried IV zometa. Had delayed \"shocking\" side effects and refuses it further.     Repeat dexa 2020 is stable.       3. Joints pain.  Advice yoga, tumeric OCT.       Total time on telephone visit is 25 minutes, all spent in counseling regarding her cancer dx and tx, her symptoms, and plan of ongoing anti-hormonal therapy.            Again, thank you for allowing me to participate in the care of your patient.        Sincerely,        Celi Powers MD, MD    "

## 2020-09-09 NOTE — LETTER
"    9/9/2020         RE: Suma Calabrese  [address]  [facility] [address]        Dear Colleague,    Thank you for referring your patient, Suma Calabrese, to the [facility]. Please see a copy of my visit note below.    Suma Calabrese is a 65 year old female who is being evaluated via a billable telephone visit.      The patient has been notified of following:     \"This telephone visit will be conducted via a call between you and your physician/provider. We have found that certain health care needs can be provided without the need for a physical exam.  This service lets us provide the care you need with a short phone conversation.  If a prescription is necessary we can send it directly to your pharmacy.  If lab work is needed we can place an order for that and you can then stop by our lab to have the test done at a later time.    Telephone visits are billed at different rates depending on your insurance coverage. During this emergency period, for some insurers they may be billed the same as an in-person visit.  Please reach out to your insurance provider with any questions.    If during the course of the call the physician/provider feels a telephone visit is not appropriate, you will not be charged for this service.\"    Patient has given verbal consent for Telephone visit?  Yes    What phone number would you like to be contacted at? [phone]    How would you like to obtain your AVS? Mail a copy    Phone call duration:         Sheba Wu, [facility]      ONCOLOGY FOLLOW UP VISIT       TELEPHONE VISIT      CHIEF COMPLAINT AND REASON FOR VISIT:  Bilateral breast cancer dx fall 2017, s/p double mastectomy.        HISTORY OF ONCOLOGY ILLNESS:   She self-felt a breast lump on the right side in 09/2017.    She was due her annual screening mammogram, which was done in September identified 11 o'clock, 3 cm from the nipple on the right breast, which is right upper outer quadrant, 1.1 cm " hypoechoic mass.    This was biopsied in 09/2017, identified grade 2 invasive lobular cancer, ER greater than 95% positive, [place] greater than 25% positive; HER-2/selena was negative.    She met with Dr. Salazar, discussed about surgical options at that time.  Breast preop MRI incidentally picked up a left breast nodule 3 to 4 o'clock position 4 cm from the nipple, 4   mm enhancing focus.  This is at the central lateral portion of the left breast.  Second look ultrasound confirmed the lesion, subcentimeter in size.    Biopsy was performed in October 2017 on the left side, identified radial scar, no evidence of malignancy.      She subsequently had double mastectomy with Dr. Salazar mid 10/2017.  On the left side found invasive grade 1 IDC  0.5 cm at 3 o'clock position 9 cm from the nipple, % positive, KS 80% positive, HER-2/selena negative. negative DCIS, negative LCIS.  Margins were clear.  Lymphovascular invasion is absent. 2 lymph nodes taken out.  Both of them were negative.  This was staged at T1a N0.    On the right side, found 2 cm invasive lobular cancer was identified, grade 1 associated with LCIS, absent lymphovascular invasion.  One sentinel lymph node was taken out, was negative.  This is staged T1c N0 disease.    RS is 13, corresponds to 10-year risk of distal recurrence with surgery, radiation, and tamoxifen is about 8%.       She made informed decision to proceed with AI 12/2017.     She could not tolerates fosamax. So stopped it. Had bad reaction to IV zometa, and refused it further.       INTERVAL HISTORY:   She tolerates AI fine so far.       PAST MEDICAL HISTORY:  History of hyponatremia, hypokalemia, hypothyroidism, nontoxic uninodular goiter, rectal prolapse, hypertension, internal hemorrhoid.Eextensive diverticulitis which required subtotal colectomy and end ileostomy bag in 10/2017.  Nov 2017 for acute diverticulitis with perforation, pericolic abscess, and bowel obstruction, s/p s/p exploratory  laparotomy, subtotal colectomy, and end ileostomy on 11/13/2017.     FAMILY HISTORY:  She is adopted.      SOCIAL HISTORY:  She works in the [employer].  Quit smoking 09/2017 when she was diagnosed with breast cancer.  Denies alcohol abuse.        REVIEW OF SYSTEMS:   Less pain in hips and knees in summer.          PHYSICAL EXAMINATION LIMITED OVER THE PHONE  Sounded  very pleasant, not in acute distress.   Neuro: Oriented to time, person, and places.  Respiratory: talk nl, no sob during conversation.  PSYCHIATRIC: Oriented to time, person, and places. Normal mood and affect. Good memory. Proper insight and judgement.        CURRENT LAB DATA  REVIEWED TODAY RENETTA WITH PATIENT DURING VISIT    CMP/marker are good      CURRENT IMAGE DATA REVIEWED WITH PATIENT DURING VISIT:  DEXA 9/2020 - osteopenia, improving at L spine, and right hip. Worse at left hip.       OLD DATA REVIEWED TODAY WITH SUMMARY TODAY WITH PATIENT DURING THE VISIT:     dexa 12/2017 - osteopenia  CT a/p 11/2017: Distal sigmoid wall thickening, currently resulting in colonic obstruction. This could relate to diverticulitis or focal colitis, but neoplasm cannot be excluded. Serpiginous perisigmoid fluid collection/abscess appears mildly increased. Small amount of free fluid.     US arm 11/2017  1. No evidence of deep vein thrombosis.  2. Superficial thrombophlebitis in the right cephalic vein.             ASSESSMENT/PLAN DISCUSSED WITH PATIENT TODAY DURING THE VISIT:   1.  09/2017,10/20/17 diagnosed bilateral breast cancer.  Right-side is T1cN0, 2 cm invasive lobular, grade 1, ER/OK positive, HER-2/selena negative.  Left-side invasive ductal, T1aN0, grade 1, ER/OK positive, HER-2/selena negative.    She had double mastectomy.  All the nodes are negative.  Margins were clean.  She also had lobular carcinoma in situ on the right side.  Recurrence score on the right side of 13 corresponds to 10-year risk of distal recurrence 8% with surgery,  "radiation, and tamoxifen.     She made informed decision to proceed with Arimidex 12/2017.    It seems she is having some muscular side effects from AI.     We also discussed ER positive breast cancer recurrence pattern and future followup plan.  We also discussed about 5 years versus 10 years anti-hormonal therapy data and how this may not apply to her situation.     She is on 6 months f/u with labs.       2. Osteopenia - we discussed the proper use of vit D, dosing, etc.   We discussed the use of bisphosphonate, she tried fosamax.  She had severe heart burn. She then tried IV zometa. Had delayed \"shocking\" side effects and refuses it further.     Repeat dexa 2020 is stable.       3. Joints pain.  Advice yoga, tumeric OCT.       Total time on telephone visit is 25 minutes, all spent in counseling regarding her cancer dx and tx, her symptoms, and plan of ongoing anti-hormonal therapy.            Again, thank you for allowing me to participate in the care of your patient.        Sincerely,        Celi Powers MD, MD    "

## 2020-09-09 NOTE — PROGRESS NOTES
"Suma Calabrese is a 65 year old female who is being evaluated via a billable telephone visit.      The patient has been notified of following:     \"This telephone visit will be conducted via a call between you and your physician/provider. We have found that certain health care needs can be provided without the need for a physical exam.  This service lets us provide the care you need with a short phone conversation.  If a prescription is necessary we can send it directly to your pharmacy.  If lab work is needed we can place an order for that and you can then stop by our lab to have the test done at a later time.    Telephone visits are billed at different rates depending on your insurance coverage. During this emergency period, for some insurers they may be billed the same as an in-person visit.  Please reach out to your insurance provider with any questions.    If during the course of the call the physician/provider feels a telephone visit is not appropriate, you will not be charged for this service.\"    Patient has given verbal consent for Telephone visit?  Yes    What phone number would you like to be contacted at? [phone]    How would you like to obtain your AVS? Mail a copy    Phone call duration:         Sheba Wu CMA    "

## 2020-09-09 NOTE — PROGRESS NOTES
ONCOLOGY FOLLOW UP VISIT       TELEPHONE VISIT      CHIEF COMPLAINT AND REASON FOR VISIT:  Bilateral breast cancer dx fall 2017, s/p double mastectomy.        HISTORY OF ONCOLOGY ILLNESS:   She self-felt a breast lump on the right side in 09/2017.    She was due her annual screening mammogram, which was done in September identified 11 o'clock, 3 cm from the nipple on the right breast, which is right upper outer quadrant, 1.1 cm hypoechoic mass.    This was biopsied in 09/2017, identified grade 2 invasive lobular cancer, ER greater than 95% positive, [place] greater than 25% positive; HER-2/selena was negative.    She met with Dr. Salazar, discussed about surgical options at that time.  Breast preop MRI incidentally picked up a left breast nodule 3 to 4 o'clock position 4 cm from the nipple, 4   mm enhancing focus.  This is at the central lateral portion of the left breast.  Second look ultrasound confirmed the lesion, subcentimeter in size.    Biopsy was performed in October 2017 on the left side, identified radial scar, no evidence of malignancy.      She subsequently had double mastectomy with Dr. Salazra mid 10/2017.  On the left side found invasive grade 1 IDC  0.5 cm at 3 o'clock position 9 cm from the nipple, % positive, [place] 80% positive, HER-2/selena negative. negative DCIS, negative LCIS.  Margins were clear.  Lymphovascular invasion is absent. 2 lymph nodes taken out.  Both of them were negative.  This was staged at T1a N0.    On the right side, found 2 cm invasive lobular cancer was identified, grade 1 associated with LCIS, absent lymphovascular invasion.  One sentinel lymph node was taken out, was negative.  This is staged T1c N0 disease.    RS is 13, corresponds to 10-year risk of distal recurrence with surgery, radiation, and tamoxifen is about 8%.       She made informed decision to proceed with AI 12/2017.     She could not tolerates fosamax. So stopped it. Had bad reaction to IV zometa, and refused it  further.       INTERVAL HISTORY:   She tolerates AI fine so far.       PAST MEDICAL HISTORY:  History of hyponatremia, hypokalemia, hypothyroidism, nontoxic uninodular goiter, rectal prolapse, hypertension, internal hemorrhoid.Eextensive diverticulitis which required subtotal colectomy and end ileostomy bag in 10/2017.  Nov 2017 for acute diverticulitis with perforation, pericolic abscess, and bowel obstruction, s/p s/p exploratory laparotomy, subtotal colectomy, and end ileostomy on 11/13/2017.     FAMILY HISTORY:  She is adopted.      SOCIAL HISTORY:  She works in the [employer].  Quit smoking 09/2017 when she was diagnosed with breast cancer.  Denies alcohol abuse.        REVIEW OF SYSTEMS:   Less pain in hips and knees in summer.          PHYSICAL EXAMINATION LIMITED OVER THE PHONE  Sounded  very pleasant, not in acute distress.   Neuro: Oriented to time, person, and places.  Respiratory: talk nl, no sob during conversation.  PSYCHIATRIC: Oriented to time, person, and places. Normal mood and affect. Good memory. Proper insight and judgement.        CURRENT LAB DATA  REVIEWED TODAY RENETTA WITH PATIENT DURING VISIT    CMP/marker are good      CURRENT IMAGE DATA REVIEWED WITH PATIENT DURING VISIT:  DEXA 9/2020 - osteopenia, improving at L spine, and right hip. Worse at left hip.       OLD DATA REVIEWED TODAY WITH SUMMARY TODAY WITH PATIENT DURING THE VISIT:     dexa 12/2017 - osteopenia  CT a/p 11/2017: Distal sigmoid wall thickening, currently resulting in colonic obstruction. This could relate to diverticulitis or focal colitis, but neoplasm cannot be excluded. Serpiginous perisigmoid fluid collection/abscess appears mildly increased. Small amount of free fluid.     US arm 11/2017  1. No evidence of deep vein thrombosis.  2. Superficial thrombophlebitis in the right cephalic vein.             ASSESSMENT/PLAN DISCUSSED WITH PATIENT TODAY DURING THE VISIT:   1.  09/2017,10/20/17 diagnosed bilateral  "breast cancer.  Right-side is T1cN0, 2 cm invasive lobular, grade 1, ER/ND positive, HER-2/selena negative.  Left-side invasive ductal, T1aN0, grade 1, ER/ND positive, HER-2/selena negative.    She had double mastectomy.  All the nodes are negative.  Margins were clean.  She also had lobular carcinoma in situ on the right side.  Recurrence score on the right side of 13 corresponds to 10-year risk of distal recurrence 8% with surgery, radiation, and tamoxifen.     She made informed decision to proceed with Arimidex 12/2017.    It seems she is having some muscular side effects from AI.     We also discussed ER positive breast cancer recurrence pattern and future followup plan.  We also discussed about 5 years versus 10 years anti-hormonal therapy data and how this may not apply to her situation.     She is on 6 months f/u with labs.       2. Osteopenia - we discussed the proper use of vit D, dosing, etc.   We discussed the use of bisphosphonate, she tried fosamax.  She had severe heart burn. She then tried IV zometa. Had delayed \"shocking\" side effects and refuses it further.     Repeat dexa 2020 is stable.       3. Joints pain.  Advice yoga, tumeric OCT.       Total time on telephone visit is 25 minutes, all spent in counseling regarding her cancer dx and tx, her symptoms, and plan of ongoing anti-hormonal therapy.          "

## 2021-01-10 ENCOUNTER — HEALTH MAINTENANCE LETTER (OUTPATIENT)
Age: 66
End: 2021-01-10

## 2021-01-13 DIAGNOSIS — E03.4 HYPOTHYROIDISM DUE TO ACQUIRED ATROPHY OF THYROID: Chronic | ICD-10-CM

## 2021-01-15 RX ORDER — LEVOTHYROXINE SODIUM 75 UG/1
TABLET ORAL
Qty: 90 TABLET | OUTPATIENT
Start: 2021-01-15

## 2021-01-19 PROBLEM — M85.80 OSTEOPENIA, UNSPECIFIED LOCATION: Status: ACTIVE | Noted: 2019-10-09

## 2021-01-19 PROBLEM — C50.411 MALIGNANT NEOPLASM OF UPPER-OUTER QUADRANT OF RIGHT BREAST IN FEMALE, ESTROGEN RECEPTOR POSITIVE (H): Status: ACTIVE | Noted: 2017-09-21

## 2021-01-19 PROBLEM — Z17.0 MALIGNANT NEOPLASM OF UPPER-OUTER QUADRANT OF RIGHT BREAST IN FEMALE, ESTROGEN RECEPTOR POSITIVE (H): Status: ACTIVE | Noted: 2017-09-21

## 2021-01-19 PROBLEM — Z90.13 S/P BILATERAL MASTECTOMY: Status: ACTIVE | Noted: 2017-10-16

## 2021-01-19 NOTE — PATIENT INSTRUCTIONS
"            I have generated a lab order.              Please call ahead for a \"lab only\" appointment.                                                                Hopefully you can quit smoking; perhaps when you retire.                                                                                                                      "

## 2021-01-21 ENCOUNTER — VIRTUAL VISIT (OUTPATIENT)
Dept: INTERNAL MEDICINE | Facility: CLINIC | Age: 66
End: 2021-01-21
Payer: COMMERCIAL

## 2021-01-21 DIAGNOSIS — Z93.3 COLOSTOMY IN PLACE (H): ICD-10-CM

## 2021-01-21 DIAGNOSIS — F17.200 TOBACCO USE DISORDER: ICD-10-CM

## 2021-01-21 DIAGNOSIS — E53.8 VITAMIN B12 DEFICIENCY (NON ANEMIC): ICD-10-CM

## 2021-01-21 DIAGNOSIS — E03.4 HYPOTHYROIDISM DUE TO ACQUIRED ATROPHY OF THYROID: Primary | ICD-10-CM

## 2021-01-21 PROCEDURE — 99213 OFFICE O/P EST LOW 20 MIN: CPT | Mod: 95 | Performed by: INTERNAL MEDICINE

## 2021-01-21 NOTE — PROGRESS NOTES
"Hansa is a 65 year old who is being evaluated via a billable telephone visit.      What phone number would you like to be contacted at? [phone]  How would you like to obtain your AVS? MyChart  Assessment & Plan     Hypothyroidism due to acquired atrophy of thyroid  Check labs and adjust medications as indicated.    - TSH  - T4, free    Colostomy in place (H)  We will reorder supplies as needed    Tobacco use disorder  Plans to quit    Vitamin B12 deficiency (non anemic)  Check level  - Vitamin B12                 Tobacco Cessation:   reports that she has been smoking cigarettes. She has a 20.00 pack-year smoking history. She has never used smokeless tobacco.  Tobacco Cessation Action Plan: she hopes to quit when she retires      Patient Instructions               I have generated a lab order.              Please call ahead for a \"lab only\" appointment.                                                                Hopefully you can quit smoking; perhaps when you retire.                                                                                                                          Return in about 6 months (around 7/21/2021) for follow up of several issues.    Owen Jean MD  [facility]    Subjective     Hansa is a 65 year old who presents to clinic today for the following health issues     HPI   Needs refills   Ostomy supplies-[employer]-needs new DME order sent to them  Phone for fax #: [phone]    Hypothyroidism Follow-up      Since last visit, patient describes the following symptoms: dry skin and hair loss      How many servings of fruits and vegetables do you eat daily?  2-3    On average, how many sweetened beverages do you drink each day (Examples: soda, juice, sweet tea, etc.  Do NOT count diet or artificially sweetened beverages)?   0    How many days per week do you exercise enough to make your heart beat faster? 7    How many minutes a day " "do you exercise enough to make your heart beat faster? 30 - 60    How many days per week do you miss taking your medication? 0      Will retire April 30.               Working from home now.                Has worked at the [employer].                      S/p both COVID vaccines already!                      Still smokes 1/2 ppd or less.           Cutting back.    Hopes to quit.                   Not taking B12; stopped for no reason.      Review of Systems   CONSTITUTIONAL:NEGATIVE for fever, chills, change in weight  RESP:NEGATIVE for significant cough or SOB  CV: NEGATIVE for chest pain, palpitations or peripheral edema      Objective    Vitals - Patient Reported  Weight (Patient Reported): 57.6 kg (127 lb)  Height (Patient Reported): 167.6 cm (5' 6\")  BMI (Based on Pt Reported Ht/Wt): 20.5      Vitals:  No vitals were obtained today due to virtual visit.    Physical Exam     PSYCH: Alert and oriented times 3; coherent speech, normal   rate and volume, able to articulate logical thoughts, able   to abstract reason, no tangential thoughts, no hallucinations   or delusions  Her affect is normal  RESP: No cough, no audible wheezing, able to talk in full sentences  Remainder of exam unable to be completed due to telephone visits                Phone call duration: 10 minutes  "

## 2021-01-25 ENCOUNTER — MYC MEDICAL ADVICE (OUTPATIENT)
Dept: INTERNAL MEDICINE | Facility: CLINIC | Age: 66
End: 2021-01-25

## 2021-01-25 RX ORDER — MULTIVIT WITH MINERALS/LUTEIN
1 TABLET ORAL DAILY
COMMUNITY
Start: 2021-01-25

## 2021-01-27 ENCOUNTER — MYC MEDICAL ADVICE (OUTPATIENT)
Dept: INTERNAL MEDICINE | Facility: CLINIC | Age: 66
End: 2021-01-27

## 2021-02-15 ENCOUNTER — MYC MEDICAL ADVICE (OUTPATIENT)
Dept: INTERNAL MEDICINE | Facility: CLINIC | Age: 66
End: 2021-02-15

## 2021-02-15 DIAGNOSIS — E03.4 HYPOTHYROIDISM DUE TO ACQUIRED ATROPHY OF THYROID: ICD-10-CM

## 2021-02-15 DIAGNOSIS — C50.912 BILATERAL MALIGNANT NEOPLASM OF BREAST IN FEMALE, ESTROGEN RECEPTOR POSITIVE, UNSPECIFIED SITE OF BREAST (H): ICD-10-CM

## 2021-02-15 DIAGNOSIS — C50.911 BILATERAL MALIGNANT NEOPLASM OF BREAST IN FEMALE, ESTROGEN RECEPTOR POSITIVE, UNSPECIFIED SITE OF BREAST (H): ICD-10-CM

## 2021-02-15 DIAGNOSIS — Z17.0 BILATERAL MALIGNANT NEOPLASM OF BREAST IN FEMALE, ESTROGEN RECEPTOR POSITIVE, UNSPECIFIED SITE OF BREAST (H): ICD-10-CM

## 2021-02-15 DIAGNOSIS — E03.4 HYPOTHYROIDISM DUE TO ACQUIRED ATROPHY OF THYROID: Chronic | ICD-10-CM

## 2021-02-15 DIAGNOSIS — E53.8 VITAMIN B12 DEFICIENCY (NON ANEMIC): ICD-10-CM

## 2021-02-15 LAB
ALBUMIN SERPL-MCNC: 3.6 G/DL (ref 3.4–5)
ALP SERPL-CCNC: 98 U/L (ref 40–150)
ALT SERPL W P-5'-P-CCNC: 18 U/L (ref 0–50)
ANION GAP SERPL CALCULATED.3IONS-SCNC: 1 MMOL/L (ref 3–14)
AST SERPL W P-5'-P-CCNC: 15 U/L (ref 0–45)
BILIRUB SERPL-MCNC: 0.6 MG/DL (ref 0.2–1.3)
BUN SERPL-MCNC: 15 MG/DL (ref 7–30)
CALCIUM SERPL-MCNC: 10 MG/DL (ref 8.5–10.1)
CANCER AG27-29 SERPL-ACNC: 12 U/ML (ref 0–39)
CHLORIDE SERPL-SCNC: 107 MMOL/L (ref 94–109)
CO2 SERPL-SCNC: 30 MMOL/L (ref 20–32)
CREAT SERPL-MCNC: 0.81 MG/DL (ref 0.52–1.04)
GFR SERPL CREATININE-BSD FRML MDRD: 75 ML/MIN/{1.73_M2}
GLUCOSE SERPL-MCNC: 90 MG/DL (ref 70–99)
POTASSIUM SERPL-SCNC: 4.4 MMOL/L (ref 3.4–5.3)
PROT SERPL-MCNC: 7.4 G/DL (ref 6.8–8.8)
SODIUM SERPL-SCNC: 138 MMOL/L (ref 133–144)
T4 FREE SERPL-MCNC: 1.1 NG/DL (ref 0.76–1.46)
TSH SERPL DL<=0.005 MIU/L-ACNC: 1.03 MU/L (ref 0.4–4)
VIT B12 SERPL-MCNC: 565 PG/ML (ref 193–986)

## 2021-02-15 PROCEDURE — 84443 ASSAY THYROID STIM HORMONE: CPT | Performed by: INTERNAL MEDICINE

## 2021-02-15 PROCEDURE — 84439 ASSAY OF FREE THYROXINE: CPT | Performed by: INTERNAL MEDICINE

## 2021-02-15 PROCEDURE — 82607 VITAMIN B-12: CPT | Performed by: INTERNAL MEDICINE

## 2021-02-15 PROCEDURE — 86300 IMMUNOASSAY TUMOR CA 15-3: CPT | Performed by: INTERNAL MEDICINE

## 2021-02-15 PROCEDURE — 36415 COLL VENOUS BLD VENIPUNCTURE: CPT | Performed by: INTERNAL MEDICINE

## 2021-02-15 PROCEDURE — 80053 COMPREHEN METABOLIC PANEL: CPT | Performed by: INTERNAL MEDICINE

## 2021-02-16 RX ORDER — LEVOTHYROXINE SODIUM 75 UG/1
75 TABLET ORAL DAILY
Qty: 90 TABLET | Refills: 4 | Status: SHIPPED | OUTPATIENT
Start: 2021-02-16 | End: 2022-04-14

## 2021-03-12 NOTE — PROGRESS NOTES
[facility]    Hematology/Oncology Established Patient Follow-up Note      Today's Date: 03/15/21    Reason for Follow-up: Bilateral breast cancer. Transfer of care.    HISTORY OF PRESENT ILLNESS: Suma Calabrese is a 65 year old female who presents with the following oncologic history:  --She self-felt a breast lump on the right side in 09/2017.    --9/2017 Mammogram showed 11 o'clock, 3 cm from the nipple on the right breast, which is right upper outer quadrant, 1.1 cm hypoechoic mass.    --This was biopsied in 09/2017, identified grade 2 invasive lobular cancer, ER greater than 95% positive, OH greater than 25% positive; HER-2/selena was negative.    --She met with Dr. Salazar, discussed  surgical options at that time.   --Breast MRI showed a left breast nodule 3 to 4 o'clock position 4 cm from the nipple, 4 mm enhancing focus.  This is at the central lateral portion of the left breast.  Second look ultrasound confirmed the lesion, subcentimeter in size.    --Biopsy was performed in 10/2017 on the left side, identified radial scar, no evidence of malignancy.    --She subsequently underwent double mastectomy with Dr. Salazar mid 10/2017.  Pathology showed left invasive grade 1 IDC  0.5 cm at 3 o'clock position 9 cm from the nipple, % positive, OH 80% positive, HER-2/selena negative. Margins were clear.  Lymphovascular invasion absent. Two lymph nodes were negative.  This was staged at [id number]-R3.    --On the right side, pathology showed 2 cm invasive lobular cancer, grade 1 associated with LCIS, no lymphovascular invasion.  One sentinel lymph node was negative.  Therefore stage [id number]-M6 disease.    --Oncotype DX score = 13, corresponding to 10-year risk of distant recurrence of 8% after 5 years of hormone blockade therapy.     --She started anastrazole 12/2017.   --She could not tolerate alendronate and therefore discontinued it. She had adverse reaction to Zometa, and declined further  doses.    INTERIM HISTORY:  Suma Calabrese reports no new complaints.  However, she expresses concern about continuing anastrazole due to bone density lowering effects.      REVIEW OF SYSTEMS:   14 point ROS was reviewed and is negative other than as noted above in HPI.       HOME MEDICATIONS:  Current Outpatient Medications   Medication Sig Dispense Refill     anastrozole (ARIMIDEX) 1 MG tablet Take 1 tablet (1 mg) by mouth daily 90 tablet 3     Ibuprofen (ADVIL PO) Take 200 mg by mouth every 4 hours as needed for moderate pain       levothyroxine (SYNTHROID/LEVOTHROID) 75 MCG tablet Take 1 tablet (75 mcg) by mouth daily 90 tablet 4     Multiple Vitamins-Minerals (CENTRUM SILVER) per tablet Take 1 tablet by mouth daily       multivitamin (CENTRUM SILVER) tablet Take 1 tablet by mouth daily       VITAMIN D, CHOLECALCIFEROL, PO Take 1,000 Units by mouth every morning           ALLERGIES:  Allergies   Allergen Reactions     Bupropion Other (See Comments)     Jittery and HA     Erythromycin GI Disturbance     Oxycodone Nausea and Vomiting         PAST MEDICAL HISTORY:  Past Medical History:   Diagnosis Date     Cancer (H)     breast     Diverticulitis      Hypertension      Nontoxic uninodular goiter 1/28/2013     Pure hypercholesterolemia 1/28/2013     TMJ (temporomandibular joint syndrome) 7/1/2013         PAST SURGICAL HISTORY:  Past Surgical History:   Procedure Laterality Date     BIOPSY       BIOPSY NODE SENTINEL Bilateral 10/16/2017    Procedure: BIOPSY NODE SENTINEL;;  Surgeon: Van Salazar MD;  Location: [facility]     bladder dilitation[  age 17     COLECTOMY SUBTOTAL Left 11/13/2017    Procedure: COLECTOMY SUBTOTAL;  EXPLORATORY LAPAROTOMY, OPEN SUBTOTAL  COLECTOMY, END ILEOSTOMY;  Surgeon: May Spain MD;  Location:  OR     DENTAL SURGERY      wisdom tooth extraction     ILEOSTOMY N/A 11/13/2017    Procedure: ILEOSTOMY;;  Surgeon: May Spain MD;  Location:  OR     LAPAROTOMY  EXPLORATORY N/A 11/13/2017    Procedure: LAPAROTOMY EXPLORATORY;;  Surgeon: May Spain MD;  Location:  OR     MASTECTOMY SIMPLE BILATERAL Bilateral 10/16/2017    Procedure: MASTECTOMY SIMPLE BILATERAL;  BILATERAL SIMPLE MASTECTOMY, BILATERAL AXILLARY SENTINEL NODE BIOPSY;  Surgeon: Van Salazar MD;  Location: [facility]         SOCIAL HISTORY:  Social History     Socioeconomic History     Marital status: Single     Spouse name: Not on file     Number of children: Not on file     Years of education: Not on file     Highest education level: Not on file   Occupational History     Not on file   Social Needs     Financial resource strain: Not on file     Food insecurity     Worry: Not on file     Inability: Not on file     Transportation needs     Medical: Not on file     Non-medical: Not on file   Tobacco Use     Smoking status: Current Every Day Smoker     Packs/day: 0.50     Years: 40.00     Pack years: 20.00     Types: Cigarettes     Smokeless tobacco: Never Used     Tobacco comment: 1/2 PPD   Substance and Sexual Activity     Alcohol use: Yes     Alcohol/week: 0.0 standard drinks     Comment: weekends     Drug use: No     Sexual activity: Not Currently   Lifestyle     Physical activity     Days per week: Not on file     Minutes per session: Not on file     Stress: Not on file   Relationships     Social connections     Talks on phone: Not on file     Gets together: Not on file     Attends Sabianist service: Not on file     Active member of club or organization: Not on file     Attends meetings of clubs or organizations: Not on file     Relationship status: Not on file     Intimate partner violence     Fear of current or ex partner: Not on file     Emotionally abused: Not on file     Physically abused: Not on file     Forced sexual activity: Not on file   Other Topics Concern     Parent/sibling w/ CABG, MI or angioplasty before 65F 55M? No   Social History Narrative     Not on file         FAMILY  "HISTORY:  Family History   Problem Relation Age of Onset     Unknown/Adopted Mother      Unknown/Adopted Father          PHYSICAL EXAM:  Vital signs:  BP (!) 141/95   Pulse 89   Temp 97.9  F (36.6  C) (Oral)   Resp 16   Ht 1.689 m (5' 6.5\")   Wt 57.9 kg (127 lb 9.6 oz)   LMP  (LMP Unknown)   SpO2 99%   BMI 20.29 kg/m     GENERAL/CONSTITUTIONAL: No acute distress.  EYES: No scleral icterus.  LYMPH: No cervical, supraclavicular, axillary adenopathy.   BREAST: Bilateral breasts are surgically absent with no palpable chest wall masses or fluid.  RESPIRATORY: No audible cough or wheezing.   GASTROINTESTINAL: No hepatosplenomegaly, masses, or tenderness. No guarding.  No distention.  MUSCULOSKELETAL: Warm and well-perfused, no cyanosis, clubbing, or edema.  NEUROLOGIC: Alert, oriented, answers questions appropriately.  INTEGUMENTARY: No rashes or jaundice.  GAIT: Steady, does not use assistive device      LABS:  CBC RESULTS:   Recent Labs   Lab Test 12/24/18  1151   WBC 5.8   RBC 5.29*   HGB 17.6*   HCT 52.2*   MCV 99   MCH 33.3*   MCHC 33.7   RDW 12.8          Recent Labs   Lab Test 02/15/21  0948 09/02/20  1434    139   POTASSIUM 4.4 4.2   CHLORIDE 107 108   CO2 30 29   ANIONGAP 1* 2*   GLC 90 92   BUN 15 17   CR 0.81 1.01   CODY 10.0 9.0     CA 27-29 = 12  Vitamin B12 = 565  TSH = 1.03    PATHOLOGY:  Reviewed as per HPI.    IMAGING:  Reviewed as per HPI.    ASSESSMENT/PLAN:  Suma Calabrese is a 65 year old female with the following issues:  1. Stage IA, [id number]-Q9-I2, grade 1 invasive lobular carcinoma of the right upper outer breast, ER/UT positive, HER-2/selena negative, Oncotype DX = 13  2. Stage IA, fN1w-T4-U5, grade 1 invasive ductal carcinoma of the left lower outer breast, ER/UT positive, HER-2/selena negative  --Hansa is s/p bilateral mastectomies with negative surgical margins.  --She is currently on anastrazole, tolerating this well with no clinical evidence for recurrent based on physical " exam.  --Recommended total 5 years of hormone blockade therapy.  --I discussed that as per ASCO and NCCN guidelines, routine labs and imaging are not recommended for breast cancer surveillance as they have not been shown to improve overall survival.    2. Osteopenia  --Last DEXA from 9/2/2020 showed stable osteopenia.  --She did not tolerate Zometa.  --I recommended adequate calcium, vitamin D, and weight-bearing exercise.  --I recommended she try alendronate once weekly to counteract bone density lowering effects of anastrazole for the duration of her hormone blockade therapy.  I discussed potential adverse effects of alendronate and she is willing to try it. Advised that she take alendronate with 2 full glasses of water once weekly in upright position.  --Plan to repeat DEXA in 9/2022.    Return in 6 months.    Serena Peoples MD  Hematology/Oncology  [facility]    Total time spent: 40 minutes in review of patient's records, patient evaluation, discussion of plan of care, and documentation.

## 2021-03-15 ENCOUNTER — ONCOLOGY VISIT (OUTPATIENT)
Dept: ONCOLOGY | Facility: CLINIC | Age: 66
End: 2021-03-15
Attending: INTERNAL MEDICINE
Payer: COMMERCIAL

## 2021-03-15 VITALS
DIASTOLIC BLOOD PRESSURE: 95 MMHG | HEIGHT: 67 IN | TEMPERATURE: 97.9 F | OXYGEN SATURATION: 99 % | SYSTOLIC BLOOD PRESSURE: 141 MMHG | WEIGHT: 127.6 LBS | BODY MASS INDEX: 20.03 KG/M2 | HEART RATE: 89 BPM | RESPIRATION RATE: 16 BRPM

## 2021-03-15 DIAGNOSIS — M85.89 OSTEOPENIA OF MULTIPLE SITES: ICD-10-CM

## 2021-03-15 DIAGNOSIS — C50.512 MALIGNANT NEOPLASM OF LOWER-OUTER QUADRANT OF LEFT BREAST OF FEMALE, ESTROGEN RECEPTOR POSITIVE (H): ICD-10-CM

## 2021-03-15 DIAGNOSIS — Z17.0 MALIGNANT NEOPLASM OF UPPER-OUTER QUADRANT OF RIGHT BREAST IN FEMALE, ESTROGEN RECEPTOR POSITIVE (H): Primary | ICD-10-CM

## 2021-03-15 DIAGNOSIS — Z17.0 MALIGNANT NEOPLASM OF LOWER-OUTER QUADRANT OF LEFT BREAST OF FEMALE, ESTROGEN RECEPTOR POSITIVE (H): ICD-10-CM

## 2021-03-15 DIAGNOSIS — Z79.811 AROMATASE INHIBITOR USE: ICD-10-CM

## 2021-03-15 DIAGNOSIS — C50.411 MALIGNANT NEOPLASM OF UPPER-OUTER QUADRANT OF RIGHT BREAST IN FEMALE, ESTROGEN RECEPTOR POSITIVE (H): Primary | ICD-10-CM

## 2021-03-15 PROCEDURE — 99215 OFFICE O/P EST HI 40 MIN: CPT | Performed by: INTERNAL MEDICINE

## 2021-03-15 PROCEDURE — G0463 HOSPITAL OUTPT CLINIC VISIT: HCPCS

## 2021-03-15 RX ORDER — ALENDRONATE SODIUM 70 MG/1
70 TABLET ORAL
Qty: 30 TABLET | Refills: 1 | Status: SHIPPED | OUTPATIENT
Start: 2021-03-15 | End: 2022-04-11

## 2021-03-15 ASSESSMENT — MIFFLIN-ST. JEOR: SCORE: 1138.48

## 2021-03-15 ASSESSMENT — PAIN SCALES - GENERAL: PAINLEVEL: NO PAIN (0)

## 2021-03-15 NOTE — LETTER
3/15/2021         RE: Suma Calabrese  [address]  [place] [facility] [address]        Dear Colleague,    Thank you for referring your patient, Suma Calabrese, to the [facility]. Please see a copy of my visit note below.    [facility]    Hematology/Oncology Established Patient Follow-up Note      Today's Date: 03/15/21    Reason for Follow-up: Bilateral breast cancer. Transfer of care.    HISTORY OF PRESENT ILLNESS: Suma Calabrese is a 65 year old female who presents with the following oncologic history:  --She self-felt a breast lump on the right side in 09/2017.    --9/2017 Mammogram showed 11 o'clock, 3 cm from the nipple on the right breast, which is right upper outer quadrant, 1.1 cm hypoechoic mass.    --This was biopsied in 09/2017, identified grade 2 invasive lobular cancer, ER greater than 95% positive, KY greater than 25% positive; HER-2/selena was negative.    --She met with Dr. Salazar, discussed  surgical options at that time.   --Breast MRI showed a left breast nodule 3 to 4 o'clock position 4 cm from the nipple, 4 mm enhancing focus.  This is at the central lateral portion of the left breast.  Second look ultrasound confirmed the lesion, subcentimeter in size.    --Biopsy was performed in 10/2017 on the left side, identified radial scar, no evidence of malignancy.    --She subsequently underwent double mastectomy with Dr. Salazar mid 10/2017.  Pathology showed left invasive grade 1 IDC  0.5 cm at 3 o'clock position 9 cm from the nipple, % positive, KY 80% positive, HER-2/selena negative. Margins were clear.  Lymphovascular invasion absent. Two lymph nodes were negative.  This was staged at [id number]-Z0.    --On the right side, pathology showed 2 cm invasive lobular cancer, grade 1 associated with LCIS, no lymphovascular invasion.  One sentinel lymph node was negative.  Therefore stage jC3h-Q8 disease.    --Oncotype DX score = 13, corresponding to  10-year risk of distant recurrence of 8% after 5 years of hormone blockade therapy.     --She started anastrazole 12/2017.   --She could not tolerate alendronate and therefore discontinued it. She had adverse reaction to Zometa, and declined further doses.    INTERIM HISTORY:  Suma Calabrese reports no new complaints.  However, she expresses concern about continuing anastrazole due to bone density lowering effects.      REVIEW OF SYSTEMS:   14 point ROS was reviewed and is negative other than as noted above in HPI.       HOME MEDICATIONS:  Current Outpatient Medications   Medication Sig Dispense Refill     anastrozole (ARIMIDEX) 1 MG tablet Take 1 tablet (1 mg) by mouth daily 90 tablet 3     Ibuprofen (ADVIL PO) Take 200 mg by mouth every 4 hours as needed for moderate pain       levothyroxine (SYNTHROID/LEVOTHROID) 75 MCG tablet Take 1 tablet (75 mcg) by mouth daily 90 tablet 4     Multiple Vitamins-Minerals (CENTRUM SILVER) per tablet Take 1 tablet by mouth daily       multivitamin (CENTRUM SILVER) tablet Take 1 tablet by mouth daily       VITAMIN D, CHOLECALCIFEROL, PO Take 1,000 Units by mouth every morning           ALLERGIES:  Allergies   Allergen Reactions     Bupropion Other (See Comments)     Jittery and HA     Erythromycin GI Disturbance     Oxycodone Nausea and Vomiting         PAST MEDICAL HISTORY:  Past Medical History:   Diagnosis Date     Cancer (H)     breast     Diverticulitis      Hypertension      Nontoxic uninodular goiter 1/28/2013     Pure hypercholesterolemia 1/28/2013     TMJ (temporomandibular joint syndrome) 7/1/2013         PAST SURGICAL HISTORY:  Past Surgical History:   Procedure Laterality Date     BIOPSY       BIOPSY NODE SENTINEL Bilateral 10/16/2017    Procedure: BIOPSY NODE SENTINEL;;  Surgeon: Van Salazar MD;  [facility]     bladder dilitation[  age 17     COLECTOMY SUBTOTAL Left 11/13/2017    Procedure: COLECTOMY SUBTOTAL;  EXPLORATORY LAPAROTOMY, OPEN SUBTOTAL   COLECTOMY, END ILEOSTOMY;  Surgeon: May Spain MD;  Location:  OR     DENTAL SURGERY      wisdom tooth extraction     ILEOSTOMY N/A 11/13/2017    Procedure: ILEOSTOMY;;  Surgeon: May Spain MD;  Location:  OR     LAPAROTOMY EXPLORATORY N/A 11/13/2017    Procedure: LAPAROTOMY EXPLORATORY;;  Surgeon: May Spain MD;  Location:  OR     MASTECTOMY SIMPLE BILATERAL Bilateral 10/16/2017    Procedure: MASTECTOMY SIMPLE BILATERAL;  BILATERAL SIMPLE MASTECTOMY, BILATERAL AXILLARY SENTINEL NODE BIOPSY;  Surgeon: Van Salazar MD;  Location: [facility]         SOCIAL HISTORY:  Social History     Socioeconomic History     Marital status: Single     Spouse name: Not on file     Number of children: Not on file     Years of education: Not on file     Highest education level: Not on file   Occupational History     Not on file   Social Needs     Financial resource strain: Not on file     Food insecurity     Worry: Not on file     Inability: Not on file     Transportation needs     Medical: Not on file     Non-medical: Not on file   Tobacco Use     Smoking status: Current Every Day Smoker     Packs/day: 0.50     Years: 40.00     Pack years: 20.00     Types: Cigarettes     Smokeless tobacco: Never Used     Tobacco comment: 1/2 PPD   Substance and Sexual Activity     Alcohol use: Yes     Alcohol/week: 0.0 standard drinks     Comment: weekends     Drug use: No     Sexual activity: Not Currently   Lifestyle     Physical activity     Days per week: Not on file     Minutes per session: Not on file     Stress: Not on file   Relationships     Social connections     Talks on phone: Not on file     Gets together: Not on file     Attends Protestant service: Not on file     Active member of club or organization: Not on file     Attends meetings of clubs or organizations: Not on file     Relationship status: Not on file     Intimate partner violence     Fear of current or ex partner: Not on file     Emotionally abused:  "Not on file     Physically abused: Not on file     Forced sexual activity: Not on file   Other Topics Concern     Parent/sibling w/ CABG, MI or angioplasty before 65F 55M? No   Social History Narrative     Not on file         FAMILY HISTORY:  Family History   Problem Relation Age of Onset     Unknown/Adopted Mother      Unknown/Adopted Father          PHYSICAL EXAM:  Vital signs:  BP (!) 141/95   Pulse 89   Temp 97.9  F (36.6  C) (Oral)   Resp 16   Ht 1.689 m (5' 6.5\")   Wt 57.9 kg (127 lb 9.6 oz)   LMP  (LMP Unknown)   SpO2 99%   BMI 20.29 kg/m     GENERAL/CONSTITUTIONAL: No acute distress.  EYES: No scleral icterus.  LYMPH: No cervical, supraclavicular, axillary adenopathy.   BREAST: Bilateral breasts are surgically absent with no palpable chest wall masses or fluid.  RESPIRATORY: No audible cough or wheezing.   GASTROINTESTINAL: No hepatosplenomegaly, masses, or tenderness. No guarding.  No distention.  MUSCULOSKELETAL: Warm and well-perfused, no cyanosis, clubbing, or edema.  NEUROLOGIC: Alert, oriented, answers questions appropriately.  INTEGUMENTARY: No rashes or jaundice.  GAIT: Steady, does not use assistive device      LABS:  CBC RESULTS:   Recent Labs   Lab Test 12/24/18  1151   WBC 5.8   RBC 5.29*   HGB 17.6*   HCT 52.2*   MCV 99   MCH 33.3*   MCHC 33.7   RDW 12.8          Recent Labs   Lab Test 02/15/21  0948 09/02/20  1434    139   POTASSIUM 4.4 4.2   CHLORIDE 107 108   CO2 30 29   ANIONGAP 1* 2*   GLC 90 92   BUN 15 17   CR 0.81 1.01   CODY 10.0 9.0     CA 27-29 = 12  Vitamin B12 = 565  TSH = 1.03    PATHOLOGY:  Reviewed as per HPI.    IMAGING:  Reviewed as per HPI.    ASSESSMENT/PLAN:  Suma Calabrese is a 65 year old female with the following issues:  1. Stage IA, pF5c-N1-K3, grade 1 invasive lobular carcinoma of the right upper outer breast, ER/AL positive, HER-2/selena negative, Oncotype DX = 13  2. Stage IA, nS2p-W5-M5, grade 1 invasive ductal carcinoma of the left lower outer " "breast, ER/SD positive, HER-2/selena negative  --Hansa is s/p bilateral mastectomies with negative surgical margins.  --She is currently on anastrazole, tolerating this well with no clinical evidence for recurrent based on physical exam.  --Recommended total 5 years of hormone blockade therapy.  --I discussed that as per ASCO and NCCN guidelines, routine labs and imaging are not recommended for breast cancer surveillance as they have not been shown to improve overall survival.    2. Osteopenia  --Last DEXA from 9/2/2020 showed stable osteopenia.  --She did not tolerate Zometa.  --I recommended adequate calcium, vitamin D, and weight-bearing exercise.  --I recommended she try alendronate once weekly to counteract bone density lowering effects of anastrazole for the duration of her hormone blockade therapy.  I discussed potential adverse effects of alendronate and she is willing to try it. Advised that she take alendronate with 2 full glasses of water once weekly in upright position.  --Plan to repeat DEXA in 9/2022.    Return in 6 months.    Serena Peoples MD  Hematology/Oncology  [facility]    Total time spent: 40 minutes in review of patient's records, patient evaluation, discussion of plan of care, and documentation.      Oncology Rooming Note    March 15, 2021 12:38 PM   Suma Calabrese is a 65 year old female who presents for:    Chief Complaint   Patient presents with     Oncology Clinic Visit     Tubular adenoma of colon     Initial Vitals: LMP  (LMP Unknown)  Estimated body mass index is 20.18 kg/m  as calculated from the following:    Height as of 10/9/19: 1.676 m (5' 6\").    Weight as of 9/9/20: 56.7 kg (125 lb). There is no height or weight on file to calculate BSA.  Data Unavailable Comment: Data Unavailable   No LMP recorded (lmp unknown). Patient is postmenopausal.  Allergies reviewed: Yes  Medications reviewed: Yes    Medications: Medication refills not needed today.  Pharmacy " name entered into [facility]:    [id number] - [place], [place] - [address] AT [facility] [address]  [facility] #[id number] - [place], [place] - [address] AT [facility] [address]    Clinical concerns: No       Suma Neil MA                Again, thank you for allowing me to participate in the care of your patient.        Sincerely,        Serena Peoples MD

## 2021-03-15 NOTE — PROGRESS NOTES
"Oncology Rooming Note    March 15, 2021 12:38 PM   Suma Calabrese is a 65 year old female who presents for:    Chief Complaint   Patient presents with     Oncology Clinic Visit     Tubular adenoma of colon     Initial Vitals: LMP  (LMP Unknown)  Estimated body mass index is 20.18 kg/m  as calculated from the following:    Height as of 10/9/19: 1.676 m (5' 6\").    Weight as of 9/9/20: 56.7 kg (125 lb). There is no height or weight on file to calculate BSA.  Data Unavailable Comment: Data Unavailable   No LMP recorded (lmp unknown). Patient is postmenopausal.  Allergies reviewed: Yes  Medications reviewed: Yes    Medications: Medication refills not needed today.  Pharmacy name entered into [facility]:    [facility] #[id number] - [place], [place] - [address] AT [facility] & [address] [place] [address] [facility]ORE #[id number] - [place], [place] - [address] AT [facility] [address]    Clinical concerns: No       Suma Neil MA            "

## 2021-05-08 ENCOUNTER — HEALTH MAINTENANCE LETTER (OUTPATIENT)
Age: 66
End: 2021-05-08

## 2021-06-09 DIAGNOSIS — Z17.0 BILATERAL MALIGNANT NEOPLASM OF BREAST IN FEMALE, ESTROGEN RECEPTOR POSITIVE, UNSPECIFIED SITE OF BREAST (H): ICD-10-CM

## 2021-06-09 DIAGNOSIS — C50.912 BILATERAL MALIGNANT NEOPLASM OF BREAST IN FEMALE, ESTROGEN RECEPTOR POSITIVE, UNSPECIFIED SITE OF BREAST (H): ICD-10-CM

## 2021-06-09 DIAGNOSIS — C50.911 BILATERAL MALIGNANT NEOPLASM OF BREAST IN FEMALE, ESTROGEN RECEPTOR POSITIVE, UNSPECIFIED SITE OF BREAST (H): ICD-10-CM

## 2021-06-09 RX ORDER — ANASTROZOLE 1 MG/1
TABLET ORAL
Qty: 90 TABLET | Refills: 3 | Status: SHIPPED | OUTPATIENT
Start: 2021-06-09 | End: 2021-06-15

## 2021-06-09 NOTE — TELEPHONE ENCOUNTER
Per last visit note with Dr. Peoples 3/15/21:  --She is currently on anastrazole, tolerating this well with no clinical evidence for recurrent based on physical exam.  --Recommended total 5 years of hormone blockade therapy.    Will route to Dr. Peoples to review/sign if appropriate.     Lety Heck, BRENDANN, RN, PHN, OCN  Oncology Care Coordinator  [facility]

## 2021-06-15 DIAGNOSIS — C50.911 BILATERAL MALIGNANT NEOPLASM OF BREAST IN FEMALE, ESTROGEN RECEPTOR POSITIVE, UNSPECIFIED SITE OF BREAST (H): ICD-10-CM

## 2021-06-15 DIAGNOSIS — C50.912 BILATERAL MALIGNANT NEOPLASM OF BREAST IN FEMALE, ESTROGEN RECEPTOR POSITIVE, UNSPECIFIED SITE OF BREAST (H): ICD-10-CM

## 2021-06-15 DIAGNOSIS — Z17.0 BILATERAL MALIGNANT NEOPLASM OF BREAST IN FEMALE, ESTROGEN RECEPTOR POSITIVE, UNSPECIFIED SITE OF BREAST (H): ICD-10-CM

## 2021-06-15 RX ORDER — ANASTROZOLE 1 MG/1
1 TABLET ORAL DAILY
Qty: 90 TABLET | Refills: 3 | Status: SHIPPED | OUTPATIENT
Start: 2021-06-15 | End: 2022-04-11

## 2021-07-28 ENCOUNTER — TELEPHONE (OUTPATIENT)
Dept: ONCOLOGY | Facility: CLINIC | Age: 66
End: 2021-07-28

## 2021-09-23 ENCOUNTER — MEDICAL CORRESPONDENCE (OUTPATIENT)
Dept: HEALTH INFORMATION MANAGEMENT | Facility: CLINIC | Age: 66
End: 2021-09-23
Payer: MEDICARE

## 2021-10-05 NOTE — PROGRESS NOTES
Oncology/Hematology Visit Note  Oct 8, 2021    Reason for Visit: Follow up of bilateral breast cancer     History of Present Illness: Suma Calabrese is a 66 year old female who presents with the following oncologic history:  --She self-felt a breast lump on the right side in 09/2017.    --9/2017 Mammogram showed 11 o'clock, 3 cm from the nipple on the right breast, which is right upper outer quadrant, 1.1 cm hypoechoic mass.    --This was biopsied in 09/2017, identified grade 2 invasive lobular cancer, ER greater than 95% positive, NV greater than 25% positive; HER-2/selena was negative.    --She met with Dr. Salazar, discussed  surgical options at that time.   --Breast MRI showed a left breast nodule 3 to 4 o'clock position 4 cm from the nipple, 4 mm enhancing focus.  This is at the central lateral portion of the left breast.  Second look ultrasound confirmed the lesion, subcentimeter in size.    --Biopsy was performed in 10/2017 on the left side, identified radial scar, no evidence of malignancy.    --She subsequently underwent double mastectomy with Dr. Salazar mid 10/2017.  Pathology showed left invasive grade 1 IDC  0.5 cm at 3 o'clock position 9 cm from the nipple, % positive, NV 80% positive, HER-2/selena negative. Margins were clear.  Lymphovascular invasion absent. Two lymph nodes were negative.  This was staged at [id number].    --On the right side, pathology showed 2 cm invasive lobular cancer, grade 1 associated with LCIS, no lymphovascular invasion.  One sentinel lymph node was negative.  Therefore stage [id number] disease.    --Oncotype DX score = 13, corresponding to 10-year risk of distant recurrence of 8% after 5 years of hormone blockade therapy.     --She started anastrazole 12/2017.   --She could not tolerate alendronate and therefore discontinued it. She had adverse reaction to Zometa, and declined further doses.    Interval History:  Hansa was seen today for follow-up. Overall she is doing well.  She has noted an area of swelling in her left chest wall that she isn't sure how long it has been there but it wasn't when she saw Dr. Peoples last. She denies any weight changes to explain the swelling. It is not painful unless she pushes on it too often. She denies any other chest wall concerns. Her left axilla feels tight since surgery, unchanged. Hasn't had issues with lymphedema.    She does have mild joint aches with anastrozole, mainly in her hips and knees, no focal areas of pain. The soreness is worse at night or with prolonged sitting and improves with activity. She does not take any pain medications. Denies mood issues or significant hot flashes.     She did not start alendronate due to concerns with poor tolerance as she had in the past. She is taking calcium/vit D and exercising. She is still smoking.    She denies fevers, headaches, dizziness, chest pain, SOB, cough, nausea, vomiting, abdominal pain, bowel or bladder concerns (has ostomy, no changes to output or blood in stool). She is overdue to see PCP.     Current Outpatient Medications   Medication Sig Dispense Refill     alendronate (FOSAMAX) 70 MG tablet Take 1 tablet (70 mg) by mouth every 7 days 30 tablet 1     anastrozole (ARIMIDEX) 1 MG tablet Take 1 tablet (1 mg) by mouth daily 90 tablet 3     Ibuprofen (ADVIL PO) Take 200 mg by mouth every 4 hours as needed for moderate pain       levothyroxine (SYNTHROID/LEVOTHROID) 75 MCG tablet Take 1 tablet (75 mcg) by mouth daily 90 tablet 4     Multiple Vitamins-Minerals (CENTRUM SILVER) per tablet Take 1 tablet by mouth daily       multivitamin (CENTRUM SILVER) tablet Take 1 tablet by mouth daily       VITAMIN D, CHOLECALCIFEROL, PO Take 1,000 Units by mouth every morning       Physical Examination:  BP (!) 155/87   Pulse 93   Resp 16   Wt 56 kg (123 lb 6.4 oz)   LMP  (LMP Unknown)   SpO2 99%   BMI 19.62 kg/m    Wt Readings from Last 10 Encounters:   03/15/21 57.9 kg (127 lb 9.6 oz)   09/09/20  56.7 kg (125 lb)   10/09/19 55.9 kg (123 lb 3.2 oz)   05/13/19 59 kg (130 lb)   04/19/19 59.9 kg (132 lb)   04/10/19 60.6 kg (133 lb 9.6 oz)   02/07/19 62.6 kg (138 lb)   12/24/18 63.5 kg (140 lb)   09/12/18 62.1 kg (136 lb 12.8 oz)   03/14/18 59.9 kg (132 lb)     Constitutional: Well-appearing female in no acute distress.  Eyes: EOMI, PERRL. No scleral icterus.  ENT: Deferred   Lymphatic: Neck is supple without cervical or supraclavicular lymphadenopathy. No axillary lymphadenopathy. Mild cording in L axilla.  Breast: Surgically absent. No palpable nodules or masses. There is a focal area of swelling in mid inferior chest wall, no overlying erythema or tenderness, fluctuant without any firm nodules.   Cardiovascular: Regular rate and rhythm. No murmurs, gallops, or rubs. No peripheral edema.  Respiratory: Clear to auscultation bilaterally. No wheezes or crackles.  Gastrointestinal: Bowel sounds present. Abdomen soft, non-tender. No palpable hepatosplenomegaly or masses. Ostomy in place.   Neurologic: Cranial nerves II through XII are grossly intact.  Skin: No rashes, petechiae, or bruising noted on exposed skin.    Laboratory Data:  No labs to review       Assessment and Plan:  Suma Calabrese is a 66 year old female with the following issues:    1. Bilateral Breast Cancer  Stage IA, [id number]-W6-L4, grade 1 invasive lobular carcinoma of the right upper outer breast, ER/HI positive, HER-2/selena negative, Oncotype DX = 13   Stage IA, [id number]-W6-V0, grade 1 invasive ductal carcinoma of the left lower outer breast, ER/HI positive, HER-2/selena negative  --Hansa is s/p bilateral mastectomies with negative surgical margins.  --She is currently on anastrazole, tolerating this well with no clinical evidence for recurrent based on physical exam. She will monitor   --She does have focal area of L chest wall swelling, possibly seroma? Will get US to assess.  --Recommended total 5 years of hormone blockade therapy.  --Dr. Peoples follow-up  in 6 months     2. Osteopenia  --Last DEXA from 9/2/2020 showed stable osteopenia.  --She did not tolerate Zometa.  --Continue adequate calcium, vitamin D, and weight-bearing exercise.  --She does not want to retry alendronate. As such will discussed risks of ongoing anastrozole without bone strengthening medication   --Plan to repeat DEXA in 9/2022.    3. Hypertension  --Initial reading was elevated at 155/87. Recheck levels continued to elevate to 180s/100s, concern for anxiety contributing. Patient asymptomatic specifically no headaches or vision changes. Discussed going to ED now vs restarting Dyazide today (was on in past) and getting BP machine to monitor at home and go to ED if continues to elevate or she develops a headache. She opted to  med and monitor at home. She understands reasons to go to ED. I will call her later to ensure improvement in BP.   --Asked her to follow-up with PCP as she will need lab and BP monitoring with restarting Dyazide     65 minutes spent on the date of the encounter doing chart review, patient visit and documentation     Ruben Villeda PA-C  [facility]  [address], [place] [id number] [phone]

## 2021-10-08 ENCOUNTER — ONCOLOGY VISIT (OUTPATIENT)
Dept: ONCOLOGY | Facility: CLINIC | Age: 66
End: 2021-10-08
Attending: INTERNAL MEDICINE
Payer: MEDICARE

## 2021-10-08 VITALS
WEIGHT: 123.4 LBS | OXYGEN SATURATION: 99 % | RESPIRATION RATE: 16 BRPM | HEART RATE: 93 BPM | BODY MASS INDEX: 19.62 KG/M2 | SYSTOLIC BLOOD PRESSURE: 180 MMHG | DIASTOLIC BLOOD PRESSURE: 114 MMHG

## 2021-10-08 DIAGNOSIS — R22.2 LOCALIZED SWELLING OF CHEST WALL: ICD-10-CM

## 2021-10-08 DIAGNOSIS — Z17.0 MALIGNANT NEOPLASM OF LOWER-OUTER QUADRANT OF LEFT BREAST OF FEMALE, ESTROGEN RECEPTOR POSITIVE (H): ICD-10-CM

## 2021-10-08 DIAGNOSIS — I10 ESSENTIAL HYPERTENSION: ICD-10-CM

## 2021-10-08 DIAGNOSIS — C50.512 MALIGNANT NEOPLASM OF LOWER-OUTER QUADRANT OF LEFT BREAST OF FEMALE, ESTROGEN RECEPTOR POSITIVE (H): ICD-10-CM

## 2021-10-08 DIAGNOSIS — Z17.0 MALIGNANT NEOPLASM OF UPPER-OUTER QUADRANT OF RIGHT BREAST IN FEMALE, ESTROGEN RECEPTOR POSITIVE (H): Primary | ICD-10-CM

## 2021-10-08 DIAGNOSIS — C50.411 MALIGNANT NEOPLASM OF UPPER-OUTER QUADRANT OF RIGHT BREAST IN FEMALE, ESTROGEN RECEPTOR POSITIVE (H): Primary | ICD-10-CM

## 2021-10-08 PROCEDURE — 99215 OFFICE O/P EST HI 40 MIN: CPT | Performed by: PHYSICIAN ASSISTANT

## 2021-10-08 RX ORDER — TRIAMTERENE AND HYDROCHLOROTHIAZIDE 37.5; 25 MG/1; MG/1
1 CAPSULE ORAL DAILY
Qty: 30 CAPSULE | Refills: 0 | Status: SHIPPED | OUTPATIENT
Start: 2021-10-08 | End: 2021-10-22

## 2021-10-08 ASSESSMENT — PAIN SCALES - GENERAL: PAINLEVEL: NO PAIN (0)

## 2021-10-08 NOTE — PROGRESS NOTES
"Oncology Rooming Note    October 8, 2021 12:32 PM   Suma Calabrese is a 66 year old female who presents for:    Chief Complaint   Patient presents with     Oncology Clinic Visit     Initial Vitals: BP (!) 155/87   Pulse 93   Resp 16   Wt 56 kg (123 lb 6.4 oz)   LMP  (LMP Unknown)   SpO2 99%   BMI 19.62 kg/m   Estimated body mass index is 19.62 kg/m  as calculated from the following:    Height as of 3/15/21: 1.689 m (5' 6.5\").    Weight as of this encounter: 56 kg (123 lb 6.4 oz). Body surface area is 1.62 meters squared.  No Pain (0) Comment: Data Unavailable   No LMP recorded (lmp unknown). Patient is postmenopausal.  Allergies reviewed: Yes  Medications reviewed: Yes    Medications: Medication refills not needed today.  Pharmacy name entered into EPIC:    [id number] - [place], [place] - [address] AT [facility] [address]  [facility] #[id number] - [place], [place] - [address] AT [facility] [address]    Clinical concerns: no      Gala Kidd CMA            "

## 2021-10-09 ENCOUNTER — HOSPITAL ENCOUNTER (EMERGENCY)
Facility: CLINIC | Age: 66
Discharge: HOME | End: 2021-10-09
Attending: EMERGENCY MEDICINE | Admitting: EMERGENCY MEDICINE
Payer: MEDICARE

## 2021-10-09 VITALS
SYSTOLIC BLOOD PRESSURE: 134 MMHG | OXYGEN SATURATION: 98 % | TEMPERATURE: 97.8 F | HEART RATE: 89 BPM | DIASTOLIC BLOOD PRESSURE: 93 MMHG | BODY MASS INDEX: 19.92 KG/M2 | RESPIRATION RATE: 18 BRPM | HEIGHT: 66 IN

## 2021-10-09 DIAGNOSIS — I10 HYPERTENSION, UNSPECIFIED TYPE: ICD-10-CM

## 2021-10-09 LAB
ANION GAP SERPL CALCULATED.3IONS-SCNC: 5 MMOL/L (ref 3–14)
ATRIAL RATE - MUSE: 79 BPM
BASOPHILS # BLD AUTO: 0 10E3/UL (ref 0–0.2)
BASOPHILS NFR BLD AUTO: 0 %
BUN SERPL-MCNC: 16 MG/DL (ref 7–30)
CALCIUM SERPL-MCNC: 10 MG/DL (ref 8.5–10.1)
CHLORIDE BLD-SCNC: 103 MMOL/L (ref 94–109)
CO2 SERPL-SCNC: 28 MMOL/L (ref 20–32)
CREAT SERPL-MCNC: 1.05 MG/DL (ref 0.52–1.04)
DIASTOLIC BLOOD PRESSURE - MUSE: NORMAL MMHG
EOSINOPHIL # BLD AUTO: 0.1 10E3/UL (ref 0–0.7)
EOSINOPHIL NFR BLD AUTO: 2 %
ERYTHROCYTE [DISTWIDTH] IN BLOOD BY AUTOMATED COUNT: 13 % (ref 10–15)
GFR SERPL CREATININE-BSD FRML MDRD: 55 ML/MIN/1.73M2
GLUCOSE BLD-MCNC: 113 MG/DL (ref 70–99)
HCT VFR BLD AUTO: 45.6 % (ref 35–47)
HGB BLD-MCNC: 15.2 G/DL (ref 11.7–15.7)
IMM GRANULOCYTES # BLD: 0 10E3/UL
IMM GRANULOCYTES NFR BLD: 0 %
INTERPRETATION ECG - MUSE: NORMAL
LYMPHOCYTES # BLD AUTO: 1.2 10E3/UL (ref 0.8–5.3)
LYMPHOCYTES NFR BLD AUTO: 26 %
MCH RBC QN AUTO: 31.8 PG (ref 26.5–33)
MCHC RBC AUTO-ENTMCNC: 33.3 G/DL (ref 31.5–36.5)
MCV RBC AUTO: 95 FL (ref 78–100)
MONOCYTES # BLD AUTO: 0.4 10E3/UL (ref 0–1.3)
MONOCYTES NFR BLD AUTO: 8 %
NEUTROPHILS # BLD AUTO: 2.9 10E3/UL (ref 1.6–8.3)
NEUTROPHILS NFR BLD AUTO: 64 %
NRBC # BLD AUTO: 0 10E3/UL
NRBC BLD AUTO-RTO: 0 /100
P AXIS - MUSE: 83 DEGREES
PLATELET # BLD AUTO: 149 10E3/UL (ref 150–450)
POTASSIUM BLD-SCNC: 3.4 MMOL/L (ref 3.4–5.3)
PR INTERVAL - MUSE: 134 MS
QRS DURATION - MUSE: 82 MS
QT - MUSE: 372 MS
QTC - MUSE: 426 MS
R AXIS - MUSE: -36 DEGREES
RBC # BLD AUTO: 4.78 10E6/UL (ref 3.8–5.2)
SODIUM SERPL-SCNC: 136 MMOL/L (ref 133–144)
SYSTOLIC BLOOD PRESSURE - MUSE: NORMAL MMHG
T AXIS - MUSE: 53 DEGREES
VENTRICULAR RATE- MUSE: 79 BPM
WBC # BLD AUTO: 4.6 10E3/UL (ref 4–11)

## 2021-10-09 PROCEDURE — 93005 ELECTROCARDIOGRAM TRACING: CPT

## 2021-10-09 PROCEDURE — 99284 EMERGENCY DEPT VISIT MOD MDM: CPT

## 2021-10-09 PROCEDURE — 36415 COLL VENOUS BLD VENIPUNCTURE: CPT | Performed by: EMERGENCY MEDICINE

## 2021-10-09 PROCEDURE — 80048 BASIC METABOLIC PNL TOTAL CA: CPT | Performed by: EMERGENCY MEDICINE

## 2021-10-09 PROCEDURE — 85025 COMPLETE CBC W/AUTO DIFF WBC: CPT | Performed by: EMERGENCY MEDICINE

## 2021-10-09 ASSESSMENT — ENCOUNTER SYMPTOMS
SHORTNESS OF BREATH: 0
HEADACHES: 0

## 2021-10-09 NOTE — ED TRIAGE NOTES
"Blood pressure elevated at check up yesterday. Pt. Started blood pressure med at 230pm and continues to be around 150s/90s. Pt is anxious and \"It got me scared\" Denies headache, N/V or vision changes.  "

## 2021-10-09 NOTE — ED PROVIDER NOTES
History   Chief Complaint:  Hypertension       HPI   Suma Calabrese is a 66 year old female with history of hypertension, hyperlipidemia, and malignant neoplasm of upper-outer quadrant of right breast who presents with hypertension. Patient reports she was at her oncology clinic yesterday and her blood pressure was running high. She just started blood pressure medications yesterday and her PA recommended she come be evaluated in the emergency department. She also notes she recently got a blood pressure cuff and has been checking her blood pressure 3 times per day. She denies any pain, swelling, or vision changes    Review of Systems   Eyes: Negative for visual disturbance.   Respiratory: Negative for shortness of breath.    Cardiovascular: Negative for chest pain and leg swelling.   Neurological: Negative for headaches.   All other systems reviewed and are negative.    Allergies:  Bupropion  Erythromycin  Oxycodone    Medications:  Fosamax  Arimidex  Levothyroxine   Dyazide  Vitamin D    Past Medical History:     Diverticulitis of colon  Hypertension  Nontoxic uninodular goiter  Hypercholesterolemia  TMJ  Rectal prolapse  Internal hemorrhoids   Tobacco use disorder   Hyperlipidemia  Hypothyroidism due to acquired atrophy of thyroid  Malignant neoplasm of upper-outer quadrant of right breast in female  Hyponatremia  Hypokalemia  Diverticulitis of large intestine   Tubular adenoma of colon  Colostomy in place  Vitamin B12 deficiency  Moderate malnutrition  Dysfunction of right eustachian tube  Hearing loss of right ear  Osteopenia      Past Surgical History:    Bilateral biopsy node sentinel  Bladder dilitation  Colectomy subtotal; exploratory laparotomy, open subtotal colectomy, end ileostomy  [place] tooth extraction  Ileostomy  Laparotomy exploratory  Mastectomy simple bilateral     Social History:  The patient presents alone.     Physical Exam     Patient Vitals for the past 24 hrs:   BP Temp Temp src Pulse  "Resp SpO2 Height   10/09/21 0934 (!) 157/99 -- -- -- -- -- --   10/09/21 0911 (!) 168/104 -- -- -- -- -- --   10/09/21 0855 (!) 164/98 97.8  F (36.6  C) Temporal 113 18 98 % 1.676 m (5' 6\")       Physical Exam  General: Alert, interactive  Head:  Scalp is atraumatic  Eyes:  The pupils are equal, round, and reactive to light    EOM's intact    No scleral icterus  ENT:      Nose:  The external nose is normal  Ears:  External ears are normal  Mouth/Throat: The oropharynx is normal    Mucus membranes are moist       Neck:  Normal range of motion.      There is no rigidity.    Trachea is in the midline         CV:  Regular rate and rhythm    No murmur, no peripheral edema  Resp:  Breath sounds are clear bilaterally    Non-labored, no retractions or accessory muscle use      GI:  Abdomen is soft, no distension, no tenderness. Ostomy present in the abdomen.       MS:  Normal strength in all 4 extremities  Skin:  Warm and dry, No rash or lesions noted.  Neuro: Strength 5/5 x4.  Sensation intact  In all 4 extremities.      Cranial nerves 2-12 grossly intact.    GCS: 15  Psych:  Awake. Alert.  Anxious affect.      Appropriate interactions.    Emergency Department Course   ECG  ECG obtained at 0938, ECG read at 0944  Normal sinus rhythm  Possible Left atrial enlargement  Left axis deviation  Anteroseptal infarct, age undetermined   No significant changes as compared to prior, dated 10/11/17.  Rate 79 bpm. DC interval 134 ms. QRS duration 82 ms. QT/QTc 372/426 ms. P-R-T axes 83 -36 53.     Laboratory:  BMP: Creatinine 1.05 (H), Glucose 113 (H), GFR Estimate 55 (L) o/w WNL  CBC: WBC 4.6, HGB 15.2,  (L)      Emergency Department Course:  Reviewed:  I reviewed nursing notes, vitals, past medical history and Care Everywhere    Assessments:  0923 I obtained history and examined the patient as noted above.     Disposition:  The patient was discharged to home.     Impression & Plan     Medical Decision Making:  Following " presentation history and physical examination were performed, outpatient records were reviewed.  Patient has had hypertension in the past has been off of medications.  She was noted to be hypertensive at her oncology clinic yesterday and they suggested emergency department evaluation.  The patient is asymptomatic with no signs of endorgan damage.  She had reinitiated her antihypertensive yesterday and is taken 1 dose.  She took her home dose in the ED this morning.  Her blood pressure is not in a concerning range and I believe she can safely be discharged home I have advised checking her blood pressure twice daily keeping a log and following up with her primary care provider she will return here if new symptoms develop.  Patient was in agreement this plan and subsequently discharged home.        Diagnosis:    ICD-10-CM    1. Hypertension, unspecified type  I10        Discharge Medications:  New Prescriptions    No medications on file       Scribe Disclosure:  Lenora SAUL, am serving as a scribe at 9:23 AM on 10/9/2021 to document services personally performed by Govind Wu, based on my observations and the provider's statements to me.            Govind Wu MD  10/09/21 1140

## 2021-10-14 ENCOUNTER — HOSPITAL ENCOUNTER (OUTPATIENT)
Dept: MAMMOGRAPHY | Facility: CLINIC | Age: 66
Discharge: HOME | End: 2021-10-14
Attending: PHYSICIAN ASSISTANT | Admitting: PHYSICIAN ASSISTANT
Payer: MEDICARE

## 2021-10-14 DIAGNOSIS — C50.411 MALIGNANT NEOPLASM OF UPPER-OUTER QUADRANT OF RIGHT BREAST IN FEMALE, ESTROGEN RECEPTOR POSITIVE (H): ICD-10-CM

## 2021-10-14 DIAGNOSIS — Z17.0 MALIGNANT NEOPLASM OF UPPER-OUTER QUADRANT OF RIGHT BREAST IN FEMALE, ESTROGEN RECEPTOR POSITIVE (H): ICD-10-CM

## 2021-10-14 DIAGNOSIS — R22.2 LOCALIZED SWELLING OF CHEST WALL: ICD-10-CM

## 2021-10-14 PROCEDURE — 76642 ULTRASOUND BREAST LIMITED: CPT | Mod: LT

## 2021-10-22 ENCOUNTER — OFFICE VISIT (OUTPATIENT)
Dept: INTERNAL MEDICINE | Facility: CLINIC | Age: 66
End: 2021-10-22
Payer: MEDICARE

## 2021-10-22 VITALS
DIASTOLIC BLOOD PRESSURE: 78 MMHG | TEMPERATURE: 97 F | OXYGEN SATURATION: 99 % | BODY MASS INDEX: 20.18 KG/M2 | SYSTOLIC BLOOD PRESSURE: 116 MMHG | HEART RATE: 86 BPM | WEIGHT: 125 LBS

## 2021-10-22 DIAGNOSIS — L72.3 SEBACEOUS CYST: ICD-10-CM

## 2021-10-22 DIAGNOSIS — E03.4 HYPOTHYROIDISM DUE TO ACQUIRED ATROPHY OF THYROID: ICD-10-CM

## 2021-10-22 DIAGNOSIS — Z23 ENCOUNTER FOR IMMUNIZATION: ICD-10-CM

## 2021-10-22 DIAGNOSIS — N18.31 STAGE 3A CHRONIC KIDNEY DISEASE (H): ICD-10-CM

## 2021-10-22 DIAGNOSIS — I10 ESSENTIAL HYPERTENSION: Primary | ICD-10-CM

## 2021-10-22 DIAGNOSIS — Z23 HIGH PRIORITY FOR 2019-NCOV VACCINE: ICD-10-CM

## 2021-10-22 PROBLEM — E44.0 MODERATE MALNUTRITION (H): Status: RESOLVED | Noted: 2019-02-07 | Resolved: 2021-10-22

## 2021-10-22 PROBLEM — N18.30 CHRONIC KIDNEY DISEASE, STAGE 3 (H): Status: ACTIVE | Noted: 2021-10-22

## 2021-10-22 PROCEDURE — 91300 COVID-19,PF,PFIZER (12+ YRS): CPT | Performed by: INTERNAL MEDICINE

## 2021-10-22 PROCEDURE — 90662 IIV NO PRSV INCREASED AG IM: CPT | Performed by: INTERNAL MEDICINE

## 2021-10-22 PROCEDURE — 36415 COLL VENOUS BLD VENIPUNCTURE: CPT | Performed by: INTERNAL MEDICINE

## 2021-10-22 PROCEDURE — 80048 BASIC METABOLIC PNL TOTAL CA: CPT | Performed by: INTERNAL MEDICINE

## 2021-10-22 PROCEDURE — 0004A COVID-19,PF,PFIZER (12+ YRS): CPT | Performed by: INTERNAL MEDICINE

## 2021-10-22 PROCEDURE — 84443 ASSAY THYROID STIM HORMONE: CPT | Performed by: INTERNAL MEDICINE

## 2021-10-22 PROCEDURE — 90471 IMMUNIZATION ADMIN: CPT | Performed by: INTERNAL MEDICINE

## 2021-10-22 PROCEDURE — 99214 OFFICE O/P EST MOD 30 MIN: CPT | Mod: 25 | Performed by: INTERNAL MEDICINE

## 2021-10-22 RX ORDER — LISINOPRIL AND HYDROCHLOROTHIAZIDE 20; 25 MG/1; MG/1
1 TABLET ORAL DAILY
Qty: 60 TABLET | Refills: 0 | Status: SHIPPED | OUTPATIENT
Start: 2021-10-22 | End: 2021-10-26

## 2021-10-22 NOTE — PATIENT INSTRUCTIONS
- I will send you a message on [employer] when I am able to look at the results of your tests from today  - I have placed the order for a repeat blood test and you can make a non-fasting lab-only appointment via [employer] or by calling into our clinic. You do not need to see me again to have this blood work done as I have already placed the order.

## 2021-10-22 NOTE — PROGRESS NOTES
Assessment & Plan     Essential hypertension  BP looks good today but her home readings show she's pretty consistently >130/90. Discussed new guidelines targeting <130/80. After some discussion, we decided to STOP the triamterene and switch to lisinopril to see if we can get a bit better BP control throughout the day. Counseled on risk of dry cough, angioedema, kidney dysfunction, hyperkalemia. Will check labs today and again in 2-4 weeks. Encouraged to continue checking BP at home and to let me know via MyChart or f/u visit what those readings are in case we need to further titrate medication to obtain optimal BP control.   - lisinopril-hydrochlorothiazide (ZESTORETIC) 20-25 MG tablet; Take 1 tablet by mouth daily  - Basic metabolic panel; Future  - Basic metabolic panel; Future    Hypothyroidism due to acquired atrophy of thyroid  Last TSH in Feb 2021. Tolerating dose well. Will re-check today per patient request as we are checking labs anyway.  - TSH; Future    Sebaceous cyst  DDX includes recurrent abscess vs cyst vs other. Derm referral placed as I feel this is more likely a recurrent cyst than an abscess given that it has been ongoing for years.  - Adult Dermatology Referral; Future    Stage 3a chronic kidney disease (H)  Known issue that I take into account for their medical decisions, no current exacerbations or new concerns. BMP today.    Encounter for immunization  - [place] FLU VACCINE, INCREASED ANTIGEN, PRESV FREE ([id number])    High priority for 2019-nCoV vaccine  - COVID-19,PF,PFIZER    F/u in 2-4 weeks for non-fasting lab only appointment, 3-6 months for BP check    Erik Tom MD  [facility]    Subjective   Hansa is a 66 year old who presents today to follow-up on her blood pressure. This is the first time I have met Hansa. She was found to have elevated BP at a recent oncology visit. She was started on hydrochlorothiazide-triamterene. She then presented to the ER the  next day and was found to be stable. She presents with her BP log. She would like me to take a look at this longstanding (years) lump on her back that occasionally pops and drains a white-yessenia fluid.    Review of Systems   Constitutional, HEENT, cardiovascular, pulmonary, derm systems are negative, except as otherwise noted.      Objective    /78   Pulse 86   Temp 97  F (36.1  C) (Tympanic)   Wt 56.7 kg (125 lb)   LMP  (LMP Unknown)   SpO2 99%   BMI 20.18 kg/m    Body mass index is 20.18 kg/m .     Physical Exam   GENERAL: alert and in no distress.  EYES: conjunctivae/corneas clear. EOMs grossly intact  HENT: NC/AT, facies symmetric.  RESP: CTAB, no w/r/r.  CV: RRR, no m/r/g.  GI: NT, ND, without rebound tenderness or guarding  MSK: No edema. Moves all four extremities freely.  SKIN: ~2cm fluctuant mass in center of back. No erythema or drainage noted.  NEURO: Alert. Oriented.

## 2021-10-24 LAB
ANION GAP SERPL CALCULATED.3IONS-SCNC: 9 MMOL/L (ref 3–14)
BUN SERPL-MCNC: 18 MG/DL (ref 7–30)
CALCIUM SERPL-MCNC: 10 MG/DL (ref 8.5–10.1)
CHLORIDE BLD-SCNC: 99 MMOL/L (ref 94–109)
CO2 SERPL-SCNC: 27 MMOL/L (ref 20–32)
CREAT SERPL-MCNC: 0.76 MG/DL (ref 0.52–1.04)
GFR SERPL CREATININE-BSD FRML MDRD: 81 ML/MIN/1.73M2
GLUCOSE BLD-MCNC: 78 MG/DL (ref 70–99)
POTASSIUM BLD-SCNC: 4.2 MMOL/L (ref 3.4–5.3)
SODIUM SERPL-SCNC: 135 MMOL/L (ref 133–144)
TSH SERPL DL<=0.005 MIU/L-ACNC: 3.1 MU/L (ref 0.4–4)

## 2021-10-26 RX ORDER — TRIAMTERENE AND HYDROCHLOROTHIAZIDE 37.5; 25 MG/1; MG/1
1 CAPSULE ORAL DAILY
Qty: 90 CAPSULE | Refills: 1 | Status: SHIPPED | OUTPATIENT
Start: 2021-10-26 | End: 2022-04-14

## 2021-10-27 ENCOUNTER — APPOINTMENT (OUTPATIENT)
Dept: CT IMAGING | Facility: CLINIC | Age: 66
End: 2021-10-27
Attending: EMERGENCY MEDICINE
Payer: MEDICARE

## 2021-10-27 ENCOUNTER — HOSPITAL ENCOUNTER (EMERGENCY)
Facility: CLINIC | Age: 66
Discharge: HOME | End: 2021-10-27
Attending: EMERGENCY MEDICINE | Admitting: EMERGENCY MEDICINE
Payer: MEDICARE

## 2021-10-27 VITALS
WEIGHT: 125 LBS | DIASTOLIC BLOOD PRESSURE: 73 MMHG | TEMPERATURE: 97.8 F | RESPIRATION RATE: 16 BRPM | SYSTOLIC BLOOD PRESSURE: 125 MMHG | BODY MASS INDEX: 20.09 KG/M2 | HEIGHT: 66 IN | HEART RATE: 68 BPM | OXYGEN SATURATION: 100 %

## 2021-10-27 DIAGNOSIS — R10.32 LLQ ABDOMINAL PAIN: ICD-10-CM

## 2021-10-27 LAB
ALBUMIN SERPL-MCNC: 3.4 G/DL (ref 3.4–5)
ALBUMIN UR-MCNC: NEGATIVE MG/DL
ALP SERPL-CCNC: 89 U/L (ref 40–150)
ALT SERPL W P-5'-P-CCNC: 21 U/L (ref 0–50)
ANION GAP SERPL CALCULATED.3IONS-SCNC: 5 MMOL/L (ref 3–14)
APPEARANCE UR: CLEAR
AST SERPL W P-5'-P-CCNC: 19 U/L (ref 0–45)
BACTERIA #/AREA URNS HPF: ABNORMAL /HPF
BASOPHILS # BLD AUTO: 0 10E3/UL (ref 0–0.2)
BASOPHILS NFR BLD AUTO: 0 %
BILIRUB SERPL-MCNC: 0.8 MG/DL (ref 0.2–1.3)
BILIRUB UR QL STRIP: NEGATIVE
BUN SERPL-MCNC: 13 MG/DL (ref 7–30)
CALCIUM SERPL-MCNC: 8.8 MG/DL (ref 8.5–10.1)
CHLORIDE BLD-SCNC: 98 MMOL/L (ref 94–109)
CO2 SERPL-SCNC: 26 MMOL/L (ref 20–32)
COLOR UR AUTO: ABNORMAL
CREAT SERPL-MCNC: 0.8 MG/DL (ref 0.52–1.04)
EOSINOPHIL # BLD AUTO: 0.1 10E3/UL (ref 0–0.7)
EOSINOPHIL NFR BLD AUTO: 3 %
ERYTHROCYTE [DISTWIDTH] IN BLOOD BY AUTOMATED COUNT: 11.8 % (ref 10–15)
GFR SERPL CREATININE-BSD FRML MDRD: 76 ML/MIN/1.73M2
GLUCOSE BLD-MCNC: 89 MG/DL (ref 70–99)
GLUCOSE UR STRIP-MCNC: NEGATIVE MG/DL
HCT VFR BLD AUTO: 40.7 % (ref 35–47)
HGB BLD-MCNC: 14.1 G/DL (ref 11.7–15.7)
HGB UR QL STRIP: NEGATIVE
HOLD SPECIMEN: NORMAL
IMM GRANULOCYTES # BLD: 0 10E3/UL
IMM GRANULOCYTES NFR BLD: 0 %
KETONES UR STRIP-MCNC: NEGATIVE MG/DL
LEUKOCYTE ESTERASE UR QL STRIP: NEGATIVE
LIPASE SERPL-CCNC: 99 U/L (ref 73–393)
LYMPHOCYTES # BLD AUTO: 1.6 10E3/UL (ref 0.8–5.3)
LYMPHOCYTES NFR BLD AUTO: 32 %
MCH RBC QN AUTO: 32 PG (ref 26.5–33)
MCHC RBC AUTO-ENTMCNC: 34.6 G/DL (ref 31.5–36.5)
MCV RBC AUTO: 92 FL (ref 78–100)
MONOCYTES # BLD AUTO: 0.4 10E3/UL (ref 0–1.3)
MONOCYTES NFR BLD AUTO: 7 %
NEUTROPHILS # BLD AUTO: 3 10E3/UL (ref 1.6–8.3)
NEUTROPHILS NFR BLD AUTO: 58 %
NITRATE UR QL: NEGATIVE
NRBC # BLD AUTO: 0 10E3/UL
NRBC BLD AUTO-RTO: 0 /100
PH UR STRIP: 5.5 [PH] (ref 5–7)
PLATELET # BLD AUTO: 169 10E3/UL (ref 150–450)
POTASSIUM BLD-SCNC: 3.5 MMOL/L (ref 3.4–5.3)
PROT SERPL-MCNC: 7 G/DL (ref 6.8–8.8)
RBC # BLD AUTO: 4.41 10E6/UL (ref 3.8–5.2)
RBC URINE: 0 /HPF
SODIUM SERPL-SCNC: 129 MMOL/L (ref 133–144)
SP GR UR STRIP: 1.02 (ref 1–1.03)
UROBILINOGEN UR STRIP-MCNC: NORMAL MG/DL
WBC # BLD AUTO: 5.2 10E3/UL (ref 4–11)
WBC URINE: <1 /HPF

## 2021-10-27 PROCEDURE — 85025 COMPLETE CBC W/AUTO DIFF WBC: CPT | Performed by: EMERGENCY MEDICINE

## 2021-10-27 PROCEDURE — 99285 EMERGENCY DEPT VISIT HI MDM: CPT | Mod: 25

## 2021-10-27 PROCEDURE — 96360 HYDRATION IV INFUSION INIT: CPT | Mod: 59

## 2021-10-27 PROCEDURE — 250N000009 HC RX 250: Performed by: EMERGENCY MEDICINE

## 2021-10-27 PROCEDURE — 83690 ASSAY OF LIPASE: CPT | Performed by: EMERGENCY MEDICINE

## 2021-10-27 PROCEDURE — 250N000011 HC RX IP 250 OP 636: Performed by: EMERGENCY MEDICINE

## 2021-10-27 PROCEDURE — 258N000003 HC RX IP 258 OP 636: Performed by: EMERGENCY MEDICINE

## 2021-10-27 PROCEDURE — 74177 CT ABD & PELVIS W/CONTRAST: CPT

## 2021-10-27 PROCEDURE — 82040 ASSAY OF SERUM ALBUMIN: CPT | Performed by: EMERGENCY MEDICINE

## 2021-10-27 PROCEDURE — 81001 URINALYSIS AUTO W/SCOPE: CPT | Performed by: EMERGENCY MEDICINE

## 2021-10-27 PROCEDURE — 36415 COLL VENOUS BLD VENIPUNCTURE: CPT | Performed by: EMERGENCY MEDICINE

## 2021-10-27 RX ORDER — IOPAMIDOL 755 MG/ML
63 INJECTION, SOLUTION INTRAVASCULAR ONCE
Status: COMPLETED | OUTPATIENT
Start: 2021-10-27 | End: 2021-10-27

## 2021-10-27 RX ADMIN — IOPAMIDOL 63 ML: 755 INJECTION, SOLUTION INTRAVENOUS at 05:02

## 2021-10-27 RX ADMIN — SODIUM CHLORIDE 60 ML: 900 INJECTION INTRAVENOUS at 05:02

## 2021-10-27 RX ADMIN — SODIUM CHLORIDE 1000 ML: 9 INJECTION, SOLUTION INTRAVENOUS at 04:38

## 2021-10-27 ASSESSMENT — MIFFLIN-ST. JEOR: SCORE: 1113.75

## 2021-10-27 ASSESSMENT — ENCOUNTER SYMPTOMS
ABDOMINAL PAIN: 1
SLEEP DISTURBANCE: 1
VOMITING: 0
FEVER: 0
CHILLS: 1

## 2021-10-27 NOTE — ED TRIAGE NOTES
Patient presents with complaints of lower abdominal pain. Patient has a history of bowel surgery, has a colostomy, and says that she is also experiencing some pressure and a burning sensation in her bowels. She denied any nausea or vomiting.

## 2021-10-27 NOTE — ED PROVIDER NOTES
History   Chief Complaint:  Abdominal Pain       The history is provided by the patient.      Suma Calabrese is a 66 year old female with history of breast cancer, diverticulitis, CKD, hypertension, hyperlipidemia, and, hypothyroidism who presents with abdominal pain. Starting 3 days ago in the evening a few hours after eating the patient began having burning abdominal pain. She reports the pain worsened over night and caused her to have a lack of sleep. The next day the pain was not as severe and she was feeling better. Then yesterday the pain returned and started to stay on the left side of the abdomen. It has been constant since then but at times decreases some in severity. The pain decreases when laying down, but does not change when eating. She also reports having some chills. The patient denies any fever or vomiting. She did have similar pain in the past in 2003 and was seen by a doctor. She does not remember the diagnosis she was given but shortly after a shot of penicillin she was feeling better. Four years ago the patient did have a bowel obstruction and at this time had a colostomy bag placed. She reports good stool output today.     Review of Systems   Constitutional: Positive for chills. Negative for fever.   Gastrointestinal: Positive for abdominal pain (Left-sided). Negative for vomiting.   Psychiatric/Behavioral: Positive for sleep disturbance.   All other systems reviewed and are negative.      Allergies:  Bupropion  Erythromycin  Oxycodone    Medications:  anastrozole  levothyroxine   triamterene-HCTZ   vitamin D    Past Medical History:     Breast Cancer   Diverticulitis  Hypertension  Nontoxic uninodular goiter  Pure hypercholesterolemia  TMJ (temporomandibular joint syndrome)  Rectal prolapse  Internal hemorrhoids with other complication  Tobacco use disorder  Hyperlipidemia   Hypothyroidism   Malignant neoplasm of upper-outer quadrant of right breast in  "female  Hyponatremia  Hypokalemia  Tubular adenoma of colon  Colostomy in place  Vitamin B12 deficiency  Dysfunction of right eustachian tube  Hearing loss of right ear  Osteopenia  Chronic kidney disease, stage 3    Rheumatic fever    Past Surgical History:    Biopsy node sentinel   Bladder dilatation  Colectomy subtotal   Dental surgery   Ileostomy   Laparotomy exploratory   mastectomy simple bilateral      Social History:  Presents unaccompanied.   PCP: Owen Jean     Physical Exam     Patient Vitals for the past 24 hrs:   BP Temp Temp src Pulse Resp SpO2 Height Weight   10/27/21 0406 (!) 146/81 97.8  F (36.6  C) Temporal 76 16 100 % 1.676 m (5' 6\") 56.7 kg (125 lb)       Physical Exam    Eye:  Pupils are equal, round, and reactive.  Extraocular movements intact.    ENT:  No rhinorrhea.  Moist mucus membranes.  Normal tongue and tonsil.    Cardiac:  Regular rate and rhythm.  No murmurs, gallops, or rubs.    Pulmonary:  Clear to auscultation bilaterally.  No wheezes, rales, or rhonchi.    Abdomen:  Their is generalized left sided abdominal discomfort to palpation without rebound or guarding. Colostomy in the right abdomen with brown stool in the bag.     Musculoskeletal:  Normal movement of all extremities without evidence for deficit.    Skin:  Warm and dry without rashes.    Neurologic:  Non-focal exam without asymmetric weakness or numbness.     Psychiatric:  Normal affect with appropriate interaction with examiner.    Emergency Department Course     Imaging:  CT Abdomen Pelvis w Contrast   Final Result   IMPRESSION:    1.  No etiology identified to explain patient's lower abdominal pain.        Report per radiology    Laboratory:  Labs Ordered and Resulted from Time of ED Arrival Up to the Time of Departure from the ED   COMPREHENSIVE METABOLIC PANEL - Abnormal; Notable for the following components:       Result Value    Sodium 129 (*)     All other components within normal limits   ROUTINE UA WITH " MICROSCOPIC REFLEX TO CULTURE - Abnormal; Notable for the following components:    Bacteria Urine Few (*)     All other components within normal limits    Narrative:     Urine Culture not indicated   LIPASE - Normal   CBC WITH PLATELETS AND DIFFERENTIAL   EXTRA RED TOP TUBE   PERIPHERAL IV CATHETER   PULSE OXIMETRY NURSING   CBC WITH PLATELETS & DIFFERENTIAL    Narrative:     The following orders were created for panel order CBC with platelets differential.  Procedure                               Abnormality         Status                     ---------                               -----------         ------                     CBC with platelets and d...[[id number]]                      Final result                 Please view results for these tests on the individual orders.   EXTRA TUBE    Narrative:     The following orders were created for panel order Conner Draw.  Procedure                               Abnormality         Status                     ---------                               -----------         ------                     Extra Red Top Tube[[id number]]                               Final result                 Please view results for these tests on the individual orders.     Emergency Department Course:    Reviewed:  I reviewed nursing notes, vitals, past medical history and Care Everywhere    Assessments:  0422 I obtained history and examined the patient as noted above.   0540 I rechecked the patient and explained findings.   0552 I rechecked the patient.   0611 I rechecked the patient and discharged her home.     Interventions:  0438 0.9% sodium chloride bolus, 1,000 mL, IV    Disposition:  The patient was discharged to home.     Impression & Plan     Medical Decision Making:  This 66-year-old woman with a history of breast cancer and partial colectomy with colostomy in place presenting with complaints of having left lower quadrant abdominal pain over the last 24 hours.  Her chief concern was  for the possibility of obstruction, though she notes that she has been having normal stool output through her colostomy.  She notes that tenderness was more diffuse yesterday but more localized on the left side today.  She otherwise denies vomiting or fever.    On my exam, she appears clinically well.  Her vital signs are normal.  Abdominal exam is reassuring with some mild tenderness to the left side.  Because of her complicated history along with her focal pain, labs and a CT were obtained.  Fortunately, all of the studies are unremarkable.  Her urinalysis is clear.    At this, I reassured the patient there is not appear to be a life-threatening process at play.  Exact cause of her discomfort is unclear, though I do not feel she requires admission for serial exams or surgical consult.  The patient is in support of this and will be discharged home.  I advised her to follow-up closely with her primary care doctor in the next 48 hours if not showing improvement with immediate return to us for worsening of condition or other emergent concerns.      Diagnosis:    ICD-10-CM    1. LLQ abdominal pain  R10.32        Scribe Disclosure:  I, Arabella Guerrero, am serving as a scribe at 4:08 AM on 10/27/2021 to document services personally performed by Trierweiler, Chad A, MD based on my observations and the provider's statements to me.            Trierweiler, Chad A, MD  10/27/21 [id number]

## 2022-03-31 ENCOUNTER — TELEPHONE (OUTPATIENT)
Dept: INTERNAL MEDICINE | Facility: CLINIC | Age: 67
End: 2022-03-31
Payer: MEDICARE

## 2022-03-31 NOTE — TELEPHONE ENCOUNTER
Reason for Call: Request for an order or referral:    Order or referral being requested: Lab orders    Date needed: at your convenience    Has the patient been seen by the PCP for this problem? YES    Additional comments: Patient has a virtual apt on 4/12 and she will come in for labs to refill her levothyroxine if possible to get orders placed please advise thank you    Phone number Patient can be reached at:  Home number on file [phone] (home)    Best Time:  anyitme    Can we leave a detailed message on this number?  YES    Call taken on 3/31/2022 at 11:39 AM by Erma Mason

## 2022-04-04 NOTE — TELEPHONE ENCOUNTER
I did see her once prior but it appears she's schedule to see you in 10 days to discuss meds. Happy to order labs to facilitate that discussion if you'd like!

## 2022-04-04 NOTE — TELEPHONE ENCOUNTER
Never seen her but her tsh isn't due until fall 2022. dont see a reason to recheck it from my end.

## 2022-04-04 NOTE — TELEPHONE ENCOUNTER
Neither Dr. Borrero (who is seeing the patient in 2 weeks) nor me (who last saw her) see the need for further lab work. Would recommend she discuss possible lab work at [facility].

## 2022-04-05 NOTE — PROGRESS NOTES
[facility]    Hematology/Oncology Established Patient Follow-up Note      Today's Date: 4/11/2022    Reason for Follow-up: Bilateral breast cancer.    HISTORY OF PRESENT ILLNESS: Suma Calabrese is a 65 year old female who presents with the following oncologic history:  --She self-palpated a breast lump on the right side in 09/2017.    --9/2017 Mammogram showed 11 o'clock, 3 cm from the nipple on the right breast, which is right upper outer quadrant, 1.1 cm hypoechoic mass.    --This was biopsied in 09/2017, identified grade 2 invasive lobular cancer, ER greater than 95% positive, LA greater than 25% positive; HER-2/selena was negative.    --She met with Dr. Salazar, discussed  surgical options at that time.   --Breast MRI showed a left breast nodule 3 to 4 o'clock position 4 cm from the nipple, 4 mm enhancing focus.  This is at the central lateral portion of the left breast.  Second look ultrasound confirmed the lesion, subcentimeter in size.    --Biopsy was performed in 10/2017 on the left side, identified radial scar, no evidence of malignancy.    --She subsequently underwent double mastectomy with Dr. Salazar mid 10/2017.  Pathology showed left invasive grade 1 IDC  0.5 cm at 3 o'clock position 9 cm from the nipple, % positive, LA 80% positive, HER-2/selena negative. Margins were clear.  Lymphovascular invasion absent. Two lymph nodes were negative.  This was staged at lX3k-G8.    --On the right side, pathology showed 2 cm invasive lobular cancer, grade 1 associated with LCIS, no lymphovascular invasion.  One sentinel lymph node was negative.  Therefore stage [id number]-E7 disease.    --Oncotype DX score = 13, corresponding to 10-year risk of distant recurrence of 8% after 5 years of hormone blockade therapy.     --She started anastrazole 12/2017.   --She could not tolerate alendronate and therefore discontinued it. She had adverse reaction to Zometa, and declined further doses.    INTERIM  HISTORY:  Hansa reports feeling well with no new complaints.      REVIEW OF SYSTEMS:   14 point ROS was reviewed and is negative other than as noted above in HPI.       HOME MEDICATIONS:  Current Outpatient Medications   Medication Sig Dispense Refill     anastrozole (ARIMIDEX) 1 MG tablet Take 1 tablet (1 mg) by mouth daily 90 tablet 3     levothyroxine (SYNTHROID/LEVOTHROID) 75 MCG tablet Take 1 tablet (75 mcg) by mouth daily 90 tablet 4     multivitamin (CENTRUM SILVER) tablet Take 1 tablet by mouth daily       triamterene-HCTZ (DYAZIDE) 37.5-25 MG capsule Take 1 capsule by mouth daily 90 capsule 1     VITAMIN D, CHOLECALCIFEROL, PO Take 2,000 Units by mouth every morning           ALLERGIES:  Allergies   Allergen Reactions     Bupropion Other (See Comments)     Jittery and HA     Erythromycin GI Disturbance     Oxycodone Nausea and Vomiting         PAST MEDICAL HISTORY:  Past Medical History:   Diagnosis Date     Cancer (H)     breast     Diverticulitis      Hypertension      Nontoxic uninodular goiter 1/28/2013     Pure hypercholesterolemia 1/28/2013     TMJ (temporomandibular joint syndrome) 7/1/2013         PAST SURGICAL HISTORY:  Past Surgical History:   Procedure Laterality Date     BIOPSY       BIOPSY NODE SENTINEL Bilateral 10/16/2017    Procedure: BIOPSY NODE SENTINEL;;  Surgeon: Van Salazar MD;  Location:  [facility]     bladder dilitation[  age 17     COLECTOMY SUBTOTAL Left 11/13/2017    Procedure: COLECTOMY SUBTOTAL;  EXPLORATORY LAPAROTOMY, OPEN SUBTOTAL  COLECTOMY, END ILEOSTOMY;  Surgeon: May Spain MD;  Location:  OR     DENTAL SURGERY      wisdom tooth extraction     ILEOSTOMY N/A 11/13/2017    Procedure: ILEOSTOMY;;  Surgeon: May Spain MD;  Location:  OR     LAPAROTOMY EXPLORATORY N/A 11/13/2017    Procedure: LAPAROTOMY EXPLORATORY;;  Surgeon: May Spain MD;  Location:  OR     MASTECTOMY SIMPLE BILATERAL Bilateral 10/16/2017    Procedure: MASTECTOMY SIMPLE  BILATERAL;  BILATERAL SIMPLE MASTECTOMY, BILATERAL AXILLARY SENTINEL NODE BIOPSY;  Surgeon: Van Salazar MD;  [facility]: [facility]         SOCIAL HISTORY:  Social History     Socioeconomic History     Marital status: Single     Spouse name: Not on file     Number of children: Not on file     Years of education: Not on file     Highest education level: Not on file   Occupational History     Not on file   Tobacco Use     Smoking status: Current Every Day Smoker     Packs/day: 0.50     Years: 40.00     Pack years: 20.00     Types: Cigarettes     Smokeless tobacco: Never Used     Tobacco comment: 1/2 PPD   Substance and Sexual Activity     Alcohol use: Yes     Alcohol/week: 0.0 standard drinks     Comment: weekends     Drug use: No     Sexual activity: Not Currently   Other Topics Concern     Parent/sibling w/ CABG, MI or angioplasty before 65F 55M? No   Social History Narrative     Not on file     Social Determinants of Health     Financial Resource Strain: Not on file   Food Insecurity: Not on file   Transportation Needs: Not on file   Physical Activity: Not on file   Stress: Not on file   Social Connections: Not on file   Intimate Partner Violence: Not on file   Housing Stability: Not on file         FAMILY HISTORY:  Family History   Problem Relation Age of Onset     Unknown/Adopted Mother      Unknown/Adopted Father          PHYSICAL EXAM:  Vital signs:  /85   Pulse 93   Temp 98.4  F (36.9  C) (Oral)   Resp 18   Wt 56 kg (123 lb 6.4 oz)   LMP  (LMP Unknown)   SpO2 100%   BMI 19.92 kg/m     GENERAL/CONSTITUTIONAL: No acute distress.  EYES: No scleral icterus.  LYMPH: No cervical, supraclavicular, axillary adenopathy.   BREAST: Bilateral breasts are surgically absent with no palpable chest wall masses or fluid.  RESPIRATORY: No audible cough or wheezing.   GASTROINTESTINAL: No hepatosplenomegaly, masses, or tenderness. No guarding.  No distention. Ostomy in place.  MUSCULOSKELETAL: Warm and  well-perfused, no cyanosis, clubbing, or edema.  NEUROLOGIC: Alert, oriented, answers questions appropriately.  INTEGUMENTARY: No rashes or jaundice. Large cyst at center upper back.  GAIT: Steady, does not use assistive device      LABS:  CBC RESULTS: Recent Labs   Lab Test 10/27/21  0435   WBC 5.2   RBC 4.41   HGB 14.1   HCT 40.7   MCV 92   MCH 32.0   MCHC 34.6   RDW 11.8        Last Comprehensive Metabolic Panel:  Sodium   Date Value Ref Range Status   10/27/2021 129 (L) 133 - 144 mmol/L Final   02/15/2021 138 133 - 144 mmol/L Final     Potassium   Date Value Ref Range Status   10/27/2021 3.5 3.4 - 5.3 mmol/L Final   02/15/2021 4.4 3.4 - 5.3 mmol/L Final     Chloride   Date Value Ref Range Status   10/27/2021 98 94 - 109 mmol/L Final   02/15/2021 107 94 - 109 mmol/L Final     Carbon Dioxide   Date Value Ref Range Status   02/15/2021 30 20 - 32 mmol/L Final     Carbon Dioxide (CO2)   Date Value Ref Range Status   10/27/2021 26 20 - 32 mmol/L Final     Anion Gap   Date Value Ref Range Status   10/27/2021 5 3 - 14 mmol/L Final   02/15/2021 1 (L) 3 - 14 mmol/L Final     Glucose   Date Value Ref Range Status   10/27/2021 89 70 - 99 mg/dL Final   02/15/2021 90 70 - 99 mg/dL Final     Urea Nitrogen   Date Value Ref Range Status   10/27/2021 13 7 - 30 mg/dL Final   02/15/2021 15 7 - 30 mg/dL Final     Creatinine   Date Value Ref Range Status   10/27/2021 0.80 0.52 - 1.04 mg/dL Final   02/15/2021 0.81 0.52 - 1.04 mg/dL Final     GFR Estimate   Date Value Ref Range Status   10/27/2021 76 >60 mL/min/1.73m2 Final     Comment:     As of July 11, 2021, eGFR is calculated by the CKD-EPI creatinine equation, without race adjustment. eGFR can be influenced by muscle mass, exercise, and diet. The reported eGFR is an estimation only and is only applicable if the renal function is stable.   02/15/2021 75 >60 mL/min/[1.73_m2] Final     Comment:     Non  GFR Calc  Starting 12/18/2018, serum creatinine based  estimated GFR (eGFR) will be   calculated using the Chronic Kidney Disease Epidemiology Collaboration   (CKD-EPI) equation.       Calcium   Date Value Ref Range Status   10/27/2021 8.8 8.5 - 10.1 mg/dL Final   02/15/2021 10.0 8.5 - 10.1 mg/dL Final     Bilirubin Total   Date Value Ref Range Status   10/27/2021 0.8 0.2 - 1.3 mg/dL Final   02/15/2021 0.6 0.2 - 1.3 mg/dL Final     Alkaline Phosphatase   Date Value Ref Range Status   10/27/2021 89 40 - 150 U/L Final   02/15/2021 98 40 - 150 U/L Final     ALT   Date Value Ref Range Status   10/27/2021 21 0 - 50 U/L Final   02/15/2021 18 0 - 50 U/L Final     AST   Date Value Ref Range Status   10/27/2021 19 0 - 45 U/L Final   02/15/2021 15 0 - 45 U/L Final       PATHOLOGY:  None new since prior visit.    IMAGING:  None relevant.    ASSESSMENT/PLAN:  Suma Calabrese is a 66 year old female with the following issues:  1. Stage IA, gL7d-I4-R3, grade 1 invasive lobular carcinoma of the right upper outer breast, ER/AL positive, HER-2/selena negative, Oncotype DX = 13  2. Stage IA, qC8v-M1-U4, grade 1 invasive ductal carcinoma of the left lower outer breast, ER/AL positive, HER-2/selena negative  --Hansa is s/p bilateral mastectomies with negative surgical margins.  --She is currently on anastrazole, tolerating this well with no clinical evidence for recurrent based on physical exam from today.  --Recommended total 5 years of hormone blockade therapy. She would complete anastrazole in 12/2022. Refilled anastrazole.  --I discussed that as per ASCO and NCCN guidelines, routine labs and imaging are not recommended for breast cancer surveillance as they have not been shown to improve overall survival.    2. Osteopenia  --Last DEXA from 9/2/2020 showed stable osteopenia.  --She did not tolerate Zometa.  --I recommended adequate calcium, vitamin D, and weight-bearing exercise.  --She was unable to tolerate alendronate.  --Plan to repeat DEXA in 9/2022.    3. Tobacco use  --She is still  smoking.  --Offered QUIT plan. She declined this.    Return in 6 months with JING.    Serena Peoples MD  Hematology/Oncology  [facility]    Total time spent: 30 minutes in patient evaluation, counseling, documentation, and coordination of care.

## 2022-04-11 ENCOUNTER — ONCOLOGY VISIT (OUTPATIENT)
Dept: ONCOLOGY | Facility: CLINIC | Age: 67
End: 2022-04-11
Attending: PHYSICIAN ASSISTANT
Payer: MEDICARE

## 2022-04-11 VITALS
WEIGHT: 123.4 LBS | SYSTOLIC BLOOD PRESSURE: 123 MMHG | DIASTOLIC BLOOD PRESSURE: 85 MMHG | TEMPERATURE: 98.4 F | HEART RATE: 93 BPM | BODY MASS INDEX: 19.92 KG/M2 | OXYGEN SATURATION: 100 % | RESPIRATION RATE: 18 BRPM

## 2022-04-11 DIAGNOSIS — Z17.0 MALIGNANT NEOPLASM OF UPPER-OUTER QUADRANT OF RIGHT BREAST IN FEMALE, ESTROGEN RECEPTOR POSITIVE (H): Primary | ICD-10-CM

## 2022-04-11 DIAGNOSIS — Z17.0 MALIGNANT NEOPLASM OF LOWER-OUTER QUADRANT OF LEFT BREAST OF FEMALE, ESTROGEN RECEPTOR POSITIVE (H): ICD-10-CM

## 2022-04-11 DIAGNOSIS — Z79.811 AROMATASE INHIBITOR USE: ICD-10-CM

## 2022-04-11 DIAGNOSIS — C50.512 MALIGNANT NEOPLASM OF LOWER-OUTER QUADRANT OF LEFT BREAST OF FEMALE, ESTROGEN RECEPTOR POSITIVE (H): ICD-10-CM

## 2022-04-11 DIAGNOSIS — Z72.0 TOBACCO ABUSE: ICD-10-CM

## 2022-04-11 DIAGNOSIS — M85.89 OSTEOPENIA OF MULTIPLE SITES: ICD-10-CM

## 2022-04-11 DIAGNOSIS — C50.411 MALIGNANT NEOPLASM OF UPPER-OUTER QUADRANT OF RIGHT BREAST IN FEMALE, ESTROGEN RECEPTOR POSITIVE (H): Primary | ICD-10-CM

## 2022-04-11 PROCEDURE — G0463 HOSPITAL OUTPT CLINIC VISIT: HCPCS

## 2022-04-11 PROCEDURE — 99214 OFFICE O/P EST MOD 30 MIN: CPT | Performed by: INTERNAL MEDICINE

## 2022-04-11 RX ORDER — ANASTROZOLE 1 MG/1
1 TABLET ORAL DAILY
Qty: 90 TABLET | Refills: 3 | Status: SHIPPED | OUTPATIENT
Start: 2022-04-11 | End: 2022-10-12

## 2022-04-11 ASSESSMENT — PAIN SCALES - GENERAL: PAINLEVEL: NO PAIN (0)

## 2022-04-11 NOTE — PROGRESS NOTES
"Oncology Rooming Note    April 11, 2022 10:06 AM   Suma Calabrese is a 66 year old female who presents for:    Chief Complaint   Patient presents with     Oncology Clinic Visit     Initial Vitals: /85   Pulse 93   Temp 98.4  F (36.9  C) (Oral)   Resp 18   Wt 56 kg (123 lb 6.4 oz)   LMP  (LMP Unknown)   SpO2 100%   BMI 19.92 kg/m   Estimated body mass index is 19.92 kg/m  as calculated from the following:    Height as of 10/27/21: 1.676 m (5' 6\").    Weight as of this encounter: 56 kg (123 lb 6.4 oz). Body surface area is 1.61 meters squared.  No Pain (0) Comment: Data Unavailable   No LMP recorded (lmp unknown). Patient is postmenopausal.  Allergies reviewed: Yes  Medications reviewed: Yes    Medications: MEDICATION REFILLS NEEDED TODAY. Provider was notified.  Pharmacy name entered into EPIC:    Anastrozole    [id number] - [place], [place] - [address] AT [facility] & [address] [place] [address] [facility] #[id number] - [place], [place] - [address] AT [facility] OF [address]    Clinical concerns: no       Shari J. Schoenberger, CMA            "

## 2022-04-11 NOTE — LETTER
4/11/2022         RE: Suma Calabrese  [address]  [facility] [address]        Dear Colleague,    Thank you for referring your patient, Suma Calabrese, to the [facility]. Please see a copy of my visit note below.    [facility]    Hematology/Oncology Established Patient Follow-up Note      Today's Date: 4/11/2022    Reason for Follow-up: Bilateral breast cancer.    HISTORY OF PRESENT ILLNESS: Suma Calabrese is a 65 year old female who presents with the following oncologic history:  --She self-palpated a breast lump on the right side in 09/2017.    --9/2017 Mammogram showed 11 o'clock, 3 cm from the nipple on the right breast, which is right upper outer quadrant, 1.1 cm hypoechoic mass.    --This was biopsied in 09/2017, identified grade 2 invasive lobular cancer, ER greater than 95% positive, AR greater than 25% positive; HER-2/selena was negative.    --She met with Dr. Salazar, discussed  surgical options at that time.   --Breast MRI showed a left breast nodule 3 to 4 o'clock position 4 cm from the nipple, 4 mm enhancing focus.  This is at the central lateral portion of the left breast.  Second look ultrasound confirmed the lesion, subcentimeter in size.    --Biopsy was performed in 10/2017 on the left side, identified radial scar, no evidence of malignancy.    --She subsequently underwent double mastectomy with Dr. Salazar mid 10/2017.  Pathology showed left invasive grade 1 IDC  0.5 cm at 3 o'clock position 9 cm from the nipple, % positive, AR 80% positive, HER-2/selena negative. Margins were clear.  Lymphovascular invasion absent. Two lymph nodes were negative.  This was staged at [id number]-E6.    --On the right side, pathology showed 2 cm invasive lobular cancer, grade 1 associated with LCIS, no lymphovascular invasion.  One sentinel lymph node was negative.  Therefore stage [id number] disease.    --Oncotype DX score = 13, corresponding to 10-year risk of  distant recurrence of 8% after 5 years of hormone blockade therapy.     --She started anastrazole 12/2017.   --She could not tolerate alendronate and therefore discontinued it. She had adverse reaction to Zometa, and declined further doses.    INTERIM HISTORY:  Hansa reports feeling well with no new complaints.      REVIEW OF SYSTEMS:   14 point ROS was reviewed and is negative other than as noted above in HPI.       HOME MEDICATIONS:  Current Outpatient Medications   Medication Sig Dispense Refill     anastrozole (ARIMIDEX) 1 MG tablet Take 1 tablet (1 mg) by mouth daily 90 tablet 3     levothyroxine (SYNTHROID/LEVOTHROID) 75 MCG tablet Take 1 tablet (75 mcg) by mouth daily 90 tablet 4     multivitamin (CENTRUM SILVER) tablet Take 1 tablet by mouth daily       triamterene-HCTZ (DYAZIDE) 37.5-25 MG capsule Take 1 capsule by mouth daily 90 capsule 1     VITAMIN D, CHOLECALCIFEROL, PO Take 2,000 Units by mouth every morning           ALLERGIES:  Allergies   Allergen Reactions     Bupropion Other (See Comments)     Jittery and HA     Erythromycin GI Disturbance     Oxycodone Nausea and Vomiting         PAST MEDICAL HISTORY:  Past Medical History:   Diagnosis Date     Cancer (H)     breast     Diverticulitis      Hypertension      Nontoxic uninodular goiter 1/28/2013     Pure hypercholesterolemia 1/28/2013     TMJ (temporomandibular joint syndrome) 7/1/2013         PAST SURGICAL HISTORY:  Past Surgical History:   Procedure Laterality Date     BIOPSY       BIOPSY NODE SENTINEL Bilateral 10/16/2017    Procedure: BIOPSY NODE SENTINEL;;  Surgeon: Van Salazar MD;  Location: [facility]     bladder dilitation[  age 17     COLECTOMY SUBTOTAL Left 11/13/2017    Procedure: COLECTOMY SUBTOTAL;  EXPLORATORY LAPAROTOMY, OPEN SUBTOTAL  COLECTOMY, END ILEOSTOMY;  Surgeon: May Spain MD;  Location:  OR     DENTAL SURGERY      wisdom tooth extraction     ILEOSTOMY N/A 11/13/2017    Procedure: ILEOSTOMY;;  Surgeon:  May Spain MD;  Location:  OR     LAPAROTOMY EXPLORATORY N/A 11/13/2017    Procedure: LAPAROTOMY EXPLORATORY;;  Surgeon: Mya Spain MD;  Location:  OR     MASTECTOMY SIMPLE BILATERAL Bilateral 10/16/2017    Procedure: MASTECTOMY SIMPLE BILATERAL;  BILATERAL SIMPLE MASTECTOMY, BILATERAL AXILLARY SENTINEL NODE BIOPSY;  Surgeon: Van Salazar MD;  Location: [facility]         SOCIAL HISTORY:  Social History     Socioeconomic History     Marital status: Single     Spouse name: Not on file     Number of children: Not on file     Years of education: Not on file     Highest education level: Not on file   Occupational History     Not on file   Tobacco Use     Smoking status: Current Every Day Smoker     Packs/day: 0.50     Years: 40.00     Pack years: 20.00     Types: Cigarettes     Smokeless tobacco: Never Used     Tobacco comment: 1/2 PPD   Substance and Sexual Activity     Alcohol use: Yes     Alcohol/week: 0.0 standard drinks     Comment: weekends     Drug use: No     Sexual activity: Not Currently   Other Topics Concern     Parent/sibling w/ CABG, MI or angioplasty before 65F 55M? No   Social History Narrative     Not on file     Social Determinants of Health     Financial Resource Strain: Not on file   Food Insecurity: Not on file   Transportation Needs: Not on file   Physical Activity: Not on file   Stress: Not on file   Social Connections: Not on file   Intimate Partner Violence: Not on file   Housing Stability: Not on file         FAMILY HISTORY:  Family History   Problem Relation Age of Onset     Unknown/Adopted Mother      Unknown/Adopted Father          PHYSICAL EXAM:  Vital signs:  /85   Pulse 93   Temp 98.4  F (36.9  C) (Oral)   Resp 18   Wt 56 kg (123 lb 6.4 oz)   LMP  (LMP Unknown)   SpO2 100%   BMI 19.92 kg/m     GENERAL/CONSTITUTIONAL: No acute distress.  EYES: No scleral icterus.  LYMPH: No cervical, supraclavicular, axillary adenopathy.   BREAST: Bilateral breasts are  surgically absent with no palpable chest wall masses or fluid.  RESPIRATORY: No audible cough or wheezing.   GASTROINTESTINAL: No hepatosplenomegaly, masses, or tenderness. No guarding.  No distention. Ostomy in place.  MUSCULOSKELETAL: Warm and well-perfused, no cyanosis, clubbing, or edema.  NEUROLOGIC: Alert, oriented, answers questions appropriately.  INTEGUMENTARY: No rashes or jaundice. Large cyst at center upper back.  GAIT: Steady, does not use assistive device      LABS:  CBC RESULTS: Recent Labs   Lab Test 10/27/21  0435   WBC 5.2   RBC 4.41   HGB 14.1   HCT 40.7   MCV 92   MCH 32.0   MCHC 34.6   RDW 11.8        Last Comprehensive Metabolic Panel:  Sodium   Date Value Ref Range Status   10/27/2021 129 (L) 133 - 144 mmol/L Final   02/15/2021 138 133 - 144 mmol/L Final     Potassium   Date Value Ref Range Status   10/27/2021 3.5 3.4 - 5.3 mmol/L Final   02/15/2021 4.4 3.4 - 5.3 mmol/L Final     Chloride   Date Value Ref Range Status   10/27/2021 98 94 - 109 mmol/L Final   02/15/2021 107 94 - 109 mmol/L Final     Carbon Dioxide   Date Value Ref Range Status   02/15/2021 30 20 - 32 mmol/L Final     Carbon Dioxide (CO2)   Date Value Ref Range Status   10/27/2021 26 20 - 32 mmol/L Final     Anion Gap   Date Value Ref Range Status   10/27/2021 5 3 - 14 mmol/L Final   02/15/2021 1 (L) 3 - 14 mmol/L Final     Glucose   Date Value Ref Range Status   10/27/2021 89 70 - 99 mg/dL Final   02/15/2021 90 70 - 99 mg/dL Final     Urea Nitrogen   Date Value Ref Range Status   10/27/2021 13 7 - 30 mg/dL Final   02/15/2021 15 7 - 30 mg/dL Final     Creatinine   Date Value Ref Range Status   10/27/2021 0.80 0.52 - 1.04 mg/dL Final   02/15/2021 0.81 0.52 - 1.04 mg/dL Final     GFR Estimate   Date Value Ref Range Status   10/27/2021 76 >60 mL/min/1.73m2 Final     Comment:     As of July 11, 2021, eGFR is calculated by the CKD-EPI creatinine equation, without race adjustment. eGFR can be influenced by muscle mass, exercise,  and diet. The reported eGFR is an estimation only and is only applicable if the renal function is stable.   02/15/2021 75 >60 mL/min/[1.73_m2] Final     Comment:     Non  GFR Calc  Starting 12/18/2018, serum creatinine based estimated GFR (eGFR) will be   calculated using the Chronic Kidney Disease Epidemiology Collaboration   (CKD-EPI) equation.       Calcium   Date Value Ref Range Status   10/27/2021 8.8 8.5 - 10.1 mg/dL Final   02/15/2021 10.0 8.5 - 10.1 mg/dL Final     Bilirubin Total   Date Value Ref Range Status   10/27/2021 0.8 0.2 - 1.3 mg/dL Final   02/15/2021 0.6 0.2 - 1.3 mg/dL Final     Alkaline Phosphatase   Date Value Ref Range Status   10/27/2021 89 40 - 150 U/L Final   02/15/2021 98 40 - 150 U/L Final     ALT   Date Value Ref Range Status   10/27/2021 21 0 - 50 U/L Final   02/15/2021 18 0 - 50 U/L Final     AST   Date Value Ref Range Status   10/27/2021 19 0 - 45 U/L Final   02/15/2021 15 0 - 45 U/L Final       PATHOLOGY:  None new since prior visit.    IMAGING:  None relevant.    ASSESSMENT/PLAN:  Suma Calabrese is a 66 year old female with the following issues:  1. Stage IA, [id number], grade 1 invasive lobular carcinoma of the right upper outer breast, ER/MT positive, HER-2/selena negative, Oncotype DX = 13  2. Stage IA, [id number]-P4-L1, grade 1 invasive ductal carcinoma of the left lower outer breast, ER/MT positive, HER-2/selena negative  --Hansa is s/p bilateral mastectomies with negative surgical margins.  --She is currently on anastrazole, tolerating this well with no clinical evidence for recurrent based on physical exam from today.  --Recommended total 5 years of hormone blockade therapy. She would complete anastrazole in 12/2022. Refilled anastrazole.  --I discussed that as per ASCO and NCCN guidelines, routine labs and imaging are not recommended for breast cancer surveillance as they have not been shown to improve overall survival.    2. Osteopenia  --Last DEXA from 9/2/2020  "showed stable osteopenia.  --She did not tolerate Zometa.  --I recommended adequate calcium, vitamin D, and weight-bearing exercise.  --She was unable to tolerate alendronate.  --Plan to repeat DEXA in 9/2022.    3. Tobacco use  --She is still smoking.  --Offered QUIT plan. She declined this.    Return in 6 months with DEXA.    Serena Peoples MD  Hematology/Oncology  [facility]    Total time spent: 30 minutes in patient evaluation, counseling, documentation, and coordination of care.      Oncology Rooming Note    April 11, 2022 10:06 AM   Suma Calabrese is a 66 year old female who presents for:    Chief Complaint   Patient presents with     Oncology Clinic Visit     Initial Vitals: /85   Pulse 93   Temp 98.4  F (36.9  C) (Oral)   Resp 18   Wt 56 kg (123 lb 6.4 oz)   LMP  (LMP Unknown)   SpO2 100%   BMI 19.92 kg/m   Estimated body mass index is 19.92 kg/m  as calculated from the following:    Height as of 10/27/21: 1.676 m (5' 6\").    Weight as of this encounter: 56 kg (123 lb 6.4 oz). Body surface area is 1.61 meters squared.  No Pain (0) Comment: Data Unavailable   No LMP recorded (lmp unknown). Patient is postmenopausal.  Allergies reviewed: Yes  Medications reviewed: Yes    Medications: MEDICATION REFILLS NEEDED TODAY. Provider was notified.  Pharmacy name entered into EPIC:    Anastrozole    [id number] - [place], [place] - [address] AT [facility] & [address] [place] [address] [facility] #[id number] - [place], [place] - [address] AT [facility] [address]    Clinical concerns: no       Shari J. Schoenberger, [facility]                Again, thank you for allowing me to participate in the care of your patient.        Sincerely,        Serena Peoples MD    "

## 2022-04-12 ENCOUNTER — OFFICE VISIT (OUTPATIENT)
Dept: DERMATOLOGY | Facility: CLINIC | Age: 67
End: 2022-04-12
Attending: INTERNAL MEDICINE
Payer: MEDICARE

## 2022-04-12 VITALS — DIASTOLIC BLOOD PRESSURE: 99 MMHG | OXYGEN SATURATION: 100 % | HEART RATE: 82 BPM | SYSTOLIC BLOOD PRESSURE: 148 MMHG

## 2022-04-12 DIAGNOSIS — L82.1 SEBORRHEIC KERATOSIS: Primary | ICD-10-CM

## 2022-04-12 DIAGNOSIS — L72.3 SEBACEOUS CYST: ICD-10-CM

## 2022-04-12 PROCEDURE — 99202 OFFICE O/P NEW SF 15 MIN: CPT | Performed by: PHYSICIAN ASSISTANT

## 2022-04-12 NOTE — PROGRESS NOTES
HPI:   Chief complaints: Suma Calabrese is a pleasant 66 year old female who presents for evaluation of a cyst on the back. It is growing and is bothersome at this point.       PHYSICAL EXAM:    BP (!) 148/99   Pulse 82   LMP  (LMP Unknown)   SpO2 100%   Skin exam performed as follows: Type 2 skin. Mood appropriate  Alert and Oriented X 3. Well developed, well nourished in no distress.  General appearance: Normal  Head including face: Normal  Eyes: conjunctiva and lids: Normal  Mouth: Lips, teeth, gums: Normal  Neck: Normal  Cardiovascular: Exam of peripheral vascular system by observation for swelling, varicosities, edema: Normal  Right upper extremity: Normal  Left upper extremity: Normal  Right lower extremity: Normal  Left lower extremity: Normal  Skin: Scalp and body hair: See below    25 mm smooth mobile subcutaneous nodule on the right medial back  Keratotic plaque on the right flank    ASSESSMENT/PLAN:     1. Sebaceous cyst - discussed excision with Alejandra Sims or Dr Bob   2. Seborrheic keratosis on the right flank - advised benign no treatment needed        Follow-up: PRN  CC:   Scribed By: Ceci Whaley, MS, PA-C

## 2022-04-12 NOTE — LETTER
4/12/2022         RE: Suma Calabrese  [address]  [facility] [address]        Dear Colleague,    Thank you for referring your patient, Suma Calabrese, to the [facility]. Please see a copy of my visit note below.    HPI:   Chief complaints: Suma Calabrese is a pleasant 66 year old female who presents for evaluation of a cyst on the back. It is growing and is bothersome at this point.       PHYSICAL EXAM:    BP (!) 148/99   Pulse 82   LMP  (LMP Unknown)   SpO2 100%   Skin exam performed as follows: Type 2 skin. Mood appropriate  Alert and Oriented X 3. Well developed, well nourished in no distress.  General appearance: Normal  Head including face: Normal  Eyes: conjunctiva and lids: Normal  Mouth: Lips, teeth, gums: Normal  Neck: Normal  Cardiovascular: Exam of peripheral vascular system by observation for swelling, varicosities, edema: Normal  Right upper extremity: Normal  Left upper extremity: Normal  Right lower extremity: Normal  Left lower extremity: Normal  Skin: Scalp and body hair: See below    25 mm smooth mobile subcutaneous nodule on the right medial back  Keratotic plaque on the right flank    ASSESSMENT/PLAN:     1. Sebaceous cyst - discussed excision with Alejandra Sims or Dr Bob   2. Seborrheic keratosis on the right flank - advised benign no treatment needed        Follow-up: PRN  CC:   Scribed By: Ceci Daniel MS, PADANIEL          Again, thank you for allowing me to participate in the care of your patient.        Sincerely,        Ceci Daniel PA-C

## 2022-04-14 ENCOUNTER — VIRTUAL VISIT (OUTPATIENT)
Dept: INTERNAL MEDICINE | Facility: CLINIC | Age: 67
End: 2022-04-14
Payer: MEDICARE

## 2022-04-14 DIAGNOSIS — Z90.49 S/P PARTIAL COLECTOMY: ICD-10-CM

## 2022-04-14 DIAGNOSIS — Z12.11 SCREEN FOR COLON CANCER: ICD-10-CM

## 2022-04-14 DIAGNOSIS — E03.4 HYPOTHYROIDISM DUE TO ACQUIRED ATROPHY OF THYROID: Primary | Chronic | ICD-10-CM

## 2022-04-14 DIAGNOSIS — I10 ESSENTIAL HYPERTENSION: ICD-10-CM

## 2022-04-14 DIAGNOSIS — Z93.3 COLOSTOMY IN PLACE (H): ICD-10-CM

## 2022-04-14 PROBLEM — N18.30 CHRONIC KIDNEY DISEASE, STAGE 3 (H): Status: RESOLVED | Noted: 2021-10-22 | Resolved: 2022-04-14

## 2022-04-14 PROBLEM — Z13.220 SCREENING FOR HYPERLIPIDEMIA: Status: ACTIVE | Noted: 2022-04-14

## 2022-04-14 PROBLEM — Z13.220 SCREENING FOR HYPERLIPIDEMIA: Status: RESOLVED | Noted: 2022-04-14 | Resolved: 2022-04-14

## 2022-04-14 PROCEDURE — 99214 OFFICE O/P EST MOD 30 MIN: CPT | Mod: 95 | Performed by: INTERNAL MEDICINE

## 2022-04-14 RX ORDER — LEVOTHYROXINE SODIUM 75 UG/1
75 TABLET ORAL DAILY
Qty: 90 TABLET | Refills: 1 | Status: SHIPPED | OUTPATIENT
Start: 2022-04-14 | End: 2022-08-23

## 2022-04-14 RX ORDER — TRIAMTERENE AND HYDROCHLOROTHIAZIDE 37.5; 25 MG/1; MG/1
1 CAPSULE ORAL DAILY
Qty: 90 CAPSULE | Refills: 1 | Status: SHIPPED | OUTPATIENT
Start: 2022-04-14 | End: 2022-08-23

## 2022-04-14 NOTE — PROGRESS NOTES
Hansa is a 66 year old who is being evaluated via a billable telephone visit.      What phone number would you like to be contacted at? [phone]  How would you like to obtain your AVS? MyChart    Assessment & Plan     Hypothyroidism due to acquired atrophy of thyroid  Labs due fall 2022  - levothyroxine (SYNTHROID/LEVOTHROID) 75 MCG tablet; Take 1 tablet (75 mcg) by mouth daily  - TSH with free T4 reflex; Future    Essential hypertension  Recently noted elevations- recheck at home, f/u sooner than fall if elevated  - triamterene-HCTZ (DYAZIDE) 37.5-25 MG capsule; Take 1 capsule by mouth daily    Screen for colon cancer  Colostomy in place (H)  S/P partial colectomy  Subtotal colectomy. ? How much colon remains. Needs some screening to determine length of colon that remains;  >105 cm resected.      Review of prior external note(s) from - Outside records from colorectal   Ordering of each unique test  Prescription drug management         Tobacco Cessation:   reports that she has been smoking cigarettes. She has a 20.00 pack-year smoking history. She has never used smokeless tobacco.      See Patient Instructions    No follow-ups on file.    Osvaldo Borrero MD  [facility]    Subjective   Hansa is a 66 year old who presents for the following health issues     HPI     Hypothyroidism Follow-up      Since last visit, patient describes the following symptoms: Weight stable, no hair loss, no skin changes, no constipation, no loose stools          Review of Systems         Objective           Vitals:  No vitals were obtained today due to virtual visit.    Physical Exam   alert and no distress  RESP: No cough, no audible wheezing, able to talk in full sentences  Remainder of exam unable to be completed due to telephone visits                Phone call duration: 22 minutes

## 2022-04-21 ENCOUNTER — MEDICAL CORRESPONDENCE (OUTPATIENT)
Dept: HEALTH INFORMATION MANAGEMENT | Facility: CLINIC | Age: 67
End: 2022-04-21
Payer: MEDICARE

## 2022-05-02 NOTE — PROGRESS NOTES
ONCOLOGY FOLLOW UP VISIT       TELEPHONE VISIT      CHIEF COMPLAINT AND REASON FOR VISIT:  Bilateral breast cancer dx fall 2017, s/p double mastectomy.        HISTORY OF ONCOLOGY ILLNESS:   She self-felt a breast lump on the right side in 09/2017.    She was due her annual screening mammogram, which was done in September identified 11 o'clock, 3 cm from the nipple on the right breast, which is right upper outer quadrant, 1.1 cm hypoechoic mass.    This was biopsied in 09/2017, identified grade 2 invasive lobular cancer, ER greater than 95% positive, AK greater than 25% positive; HER-2/selena was negative.    She met with Dr. Salazar, discussed about surgical options at that time.  Breast preop MRI incidentally picked up a left breast nodule 3 to 4 o'clock position 4 cm from the nipple, 4   mm enhancing focus.  This is at the central lateral portion of the left breast.  Second look ultrasound confirmed the lesion, subcentimeter in size.    Biopsy was performed in October 2017 on the left side, identified radial scar, no evidence of malignancy.      She subsequently had double mastectomy with Dr. Salazar mid 10/2017.  On the left side found invasive grade 1 IDC  0.5 cm at 3 o'clock position 9 cm from the nipple, % positive, AK 80% positive, HER-2/selena negative. negative DCIS, negative LCIS.  Margins were clear.  Lymphovascular invasion is absent. 2 lymph nodes taken out.  Both of them were negative.  This was staged at T1a N0.    On the right side, found 2 cm invasive lobular cancer was identified, grade 1 associated with LCIS, absent lymphovascular invasion.  One sentinel lymph node was taken out, was negative.  This is staged T1c N0 disease.    RS is 13, corresponds to 10-year risk of distal recurrence with surgery, radiation, and tamoxifen is about 8%.       She made informed decision to proceed with AI 12/2017.         INTERVAL HISTORY:   She could not tolerates fosamax. So stopped it.   She tolerates AI fine so  far.   Had bad reaction to IV zometa, and refused it further.       PAST MEDICAL HISTORY:  History of hyponatremia, hypokalemia, hypothyroidism, nontoxic uninodular goiter, rectal prolapse, hypertension, internal hemorrhoid.Eextensive diverticulitis which required subtotal colectomy and end ileostomy bag in 10/2017.  Nov 2017 for acute diverticulitis with perforation, pericolic abscess, and bowel obstruction, s/p s/p exploratory laparotomy, subtotal colectomy, and end ileostomy on 11/13/2017.     FAMILY HISTORY:  She is adopted.      SOCIAL HISTORY:  She works in the [employer].  Quit smoking 09/2017 when she was diagnosed with breast cancer.  Denies alcohol abuse.        REVIEW OF SYSTEMS:   GENERAL: pt is in usual state of health.  No B symptoms  NEURO:   No headache, double vision, or focal weakness.  No neuropathy.   SKIN:  No chronic skin rash or skin infection.   CARDIOVASCULAR:  No High blood pressure or hyperlipidemia. NO ALEMAN  PULMONARY:  No shortness of breath, no pleurisy, no cough, no hemoptysis.   GI:  No abdominal pain, nausea, vomiting, constipation.  No diarrhea.  No bright red blood per rectum.   :  No urgency, frequency.  No recurrent urinary tract infection.  No kidney problems.   RHEUMATOLOGY/MUSCULOSKELETAL SYSTEM: Tolerating AI fine with mild arthritic pain and hot flushes. She has stiffness upon changing positions.   She had poor tolerance to IV zometa, refuse it again.   ENDOCRINE:  No history of diabetes or thyroid problem.  No complaints of hot flashes.   HEMATOLOGY:  No history of bleeding or thrombosis episode.   Oncology: no hx of cancers  IMMUNOLOGY:  No recurrent fever or chills episode.  No recurrent infectious episode.   BREASTS/GYN: No breast complains or vaginal dryness  PSYCHIATRY:  No anxiety or depression.         PHYSICAL EXAMINATION LIMITED OVER THE PHONE  Sounded  very pleasant, not in acute distress.   Neuro: Oriented to time, person, and  places.  Respiratory: talk nl, no sob during conversation.  PSYCHIATRIC: Oriented to time, person, and places. Normal mood and affect. Good memory. Proper insight and judgement.        CURRENT LAB DATA  REVIEWED TODAY RENETTA WITH PATIENT OVER THE PHONE    CMP is good      OLD DATA REVIEWED TODAY WITH SUMMARY TODAY WITH PATIENT OVER THE PHONE      Bilateral mastectomy pathology 10/2017 -  On the left side found invasive grade 1 IDC  0.5 cm at 3 o'clock position 9 cm from the nipple, % positive, NE 80% positive, HER-2/selena negative. negative DCIS, negative LCIS.  Margins were clear.  Lymphovascular invasion is absent. 2 lymph nodes taken out.  Both of them were negative.  This is staged at T1a N0.    On the right side, found 2 cm invasive lobular cancer was identified, grade 1 associated with LCIS, absent lymphovascular invasion.  One sentinel lymph node was taken out, was negative.  This is staged T1c N0 disease.      Recurrence score on the right-sided invasive lobular cancer came back 13, corresponds to 10-year risk of distal recurrence with surgery, radiation, and tamoxifen is about 8%.        dexa 12/2017 - osteopenia  CT a/p 11/2017: Distal sigmoid wall thickening, currently resulting in colonic obstruction. This could relate to diverticulitis or focal colitis, but neoplasm cannot be excluded. Serpiginous perisigmoid fluid collection/abscess appears mildly increased. Small amount of free fluid.     US arm 11/2017  1. No evidence of deep vein thrombosis.  2. Superficial thrombophlebitis in the right cephalic vein.       Bilateral mastectomy pathology 10/2017 -  On the left side found invasive grade 1 IDC  0.5 cm at 3 o'clock position 9 cm from the nipple, % positive, NE 80% positive, HER-2/selena negative. negative DCIS, negative LCIS.  Margins were clear.  Lymphovascular invasion is absent. 2 lymph nodes taken out.  Both of them were negative.  This is staged at T1a N0.    On the right side, found 2 cm  "invasive lobular cancer was identified, grade 1 associated with LCIS, absent lymphovascular invasion.  One sentinel lymph node was taken out, was negative.  This is staged T1c N0 disease.      Recurrence score on the right-sided invasive lobular cancer came back 13, corresponds to 10-year risk of distal recurrence with surgery, radiation, and tamoxifen is about 8%.           ASSESSMENT/PLAN DISCUSSED WITH PATIENT TODAY OVER THE PHONE::   1.  09/2017,10/20/17 diagnosed bilateral breast cancer.  Right-side is T1cN0, 2 cm invasive lobular, grade 1, ER/CT positive, HER-2/selena negative.  Left-side invasive ductal, T1aN0, grade 1, ER/CT positive, HER-2/selena negative.    She had double mastectomy.  All the nodes are negative.  Margins were clean.  She also had lobular carcinoma in situ on the right side.  Recurrence score on the right side of 13 corresponds to 10-year risk of distal recurrence 8% with surgery, radiation, and tamoxifen.     She made informed decision to proceed with Arimidex 12/2017.    It seems she is having some muscular side effects from AI.     We also discussed ER positive breast cancer recurrence pattern and future followup plan.  We also discussed about 5 years versus 10 years anti-hormonal therapy data and how this may not apply to her situation.     She is on 6 months f/u with labs.       2. Osteopenia - we discussed the proper use of vit D, dosing, etc.   We discussed the use of bisphosphonate, she tried fosamax.  She had severe heart burn. She then tried IV zometa. Had delayed \"shocking\" side effects and refuses it further.     Advice repeat dexa this year. May consider changing to Prolia.     Total time on telephone visit is 30 minutes, all spent in counseling regarding her tolerance to AI, to IV Zometa, approaches on preserving her bone house, the plan of ongoing anti-hormonal therapy.    I have reviewed the note as documented above.  This accurately captures the substance of my conversation with " the patient.       Unknown

## 2022-05-11 ENCOUNTER — OFFICE VISIT (OUTPATIENT)
Dept: DERMATOLOGY | Facility: CLINIC | Age: 67
End: 2022-05-11
Payer: MEDICARE

## 2022-05-11 VITALS — DIASTOLIC BLOOD PRESSURE: 84 MMHG | OXYGEN SATURATION: 99 % | SYSTOLIC BLOOD PRESSURE: 135 MMHG | HEART RATE: 84 BPM

## 2022-05-11 DIAGNOSIS — L72.0 EIC (EPIDERMAL INCLUSION CYST): Primary | ICD-10-CM

## 2022-05-11 PROCEDURE — 99207 PR DROP WITH A PROCEDURE: CPT | Performed by: PHYSICIAN ASSISTANT

## 2022-05-11 PROCEDURE — 13100 CMPLX RPR TRUNK 1.1-2.5 CM: CPT | Performed by: PHYSICIAN ASSISTANT

## 2022-05-11 PROCEDURE — 11403 EXC TR-EXT B9+MARG 2.1-3CM: CPT | Performed by: PHYSICIAN ASSISTANT

## 2022-05-11 PROCEDURE — 88304 TISSUE EXAM BY PATHOLOGIST: CPT | Performed by: DERMATOLOGY

## 2022-05-11 NOTE — PATIENT INSTRUCTIONS
Sutured Wound Care     [facility]: [phone]    [facility]: [phone]          No strenuous activity for 48 hours. Resume moderate activity in 48 hours. No heavy exercising until you are seen for follow up in one week.     Take Tylenol as needed for discomfort.                         Do not drink alcoholic beverages for 48 hours.     Keep the pressure bandage in place for 24 hours. If the bandage becomes blood tinged or loose, reinforce it with gauze and tape.        (Refer to the reverse side of this page for management of bleeding).    Remove pressure bandage in 24 hours     Leave the flat bandage in place until your follow up appointment.    Keep the bandage dry. Wash around it carefully.    If the tape becomes soiled or starts to come off, reinforce it with additional paper tape.    Do not smoke for 3 weeks; smoking is detrimental to wound healing.    It is normal to have swelling and bruising around the surgical site. The bruising will fade in approximately 10-14 days. Elevate the area to reduce swelling.    Numbness, itchiness and sensitivity to temperature changes can occur after surgery and may take up to 18 months to normalize.      POSSIBLE COMPLICATIONS    BLEEDING:    Leave the bandage in place.  Use tightly rolled up gauze or a cloth to apply direct pressure over the bandage for 20   minutes.  Reapply pressure for an additional 20 minutes if necessary  Call the office or go to the nearest emergency room if pressure fails to stop the bleeding.  Use additional gauze and tape to maintain pressure once the bleeding has stopped.        PAIN:    Post operative pain should slowly get better, never worse.  A severe increase in pain may indicate a problem. Call the office if this occurs.    In case of emergency phone: [phone]

## 2022-05-11 NOTE — LETTER
5/11/2022         RE: Suma Calabrese  [address]  [place] [facility] [address]        Dear Colleague,    Thank you for referring your patient, Suma Calabrese, to the [facility]. Please see a copy of my visit note below.    HPI:  Suma Calabrese is a 67 year old female patient here today for excision of cyst on back  .  Patient states this has been present for a while.  Patient reports the following symptoms: bothersome, growing .  Patient reports the following previous treatments: none.  Patient reports the following modifying factors: none.  Associated symptoms: none.  Patient has no other skin complaints today.  Remainder of the HPI, Meds, PMH, Allergies, FH, and SH was reviewed in chart.      Past Medical History:   Diagnosis Date     Cancer (H)     breast     Diverticulitis      Hypertension      Nontoxic uninodular goiter 1/28/2013     Pure hypercholesterolemia 1/28/2013     TMJ (temporomandibular joint syndrome) 7/1/2013       Past Surgical History:   Procedure Laterality Date     BIOPSY       BIOPSY NODE SENTINEL Bilateral 10/16/2017    Procedure: BIOPSY NODE SENTINEL;;  Surgeon: Van Salazar MD;  Location: [facility]     bladder dilitation[  age 17     COLECTOMY SUBTOTAL Left 11/13/2017    Procedure: COLECTOMY SUBTOTAL;  EXPLORATORY LAPAROTOMY, OPEN SUBTOTAL  COLECTOMY, END ILEOSTOMY;  Surgeon: May Spain MD;  Location:  OR     DENTAL SURGERY      wisdom tooth extraction     ILEOSTOMY N/A 11/13/2017    Procedure: ILEOSTOMY;;  Surgeon: May Spain MD;  Location:  OR     LAPAROTOMY EXPLORATORY N/A 11/13/2017    Procedure: LAPAROTOMY EXPLORATORY;;  Surgeon: May Spain MD;  Location:  OR     MASTECTOMY SIMPLE BILATERAL Bilateral 10/16/2017    Procedure: MASTECTOMY SIMPLE BILATERAL;  BILATERAL SIMPLE MASTECTOMY, BILATERAL AXILLARY SENTINEL NODE BIOPSY;  Surgeon: Van Salazar MD;  Location: [facility]        Family History   Problem  Relation Age of Onset     Unknown/Adopted Mother      Unknown/Adopted Father        Social History     Socioeconomic History     Marital status: Single     Spouse name: Not on file     Number of children: Not on file     Years of education: Not on file     Highest education level: Not on file   Occupational History     Not on file   Tobacco Use     Smoking status: Current Every Day Smoker     Packs/day: 0.50     Years: 40.00     Pack years: 20.00     Types: Cigarettes     Smokeless tobacco: Never Used     Tobacco comment: 1/2 PPD   Substance and Sexual Activity     Alcohol use: Yes     Alcohol/week: 0.0 standard drinks     Comment: weekends     Drug use: No     Sexual activity: Not Currently   Other Topics Concern     Parent/sibling w/ CABG, MI or angioplasty before 65F 55M? No   Social History Narrative     Not on file     Social Determinants of Health     Financial Resource Strain: Not on file   Food Insecurity: Not on file   Transportation Needs: Not on file   Physical Activity: Not on file   Stress: Not on file   Social Connections: Not on file   Intimate Partner Violence: Not on file   Housing Stability: Not on file       Outpatient Encounter Medications as of 5/11/2022   Medication Sig Dispense Refill     anastrozole (ARIMIDEX) 1 MG tablet Take 1 tablet (1 mg) by mouth daily 90 tablet 3     levothyroxine (SYNTHROID/LEVOTHROID) 75 MCG tablet Take 1 tablet (75 mcg) by mouth daily 90 tablet 1     multivitamin (CENTRUM SILVER) tablet Take 1 tablet by mouth daily       triamterene-HCTZ (DYAZIDE) 37.5-25 MG capsule Take 1 capsule by mouth daily 90 capsule 1     VITAMIN D, CHOLECALCIFEROL, PO Take 2,000 Units by mouth every morning       No facility-administered encounter medications on file as of 5/11/2022.       Review Of Systems:  Skin: cyst  Eyes: negative  Ears/Nose/Throat: negative  Respiratory: No shortness of breath, dyspnea on exertion, cough, or hemoptysis  Cardiovascular: negative  Gastrointestinal:  negative  Genitourinary: negative  Musculoskeletal: negative  Neurologic: negative  Psychiatric: negative  Hematologic/Lymphatic/Immunologic: negative  Endocrine: negative      Objective:     /84   Pulse 84   LMP  (LMP Unknown)   SpO2 99%   Breastfeeding No   Eyes: Conjunctivae/lids: Normal   ENT: Lips:  Normal  MSK: Normal  Cardiovascular: Peripheral edema none  Pulm: Breathing Normal  Neuro/Psych: Orientation: A/O x 3. Normal; Mood/Affect: Normal, NAD, WDWN  Pt accompanied by: self  Following areas examined: face, neck, back. Pt wearing mask  Mercado skin type:ii   Findings:  Soft mobile smooth nodule on 2.5cm on mid back     Assessment and Plan:     1) EIC 2.5cm mid back  EXCISIONAL BIOPSY AND COMPLEX:  After thorough discussion of PGACAC, consent obtained, anesthesia with LEC and prep, the margins of the lesion were identified and an elliptical incision was made superficial to the lesion. The incisions were made through to include the mid-subcutaneous tissue.  The lesion was removed en bloc and submitted for derm pathologic review. Because of the full-thickness nature of the wound and to avoid standing cone deformities, a complex linear repair was planned.  The wound edges were widely undermined until adequate tissue mobility was obtained.  Hemostasis was achieved.  The wound edges were then closed in a layered fashion, using Vicryl for subcutaneous stitches and FAPG sutures for running top stitches.  Postoperative length was 2.5 cm.  Patient will be contacted with result.  EBL minimal; complications none; wound care routine.  The patient was discharged in good condition and will return in one week for wound evaluation.           It was a pleasure speaking with Suma Calabrese today.       Follow up in one week bandage change        Again, thank you for allowing me to participate in the care of your patient.        Sincerely,        Chrissy Sims PA-C

## 2022-05-11 NOTE — PROGRESS NOTES
HPI:  Suma Calabrese is a 67 year old female patient here today for excision of cyst on back  .  Patient states this has been present for a while.  Patient reports the following symptoms: bothersome, growing .  Patient reports the following previous treatments: none.  Patient reports the following modifying factors: none.  Associated symptoms: none.  Patient has no other skin complaints today.  Remainder of the HPI, Meds, PMH, Allergies, FH, and SH was reviewed in chart.      Past Medical History:   Diagnosis Date     Cancer (H)     breast     Diverticulitis      Hypertension      Nontoxic uninodular goiter 1/28/2013     Pure hypercholesterolemia 1/28/2013     TMJ (temporomandibular joint syndrome) 7/1/2013       Past Surgical History:   Procedure Laterality Date     BIOPSY       BIOPSY NODE SENTINEL Bilateral 10/16/2017    Procedure: BIOPSY NODE SENTINEL;;  Surgeon: Van Salazar MD;  Location: [facility]     bladder dilitation[  age 17     COLECTOMY SUBTOTAL Left 11/13/2017    Procedure: COLECTOMY SUBTOTAL;  EXPLORATORY LAPAROTOMY, OPEN SUBTOTAL  COLECTOMY, END ILEOSTOMY;  Surgeon: May Spain MD;  Location:  OR     DENTAL SURGERY      wisdom tooth extraction     ILEOSTOMY N/A 11/13/2017    Procedure: ILEOSTOMY;;  Surgeon: May Spain MD;  Location:  OR     LAPAROTOMY EXPLORATORY N/A 11/13/2017    Procedure: LAPAROTOMY EXPLORATORY;;  Surgeon: May Spain MD;  Location:  OR     MASTECTOMY SIMPLE BILATERAL Bilateral 10/16/2017    Procedure: MASTECTOMY SIMPLE BILATERAL;  BILATERAL SIMPLE MASTECTOMY, BILATERAL AXILLARY SENTINEL NODE BIOPSY;  Surgeon: Van Salazar MD;  Location: [facility]        Family History   Problem Relation Age of Onset     Unknown/Adopted Mother      Unknown/Adopted Father        Social History     Socioeconomic History     Marital status: Single     Spouse name: Not on file     Number of children: Not on file     Years of education: Not on file     Highest  education level: Not on file   Occupational History     Not on file   Tobacco Use     Smoking status: Current Every Day Smoker     Packs/day: 0.50     Years: 40.00     Pack years: 20.00     Types: Cigarettes     Smokeless tobacco: Never Used     Tobacco comment: 1/2 PPD   Substance and Sexual Activity     Alcohol use: Yes     Alcohol/week: 0.0 standard drinks     Comment: weekends     Drug use: No     Sexual activity: Not Currently   Other Topics Concern     Parent/sibling w/ CABG, MI or angioplasty before 65F 55M? No   Social History Narrative     Not on file     Social Determinants of Health     Financial Resource Strain: Not on file   Food Insecurity: Not on file   Transportation Needs: Not on file   Physical Activity: Not on file   Stress: Not on file   Social Connections: Not on file   Intimate Partner Violence: Not on file   Housing Stability: Not on file       Outpatient Encounter Medications as of 5/11/2022   Medication Sig Dispense Refill     anastrozole (ARIMIDEX) 1 MG tablet Take 1 tablet (1 mg) by mouth daily 90 tablet 3     levothyroxine (SYNTHROID/LEVOTHROID) 75 MCG tablet Take 1 tablet (75 mcg) by mouth daily 90 tablet 1     multivitamin (CENTRUM SILVER) tablet Take 1 tablet by mouth daily       triamterene-HCTZ (DYAZIDE) 37.5-25 MG capsule Take 1 capsule by mouth daily 90 capsule 1     VITAMIN D, CHOLECALCIFEROL, PO Take 2,000 Units by mouth every morning       No facility-administered encounter medications on file as of 5/11/2022.       Review Of Systems:  Skin: cyst  Eyes: negative  Ears/Nose/Throat: negative  Respiratory: No shortness of breath, dyspnea on exertion, cough, or hemoptysis  Cardiovascular: negative  Gastrointestinal: negative  Genitourinary: negative  Musculoskeletal: negative  Neurologic: negative  Psychiatric: negative  Hematologic/Lymphatic/Immunologic: negative  Endocrine: negative      Objective:     /84   Pulse 84   LMP  (LMP Unknown)   SpO2 99%   Breastfeeding No    Eyes: Conjunctivae/lids: Normal   ENT: Lips:  Normal  MSK: Normal  Cardiovascular: Peripheral edema none  Pulm: Breathing Normal  Neuro/Psych: Orientation: A/O x 3. Normal; Mood/Affect: Normal, NAD, WDWN  Pt accompanied by: self  Following areas examined: face, neck, back. Pt wearing mask  Mercado skin type:ii   Findings:  Soft mobile smooth nodule on 2.5cm on mid back     Assessment and Plan:     1) EIC 2.5cm mid back  EXCISIONAL BIOPSY AND COMPLEX:  After thorough discussion of PGACAC, consent obtained, anesthesia with LEC and prep, the margins of the lesion were identified and an elliptical incision was made superficial to the lesion. The incisions were made through to include the mid-subcutaneous tissue.  The lesion was removed en bloc and submitted for derm pathologic review. Because of the full-thickness nature of the wound and to avoid standing cone deformities, a complex linear repair was planned.  The wound edges were widely undermined until adequate tissue mobility was obtained.  Hemostasis was achieved.  The wound edges were then closed in a layered fashion, using Vicryl for subcutaneous stitches and FAPG sutures for running top stitches.  Postoperative length was 2.5 cm.  Patient will be contacted with result.  EBL minimal; complications none; wound care routine.  The patient was discharged in good condition and will return in one week for wound evaluation.           It was a pleasure speaking with Suma Calabrese today.       Follow up in one week bandage change

## 2022-05-13 LAB
PATH REPORT.COMMENTS IMP SPEC: NORMAL
PATH REPORT.COMMENTS IMP SPEC: NORMAL
PATH REPORT.FINAL DX SPEC: NORMAL
PATH REPORT.GROSS SPEC: NORMAL
PATH REPORT.MICROSCOPIC SPEC OTHER STN: NORMAL
PATH REPORT.RELEVANT HX SPEC: NORMAL

## 2022-05-18 ENCOUNTER — ALLIED HEALTH/NURSE VISIT (OUTPATIENT)
Dept: DERMATOLOGY | Facility: CLINIC | Age: 67
End: 2022-05-18
Payer: MEDICARE

## 2022-05-18 DIAGNOSIS — Z48.00 ENCOUNTER FOR CHANGE OF DRESSING: Primary | ICD-10-CM

## 2022-05-18 PROCEDURE — 99207 PR NO CHARGE NURSE ONLY: CPT

## 2022-05-18 NOTE — NURSING NOTE
Suma Calabrese comes into clinic today at the request of Chrissy Sims MS, DALY   Ordering Provider for Wound Check Action taken: bandage change.      This service provided today was under the supervising provider of the day Chrissy Sims MS, DALY  , who was available if needed.    Starla Morris RN   Please see providers note on 5/11/22 for orders  Patient returned to clinic for post surgery 1 week follow up bandage change. Patient has no complaints, denies pain.  Bandage removed from mid back. Area cleansed with wound cleanser. Site is healing with no signs of infection, wound edges approximating well.   Reapplied new steri strips and paper tape.     Advised to watch for signs and symptons of infection; spreading redness, increasing pain, drainage, odor, fever.   Call or report promptly to clinic if any of these symptoms are present.    Wound care post op instructions given and discussed for 14 day bandage removal and continued incision/scar care.    Patient verbalized understanding.       Julita CHAPIN RN BSN  [facility]  [phone]

## 2022-05-26 ENCOUNTER — NURSE TRIAGE (OUTPATIENT)
Dept: NURSING | Facility: CLINIC | Age: 67
End: 2022-05-26

## 2022-05-26 NOTE — TELEPHONE ENCOUNTER
"S-(situation): Patient calling with concern of wound     B-(background): 5/11/22 s/p epidermal inclusion cyst  excision on back    A-(assessment): drg changed on 5/18/22. Patient  removed after showering  yesterday and blood was noted on bandage. She has superficial open at the end of incision about the length of cotton end of Q-tip and width of Q-tip...  Wound appears dry. Denies any pain, redness or swelling at this time.     R-(recommendations): Return call to patient at [phone] for additional assistance/ instruction.    Additional Information    Nursing judgment    Negative: Nursing judgment    Answer Assessment - Initial Assessment Questions  Patient calling to report wound having opening      1. SYMPTOM: \"What's the main symptom you're concerned about?\" (e.g., redness, pain, drainage)      *No Answer*  2. ONSET: \"When did *No Answer*  start?\"      *No Answer*  3. SURGERY: \"What surgery was performed?\"      *No Answer*  4. DATE of SURGERY: \"When was surgery performed?\"       *No Answer*  5. INCISION SITE: \"Where is the incision located?\"       *No Answer*  6. REDNESS: \"Is there any redness at the incision site?\" If yes, ask: \"How wide across is the redness?\" (Inches, centimeters)       *No Answer*  7. PAIN: \"Is there any pain?\" If so, ask: \"How bad is it?\"  (Scale 1-10; or mild, moderate, severe)      *No Answer*  8. BLEEDING: \"Is there any bleeding?\" If so, ask: \"How much?\" and \"Where?\"      *No Answer*  9. DRAINAGE: \"Is there any drainage from the incision site?\" If yes, ask: \"What color and how much?\" (e.g., red, cloudy, pus; drops, teaspoon)      *No Answer*  10. FEVER: \"Do you have a fever?\" If so, ask: \"What is your temperature, how was it measured, and when did it start?\"        *No Answer*  11. OTHER SYMPTOMS: \"Do you have any other symptoms?\" (e.g., shaking chills, weakness, rash elsewhere on body)        *No Answer*    Answer Assessment - Initial Assessment Questions  Patient calling to report " having removed bandage from back yesterday s/p dressing change on 5/18/22.  She did not take bandage off prior to showering and noted some blood on bandage afterwards.     Wound has a superficial opening at base of incision about the width of a Q-tip and length of the cotton at the end of Q-tip.  Appears completely dry. No drainage, pain or redness noted at this time.     This nurse review instructions from visit on 4/18/22. Patient agreed to keep covered and watch for signs of infection.     Routed to Dermatology to review and follow up call. Patient would like to be called on mobile at  [phone].    Protocols used: INFORMATION ONLY CALL - NO TRIAGE-A-OH, POST-OP INCISION SYMPTOMS AND [id number]

## 2022-05-26 NOTE — TELEPHONE ENCOUNTER
Spoke with patient-she states there is a very superficial opening-at the bottom of the wound-  Small amount of red tinged drainage on dressing. Advised to keep clean and cover daily with Vaseline/aquapor until this area heals   patient states there is no heat/pain/redness  Advised patient that if there is any question about infection we would see her on a nurse only appointment with the Derm nurse.    Thank you,    Julita CHAPINRN BSN  [facility] D[facility]at[facility]- [phone]

## 2022-06-04 ENCOUNTER — HEALTH MAINTENANCE LETTER (OUTPATIENT)
Age: 67
End: 2022-06-04

## 2022-06-17 NOTE — LETTER
10/8/2021         RE: Suma Calabrese  [address]  [facility] [address]      Oncology/Hematology Visit Note  Oct 8, 2021    Reason for Visit: Follow up of bilateral breast cancer     History of Present Illness: Suma Calabrese is a 66 year old female who presents with the following oncologic history:  --She self-felt a breast lump on the right side in 09/2017.    --9/2017 Mammogram showed 11 o'clock, 3 cm from the nipple on the right breast, which is right upper outer quadrant, 1.1 cm hypoechoic mass.    --This was biopsied in 09/2017, identified grade 2 invasive lobular cancer, ER greater than 95% positive, DC greater than 25% positive; HER-2/selena was negative.    --She met with Dr. Salazar, discussed  surgical options at that time.   --Breast MRI showed a left breast nodule 3 to 4 o'clock position 4 cm from the nipple, 4 mm enhancing focus.  This is at the central lateral portion of the left breast.  Second look ultrasound confirmed the lesion, subcentimeter in size.    --Biopsy was performed in 10/2017 on the left side, identified radial scar, no evidence of malignancy.    --She subsequently underwent double mastectomy with Dr. Salazar mid 10/2017.  Pathology showed left invasive grade 1 IDC  0.5 cm at 3 o'clock position 9 cm from the nipple, % positive, DC 80% positive, HER-2/selena negative. Margins were clear.  Lymphovascular invasion absent. Two lymph nodes were negative.  This was staged at [id number].    --On the right side, pathology showed 2 cm invasive lobular cancer, grade 1 associated with LCIS, no lymphovascular invasion.  One sentinel lymph node was negative.  Therefore stage [id number] disease.    --Oncotype DX score = 13, corresponding to 10-year risk of distant recurrence of 8% after 5 years of hormone blockade therapy.     --She started anastrazole 12/2017.   --She could not tolerate alendronate and therefore discontinued it. She had adverse reaction to Zometa, and declined further  doses.    Interval History:  Hansa was seen today for follow-up. Overall she is doing well. She has noted an area of swelling in her left chest wall that she isn't sure how long it has been there but it wasn't when she saw Dr. Peoples last. She denies any weight changes to explain the swelling. It is not painful unless she pushes on it too often. She denies any other chest wall concerns. Her left axilla feels tight since surgery, unchanged. Hasn't had issues with lymphedema.    She does have mild joint aches with anastrozole, mainly in her hips and knees, no focal areas of pain. The soreness is worse at night or with prolonged sitting and improves with activity. She does not take any pain medications. Denies mood issues or significant hot flashes.     She did not start alendronate due to concerns with poor tolerance as she had in the past. She is taking calcium/vit D and exercising. She is still smoking.    She denies fevers, headaches, dizziness, chest pain, SOB, cough, nausea, vomiting, abdominal pain, bowel or bladder concerns (has ostomy, no changes to output or blood in stool). She is overdue to see PCP.     Current Outpatient Medications   Medication Sig Dispense Refill     alendronate (FOSAMAX) 70 MG tablet Take 1 tablet (70 mg) by mouth every 7 days 30 tablet 1     anastrozole (ARIMIDEX) 1 MG tablet Take 1 tablet (1 mg) by mouth daily 90 tablet 3     Ibuprofen (ADVIL PO) Take 200 mg by mouth every 4 hours as needed for moderate pain       levothyroxine (SYNTHROID/LEVOTHROID) 75 MCG tablet Take 1 tablet (75 mcg) by mouth daily 90 tablet 4     Multiple Vitamins-Minerals (CENTRUM SILVER) per tablet Take 1 tablet by mouth daily       multivitamin (CENTRUM SILVER) tablet Take 1 tablet by mouth daily       VITAMIN D, CHOLECALCIFEROL, PO Take 1,000 Units by mouth every morning       Physical Examination:  BP (!) 155/87   Pulse 93   Resp 16   Wt 56 kg (123 lb 6.4 oz)   LMP  (LMP Unknown)   SpO2 99%   BMI 19.62  kg/m    Wt Readings from Last 10 Encounters:   03/15/21 57.9 kg (127 lb 9.6 oz)   09/09/20 56.7 kg (125 lb)   10/09/19 55.9 kg (123 lb 3.2 oz)   05/13/19 59 kg (130 lb)   04/19/19 59.9 kg (132 lb)   04/10/19 60.6 kg (133 lb 9.6 oz)   02/07/19 62.6 kg (138 lb)   12/24/18 63.5 kg (140 lb)   09/12/18 62.1 kg (136 lb 12.8 oz)   03/14/18 59.9 kg (132 lb)     Constitutional: Well-appearing female in no acute distress.  Eyes: EOMI, PERRL. No scleral icterus.  ENT: Deferred   Lymphatic: Neck is supple without cervical or supraclavicular lymphadenopathy. No axillary lymphadenopathy. Mild cording in L axilla.  Breast: Surgically absent. No palpable nodules or masses. There is a focal area of swelling in mid inferior chest wall, no overlying erythema or tenderness, fluctuant without any firm nodules.   Cardiovascular: Regular rate and rhythm. No murmurs, gallops, or rubs. No peripheral edema.  Respiratory: Clear to auscultation bilaterally. No wheezes or crackles.  Gastrointestinal: Bowel sounds present. Abdomen soft, non-tender. No palpable hepatosplenomegaly or masses. Ostomy in place.   Neurologic: Cranial nerves II through XII are grossly intact.  Skin: No rashes, petechiae, or bruising noted on exposed skin.    Laboratory Data:  No labs to review       Assessment and Plan:  Suma Calabrese is a 66 year old female with the following issues:    1. Bilateral Breast Cancer  Stage IA, [id number]-C9-P7, grade 1 invasive lobular carcinoma of the right upper outer breast, ER/ID positive, HER-2/selena negative, Oncotype DX = 13   Stage IA, [id number]-T4-M4, grade 1 invasive ductal carcinoma of the left lower outer breast, ER/ID positive, HER-2/selena negative  --Hansa is s/p bilateral mastectomies with negative surgical margins.  --She is currently on anastrazole, tolerating this well with no clinical evidence for recurrent based on physical exam. She will monitor   --She does have focal area of L chest wall swelling, possibly seroma? Will get US  "to assess.  --Recommended total 5 years of hormone blockade therapy.  --Dr. Peoples follow-up in 6 months     2. Osteopenia  --Last DEXA from 9/2/2020 showed stable osteopenia.  --She did not tolerate Zometa.  --Continue adequate calcium, vitamin D, and weight-bearing exercise.  --She does not want to retry alendronate. As such will discussed risks of ongoing anastrozole without bone strengthening medication   --Plan to repeat DEXA in 9/2022.    3. Hypertension  --Initial reading was elevated at 155/87. Recheck levels continued to elevate to 180s/100s, concern for anxiety contributing. Patient asymptomatic specifically no headaches or vision changes. Discussed going to ED now vs restarting Dyazide today (was on in past) and getting BP machine to monitor at home and go to ED if continues to elevate or she develops a headache. She opted to  med and monitor at home. She understands reasons to go to ED. I will call her later to ensure improvement in BP.   --Asked her to follow-up with PCP as she will need lab and BP monitoring with restarting Dyazide     65 minutes spent on the date of the encounter doing chart review, patient visit and documentation     Ruben Villeda PA-C  [facility]  [address] [place] [address]  [phone]          Oncology Rooming Note    October 8, 2021 12:32 PM   Suma Calabrese is a 66 year old female who presents for:    Chief Complaint   Patient presents with     Oncology Clinic Visit     Initial Vitals: BP (!) 155/87   Pulse 93   Resp 16   Wt 56 kg (123 lb 6.4 oz)   LMP  (LMP Unknown)   SpO2 99%   BMI 19.62 kg/m   Estimated body mass index is 19.62 kg/m  as calculated from the following:    Height as of 3/15/21: 1.689 m (5' 6.5\").    Weight as of this encounter: 56 kg (123 lb 6.4 oz). Body surface area is 1.62 meters squared.  No Pain (0) Comment: Data Unavailable   No LMP recorded (lmp unknown). Patient is postmenopausal.  Allergies reviewed: " Yes  Medications reviewed: Yes    Medications: Medication refills not needed today.  Pharmacy name entered into [facility]:    [id number] - [place], [place] - [address] AT [facility] & [address] [place] [address] [id number] - [place], [place] - [address] AT [facility] [address]    Clinical concerns: no      Gala Kidd, ERI Baxter   DISCHARGE

## 2022-07-01 ENCOUNTER — OFFICE VISIT (OUTPATIENT)
Dept: INTERNAL MEDICINE | Facility: CLINIC | Age: 67
End: 2022-07-01
Payer: MEDICARE

## 2022-07-01 VITALS
SYSTOLIC BLOOD PRESSURE: 127 MMHG | DIASTOLIC BLOOD PRESSURE: 82 MMHG | BODY MASS INDEX: 19.85 KG/M2 | HEART RATE: 82 BPM | TEMPERATURE: 98.3 F | WEIGHT: 123 LBS | OXYGEN SATURATION: 98 %

## 2022-07-01 DIAGNOSIS — Z23 HIGH PRIORITY FOR COVID-19 VACCINATION: ICD-10-CM

## 2022-07-01 DIAGNOSIS — L98.9 SKIN LESION OF RIGHT ARM: Primary | ICD-10-CM

## 2022-07-01 DIAGNOSIS — Z23 NEED FOR PNEUMOCOCCAL VACCINATION: ICD-10-CM

## 2022-07-01 PROCEDURE — 0054A COVID-19,PF,PFIZER (12+ YRS): CPT | Performed by: INTERNAL MEDICINE

## 2022-07-01 PROCEDURE — 99213 OFFICE O/P EST LOW 20 MIN: CPT | Mod: 25 | Performed by: INTERNAL MEDICINE

## 2022-07-01 PROCEDURE — 91305 COVID-19,PF,PFIZER (12+ YRS): CPT | Performed by: INTERNAL MEDICINE

## 2022-07-01 NOTE — PROGRESS NOTES
Assessment & Plan   Problem List Items Addressed This Visit    None     Visit Diagnoses     Skin lesion of right arm    -  Primary    High priority for COVID-19 vaccination        Relevant Orders    REVIEW OF HEALTH MAINTENANCE PROTOCOL ORDERS (Completed)    COVID-19,PF,PFIZER (12+ YRS) (Completed)    Need for pneumococcal vaccination        Relevant Orders    REVIEW OF HEALTH MAINTENANCE PROTOCOL ORDERS (Completed)              The lesion on  the right forearm is probably a bruise based on the appearance and your history.        Observe for now, recheck in 6 weeks in August when you are here to see Dr. Borrero.        Covid booster given today.          Get the pneumonia vaccine at next clinic visit         Continue all medications at the same doses.  Contact your usual pharmacy if you need refills.       Shingles Vaccine (SHINGRIX):        I would recommend that you consider getting the Shingrix shingles vaccine.  The shingles vaccine is recommended for anyone over age 50.       The Shingrix vaccine is a series of 2 vaccines given 2-6 months apart.       The shingles vaccine does not stop you from getting shingles, but it decreases the intensity of the event, the duration of the event, and decreases the painful nerve condition that results       Based on the available data, the Shingrix vaccine has superior benefit and should be considered even if you have had the old Zostavax shinglesvaccine before.        Contact your insurance provider and ask them if either shingles vaccine is covered and is so, how much it will cost you.  Usually this will be cheaper to get in a pharmacy given by the pharmacist.      Regardless of the coverage, I would recommend that you consider the vaccine since shingles is very painful, (just ask anyone who has ever had it)      For Medicare insurance, the vaccine is cheaper to receive from a pharmacist in a pharmacy than for us to give you in the clinic.           Tobacco Cessation:    reports that she has been smoking cigarettes. She has a 20.00 pack-year smoking history. She has never used smokeless tobacco.          Return in about 6 weeks (around 8/12/2022) for in person Office visit, With your primary MD, Medicare Annual Wellness Exam.    Tommie Amezquita MD  [facility] REE Santo is a 67 year old, presenting for the following health issues:  Derm Problem      History of Present Illness       Reason for visit:  Mole that has changed in appearance    She eats 0-1 servings of fruits and vegetables daily.She consumes 0 sweetened beverage(s) daily.She exercises with enough effort to increase her heart rate 30 to 60 minutes per day.  She exercises with enough effort to increase her heart rate 5 days per week.   She is taking medications regularly.     **I reviewed the information recorded in the patient's EPIC chart (including but not limited to medical history, surgical history, family history, problem list, medication list, and allergy list) and updated the information as indicated based on the patients reported information.         Review of Systems   Constitutional, HEENT, cardiovascular, pulmonary, gi and gu systems are negative, except as otherwise noted.      Objective    /82   Pulse 82   Temp 98.3  F (36.8  C)   Wt 55.8 kg (123 lb)   LMP  (LMP Unknown)   SpO2 98%   BMI 19.85 kg/m    Body mass index is 19.85 kg/m .  Physical Exam   GENERAL alert and no distress  EYES:  Normal sclera,conjunctiva, EOMI  HENT: facies symmetric  MS: extremities- no gross deformities of the visible extremities noted,   PSYCH: Alert and oriented times 3; speech- coherent  SKIN:  No obvious significant skin lesions on visible portions of face   NEURO:  Normal facial movements.  Appears to move normally   SKIN: Round discoloration on the anterior right forearm approximately 1.5 cm in diameter, with smooth, no raised edges, nontender.  Looks like a  resolving superficial bruise. (See photo in the media section)                    .  ..

## 2022-07-01 NOTE — PATIENT INSTRUCTIONS
The lesion on  the right forearm is probably a bruise based on the appearance and your history.      Observe for now, recheck in 6 weeks in August when you are here to see Dr. Borrero.      Covid booster given today.        Get the pneumonia vaccine at next clinic visit       Continue all medications at the same doses.  Contact your usual pharmacy if you need refills.       Shingles Vaccine (SHINGRIX):      I would recommend that you consider getting the Shingrix shingles vaccine.  The shingles vaccine is recommended for anyone over age 50.     The Shingrix vaccine is a series of 2 vaccines given 2-6 months apart.     The shingles vaccine does not stop you from getting shingles, but it decreases the intensity of the event, the duration of the event, and decreases the painful nerve condition that results     Based on the available data, the Shingrix vaccine has superior benefit and should be considered even if you have had the old Zostavax shinglesvaccine before.      Contact your insurance provider and ask them if either shingles vaccine is covered and is so, how much it will cost you.  Usually this will be cheaper to get in a pharmacy given by the pharmacist.    Regardless of the coverage, I would recommend that you consider the vaccine since shingles is very painful, (just ask anyone who has ever had it)    For Medicare insurance, the vaccine is cheaper to receive from a pharmacist in a pharmacy than for us to give you in the clinic.

## 2022-08-23 ENCOUNTER — OFFICE VISIT (OUTPATIENT)
Dept: INTERNAL MEDICINE | Facility: CLINIC | Age: 67
End: 2022-08-23
Payer: MEDICARE

## 2022-08-23 VITALS
TEMPERATURE: 97 F | HEART RATE: 80 BPM | DIASTOLIC BLOOD PRESSURE: 86 MMHG | SYSTOLIC BLOOD PRESSURE: 144 MMHG | HEIGHT: 66 IN | WEIGHT: 122.3 LBS | OXYGEN SATURATION: 100 % | BODY MASS INDEX: 19.65 KG/M2

## 2022-08-23 DIAGNOSIS — Z23 NEED FOR VACCINATION: ICD-10-CM

## 2022-08-23 DIAGNOSIS — I10 ESSENTIAL HYPERTENSION: ICD-10-CM

## 2022-08-23 DIAGNOSIS — Z13.220 LIPID SCREENING: ICD-10-CM

## 2022-08-23 DIAGNOSIS — Z12.11 SCREEN FOR COLON CANCER: ICD-10-CM

## 2022-08-23 DIAGNOSIS — Z00.00 ENCOUNTER FOR MEDICARE ANNUAL WELLNESS EXAM: Primary | ICD-10-CM

## 2022-08-23 DIAGNOSIS — B00.1 COLD SORE: ICD-10-CM

## 2022-08-23 DIAGNOSIS — E03.4 HYPOTHYROIDISM DUE TO ACQUIRED ATROPHY OF THYROID: ICD-10-CM

## 2022-08-23 DIAGNOSIS — E03.4 HYPOTHYROIDISM DUE TO ACQUIRED ATROPHY OF THYROID: Chronic | ICD-10-CM

## 2022-08-23 LAB
ANION GAP SERPL CALCULATED.3IONS-SCNC: 4 MMOL/L (ref 3–14)
BUN SERPL-MCNC: 12 MG/DL (ref 7–30)
CALCIUM SERPL-MCNC: 10.1 MG/DL (ref 8.5–10.1)
CHLORIDE BLD-SCNC: 100 MMOL/L (ref 94–109)
CHOLEST SERPL-MCNC: 183 MG/DL
CO2 SERPL-SCNC: 26 MMOL/L (ref 20–32)
CREAT SERPL-MCNC: 0.72 MG/DL (ref 0.52–1.04)
FASTING STATUS PATIENT QL REPORTED: NO
GFR SERPL CREATININE-BSD FRML MDRD: 90 ML/MIN/1.73M2
GLUCOSE BLD-MCNC: 94 MG/DL (ref 70–99)
HDLC SERPL-MCNC: 78 MG/DL
LDLC SERPL CALC-MCNC: 92 MG/DL
NONHDLC SERPL-MCNC: 105 MG/DL
POTASSIUM BLD-SCNC: 4.8 MMOL/L (ref 3.4–5.3)
SODIUM SERPL-SCNC: 130 MMOL/L (ref 133–144)
TRIGL SERPL-MCNC: 66 MG/DL
TSH SERPL DL<=0.005 MIU/L-ACNC: 2.28 MU/L (ref 0.4–4)

## 2022-08-23 PROCEDURE — 90677 PCV20 VACCINE IM: CPT | Performed by: INTERNAL MEDICINE

## 2022-08-23 PROCEDURE — 84443 ASSAY THYROID STIM HORMONE: CPT | Performed by: INTERNAL MEDICINE

## 2022-08-23 PROCEDURE — 36415 COLL VENOUS BLD VENIPUNCTURE: CPT | Performed by: INTERNAL MEDICINE

## 2022-08-23 PROCEDURE — 80048 BASIC METABOLIC PNL TOTAL CA: CPT | Performed by: INTERNAL MEDICINE

## 2022-08-23 PROCEDURE — 99214 OFFICE O/P EST MOD 30 MIN: CPT | Mod: 25 | Performed by: INTERNAL MEDICINE

## 2022-08-23 PROCEDURE — G0438 PPPS, INITIAL VISIT: HCPCS | Performed by: INTERNAL MEDICINE

## 2022-08-23 PROCEDURE — G0009 ADMIN PNEUMOCOCCAL VACCINE: HCPCS | Performed by: INTERNAL MEDICINE

## 2022-08-23 PROCEDURE — 80061 LIPID PANEL: CPT | Performed by: INTERNAL MEDICINE

## 2022-08-23 RX ORDER — TRIAMTERENE AND HYDROCHLOROTHIAZIDE 37.5; 25 MG/1; MG/1
1 CAPSULE ORAL DAILY
Qty: 90 CAPSULE | Refills: 3 | Status: SHIPPED | OUTPATIENT
Start: 2022-08-23 | End: 2023-10-20 | Stop reason: ALTCHOICE

## 2022-08-23 RX ORDER — LISINOPRIL 10 MG/1
10 TABLET ORAL DAILY
Qty: 90 TABLET | Status: SHIPPED | OUTPATIENT
Start: 2022-08-23 | End: 2022-08-23

## 2022-08-23 RX ORDER — LISINOPRIL 10 MG/1
10 TABLET ORAL DAILY
Qty: 90 TABLET | Refills: 3 | Status: SHIPPED | OUTPATIENT
Start: 2022-08-23 | End: 2022-08-29 | Stop reason: SINTOL

## 2022-08-23 RX ORDER — LEVOTHYROXINE SODIUM 75 UG/1
75 TABLET ORAL DAILY
Qty: 90 TABLET | Refills: 3 | Status: SHIPPED | OUTPATIENT
Start: 2022-08-23 | End: 2023-10-20

## 2022-08-23 RX ORDER — VALACYCLOVIR HYDROCHLORIDE 1 G/1
2000 TABLET, FILM COATED ORAL 2 TIMES DAILY
Qty: 4 TABLET | Refills: 11 | Status: SHIPPED | OUTPATIENT
Start: 2022-08-23 | End: 2023-10-20

## 2022-08-23 ASSESSMENT — ENCOUNTER SYMPTOMS
COUGH: 0
PALPITATIONS: 0
HEADACHES: 0
FEVER: 0
DYSURIA: 0
DIZZINESS: 0
CONSTIPATION: 0
BREAST MASS: 0
JOINT SWELLING: 0
ABDOMINAL PAIN: 0
SORE THROAT: 0
NAUSEA: 0
HEARTBURN: 0
NERVOUS/ANXIOUS: 1
PARESTHESIAS: 0
ARTHRALGIAS: 1
SHORTNESS OF BREATH: 0
WEAKNESS: 0
FREQUENCY: 0
MYALGIAS: 0
HEMATOCHEZIA: 0
HEMATURIA: 0
CHILLS: 0
EYE PAIN: 0
DIARRHEA: 0

## 2022-08-23 ASSESSMENT — ACTIVITIES OF DAILY LIVING (ADL): CURRENT_FUNCTION: NO ASSISTANCE NEEDED

## 2022-08-23 NOTE — PATIENT INSTRUCTIONS
Patient Education   Personalized Prevention Plan  You are due for the preventive services outlined below.  Your care team is available to assist you in scheduling these services.  If you have already completed any of these items, please share that information with your care team to update in your medical record.  Health Maintenance Due   Topic Date Due     Pneumococcal Vaccine (1 - PCV) Never done     Zoster (Shingles) Vaccine (1 of 2) Never done     LUNG CANCER SCREENING  Never done       Understanding USDA MyPlate  The USDA has guidelines to help you make healthy food choices. These are called MyPlate. MyPlate shows the food groups that make up healthy meals using the image of a place setting. Before you eat, think about the healthiest choices for what to put on your plate or in your cup or bowl. To learn more about building a healthy plate, visit www.chooseNavendisplate.gov.    The food groups    Fruits. Any fruit or 100% fruit juice counts as part of the Fruit Group. Fruits may be fresh, canned, frozen, or dried, and may be whole, cut-up, or pureed. Make 1/2 of your plate fruits and vegetables.    Vegetables. Any vegetable or 100% vegetable juice counts as a member of the Vegetable Group. Vegetables may be fresh, frozen, canned, or dried. They can be served raw or cooked and may be whole, cut-up, or mashed. Make 1/2 of your plate fruits and vegetables.    Grains. All foods made from grains are part of the Grains Group. These include wheat, rice, oats, cornmeal, and barley. Grains are often used to make foods such as bread, pasta, oatmeal, cereal, tortillas, and grits. Grains should be no more than 1/4 of your plate. At least half of your grains should be whole grains.    Protein. This group includes meat, poultry, seafood, beans and peas, eggs, processed soy products (such as tofu), nuts (including nut butters), and seeds. Make protein choices no more than 1/4 of your plate. Meat and poultry choices should be lean  or low fat.    Dairy. The Dairy Group includes all fluid milk products and foods made from milk that contain calcium, such as yogurt and cheese. (Foods that have little calcium, such as cream, butter, and cream cheese, are not part of this group.) Most dairy choices should be low-fat or fat-free.    Oils. Oils aren't a food group, but they do contain essential nutrients. However it's important to watch your intake of oils. These are fats that are liquid at room temperature. They include canola, corn, olive, soybean, vegetable, and sunflower oil. Foods that are mainly oil include mayonnaise, certain salad dressings, and soft margarines. You likely already get your daily oil allowance from the foods you eat.  Things to limit  Eating healthy also means limiting these things in your diet:       Salt (sodium). Many processed foods have a lot of sodium. To keep sodium intake down, eat fresh vegetables, meats, poultry, and seafood when possible. Purchase low-sodium, reduced-sodium, or no-salt-added food products at the store. And don't add salt to your meals at home. Instead, season them with herbs and spices such as dill, oregano, cumin, and paprika. Or try adding flavor with lemon or lime zest and juice.    Saturated fat. Saturated fats are most often found in animal products such as beef, pork, and chicken. They are often solid at room temperature, such as butter. To reduce your saturated fat intake, choose leaner cuts of meat and poultry. And try healthier cooking methods such as grilling, broiling, roasting, or baking. For a simple lower-fat swap, use plain nonfat yogurt instead of mayonnaise when making potato salad or macaroni salad.    Added sugars. These are sugars added to foods. They are in foods such as ice cream, candy, soda, fruit drinks, sports drinks, energy drinks, cookies, pastries, jams, and syrups. Cut down on added sugars by sharing sweet treats with a family member or friend. You can also choose fruit  for dessert, and drink water or other unsweetened beverages.     [employer] last reviewed this educational content on 6/1/2020 2000-2021 [employer]. All rights reserved. This information is not intended as a substitute for professional medical care. Always follow your healthcare professional's instructions.

## 2022-08-23 NOTE — PROGRESS NOTES
"SUBJECTIVE:   Suma Calabrese is a 67 year old female who presents for Preventive Visit.    Patient has been advised of split billing requirements and indicates understanding: Yes  Are you in the first 12 months of your Medicare coverage?  No    Healthy Habits:     In general, how would you rate your overall health?  Good    Frequency of exercise:  6-7 days/week    Duration of exercise:  30-45 minutes    Do you usually eat at least 4 servings of fruit and vegetables a day, include whole grains    & fiber and avoid regularly eating high fat or \"junk\" foods?  No    Taking medications regularly:  Yes    Medication side effects:  None    Ability to successfully perform activities of daily living:  No assistance needed    Home Safety:  No safety concerns identified    Hearing Impairment:  No hearing concerns    In the past 6 months, have you been bothered by leaking of urine?  No    In general, how would you rate your overall mental or emotional health?  Good      PHQ-2 Total Score: 0    Additional concerns today:  No    Do you feel safe in your environment? YES    Have you ever done Advance Care Planning? (For example, a Health Directive, POLST, or a discussion with a medical provider or your loved ones about your wishes): No, advance care planning information given to patient to review.  Patient plans to discuss their wishes with loved ones or provider.         Fall risk  Fallen 2 or more times in the past year?: No  Any fall with injury in the past year?: No    Cognitive Screening   1) Repeat 3 items (Leader, Season, Table)    2) Clock draw: ABNORMAL .  3) 3 item recall: Recalls 3 objects  Results: 3 items recalled: COGNITIVE IMPAIRMENT LESS LIKELY    Mini-CogTM Copyright FABIEN Antonio. Licensed by the author for use in [facility]; reprinted with permission (benita@.[facility]). All rights reserved.      Do you have sleep apnea, excessive snoring or daytime drowsiness?: no    Reviewed and updated as needed this " visit by clinical staff   Tobacco  Allergies  Meds  Problems  Med Hx  Surg Hx  Fam Hx  Soc   Hx          Reviewed and updated as needed this visit by Provider      Problems              Social History     Tobacco Use     Smoking status: Current Every Day Smoker     Packs/day: 0.50     Years: 40.00     Pack years: 20.00     Types: Cigarettes     Smokeless tobacco: Never Used     Tobacco comment: 1/2 PPD   Substance Use Topics     Alcohol use: Yes     Alcohol/week: 0.0 standard drinks     Comment: weekends         Alcohol Use 8/23/2022   Prescreen: >3 drinks/day or >7 drinks/week? No               Current providers sharing in care for this patient include:   Patient Care Team:  Osvaldo Borrero MD as PCP - General (Internal Medicine)  Erik Lopez MD as Referring Physician (Internal Medicine)  Ceci Whaley PA-C as Physician Assistant (Dermatology)  Serena Peoples MD as Assigned Cancer Care Provider  Ceci Whaley PA-C as Assigned Surgical Provider  Osvaldo Borrero MD as Assigned PCP    The following health maintenance items are reviewed in Epic and correct as of today:  Health Maintenance Due   Topic Date Due     ZOSTER IMMUNIZATION (1 of 2) Never done     LUNG CANCER SCREENING  Never done           FHS-7: No flowsheet data found.  hx breast ca- see onc recs  Pertinent mammograms are reviewed under the imaging tab.    Review of Systems   Constitutional: Negative for chills and fever.   HENT: Negative for congestion, hearing loss and sore throat.    Eyes: Negative for pain and visual disturbance.   Respiratory: Negative for cough and shortness of breath.    Cardiovascular: Negative for chest pain, palpitations and peripheral edema.   Gastrointestinal: Negative for abdominal pain, constipation, diarrhea, heartburn, hematochezia and nausea.   Breasts:  Negative for tenderness, breast mass and discharge.   Genitourinary: Negative for dysuria, frequency, genital sores, hematuria, pelvic  "pain, urgency, vaginal bleeding and vaginal discharge.   Musculoskeletal: Positive for arthralgias. Negative for joint swelling and myalgias.   Skin: Negative for rash.   Neurological: Negative for dizziness, weakness, headaches and paresthesias.   Psychiatric/Behavioral: Negative for mood changes. The patient is nervous/anxious.          OBJECTIVE:   BP (!) 144/86   Pulse 80   Temp 97  F (36.1  C) (Temporal)   Ht 1.676 m (5' 6\")   Wt 55.5 kg (122 lb 4.8 oz)   LMP  (LMP Unknown)   SpO2 100%   BMI 19.74 kg/m   Estimated body mass index is 19.74 kg/m  as calculated from the following:    Height as of this encounter: 1.676 m (5' 6\").    Weight as of this encounter: 55.5 kg (122 lb 4.8 oz).  Physical Exam  GENERAL APPEARANCE: healthy, alert and no distress  EYES: Eyes grossly normal to inspection, PERRL and conjunctivae and sclerae normal  HENT: ear canals and TM's normal, nose and mouth without ulcers or lesions, oropharynx clear and cold sore on L lower side of lip  NECK: no adenopathy, no asymmetry, masses, or scars and thyroid normal to palpation  RESP: lungs clear to auscultation - no rales, rhonchi or wheezes  CV: regular rate and rhythm, normal S1 S2, no S3 or S4, no murmur, click or rub, no peripheral edema and peripheral pulses strong  ABDOMEN: soft, nontender, no hepatosplenomegaly, no masses and bowel sounds normal  MS: no musculoskeletal defects are noted and gait is age appropriate without ataxia  SKIN: no suspicious lesions or rashes  NEURO: Normal strength and tone, sensory exam grossly normal, mentation intact and speech normal  PSYCH: mentation appears normal and affect normal/bright    Results for orders placed or performed in visit on 08/23/22 (from the past 24 hour(s))   TSH with free T4 reflex   Result Value Ref Range    TSH 2.28 0.40 - 4.00 mU/L   Basic metabolic panel  (Ca, Cl, CO2, Creat, Gluc, K, Na, BUN)   Result Value Ref Range    Sodium 130 (L) 133 - 144 mmol/L    Potassium 4.8 3.4 - " 5.3 mmol/L    Chloride 100 94 - 109 mmol/L    Carbon Dioxide (CO2) 26 20 - 32 mmol/L    Anion Gap 4 3 - 14 mmol/L    Urea Nitrogen 12 7 - 30 mg/dL    Creatinine 0.72 0.52 - 1.04 mg/dL    Calcium 10.1 8.5 - 10.1 mg/dL    Glucose 94 70 - 99 mg/dL    GFR Estimate 90 >60 mL/min/1.73m2   Lipid panel reflex to direct LDL Non-fasting   Result Value Ref Range    Cholesterol 183 <200 mg/dL    Triglycerides 66 <150 mg/dL    Direct Measure HDL 78 >=50 mg/dL    LDL Cholesterol Calculated 92 <=100 mg/dL    Non HDL Cholesterol 105 <130 mg/dL    Patient Fasting > 8hrs? No     Narrative    Cholesterol  Desirable:  <200 mg/dL    Triglycerides  Normal:  Less than 150 mg/dL  Borderline High:  150-199 mg/dL  High:  200-499 mg/dL  Very High:  Greater than or equal to 500 mg/dL    Direct Measure HDL  Female:  Greater than or equal to 50 mg/dL   Male:  Greater than or equal to 40 mg/dL    LDL Cholesterol  Desirable:  <100mg/dL  Above Desirable:  100-129 mg/dL   Borderline High:  130-159 mg/dL   High:  160-189 mg/dL   Very High:  >= 190 mg/dL    Non HDL Cholesterol  Desirable:  130 mg/dL  Above Desirable:  130-159 mg/dL  Borderline High:  160-189 mg/dL  High:  190-219 mg/dL  Very High:  Greater than or equal to 220 mg/dL       ASSESSMENT / PLAN:       ICD-10-CM    1. Encounter for Medicare annual wellness exam  Z00.00    2. Hypothyroidism due to acquired atrophy of thyroid  E03.4 TSH with free T4 reflex     levothyroxine (SYNTHROID/LEVOTHROID) 75 MCG tablet     TSH with free T4 reflex        3. Essential hypertension  I10 Basic metabolic panel  (Ca, Cl, CO2, Creat, Gluc, K, Na, BUN)     lisinopril (ZESTRIL) 10 MG tablet     triamterene-HCTZ (DYAZIDE) 37.5-25 MG capsule     Basic metabolic panel  (Ca, Cl, CO2, Creat, Gluc, K, Na, BUN)         4. Hypothyroidism due to acquired atrophy of thyroid  E03.4 TSH with free T4 reflex     levothyroxine (SYNTHROID/LEVOTHROID) 75 MCG tablet     TSH with free T4 reflex            5. Cold sore  B00.1  "valACYclovir (VALTREX) 1000 mg tablet   6. Screen for colon cancer  Z12.11 Adult Colorectal Surgery  Referral   7. Lipid screening  Z13.220 Lipid panel reflex to direct LDL Non-fasting     Lipid panel reflex to direct LDL Non-fasting   8. Need for vaccination  Z23 Pneumococcal 20 Valent Conjugate (Prevnar 20)     -Updated screening, immunizations, prevention.  Please see health maintenance list, care gaps  -add lisinopril for BP, repeat labs in 3 wks  - improve BP then consider statin for primary prev ASCVD  -flex sig for colon ca screening of remaining colon   - lung ca screening discussed- pt defers    Patient has been advised of split billing requirements and indicates understanding: Yes    COUNSELING:  Reviewed preventive health counseling, as reflected in patient instructions  The 10-year ASCVD risk score (Yoselin GRIMES Jr., et al., 2013) is: 20.5%    Values used to calculate the score:      Age: 67 years      Sex: Female      Is Non- : No      Diabetic: No      Tobacco smoker: Yes      Systolic Blood Pressure: 144 mmHg      Is BP treated: Yes      HDL Cholesterol: 78 mg/dL      Total Cholesterol: 183 mg/dL   Estimated body mass index is 19.74 kg/m  as calculated from the following:    Height as of this encounter: 1.676 m (5' 6\").    Weight as of this encounter: 55.5 kg (122 lb 4.8 oz).        She reports that she has been smoking cigarettes. She has a 20.00 pack-year smoking history. She has never used smokeless tobacco.  Nicotine/Tobacco Cessation Plan:   Information offered: Patient not interested at this time      Appropriate preventive services were discussed with this patient, including applicable screening as appropriate for cardiovascular disease, diabetes, osteopenia/osteoporosis, and glaucoma.  As appropriate for age/gender, discussed screening for colorectal cancer, prostate cancer, breast cancer, and cervical cancer. Checklist reviewing preventive services available has " been given to the patient.    Reviewed patients plan of care and provided an AVS. The Basic Care Plan (routine screening as documented in Health Maintenance) for Suma meets the Care Plan requirement. This Care Plan has been established and reviewed with the Patient.    Counseling Resources:  ATP IV Guidelines  Pooled Cohorts Equation Calculator  Breast Cancer Risk Calculator  Breast Cancer: Medication to Reduce Risk  FRAX Risk Assessment  ICSI Preventive Guidelines  Dietary Guidelines for Americans, 2010  [employer] [place] My[employer]ate  ASA Prophylaxis  Lung CA Screening    Osvaldo Borrero MD  [facility]    Identified Health Risks:    The patient was counseled and encouraged to consider modifying their diet and eating habits. She was provided with information on recommended healthy diet options.

## 2022-09-02 NOTE — ED PROVIDER NOTES
"  History     Chief Complaint:  Abdominal Pain     HPI   Suma Calabrese is a 62 year old female with a history of HTN, hyperlipidemia, diverticulitis, and breast cancer S/P bilateral mastectomy who presents to the emergency department for evaluation of abdominal pain. The patient states that she was having abdominal discomfort and constipation earlier this week and was seen by her primary care and then later the ED on 11/3. At that time, the patient had a laboratory evaluation, along with CT Abdomen Pelvis (see results below). She was discharged home with a prescription for Flagyl and Cipro, which she has been taking as prescribed. The patient had a few nonbloody loose stools throughout the day yesterday. She was seen yesterday by her primary care provider for follow up as well. Since this visit, the patient has continued to have continued abdominal discomfort, which she describes as \"gas buildup\" and is not passing gas or stool today. The patient contacted her primary care provider this morning regarding these symptoms, who recommended that she seek evaluation here in the ED. The patient continues to feel nauseated and has not been eating today. She denies any recent fevers, cough, or shortness of breath.     Laboratory And Imaging Evaluation 11/3/2017:  CT Abdomen Pelvis w Contrast  1. Abnormal appearing sigmoid colon, superimposed on diverticulosis. This is most likely diverticulitis, perhaps with a small intramural abscess.  2. 7.7 x 4 x 2 cm fluid collection interposed between the uterus and the sigmoid colon could represent an abscess. Other small fluid collections in the right pelvis are also possible.  3. Large amount of stool in the colon.     CBC: WNL (WBC 6.1, HGB 12.2, )   CMP:  (L), Potassium 3.1 (L), Chloride 87 (L), Albumin 2.8 (L), Protein 6.7 (L), o/w WNL (Creatinine 0.66)   UA: pH Urine 7.5 (H), o/w Negative     Allergies:  Bupropion  Erythromycin  Oxycodone    Medications:  " "  Synthroid  Maxzide  Cipro  Flagyl  Pravachol  Claritin    Past Medical History:    Breast Cancer  Diverticulitis  HTN  Goiter  hyperlipidemia  TMJ  Hemorrhoids  hypothyroidism    Past Surgical History:    [place] node biopsy  Bilateral mastectomy    Family History:    Unknown Family History    Social History:  Presents alone.  Current Every day smoker, 0.50 ppd for 40 years.  Positive for alcohol use, rarely.   Marital Status:  Single [1]    Review of Systems   Constitutional: Positive for appetite change. Negative for fever.   Respiratory: Negative for cough and shortness of breath.    Gastrointestinal: Positive for abdominal pain, constipation, diarrhea (Resolved) and nausea. Negative for blood in stool.   All other systems reviewed and are negative.    Physical Exam   First Vitals:  BP: 140/85  Heart Rate: 84  Temp: 98.7  F (37.1  C)  Resp: 16  Height: 170.2 cm (5' 7\")  Weight: 55.8 kg (123 lb)  SpO2: 98 %    Physical Exam  General: Appears well-developed and well-nourished.   Head: No signs of trauma.   CV: Normal rate and regular rhythm.    Resp: Effort normal and breath sounds normal. No respiratory distress.   GI: Soft. There is mild left tenderness without rebound or guarding.  bowel sounds present.  No CVA tenderness.  MSK: Normal range of motion. no edema. No Calf tenderness.  Neuro: The patient is alert and oriented.  Speech normal.  GCS 15  Skin: Skin is warm and dry. No rash noted.   Psych: normal mood and affect. behavior is normal.       Emergency Department Course   Imaging:  Radiographic findings were communicated with the patient who voiced understanding of the findings.    CT Abdomen/Pelvis with contrast:   Distal sigmoid wall thickening, currently resulting in  colonic obstruction. This could relate to diverticulitis or focal  colitis, but neoplasm cannot be excluded. Serpiginous perisigmoid  fluid collection/abscess appears mildly increased. Small amount of  free fluid.  As per " radiology.    Laboratory:  CBC: WBC: 5.8, HGB: 12.8, PLT: 286  CMP: Glucose 115 (H),  (L), Potassium 3.2 (L), BUN 5 (L), Albumin 2.7 (L), Protein total 6.2 (L), o/w WNL (Creatinine: 0.72)    Lipase: 59 (L)    Lactic Acid: 0.9    Emergency Department Course:  Nursing notes and vitals reviewed. 1329 I performed an exam of the patient as documented above.     IV inserted. Medicine administered as documented above. Blood drawn. This was sent to the lab for further testing, results above.    The patient was sent for a CT Abdomen Pelvis while in the emergency department, findings above.     1625 I rechecked the patient and discussed the results of her workup thus far.     1656 I consulted with Dr. Silva, general surgery, regarding the patient's history and presentation here in the emergency department.    1706  I consulted with Dr. Melendrez of the hospitalist services. They are in agreement to accept the patient for admission.    Findings and plan explained to the Patient who consents to admission. Discussed the patient with Dr. Melendrez, who will admit the patient to a medical bed for further monitoring, evaluation, and treatment.    Impression & Plan    Medical Decision Making:  Suma Calabrese is a 62 year old female who presents due to abdominal pain and not passing any gas.  She was seen a couple of days ago for diverticulitis and a question of a small abscess. They discussed admission that point, but the patient preferred to try outpatient management.  She had been doing well until yesterday evening and today.  On my evaluation, she continued to have some bowel sounds but did have diffuse tenderness primarily on the left side.  She was non-peritoneal.  I did repeat the CT scan, which did show continued inflammation of the colon, but now also signs of bowel obstruction secondary to this and the fluid collection did look slightly worse. Given this, I did speak with Dr. Silva, who reviewed the images and recommended  admission to the medical service. He stated that he would consult to continue to monitor the patient for possible interventions.    Diagnosis:    ICD-10-CM   1. Small bowel obstruction K56.609   2. Abdominal abscess (H) K65.1     Disposition:  Admitted to Dr. Melendrez of the hospitalist service     I, Zo Hathwaay, am serving as a scribe on 11/7/2017 at 1:29 PM to personally document services performed by Angel Sandoval MD based on my observations and the provider's statements to me.     Zo Hathaway  11/7/2017    EMERGENCY DEPARTMENT       Angel Sandoval MD  11/08/17 [id number]     Bilobed Flap Text: The defect edges were debeveled with a #15 scalpel blade.  Given the location of the defect and the proximity to free margins a bilobe flap was deemed most appropriate.  Using a sterile surgical marker, an appropriate bilobe flap drawn around the defect.    The area thus outlined was incised deep to adipose tissue with a #15 scalpel blade.  The skin margins were undermined to an appropriate distance in all directions utilizing iris scissors.

## 2022-09-05 ENCOUNTER — MEDICAL CORRESPONDENCE (OUTPATIENT)
Dept: HEALTH INFORMATION MANAGEMENT | Facility: CLINIC | Age: 67
End: 2022-09-05

## 2022-09-26 NOTE — PROGRESS NOTES
[facility]    Hematology/Oncology Established Patient Follow-up Note      Today's Date: 10/12/2022    Reason for Follow-up: Bilateral breast cancer.    HISTORY OF PRESENT ILLNESS: Suma Calabrese is a 67 year old female who presents with the following oncologic history:  --She self-palpated a breast lump on the right side in 09/2017.    --9/2017 Mammogram showed 11 o'clock, 3 cm from the nipple on the right breast, which is right upper outer quadrant, 1.1 cm hypoechoic mass.    --This was biopsied in 09/2017, identified grade 2 invasive lobular cancer, ER greater than 95% positive, ME greater than 25% positive; HER-2/selena was negative.    --She met with Dr. Salazar, discussed  surgical options at that time.   --Breast MRI showed a left breast nodule 3 to 4 o'clock position 4 cm from the nipple, 4 mm enhancing focus.  This is at the central lateral portion of the left breast.  Second look ultrasound confirmed the lesion, subcentimeter in size.    --Biopsy was performed in 10/2017 on the left side, identified radial scar, no evidence of malignancy.    --She subsequently underwent double mastectomy with Dr. Salazar mid 10/2017.  Pathology showed left invasive grade 1 IDC 0.5 cm at 3 o'clock position 9 cm from the nipple, % positive, ME 80% positive, HER-2/selena negative. Margins were clear.  Lymphovascular invasion absent. Two lymph nodes were negative.  This was staged at yG5g-D9.    --On the right side, pathology showed 2 cm invasive lobular cancer, grade 1 associated with LCIS, no lymphovascular invasion.  One sentinel lymph node was negative.  Therefore stage lY0q-E4 disease.    --Oncotype DX score = 13, corresponding to 10-year risk of distant recurrence of 8% after 5 years of hormone blockade therapy.     --She started anastrozole 12/2017.   --She could not tolerate alendronate and therefore discontinued it. She had adverse reaction to Zometa, and declined further doses.  --Completion of  anastrozole 10/2022.    INTERIM HISTORY:  Hansa reports feeling well with cessation of arthralgias after discontinuation of anastrozole. She did not realize she technically had 2 more months of hormone blockade therapy.    REVIEW OF SYSTEMS:   14 point ROS was reviewed and is negative other than as noted above in HPI.       HOME MEDICATIONS:  Current Outpatient Medications   Medication Sig Dispense Refill     levothyroxine (SYNTHROID/LEVOTHROID) 75 MCG tablet Take 1 tablet (75 mcg) by mouth daily 90 tablet 3     multivitamin (CENTRUM SILVER) tablet Take 1 tablet by mouth daily       triamterene-HCTZ (DYAZIDE) 37.5-25 MG capsule Take 1 capsule by mouth daily 90 capsule 3     VITAMIN D, CHOLECALCIFEROL, PO Take 2,000 Units by mouth every morning       valACYclovir (VALTREX) 1000 mg tablet Take 2 tablets (2,000 mg) by mouth 2 times daily for 1 day 4 tablet 11         ALLERGIES:  Allergies   Allergen Reactions     Bupropion Other (See Comments)     Jittery and HA     Erythromycin GI Disturbance     Oxycodone Nausea and Vomiting         PAST MEDICAL HISTORY:  Past Medical History:   Diagnosis Date     Cancer (H)     breast     Diverticulitis      Hypertension      Nontoxic uninodular goiter 1/28/2013     Pure hypercholesterolemia 1/28/2013     TMJ (temporomandibular joint syndrome) 7/1/2013         PAST SURGICAL HISTORY:  Past Surgical History:   Procedure Laterality Date     BIOPSY       BIOPSY NODE SENTINEL Bilateral 10/16/2017    Procedure: BIOPSY NODE SENTINEL;;  Surgeon: Van Salazar MD;  Location: [facility]     bladder dilitation[  age 17     COLECTOMY SUBTOTAL Left 11/13/2017    Procedure: COLECTOMY SUBTOTAL;  EXPLORATORY LAPAROTOMY, OPEN SUBTOTAL  COLECTOMY, END ILEOSTOMY;  Surgeon: May Spain MD;  Location:  OR     DENTAL SURGERY      wisdom tooth extraction     ILEOSTOMY N/A 11/13/2017    Procedure: ILEOSTOMY;;  Surgeon: May Spain MD;  Location:  OR     LAPAROTOMY EXPLORATORY N/A  "11/13/2017    Procedure: LAPAROTOMY EXPLORATORY;;  Surgeon: May Spain MD;  Location:  OR     MASTECTOMY SIMPLE BILATERAL Bilateral 10/16/2017    Procedure: MASTECTOMY SIMPLE BILATERAL;  BILATERAL SIMPLE MASTECTOMY, BILATERAL AXILLARY SENTINEL NODE BIOPSY;  Surgeon: Van Salazar MD;  Location: [facility]         SOCIAL HISTORY:  Social History     Socioeconomic History     Marital status: Single     Spouse name: Not on file     Number of children: Not on file     Years of education: Not on file     Highest education level: Not on file   Occupational History     Not on file   Tobacco Use     Smoking status: Current Every Day Smoker     Packs/day: 0.50     Years: 40.00     Pack years: 20.00     Types: Cigarettes     Smokeless tobacco: Never Used     Tobacco comment: 1/2 PPD   Substance and Sexual Activity     Alcohol use: Yes     Alcohol/week: 0.0 standard drinks     Comment: weekends     Drug use: No     Sexual activity: Not Currently   Other Topics Concern     Parent/sibling w/ CABG, MI or angioplasty before 65F 55M? No   Social History Narrative     Not on file     Social Determinants of Health     Financial Resource Strain: Not on file   Food Insecurity: Not on file   Transportation Needs: Not on file   Physical Activity: Not on file   Stress: Not on file   Social Connections: Not on file   Intimate Partner Violence: Not on file   Housing Stability: Not on file         FAMILY HISTORY:  Family History   Problem Relation Age of Onset     Unknown/Adopted Mother      Unknown/Adopted Father          PHYSICAL EXAM:  Vital signs:  BP (!) 156/93   Pulse 84   Resp 16   Ht 1.676 m (5' 6\")   Wt 56.2 kg (124 lb)   LMP  (LMP Unknown)   SpO2 100%   BMI 20.01 kg/m     GENERAL/CONSTITUTIONAL: No acute distress.  EYES: No scleral icterus.  LYMPH: No cervical, supraclavicular, axillary adenopathy.   BREAST: Bilateral breasts are surgically absent with no palpable chest wall masses or fluid. Excess soft tissue at " left axilla.  RESPIRATORY: No audible cough or wheezing.   GASTROINTESTINAL: No hepatosplenomegaly, masses, or tenderness. No guarding.  No distention. Ostomy in place.  MUSCULOSKELETAL: Warm and well-perfused, no cyanosis, clubbing, or edema.  NEUROLOGIC: Alert, oriented, answers questions appropriately.  INTEGUMENTARY: No rashes or jaundice. Large cyst at center upper back.  GAIT: Steady, does not use assistive device      LABS:  CBC RESULTS: Recent Labs   Lab Test 10/27/21  0435   WBC 5.2   RBC 4.41   HGB 14.1   HCT 40.7   MCV 92   MCH 32.0   MCHC 34.6   RDW 11.8        Last Comprehensive Metabolic Panel:  Sodium   Date Value Ref Range Status   08/23/2022 130 (L) 133 - 144 mmol/L Final   02/15/2021 138 133 - 144 mmol/L Final     Potassium   Date Value Ref Range Status   08/23/2022 4.8 3.4 - 5.3 mmol/L Final   02/15/2021 4.4 3.4 - 5.3 mmol/L Final     Chloride   Date Value Ref Range Status   08/23/2022 100 94 - 109 mmol/L Final   02/15/2021 107 94 - 109 mmol/L Final     Carbon Dioxide   Date Value Ref Range Status   02/15/2021 30 20 - 32 mmol/L Final     Carbon Dioxide (CO2)   Date Value Ref Range Status   08/23/2022 26 20 - 32 mmol/L Final     Anion Gap   Date Value Ref Range Status   08/23/2022 4 3 - 14 mmol/L Final   02/15/2021 1 (L) 3 - 14 mmol/L Final     Glucose   Date Value Ref Range Status   08/23/2022 94 70 - 99 mg/dL Final   02/15/2021 90 70 - 99 mg/dL Final     Urea Nitrogen   Date Value Ref Range Status   08/23/2022 12 7 - 30 mg/dL Final   02/15/2021 15 7 - 30 mg/dL Final     Creatinine   Date Value Ref Range Status   08/23/2022 0.72 0.52 - 1.04 mg/dL Final   02/15/2021 0.81 0.52 - 1.04 mg/dL Final     GFR Estimate   Date Value Ref Range Status   08/23/2022 90 >60 mL/min/1.73m2 Final     Comment:     Effective December 21, 2021 eGFRcr in adults is calculated using the 2021 CKD-EPI creatinine equation which includes age and gender (Niya et al., NEJM, DOI: 10.1056/HMWMrz9148752)   02/15/2021 75  >60 mL/min/[1.73_m2] Final     Comment:     Non  GFR Calc  Starting 12/18/2018, serum creatinine based estimated GFR (eGFR) will be   calculated using the Chronic Kidney Disease Epidemiology Collaboration   (CKD-EPI) equation.       Calcium   Date Value Ref Range Status   08/23/2022 10.1 8.5 - 10.1 mg/dL Final   02/15/2021 10.0 8.5 - 10.1 mg/dL Final     Bilirubin Total   Date Value Ref Range Status   10/27/2021 0.8 0.2 - 1.3 mg/dL Final   02/15/2021 0.6 0.2 - 1.3 mg/dL Final     Alkaline Phosphatase   Date Value Ref Range Status   10/27/2021 89 40 - 150 U/L Final   02/15/2021 98 40 - 150 U/L Final     ALT   Date Value Ref Range Status   10/27/2021 21 0 - 50 U/L Final   02/15/2021 18 0 - 50 U/L Final     AST   Date Value Ref Range Status   10/27/2021 19 0 - 45 U/L Final   02/15/2021 15 0 - 45 U/L Final       PATHOLOGY:  None new since prior visit.    IMAGING:  10/4/2022 DEXA showed osteopenia, improved.    ASSESSMENT/PLAN:  Suma Calabrese is a 67 year old female with the following issues:  1. Stage IA, [id number]-G8-Z1, grade 1 invasive lobular carcinoma of the right upper outer breast, ER/AR positive, HER-2/selena negative, Oncotype DX = 13  2. Stage IA, lD7r-Y9-C5, grade 1 invasive ductal carcinoma of the left lower outer breast, ER/AR positive, HER-2/selena negative  --Hansa is s/p bilateral mastectomies with negative surgical margins. She completed anastrozole 10/2022.  --She has no clinical evidence for recurrent based on physical exam from today.  --As per ASCO and NCCN guidelines, routine labs and imaging are not recommended for breast cancer surveillance as they have not been shown to improve overall survival.    2. Osteopenia  --Discussed that last DEXA from 10/4/2022 showed improvement in osteopenia.  --She did not tolerate Zometa. She was unable to tolerate alendronate.  --I recommended adequate calcium, vitamin D, and weight-bearing exercise.    3. Tobacco use  --She is still smoking.  --Again  offered QUIT plan. She declined this again today.    Return in 1 year.    Serena Peoples MD  Hematology/Oncology  [facility]    Total time spent: 30 minutes in patient evaluation, counseling, documentation, and coordination of care.

## 2022-10-04 ENCOUNTER — HOSPITAL ENCOUNTER (OUTPATIENT)
Dept: BONE DENSITY | Facility: CLINIC | Age: 67
Discharge: HOME | End: 2022-10-04
Attending: INTERNAL MEDICINE | Admitting: INTERNAL MEDICINE
Payer: MEDICARE

## 2022-10-04 DIAGNOSIS — M85.89 OSTEOPENIA OF MULTIPLE SITES: ICD-10-CM

## 2022-10-04 PROCEDURE — 77080 DXA BONE DENSITY AXIAL: CPT

## 2022-10-09 ENCOUNTER — HEALTH MAINTENANCE LETTER (OUTPATIENT)
Age: 67
End: 2022-10-09

## 2022-10-12 ENCOUNTER — ONCOLOGY VISIT (OUTPATIENT)
Dept: ONCOLOGY | Facility: CLINIC | Age: 67
End: 2022-10-12
Attending: INTERNAL MEDICINE
Payer: MEDICARE

## 2022-10-12 VITALS
HEART RATE: 84 BPM | BODY MASS INDEX: 19.93 KG/M2 | RESPIRATION RATE: 16 BRPM | SYSTOLIC BLOOD PRESSURE: 158 MMHG | DIASTOLIC BLOOD PRESSURE: 94 MMHG | OXYGEN SATURATION: 100 % | HEIGHT: 66 IN | WEIGHT: 124 LBS

## 2022-10-12 DIAGNOSIS — C50.512 MALIGNANT NEOPLASM OF LOWER-OUTER QUADRANT OF LEFT BREAST OF FEMALE, ESTROGEN RECEPTOR POSITIVE (H): ICD-10-CM

## 2022-10-12 DIAGNOSIS — Z72.0 TOBACCO ABUSE: ICD-10-CM

## 2022-10-12 DIAGNOSIS — M85.89 OSTEOPENIA OF MULTIPLE SITES: ICD-10-CM

## 2022-10-12 DIAGNOSIS — Z17.0 MALIGNANT NEOPLASM OF LOWER-OUTER QUADRANT OF LEFT BREAST OF FEMALE, ESTROGEN RECEPTOR POSITIVE (H): ICD-10-CM

## 2022-10-12 DIAGNOSIS — C50.411 MALIGNANT NEOPLASM OF UPPER-OUTER QUADRANT OF RIGHT BREAST IN FEMALE, ESTROGEN RECEPTOR POSITIVE (H): Primary | ICD-10-CM

## 2022-10-12 DIAGNOSIS — Z17.0 MALIGNANT NEOPLASM OF UPPER-OUTER QUADRANT OF RIGHT BREAST IN FEMALE, ESTROGEN RECEPTOR POSITIVE (H): Primary | ICD-10-CM

## 2022-10-12 PROCEDURE — 250N000011 HC RX IP 250 OP 636: Performed by: INTERNAL MEDICINE

## 2022-10-12 PROCEDURE — 90662 IIV NO PRSV INCREASED AG IM: CPT | Performed by: INTERNAL MEDICINE

## 2022-10-12 PROCEDURE — G0463 HOSPITAL OUTPT CLINIC VISIT: HCPCS | Mod: 25

## 2022-10-12 PROCEDURE — 99214 OFFICE O/P EST MOD 30 MIN: CPT | Performed by: INTERNAL MEDICINE

## 2022-10-12 PROCEDURE — G0008 ADMIN INFLUENZA VIRUS VAC: HCPCS | Performed by: INTERNAL MEDICINE

## 2022-10-12 RX ADMIN — INFLUENZA A VIRUS A/VICTORIA/2570/2019 IVR-215 (H1N1) ANTIGEN (FORMALDEHYDE INACTIVATED), INFLUENZA A VIRUS A/DARWIN/9/2021 SAN-010 (H3N2) ANTIGEN (FORMALDEHYDE INACTIVATED), INFLUENZA B VIRUS B/PHUKET/3073/2013 ANTIGEN (FORMALDEHYDE INACTIVATED), AND INFLUENZA B VIRUS B/MICHIGAN/01/2021 ANTIGEN (FORMALDEHYDE INACTIVATED) 0.7 ML: 60; 60; 60; 60 INJECTION, SUSPENSION INTRAMUSCULAR at 09:45

## 2022-10-12 ASSESSMENT — PAIN SCALES - GENERAL: PAINLEVEL: NO PAIN (0)

## 2022-10-12 NOTE — PROGRESS NOTES
"Oncology Rooming Note    October 12, 2022 9:18 AM   Suma Calabrese is a 67 year old female who presents for:    Chief Complaint   Patient presents with     Oncology Clinic Visit     Initial Vitals: LMP  (LMP Unknown)  Estimated body mass index is 19.74 kg/m  as calculated from the following:    Height as of 8/23/22: 1.676 m (5' 6\").    Weight as of 8/23/22: 55.5 kg (122 lb 4.8 oz). There is no height or weight on file to calculate BSA.  Data Unavailable Comment: Data Unavailable   No LMP recorded (lmp unknown). Patient is postmenopausal.  Allergies reviewed: Yes  Medications reviewed: Yes    Medications: Medication refills not needed today.  Pharmacy name entered into [facility]:    [facility] #[id number] - [place], [place] - [address] AT [facility] & [address] [place] [address] DRUG STORE #[id number] - [place], [place] - [address] AT [facility] OF [address]    Clinical concerns:  doctor was notified.      Mariza Cervantes MA            "

## 2022-10-12 NOTE — LETTER
10/12/2022         RE: Suma Calabrese  [address]  [facility] [address]        Dear Colleague,    Thank you for referring your patient, Suma Calabrese, to the [facility]. Please see a copy of my visit note below.    [facility]    Hematology/Oncology Established Patient Follow-up Note      Today's Date: 10/12/2022    Reason for Follow-up: Bilateral breast cancer.    HISTORY OF PRESENT ILLNESS: Suma Calabrese is a 67 year old female who presents with the following oncologic history:  --She self-palpated a breast lump on the right side in 09/2017.    --9/2017 Mammogram showed 11 o'clock, 3 cm from the nipple on the right breast, which is right upper outer quadrant, 1.1 cm hypoechoic mass.    --This was biopsied in 09/2017, identified grade 2 invasive lobular cancer, ER greater than 95% positive, IA greater than 25% positive; HER-2/selena was negative.    --She met with Dr. Salazar, discussed  surgical options at that time.   --Breast MRI showed a left breast nodule 3 to 4 o'clock position 4 cm from the nipple, 4 mm enhancing focus.  This is at the central lateral portion of the left breast.  Second look ultrasound confirmed the lesion, subcentimeter in size.    --Biopsy was performed in 10/2017 on the left side, identified radial scar, no evidence of malignancy.    --She subsequently underwent double mastectomy with Dr. Salazar mid 10/2017.  Pathology showed left invasive grade 1 IDC 0.5 cm at 3 o'clock position 9 cm from the nipple, % positive, IA 80% positive, HER-2/selena negative. Margins were clear.  Lymphovascular invasion absent. Two lymph nodes were negative.  This was staged at tA1f-U5.    --On the right side, pathology showed 2 cm invasive lobular cancer, grade 1 associated with LCIS, no lymphovascular invasion.  One sentinel lymph node was negative.  Therefore stage [id number]-H0 disease.    --Oncotype DX score = 13, corresponding to 10-year risk of  distant recurrence of 8% after 5 years of hormone blockade therapy.     --She started anastrozole 12/2017.   --She could not tolerate alendronate and therefore discontinued it. She had adverse reaction to Zometa, and declined further doses.  --Completion of anastrozole 10/2022.    INTERIM HISTORY:  Hansa reports feeling well with cessation of arthralgias after discontinuation of anastrozole. She did not realize she technically had 2 more months of hormone blockade therapy.    REVIEW OF SYSTEMS:   14 point ROS was reviewed and is negative other than as noted above in HPI.       HOME MEDICATIONS:  Current Outpatient Medications   Medication Sig Dispense Refill     levothyroxine (SYNTHROID/LEVOTHROID) 75 MCG tablet Take 1 tablet (75 mcg) by mouth daily 90 tablet 3     multivitamin (CENTRUM SILVER) tablet Take 1 tablet by mouth daily       triamterene-HCTZ (DYAZIDE) 37.5-25 MG capsule Take 1 capsule by mouth daily 90 capsule 3     VITAMIN D, CHOLECALCIFEROL, PO Take 2,000 Units by mouth every morning       valACYclovir (VALTREX) 1000 mg tablet Take 2 tablets (2,000 mg) by mouth 2 times daily for 1 day 4 tablet 11         ALLERGIES:  Allergies   Allergen Reactions     Bupropion Other (See Comments)     Jittery and HA     Erythromycin GI Disturbance     Oxycodone Nausea and Vomiting         PAST MEDICAL HISTORY:  Past Medical History:   Diagnosis Date     Cancer (H)     breast     Diverticulitis      Hypertension      Nontoxic uninodular goiter 1/28/2013     Pure hypercholesterolemia 1/28/2013     TMJ (temporomandibular joint syndrome) 7/1/2013         PAST SURGICAL HISTORY:  Past Surgical History:   Procedure Laterality Date     BIOPSY       BIOPSY NODE SENTINEL Bilateral 10/16/2017    Procedure: BIOPSY NODE SENTINEL;;  Surgeon: Van Salazar MD;  Location:  [facility]     bladder dilitation[  age 17     COLECTOMY SUBTOTAL Left 11/13/2017    Procedure: COLECTOMY SUBTOTAL;  EXPLORATORY LAPAROTOMY, OPEN SUBTOTAL   "COLECTOMY, END ILEOSTOMY;  Surgeon: May Spain MD;  Location:  OR     DENTAL SURGERY      wisdom tooth extraction     ILEOSTOMY N/A 11/13/2017    Procedure: ILEOSTOMY;;  Surgeon: May Spain MD;  Location:  OR     LAPAROTOMY EXPLORATORY N/A 11/13/2017    Procedure: LAPAROTOMY EXPLORATORY;;  Surgeon: May Spain MD;  Location:  OR     MASTECTOMY SIMPLE BILATERAL Bilateral 10/16/2017    Procedure: MASTECTOMY SIMPLE BILATERAL;  BILATERAL SIMPLE MASTECTOMY, BILATERAL AXILLARY SENTINEL NODE BIOPSY;  Surgeon: Van Salazar MD;  Location: [facility]         SOCIAL HISTORY:  Social History     Socioeconomic History     Marital status: Single     Spouse name: Not on file     Number of children: Not on file     Years of education: Not on file     Highest education level: Not on file   Occupational History     Not on file   Tobacco Use     Smoking status: Current Every Day Smoker     Packs/day: 0.50     Years: 40.00     Pack years: 20.00     Types: Cigarettes     Smokeless tobacco: Never Used     Tobacco comment: 1/2 PPD   Substance and Sexual Activity     Alcohol use: Yes     Alcohol/week: 0.0 standard drinks     Comment: weekends     Drug use: No     Sexual activity: Not Currently   Other Topics Concern     Parent/sibling w/ CABG, MI or angioplasty before 65F 55M? No   Social History Narrative     Not on file     Social Determinants of Health     Financial Resource Strain: Not on file   Food Insecurity: Not on file   Transportation Needs: Not on file   Physical Activity: Not on file   Stress: Not on file   Social Connections: Not on file   Intimate Partner Violence: Not on file   Housing Stability: Not on file         FAMILY HISTORY:  Family History   Problem Relation Age of Onset     Unknown/Adopted Mother      Unknown/Adopted Father          PHYSICAL EXAM:  Vital signs:  BP (!) 156/93   Pulse 84   Resp 16   Ht 1.676 m (5' 6\")   Wt 56.2 kg (124 lb)   LMP  (LMP Unknown)   SpO2 100%   BMI " 20.01 kg/m     GENERAL/CONSTITUTIONAL: No acute distress.  EYES: No scleral icterus.  LYMPH: No cervical, supraclavicular, axillary adenopathy.   BREAST: Bilateral breasts are surgically absent with no palpable chest wall masses or fluid. Excess soft tissue at left axilla.  RESPIRATORY: No audible cough or wheezing.   GASTROINTESTINAL: No hepatosplenomegaly, masses, or tenderness. No guarding.  No distention. Ostomy in place.  MUSCULOSKELETAL: Warm and well-perfused, no cyanosis, clubbing, or edema.  NEUROLOGIC: Alert, oriented, answers questions appropriately.  INTEGUMENTARY: No rashes or jaundice. Large cyst at center upper back.  GAIT: Steady, does not use assistive device      LABS:  CBC RESULTS: Recent Labs   Lab Test 10/27/21  0435   WBC 5.2   RBC 4.41   HGB 14.1   HCT 40.7   MCV 92   MCH 32.0   MCHC 34.6   RDW 11.8        Last Comprehensive Metabolic Panel:  Sodium   Date Value Ref Range Status   08/23/2022 130 (L) 133 - 144 mmol/L Final   02/15/2021 138 133 - 144 mmol/L Final     Potassium   Date Value Ref Range Status   08/23/2022 4.8 3.4 - 5.3 mmol/L Final   02/15/2021 4.4 3.4 - 5.3 mmol/L Final     Chloride   Date Value Ref Range Status   08/23/2022 100 94 - 109 mmol/L Final   02/15/2021 107 94 - 109 mmol/L Final     Carbon Dioxide   Date Value Ref Range Status   02/15/2021 30 20 - 32 mmol/L Final     Carbon Dioxide (CO2)   Date Value Ref Range Status   08/23/2022 26 20 - 32 mmol/L Final     Anion Gap   Date Value Ref Range Status   08/23/2022 4 3 - 14 mmol/L Final   02/15/2021 1 (L) 3 - 14 mmol/L Final     Glucose   Date Value Ref Range Status   08/23/2022 94 70 - 99 mg/dL Final   02/15/2021 90 70 - 99 mg/dL Final     Urea Nitrogen   Date Value Ref Range Status   08/23/2022 12 7 - 30 mg/dL Final   02/15/2021 15 7 - 30 mg/dL Final     Creatinine   Date Value Ref Range Status   08/23/2022 0.72 0.52 - 1.04 mg/dL Final   02/15/2021 0.81 0.52 - 1.04 mg/dL Final     GFR Estimate   Date Value Ref Range  Status   08/23/2022 90 >60 mL/min/1.73m2 Final     Comment:     Effective December 21, 2021 eGFRcr in adults is calculated using the 2021 CKD-EPI creatinine equation which includes age and gender (Niya et al., NEJ, DOI: 10.1056/WDZOtm4562878)   02/15/2021 75 >60 mL/min/[1.73_m2] Final     Comment:     Non  GFR Calc  Starting 12/18/2018, serum creatinine based estimated GFR (eGFR) will be   calculated using the Chronic Kidney Disease Epidemiology Collaboration   (CKD-EPI) equation.       Calcium   Date Value Ref Range Status   08/23/2022 10.1 8.5 - 10.1 mg/dL Final   02/15/2021 10.0 8.5 - 10.1 mg/dL Final     Bilirubin Total   Date Value Ref Range Status   10/27/2021 0.8 0.2 - 1.3 mg/dL Final   02/15/2021 0.6 0.2 - 1.3 mg/dL Final     Alkaline Phosphatase   Date Value Ref Range Status   10/27/2021 89 40 - 150 U/L Final   02/15/2021 98 40 - 150 U/L Final     ALT   Date Value Ref Range Status   10/27/2021 21 0 - 50 U/L Final   02/15/2021 18 0 - 50 U/L Final     AST   Date Value Ref Range Status   10/27/2021 19 0 - 45 U/L Final   02/15/2021 15 0 - 45 U/L Final       PATHOLOGY:  None new since prior visit.    IMAGING:  10/4/2022 DEXA showed osteopenia, improved.    ASSESSMENT/PLAN:  Suma Calabrese is a 67 year old female with the following issues:  1. Stage IA, xI3c-K2-Y6, grade 1 invasive lobular carcinoma of the right upper outer breast, ER/NJ positive, HER-2/selena negative, Oncotype DX = 13  2. Stage IA, [id number]-S4-L6, grade 1 invasive ductal carcinoma of the left lower outer breast, ER/NJ positive, HER-2/selena negative  --Hansa is s/p bilateral mastectomies with negative surgical margins. She completed anastrozole 10/2022.  --She has no clinical evidence for recurrent based on physical exam from today.  --As per ASCO and NCCN guidelines, routine labs and imaging are not recommended for breast cancer surveillance as they have not been shown to improve overall survival.    2. Osteopenia  --Discussed that  "last DEXA from 10/4/2022 showed improvement in osteopenia.  --She did not tolerate Zometa. She was unable to tolerate alendronate.  --I recommended adequate calcium, vitamin D, and weight-bearing exercise.    3. Tobacco use  --She is still smoking.  --Again offered QUIT plan. She declined this again today.    Return in 1 year.    Serena Peoples MD  Hematology/Oncology  [facility]    Total time spent: 30 minutes in patient evaluation, counseling, documentation, and coordination of care.      Oncology Rooming Note    October 12, 2022 9:18 AM   Suma Calabrese is a 67 year old female who presents for:    Chief Complaint   Patient presents with     Oncology Clinic Visit     Initial Vitals: LMP  (LMP Unknown)  Estimated body mass index is 19.74 kg/m  as calculated from the following:    Height as of 8/23/22: 1.676 m (5' 6\").    Weight as of 8/23/22: 55.5 kg (122 lb 4.8 oz). There is no height or weight on file to calculate BSA.  Data Unavailable Comment: Data Unavailable   No LMP recorded (lmp unknown). Patient is postmenopausal.  Allergies reviewed: Yes  Medications reviewed: Yes    Medications: Medication refills not needed today.  Pharmacy name entered into [employer]:    [facility] #[id number] - [place], [place] - [address] AT [facility] & [address] [place] [address] [facility] #[id number] - [place], [place] - [address] AT [facility] OF [address]    Clinical concerns:  doctor was notified.      Mariza Cervantes MA                Again, thank you for allowing me to participate in the care of your patient.        Sincerely,        Serena Peoples MD    "

## 2023-01-30 ENCOUNTER — OFFICE VISIT (OUTPATIENT)
Dept: URGENT CARE | Facility: URGENT CARE | Age: 68
End: 2023-01-30
Payer: MEDICARE

## 2023-01-30 VITALS
DIASTOLIC BLOOD PRESSURE: 95 MMHG | SYSTOLIC BLOOD PRESSURE: 158 MMHG | HEART RATE: 86 BPM | TEMPERATURE: 97.8 F | OXYGEN SATURATION: 100 % | WEIGHT: 124 LBS | HEIGHT: 66 IN | BODY MASS INDEX: 19.93 KG/M2

## 2023-01-30 DIAGNOSIS — R30.0 DYSURIA: Primary | ICD-10-CM

## 2023-01-30 LAB
ALBUMIN UR-MCNC: NEGATIVE MG/DL
APPEARANCE UR: CLEAR
BILIRUB UR QL STRIP: NEGATIVE
COLOR UR AUTO: YELLOW
GLUCOSE UR STRIP-MCNC: NEGATIVE MG/DL
HGB UR QL STRIP: NEGATIVE
KETONES UR STRIP-MCNC: NEGATIVE MG/DL
LEUKOCYTE ESTERASE UR QL STRIP: NEGATIVE
NITRATE UR QL: NEGATIVE
PH UR STRIP: 6.5 [PH] (ref 5–7)
SP GR UR STRIP: 1.01 (ref 1–1.03)
UROBILINOGEN UR STRIP-ACNC: 0.2 E.U./DL

## 2023-01-30 PROCEDURE — 99213 OFFICE O/P EST LOW 20 MIN: CPT | Performed by: PHYSICIAN ASSISTANT

## 2023-01-30 PROCEDURE — 81003 URINALYSIS AUTO W/O SCOPE: CPT | Performed by: PHYSICIAN ASSISTANT

## 2023-01-30 ASSESSMENT — ENCOUNTER SYMPTOMS
SHORTNESS OF BREATH: 0
FREQUENCY: 0
COUGH: 0
DYSURIA: 0
CHILLS: 0
ABDOMINAL PAIN: 1
NAUSEA: 0
DIFFICULTY URINATING: 0
FLANK PAIN: 0
VOMITING: 0
FEVER: 0

## 2023-01-30 NOTE — PROGRESS NOTES
SUBJECTIVE:   Suma Calabrese is a 67 year old female presenting with a chief complaint of   Chief Complaint   Patient presents with     Urgent Care       She is an established patient of [facility]. Patient presents with lower abdominal pressure for a few days.  Patient states no dysuria but she noted she has been urinating more frequently.  States she does drink a lot of water.  She has a colostomy and has not noted any additional output.  No fevers, eating and drinking as usual.   No vaginal discharge.      UTI    Onset of symptoms was 1-2 day(s).  Course of illness is same  Severity mild  Current and associated symptoms urgency and lower abdominal discomfort  Treatment and measures tried   Predisposing factors include none  Patient denies flank pain, temperature > 101 degrees F., chills, vomiting, vaginal discharge, vaginal itching, dysuria         Review of Systems   Constitutional: Negative for chills and fever.   Respiratory: Negative for cough and shortness of breath.    Gastrointestinal: Positive for abdominal pain (lower abdominal discomfort). Negative for nausea and vomiting.   Genitourinary: Positive for urgency. Negative for difficulty urinating, dysuria, flank pain, frequency, vaginal discharge and vaginal pain.       Past Medical History:   Diagnosis Date     Cancer (H)     breast     Diverticulitis      Hypertension      Nontoxic uninodular goiter 1/28/2013     Pure hypercholesterolemia 1/28/2013     TMJ (temporomandibular joint syndrome) 7/1/2013     Family History   Problem Relation Age of Onset     Unknown/Adopted Mother      Unknown/Adopted Father      Current Outpatient Medications   Medication Sig Dispense Refill     levothyroxine (SYNTHROID/LEVOTHROID) 75 MCG tablet Take 1 tablet (75 mcg) by mouth daily 90 tablet 3     multivitamin (CENTRUM SILVER) tablet Take 1 tablet by mouth daily       triamterene-HCTZ (DYAZIDE) 37.5-25 MG capsule Take 1 capsule by mouth daily 90 capsule 3     VITAMIN D,  "CHOLECALCIFEROL, PO Take 2,000 Units by mouth every morning       valACYclovir (VALTREX) 1000 mg tablet Take 2 tablets (2,000 mg) by mouth 2 times daily for 1 day 4 tablet 11     Social History     Tobacco Use     Smoking status: Every Day     Packs/day: 0.50     Years: 40.00     Pack years: 20.00     Types: Cigarettes     Smokeless tobacco: Never     Tobacco comments:     1/2 PPD   Substance Use Topics     Alcohol use: Yes     Alcohol/week: 0.0 standard drinks     Comment: weekends       OBJECTIVE  BP (!) 158/95   Pulse 86   Temp 97.8  F (36.6  C) (Temporal)   Ht 1.676 m (5' 6\")   Wt 56.2 kg (124 lb)   LMP  (LMP Unknown)   SpO2 100%   BMI 20.01 kg/m      Physical Exam  Vitals and nursing note reviewed.   Constitutional:       General: She is not in acute distress.     Appearance: Normal appearance. She is normal weight. She is not ill-appearing or toxic-appearing.   HENT:      Head: Normocephalic and atraumatic.   Eyes:      Extraocular Movements: Extraocular movements intact.      Conjunctiva/sclera: Conjunctivae normal.   Cardiovascular:      Rate and Rhythm: Normal rate and regular rhythm.      Pulses: Normal pulses.      Heart sounds: Normal heart sounds.   Pulmonary:      Effort: Pulmonary effort is normal.      Breath sounds: Normal breath sounds.   Abdominal:      Palpations: There is no mass.      Tenderness: There is no abdominal tenderness. There is no right CVA tenderness or left CVA tenderness.      Comments: Ileostomy bag in place   Skin:     General: Skin is warm and dry.   Neurological:      General: No focal deficit present.      Mental Status: She is alert.   Psychiatric:         Mood and Affect: Mood normal.         Behavior: Behavior normal.         Labs:  Results for orders placed or performed in visit on 01/30/23 (from the past 24 hour(s))   UA macro with reflex to Microscopic and Culture - Clinc Collect    Specimen: Urine, Clean Catch   Result Value Ref Range    Color Urine Yellow " Colorless, Straw, Light Yellow, Yellow    Appearance Urine Clear Clear    Glucose Urine Negative Negative mg/dL    Bilirubin Urine Negative Negative    Ketones Urine Negative Negative mg/dL    Specific Gravity Urine 1.010 1.003 - 1.035    Blood Urine Negative Negative    pH Urine 6.5 5.0 - 7.0    Protein Albumin Urine Negative Negative mg/dL    Urobilinogen Urine 0.2 0.2, 1.0 E.U./dL    Nitrite Urine Negative Negative    Leukocyte Esterase Urine Negative Negative    Narrative    Microscopic not indicated         ASSESSMENT:      ICD-10-CM    1. Dysuria  R30.0 UA macro with reflex to Microscopic and Culture - Clinc Collect           Medical Decision Making:    Differential Diagnosis:  UTI: UTI, Dysuria and Urethritis    Serious Comorbid Conditions:  Adult:  reviewed    PLAN:    Patient reassurance.  Patient education.  Discussed reasons to seek immediate medical attention.        Followup:    If not improving or if condition worsens, follow up with your Primary Care Provider, If not improving or if conditions worsens over the next 12-24 hours, go to the Emergency Department    There are no Patient Instructions on file for this visit.

## 2023-02-11 ENCOUNTER — OFFICE VISIT (OUTPATIENT)
Dept: URGENT CARE | Facility: URGENT CARE | Age: 68
End: 2023-02-11
Payer: MEDICARE

## 2023-02-11 VITALS
SYSTOLIC BLOOD PRESSURE: 136 MMHG | OXYGEN SATURATION: 97 % | WEIGHT: 124 LBS | TEMPERATURE: 98.4 F | DIASTOLIC BLOOD PRESSURE: 83 MMHG | BODY MASS INDEX: 20.01 KG/M2 | RESPIRATION RATE: 14 BRPM | HEART RATE: 87 BPM

## 2023-02-11 DIAGNOSIS — J01.00 ACUTE MAXILLARY SINUSITIS, RECURRENCE NOT SPECIFIED: Primary | ICD-10-CM

## 2023-02-11 PROCEDURE — 99213 OFFICE O/P EST LOW 20 MIN: CPT | Performed by: PHYSICIAN ASSISTANT

## 2023-02-11 ASSESSMENT — ENCOUNTER SYMPTOMS
HEADACHES: 1
CHILLS: 0
APPETITE CHANGE: 0
SINUS PRESSURE: 1
SINUS PAIN: 1
FEVER: 0

## 2023-02-11 NOTE — PATIENT INSTRUCTIONS
Clinical presentation of a bacterial rhinosinusitis.   Advised the following to patient  Drink plenty of fluids, rest, warm compresses on face  Netti Pot 1x in the morning 1x at night  or saline rinses or saline nasal spray such as Oceans spray  Benadryl (diphenhydramine) at bedtime to help with congestion and sleep  Flonase (Fluticasone) 2x each nostril twice a day for two weeks, then once each nostril once a day until symptoms resolved  Please let us know if symptoms persist, or worsen.

## 2023-02-11 NOTE — PROGRESS NOTES
Assessment & Plan        1. Acute maxillary sinusitis, recurrence not specified  -Clinical presentation of a bacterial rhinosinusitis.   - amoxicillin-clavulanate (AUGMENTIN) 875-125 MG tablet; Take 1 tablet by mouth 2 times daily for 7 days  Dispense: 14 tablet; Refill: 0    Patient Instructions     Clinical presentation of a bacterial rhinosinusitis.   Advised the following to patient  Drink plenty of fluids, rest, warm compresses on face  Netti Pot 1x in the morning 1x at night  or saline rinses or saline nasal spray such as Oceans spray  Benadryl (diphenhydramine) at bedtime to help with congestion and sleep  Flonase (Fluticasone) 2x each nostril twice a day for two weeks, then once each nostril once a day until symptoms resolved  Please let us know if symptoms persist, or worsen.                     Return if symptoms worsen or fail to improve, for Follow up, 5 days.    At the end of the encounter, I discussed results, diagnosis, medications. Discussed red flags for immediate return to clinic/ER, as well as indications for follow up if no improvement. Patient understood and agreed to plan. Patient was stable for discharge.    Koffi Santo is a 67 year old female who presents to clinic today for the following health issues:  Chief Complaint   Patient presents with     Urgent Care     Headache     X1wk, patient states she feels a burning sensation in her nasal area     Chills     No body aches, Covid tested negative 2/9/23     HPI    Patient reports one week of sinus symptoms. She reports headache, congestion, sinus pressure and nasal soreness. She took one dose of mucinex sinus -max which helped somewhat. Denies fever, fatigue, chills, nausea and vomiting.    Review of Systems   Constitutional: Negative for appetite change, chills and fever.   HENT: Positive for congestion, postnasal drip, sinus pressure and sinus pain.    Neurological: Positive for headaches.       Problem List:  2022-04: Screening for  hyperlipidemia  2021-10: Chronic kidney disease, stage 3  2019-10: Osteopenia, unspecified location  2019-10: Aromatase inhibitor use  2019-05: Dysfunction of right eustachian tube  2019-05: Hearing loss of right ear, unspecified hearing loss type  2019-02: Moderate malnutrition (H)  2018-12: Tubular adenoma of colon  2018-12: Colostomy in place (H)  2018-12: Vitamin B12 deficiency (non anemic)  2017-11: Diverticulitis large intestine  2017-11: Hyponatremia  2017-11: Hypokalemia  2017-10: S/P bilateral mastectomy  2017-09: Malignant neoplasm of upper-outer quadrant of right breast   in female, estrogen receptor positive (H)  2016-08: Acute diverticulitis  2016-08: Hypothyroidism due to acquired atrophy of thyroid  2016-08: Diverticulitis of colon  2015-05: Hypothyroidism  2013-07: TMJ (temporomandibular joint syndrome)  2013-07: Hyperlipidemia LDL goal <100  2013-01: Nontoxic uninodular goiter  2013-01: Rectal prolapse  2013-01: Essential hypertension  2013-01: Internal hemorrhoids with other complication  2013-01: Dysfunction of eustachian tube  2013-01: Tobacco use disorder      Past Medical History:   Diagnosis Date     Cancer (H)     breast     Diverticulitis      Hypertension      Nontoxic uninodular goiter 1/28/2013     Pure hypercholesterolemia 1/28/2013     TMJ (temporomandibular joint syndrome) 7/1/2013       Social History     Tobacco Use     Smoking status: Every Day     Packs/day: 0.50     Years: 40.00     Pack years: 20.00     Types: Cigarettes     Smokeless tobacco: Never     Tobacco comments:     1/2 PPD   Substance Use Topics     Alcohol use: Yes     Alcohol/week: 0.0 standard drinks     Comment: weekends           Objective    /83   Pulse 87   Temp 98.4  F (36.9  C) (Oral)   Resp 14   Wt 56.2 kg (124 lb)   LMP  (LMP Unknown)   SpO2 97%   BMI 20.01 kg/m    Physical Exam  Constitutional:       Appearance: Normal appearance.   HENT:      Head: Normocephalic.      Right Ear: Tympanic  membrane normal.      Left Ear: Tympanic membrane normal.      Nose: Congestion present.      Right Sinus: Maxillary sinus tenderness present.      Left Sinus: Maxillary sinus tenderness present.      Mouth/Throat:      Mouth: Mucous membranes are moist.      Pharynx: Uvula midline. No posterior oropharyngeal erythema.   Eyes:      Conjunctiva/sclera: Conjunctivae normal.      Pupils: Pupils are equal, round, and reactive to light.   Cardiovascular:      Rate and Rhythm: Normal rate and regular rhythm.      Heart sounds: Normal heart sounds.   Pulmonary:      Effort: Pulmonary effort is normal.      Breath sounds: Normal breath sounds.   Musculoskeletal:      Cervical back: Normal range of motion and neck supple.   Lymphadenopathy:      Head:      Right side of head: No submental, submandibular, tonsillar, preauricular or posterior auricular adenopathy.      Left side of head: No submental, submandibular, tonsillar, preauricular or posterior auricular adenopathy.   Skin:     General: Skin is warm and dry.   Neurological:      Mental Status: She is alert and oriented to person, place, and time.   Psychiatric:         Mood and Affect: Mood normal.         Behavior: Behavior normal.              Whitley Davies PA-C

## 2023-03-07 ENCOUNTER — OFFICE VISIT (OUTPATIENT)
Dept: URGENT CARE | Facility: URGENT CARE | Age: 68
End: 2023-03-07
Payer: MEDICARE

## 2023-03-07 VITALS
OXYGEN SATURATION: 99 % | TEMPERATURE: 99.5 F | SYSTOLIC BLOOD PRESSURE: 138 MMHG | HEART RATE: 92 BPM | WEIGHT: 125 LBS | BODY MASS INDEX: 20.18 KG/M2 | DIASTOLIC BLOOD PRESSURE: 80 MMHG

## 2023-03-07 DIAGNOSIS — R05.1 ACUTE COUGH: Primary | ICD-10-CM

## 2023-03-07 LAB
BASOPHILS # BLD AUTO: 0 10E3/UL (ref 0–0.2)
BASOPHILS NFR BLD AUTO: 0 %
EOSINOPHIL # BLD AUTO: 0.1 10E3/UL (ref 0–0.7)
EOSINOPHIL NFR BLD AUTO: 1 %
ERYTHROCYTE [DISTWIDTH] IN BLOOD BY AUTOMATED COUNT: 12.4 % (ref 10–15)
FLUAV AG SPEC QL IA: NEGATIVE
FLUBV AG SPEC QL IA: NEGATIVE
HCT VFR BLD AUTO: 39.8 % (ref 35–47)
HGB BLD-MCNC: 13.8 G/DL (ref 11.7–15.7)
IMM GRANULOCYTES # BLD: 0 10E3/UL
IMM GRANULOCYTES NFR BLD: 0 %
LYMPHOCYTES # BLD AUTO: 1.6 10E3/UL (ref 0.8–5.3)
LYMPHOCYTES NFR BLD AUTO: 19 %
MCH RBC QN AUTO: 31.1 PG (ref 26.5–33)
MCHC RBC AUTO-ENTMCNC: 34.7 G/DL (ref 31.5–36.5)
MCV RBC AUTO: 90 FL (ref 78–100)
MONOCYTES # BLD AUTO: 0.5 10E3/UL (ref 0–1.3)
MONOCYTES NFR BLD AUTO: 6 %
NEUTROPHILS # BLD AUTO: 6.5 10E3/UL (ref 1.6–8.3)
NEUTROPHILS NFR BLD AUTO: 74 %
PLATELET # BLD AUTO: 182 10E3/UL (ref 150–450)
RBC # BLD AUTO: 4.44 10E6/UL (ref 3.8–5.2)
WBC # BLD AUTO: 8.7 10E3/UL (ref 4–11)

## 2023-03-07 PROCEDURE — 87804 INFLUENZA ASSAY W/OPTIC: CPT | Performed by: NURSE PRACTITIONER

## 2023-03-07 PROCEDURE — U0005 INFEC AGEN DETEC AMPLI PROBE: HCPCS | Performed by: NURSE PRACTITIONER

## 2023-03-07 PROCEDURE — 85025 COMPLETE CBC W/AUTO DIFF WBC: CPT | Performed by: NURSE PRACTITIONER

## 2023-03-07 PROCEDURE — 36415 COLL VENOUS BLD VENIPUNCTURE: CPT | Performed by: NURSE PRACTITIONER

## 2023-03-07 PROCEDURE — 99214 OFFICE O/P EST MOD 30 MIN: CPT | Mod: CS | Performed by: NURSE PRACTITIONER

## 2023-03-07 PROCEDURE — U0003 INFECTIOUS AGENT DETECTION BY NUCLEIC ACID (DNA OR RNA); SEVERE ACUTE RESPIRATORY SYNDROME CORONAVIRUS 2 (SARS-COV-2) (CORONAVIRUS DISEASE [COVID-19]), AMPLIFIED PROBE TECHNIQUE, MAKING USE OF HIGH THROUGHPUT TECHNOLOGIES AS DESCRIBED BY CMS-2020-01-R: HCPCS | Performed by: NURSE PRACTITIONER

## 2023-03-07 NOTE — PATIENT INSTRUCTIONS
CBC is normal no signs of bacterial infection needing antibiotic  Flu was negative  COVID pending, nurses usually call for high risk patients to set up a virtual visit to discuss treatment options  Check MyChart in a day for results and call into clinic if positive  Cannot prescribed Paxlovid until we have a positive test  Rest! Your body needs more rest to heal.  Drink plenty of fluids (warm fluids like tea or soup are soothing and reduce cough)  Sit in the bathroom with a hot shower running and breathe in the steam.  Honey may soothe your sore throat and help manage your cough- may take straight or in warm water with lemon juice.  Avoid smoke (cigarettes, bonfires, fireplace, wood burning stoves).  Take Tylenol or an NSAID such as ibuprofen or naproxen as needed for pain.  Delsym (dextromethorphan polistirex) is an over the counter cough medication that lasts 12 hours.   Mucinex or Robitussin (guiafenesin) thin mucus and may help it to loosen more quickly  Good handwashing is the best way to prevent spread of germs  Present to emergency room if you develop trouble breathing, swallowing or cough-up blood, chest pain.  Follow up with your primary care provider if symptoms worsen or fail to improve as expected.

## 2023-03-07 NOTE — PROGRESS NOTES
Chief Complaint   Patient presents with     Urgent Care     Cough     Pt was treated with augmentin for a sinus infection. Still has cough, sinus headache.     SUBJECTIVE:  Suma Calabrese is a 67 year old female presenting with cough cold congestion sinus pain pressure headache for 1 days. Upset stomach a couple days ago, then completely better until yesterday. Had sinus infection 3 weeks ago and completed Augmentin with some relief until now.  No chest pain shortness of breath hemoptysis.  She had negative COVID testing at home, has never had COVID.  Very concerned about her lungs that she feels like this illness is really settling in her chest.    Past Medical History:   Diagnosis Date     Cancer (H)     breast     Diverticulitis      Hypertension      Nontoxic uninodular goiter 1/28/2013     Pure hypercholesterolemia 1/28/2013     TMJ (temporomandibular joint syndrome) 7/1/2013     levothyroxine (SYNTHROID/LEVOTHROID) 75 MCG tablet, Take 1 tablet (75 mcg) by mouth daily  multivitamin (CENTRUM SILVER) tablet, Take 1 tablet by mouth daily  triamterene-HCTZ (DYAZIDE) 37.5-25 MG capsule, Take 1 capsule by mouth daily  valACYclovir (VALTREX) 1000 mg tablet, Take 2 tablets (2,000 mg) by mouth 2 times daily for 1 day  VITAMIN D, CHOLECALCIFEROL, PO, Take 2,000 Units by mouth every morning    No current facility-administered medications on file prior to visit.    Social History     Tobacco Use     Smoking status: Every Day     Packs/day: 0.50     Years: 40.00     Pack years: 20.00     Types: Cigarettes     Smokeless tobacco: Never     Tobacco comments:     1/2 PPD   Substance Use Topics     Alcohol use: Yes     Alcohol/week: 0.0 standard drinks     Comment: weekends     Allergies   Allergen Reactions     Bupropion Other (See Comments)     Jittery and HA     Erythromycin GI Disturbance     Oxycodone Nausea and Vomiting       Review of Systems   All systems negative except for those listed above in HPI.    OBJECTIVE:    /80   Pulse 92   Temp 99.5  F (37.5  C) (Tympanic)   Wt 56.7 kg (125 lb)   LMP  (LMP Unknown)   SpO2 99%   BMI 20.18 kg/m       Physical Exam  Vitals reviewed.   Constitutional:       Appearance: Normal appearance. She is ill-appearing.   HENT:      Head: Normocephalic and atraumatic.      Right Ear: Tympanic membrane and ear canal normal.      Left Ear: Tympanic membrane and ear canal normal.      Nose: Congestion and rhinorrhea present.      Mouth/Throat:      Mouth: Mucous membranes are moist.      Pharynx: Oropharynx is clear. Posterior oropharyngeal erythema present. No oropharyngeal exudate.   Cardiovascular:      Rate and Rhythm: Normal rate.      Pulses: Normal pulses.   Pulmonary:      Effort: Respiratory distress present.      Breath sounds: No stridor. No wheezing, rhonchi or rales.   Chest:      Chest wall: No tenderness.   Musculoskeletal:         General: Normal range of motion.   Lymphadenopathy:      Cervical: Cervical adenopathy present.   Skin:     General: Skin is warm and dry.      Findings: No rash.   Neurological:      General: No focal deficit present.      Mental Status: She is alert and oriented to person, place, and time.   Psychiatric:         Mood and Affect: Mood normal.         Behavior: Behavior normal.       Results for orders placed or performed in visit on 03/07/23   CBC with platelets and differential     Status: None   Result Value Ref Range    WBC Count 8.7 4.0 - 11.0 10e3/uL    RBC Count 4.44 3.80 - 5.20 10e6/uL    Hemoglobin 13.8 11.7 - 15.7 g/dL    Hematocrit 39.8 35.0 - 47.0 %    MCV 90 78 - 100 fL    MCH 31.1 26.5 - 33.0 pg    MCHC 34.7 31.5 - 36.5 g/dL    RDW 12.4 10.0 - 15.0 %    Platelet Count 182 150 - 450 10e3/uL    % Neutrophils 74 %    % Lymphocytes 19 %    % Monocytes 6 %    % Eosinophils 1 %    % Basophils 0 %    % Immature Granulocytes 0 %    Absolute Neutrophils 6.5 1.6 - 8.3 10e3/uL    Absolute Lymphocytes 1.6 0.8 - 5.3 10e3/uL    Absolute Monocytes  0.5 0.0 - 1.3 10e3/uL    Absolute Eosinophils 0.1 0.0 - 0.7 10e3/uL    Absolute Basophils 0.0 0.0 - 0.2 10e3/uL    Absolute Immature Granulocytes 0.0 <=0.4 10e3/uL   Influenza A/B antigen     Status: Normal    Specimen: Nasopharyngeal; Swab   Result Value Ref Range    Influenza A antigen Negative Negative    Influenza B antigen Negative Negative    Narrative    Test results must be correlated with clinical data. If necessary, results should be confirmed by a molecular assay or viral culture.   CBC with platelets and differential     Status: None    Narrative    The following orders were created for panel order CBC with platelets and differential.  Procedure                               Abnormality         Status                     ---------                               -----------         ------                     CBC with platelets and d...[[id number]]                      Final result                 Please view results for these tests on the individual orders.     ASSESSMENT:    ICD-10-CM    1. Acute cough  R05.1 Influenza A/B antigen     CBC with platelets and differential     CBC with platelets and differential     Symptomatic COVID-19 Virus (Coronavirus) by PCR Nose        PLAN:     CBC is normal no signs of bacterial infection needing antibiotic  Flu was negative  COVID pending, nurses usually call for high risk patients to set up a virtual visit to discuss treatment options  Check MyChart in a day for results and call into clinic if positive  Cannot prescribed Paxlovid until we have a positive test  Rest! Your body needs more rest to heal.  Drink plenty of fluids (warm fluids like tea or soup are soothing and reduce cough)  Sit in the bathroom with a hot shower running and breathe in the steam.  Honey may soothe your sore throat and help manage your cough- may take straight or in warm water with lemon juice.  Avoid smoke (cigarettes, bonfires, fireplace, wood burning stoves).  Take Tylenol or an NSAID  such as ibuprofen or naproxen as needed for pain.  Delsym (dextromethorphan polistirex) is an over the counter cough medication that lasts 12 hours.   Mucinex or Robitussin (guiafenesin) thin mucus and may help it to loosen more quickly  Good handwashing is the best way to prevent spread of germs  Present to emergency room if you develop trouble breathing, swallowing or cough-up blood, chest pain.  Follow up with your primary care provider if symptoms worsen or fail to improve as expected.    Follow up with primary care provider with any problems, questions or concerns or if symptoms worsen or fail to improve. Patient agreed to plan and verbalized understanding.    Caty Martinez, [id number] [place]

## 2023-03-08 LAB — SARS-COV-2 RNA RESP QL NAA+PROBE: NEGATIVE

## 2023-04-30 NOTE — TELEPHONE ENCOUNTER
Panel Management Review      Patient has the following on her problem list:     Hypertension   Last three blood pressure readings:  BP Readings from Last 3 Encounters:   05/13/19 132/76   04/19/19 130/80   04/10/19 118/77     Blood pressure: Passed    HTN Guidelines:  Less than 140/90      Composite cancer screening  Chart review shows that this patient is due/due soon for the following Colonoscopy  Summary:    Patient is due/failing the following:   COLONOSCOPY and PHYSICAL    Action needed:   Patient needs office visit for physical.    Type of outreach:    Sent letter.    Questions for provider review:    None                                                                                                                                    Marley Lion CMA           
30-Apr-2023 00:44

## 2023-07-07 ENCOUNTER — MEDICAL CORRESPONDENCE (OUTPATIENT)
Dept: HEALTH INFORMATION MANAGEMENT | Facility: CLINIC | Age: 68
End: 2023-07-07

## 2023-07-24 ENCOUNTER — PATIENT OUTREACH (OUTPATIENT)
Dept: CARE COORDINATION | Facility: CLINIC | Age: 68
End: 2023-07-24
Payer: MEDICARE

## 2023-07-31 ENCOUNTER — APPOINTMENT (OUTPATIENT)
Dept: CT IMAGING | Facility: CLINIC | Age: 68
End: 2023-07-31
Attending: EMERGENCY MEDICINE
Payer: COMMERCIAL

## 2023-07-31 ENCOUNTER — HOSPITAL ENCOUNTER (EMERGENCY)
Facility: CLINIC | Age: 68
Discharge: HOME | End: 2023-07-31
Attending: PHYSICIAN ASSISTANT | Admitting: PHYSICIAN ASSISTANT
Payer: COMMERCIAL

## 2023-07-31 ENCOUNTER — APPOINTMENT (OUTPATIENT)
Dept: GENERAL RADIOLOGY | Facility: CLINIC | Age: 68
End: 2023-07-31
Attending: EMERGENCY MEDICINE
Payer: COMMERCIAL

## 2023-07-31 VITALS
HEIGHT: 66 IN | WEIGHT: 125 LBS | TEMPERATURE: 97.2 F | SYSTOLIC BLOOD PRESSURE: 147 MMHG | BODY MASS INDEX: 20.09 KG/M2 | HEART RATE: 92 BPM | DIASTOLIC BLOOD PRESSURE: 82 MMHG | RESPIRATION RATE: 20 BRPM | OXYGEN SATURATION: 98 %

## 2023-07-31 DIAGNOSIS — S09.90XA CLOSED HEAD INJURY, INITIAL ENCOUNTER: ICD-10-CM
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
## 2023-07-31 DIAGNOSIS — M54.50 ACUTE BILATERAL LOW BACK PAIN WITHOUT SCIATICA: ICD-10-CM

## 2023-07-31 PROCEDURE — 99285 EMERGENCY DEPT VISIT HI MDM: CPT | Mod: 25

## 2023-07-31 PROCEDURE — 72131 CT LUMBAR SPINE W/O DYE: CPT | Mod: MA

## 2023-07-31 PROCEDURE — 73562 X-RAY EXAM OF KNEE 3: CPT | Mod: LT

## 2023-07-31 PROCEDURE — 70450 CT HEAD/BRAIN W/O DYE: CPT | Mod: MA

## 2023-07-31 PROCEDURE — 73030 X-RAY EXAM OF SHOULDER: CPT | Mod: RT

## 2023-07-31 RX ORDER — CYCLOBENZAPRINE HCL 10 MG
10 TABLET ORAL 3 TIMES DAILY PRN
Qty: 20 TABLET | Refills: 0 | Status: SHIPPED | OUTPATIENT
Start: 2023-07-31 | End: 2023-08-07

## 2023-07-31 RX ORDER — ACETAMINOPHEN 500 MG
1000 TABLET ORAL ONCE
Status: DISCONTINUED | OUTPATIENT
Start: 2023-07-31 | End: 2023-07-31 | Stop reason: HOSPADM

## 2023-07-31 ASSESSMENT — ACTIVITIES OF DAILY LIVING (ADL): ADLS_ACUITY_SCORE: 35

## 2023-07-31 NOTE — DISCHARGE INSTRUCTIONS
Your imaging today is reassuring.  There is no evidence of traumatic injury.  Continue supportive cares at home including rest, ice or heat packs, and ibuprofen for pain. You will be sent home with a muscle relaxant.  Be aware this medication may cause drowsiness.  Do not drive or operate heavy machinery while taking this medication.  Follow-up with your primary care provider as needed.  For any new or worsening symptoms, present back to ED
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
## 2023-08-01 ENCOUNTER — PATIENT OUTREACH (OUTPATIENT)
Dept: INTERNAL MEDICINE | Facility: CLINIC | Age: 68
End: 2023-08-01
Payer: MEDICARE

## 2023-08-01 NOTE — TELEPHONE ENCOUNTER
What type of discharge? Emergency Department  Risk of Hospital admission or ED visit: 50%  Is a TCM episode required? No  When should the patient follow up with PCP? No longer offering appt    Caty Ordonez RN  [employer]

## 2023-08-04 ENCOUNTER — HOSPITAL ENCOUNTER (EMERGENCY)
Facility: CLINIC | Age: 68
Discharge: HOME | End: 2023-08-04
Attending: EMERGENCY MEDICINE | Admitting: EMERGENCY MEDICINE
Payer: MEDICARE

## 2023-08-04 ENCOUNTER — APPOINTMENT (OUTPATIENT)
Dept: GENERAL RADIOLOGY | Facility: CLINIC | Age: 68
End: 2023-08-04
Attending: EMERGENCY MEDICINE
Payer: MEDICARE

## 2023-08-04 VITALS
OXYGEN SATURATION: 99 % | WEIGHT: 126.2 LBS | TEMPERATURE: 97.6 F | HEART RATE: 75 BPM | RESPIRATION RATE: 18 BRPM | SYSTOLIC BLOOD PRESSURE: 164 MMHG | BODY MASS INDEX: 21.02 KG/M2 | HEIGHT: 65 IN | DIASTOLIC BLOOD PRESSURE: 88 MMHG

## 2023-08-04 DIAGNOSIS — S43.401A SPRAIN OF RIGHT SHOULDER, UNSPECIFIED SHOULDER SPRAIN TYPE, INITIAL ENCOUNTER: ICD-10-CM

## 2023-08-04 PROCEDURE — 73030 X-RAY EXAM OF SHOULDER: CPT | Mod: RT

## 2023-08-04 PROCEDURE — 99283 EMERGENCY DEPT VISIT LOW MDM: CPT

## 2023-08-04 ASSESSMENT — ACTIVITIES OF DAILY LIVING (ADL): ADLS_ACUITY_SCORE: 35
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
## 2023-08-07 ENCOUNTER — PATIENT OUTREACH (OUTPATIENT)
Dept: CARE COORDINATION | Facility: CLINIC | Age: 68
End: 2023-08-07
Payer: MEDICARE

## 2023-08-24 ENCOUNTER — TELEPHONE (OUTPATIENT)
Dept: INTERNAL MEDICINE | Facility: CLINIC | Age: 68
End: 2023-08-24
Payer: MEDICARE

## 2023-08-24 NOTE — TELEPHONE ENCOUNTER
Order/Referral Request    Who is requesting: ODALYS     Orders being requested: LABS FOR HER ANNUAL WELLNESS, SHE'LL BE FASTING.    Reason service is needed/diagnosis: She wants her labs done before her Annual visit.    When are orders needed by: 10/17/23    Has this been discussed with Provider: No    Does patient have a preference on a Group/Provider/Facility? [facility] LOCATION    Does patient have an appointment scheduled?: Yes: Her Annual is on 10/20/23 and her Labs are on 10/17/23    Where to send orders: Place orders within Epic    Could we send this information to you in [facility] or would you prefer to receive a phone call?:   Patient would prefer a phone call   Okay to leave a detailed message?: Yes at Cell number on file:    Telephone Information:   Mobile [phone]

## 2023-08-31 ENCOUNTER — OFFICE VISIT (OUTPATIENT)
Dept: URGENT CARE | Facility: URGENT CARE | Age: 68
End: 2023-08-31
Payer: MEDICARE

## 2023-08-31 VITALS
DIASTOLIC BLOOD PRESSURE: 92 MMHG | SYSTOLIC BLOOD PRESSURE: 156 MMHG | WEIGHT: 120 LBS | BODY MASS INDEX: 19.97 KG/M2 | OXYGEN SATURATION: 99 % | TEMPERATURE: 98.1 F | RESPIRATION RATE: 18 BRPM | HEART RATE: 80 BPM

## 2023-08-31 DIAGNOSIS — H10.9 BACTERIAL CONJUNCTIVITIS OF LEFT EYE: Primary | ICD-10-CM

## 2023-08-31 PROCEDURE — 99213 OFFICE O/P EST LOW 20 MIN: CPT | Performed by: STUDENT IN AN ORGANIZED HEALTH CARE EDUCATION/TRAINING PROGRAM

## 2023-08-31 RX ORDER — POLYMYXIN B SULFATE AND TRIMETHOPRIM 1; 10000 MG/ML; [USP'U]/ML
1-2 SOLUTION OPHTHALMIC EVERY 6 HOURS
Qty: 3 ML | Refills: 0 | Status: SHIPPED | OUTPATIENT
Start: 2023-08-31 | End: 2023-09-07

## 2023-08-31 RX ORDER — FLUTICASONE PROPIONATE 50 MCG
1 SPRAY, SUSPENSION (ML) NASAL DAILY
COMMUNITY

## 2023-08-31 NOTE — PROGRESS NOTES
ASSESSMENT & PLAN:   Diagnoses and all orders for this visit:  Bacterial conjunctivitis of left eye  -     trimethoprim-polymyxin b (POLYTRIM) 79267-0.1 UNIT/ML-% ophthalmic solution; Place 1-2 drops Into the left eye every 6 hours for 7 days    Left eye redness, drainage that began yesterday. Consistent with bacterial conjunctivitis. Start Polytrim drops x7 days. Warm compresses, avoiding touching eyes, good hand hygiene.    No follow-ups on file.    There are no Patient Instructions on file for this visit.    At the end of the encounter, I discussed results, diagnosis, medications. Discussed red flags for immediate return to clinic/ER, as well as indications for follow up if no improvement. Patient and/or caregiver understood and agreed to plan. Patient was stable for discharge.    ------------------------------------------------------------------------  SUBJECTIVE  Patient presents with:  Urgent Care  Conjunctivitis: This morning eye itching. C/o pink eyes.   Sinus Problem: Sinus issue since Tuesday, allergic related?    HPI  Suma Calabrese is a(n) 68 year old female presenting to urgent care for left eye drainage that began yesterday. This morning she had more redness and irritation with thick yellow drainage today. Eye feels irritated. No known exposure to pinkeye. Does not wear contacts.    Review of Systems    Current Outpatient Medications   Medication Sig Dispense Refill    fluticasone (FLONASE) 50 MCG/ACT nasal spray Spray 1 spray into both nostrils daily      levothyroxine (SYNTHROID/LEVOTHROID) 75 MCG tablet Take 1 tablet (75 mcg) by mouth daily 90 tablet 3    triamterene-HCTZ (DYAZIDE) 37.5-25 MG capsule Take 1 capsule by mouth daily 90 capsule 3    trimethoprim-polymyxin b (POLYTRIM) 11321-6.1 UNIT/ML-% ophthalmic solution Place 1-2 drops Into the left eye every 6 hours for 7 days 3 mL 0    multivitamin (CENTRUM SILVER) tablet Take 1 tablet by mouth daily      valACYclovir (VALTREX) 1000 mg tablet  Take 2 tablets (2,000 mg) by mouth 2 times daily for 1 day 4 tablet 11    VITAMIN D, CHOLECALCIFEROL, PO Take 2,000 Units by mouth every morning       Problem List:  2022-04: Screening for hyperlipidemia  2021-10: Chronic kidney disease, stage 3  2019-10: Osteopenia, unspecified location  2019-10: Aromatase inhibitor use  2019-05: Dysfunction of right eustachian tube  2019-05: Hearing loss of right ear, unspecified hearing loss type  2019-02: Moderate malnutrition (H)  2018-12: Tubular adenoma of colon  2018-12: Colostomy in place (H)  2018-12: Vitamin B12 deficiency (non anemic)  2017-11: Diverticulitis large intestine  2017-11: Hyponatremia  2017-11: Hypokalemia  2017-10: S/P bilateral mastectomy  2017-09: Malignant neoplasm of upper-outer quadrant of right breast   in female, estrogen receptor positive (H)  2016-08: Acute diverticulitis  2016-08: Hypothyroidism due to acquired atrophy of thyroid  2016-08: Diverticulitis of colon  2015-05: Hypothyroidism  2013-07: TMJ (temporomandibular joint syndrome)  2013-07: Hyperlipidemia LDL goal <100  2013-01: Nontoxic uninodular goiter  2013-01: Rectal prolapse  2013-01: Essential hypertension  2013-01: Internal hemorrhoids with other complication  2013-01: Dysfunction of eustachian tube  2013-01: Tobacco use disorder    Allergies   Allergen Reactions    Bupropion Other (See Comments)     Jittery and HA    Erythromycin GI Disturbance    Oxycodone Nausea and Vomiting         OBJECTIVE  Vitals:    08/31/23 1145 08/31/23 1150   BP: (!) 156/92    Pulse: 80    Resp: 18    Temp: 97.8  F (36.6  C) 98.1  F (36.7  C)   TempSrc: Temporal Oral   SpO2: 99%    Weight: 54.4 kg (120 lb)      Physical Exam   GENERAL: healthy, alert, no acute distress.   PSYCH: mentation appears normal. Normal affect  HEAD: normocephalic, atraumatic.  EYE: PERRL. EOMs intact. Left eye with scleral injection and yellow crusting at lashes    No results found for any visits on 08/31/23.

## 2023-09-07 ENCOUNTER — OFFICE VISIT (OUTPATIENT)
Dept: URGENT CARE | Facility: URGENT CARE | Age: 68
End: 2023-09-07
Payer: MEDICARE

## 2023-09-07 VITALS
BODY MASS INDEX: 19.97 KG/M2 | SYSTOLIC BLOOD PRESSURE: 123 MMHG | DIASTOLIC BLOOD PRESSURE: 82 MMHG | TEMPERATURE: 98.1 F | HEART RATE: 59 BPM | OXYGEN SATURATION: 100 % | WEIGHT: 120 LBS

## 2023-09-07 DIAGNOSIS — H66.001 NON-RECURRENT ACUTE SUPPURATIVE OTITIS MEDIA OF RIGHT EAR WITHOUT SPONTANEOUS RUPTURE OF TYMPANIC MEMBRANE: Primary | ICD-10-CM

## 2023-09-07 DIAGNOSIS — H10.12 ALLERGIC CONJUNCTIVITIS, LEFT: ICD-10-CM

## 2023-09-07 PROCEDURE — 99213 OFFICE O/P EST LOW 20 MIN: CPT | Performed by: PHYSICIAN ASSISTANT

## 2023-09-07 RX ORDER — CEFDINIR 300 MG/1
300 CAPSULE ORAL 2 TIMES DAILY
Qty: 20 CAPSULE | Refills: 0 | Status: SHIPPED | OUTPATIENT
Start: 2023-09-07 | End: 2023-09-17

## 2023-09-07 NOTE — PROGRESS NOTES
Non-recurrent acute suppurative otitis media of right ear without spontaneous rupture of tympanic membrane  - cefdinir (OMNICEF) 300 MG capsule; Take 1 capsule (300 mg) by mouth 2 times daily for 10 days    Allergic conjunctivitis, left  - ketotifen (ZADITOR) 0.025 % ophthalmic solution; Place 1 drop Into the left eye 2 times daily for 10 days    Patient was advised to return to clinic for reevaluation (either UC or PCP) if symptoms do not improve in 7 days. Patient educated on red flag symptoms and asked to go directly to the ED if these symptoms present themselves.       Mike Barr PA-C  [facility]    Subjective   68 year old who presents to clinic today for the following health issues:    Urgent Care and Ear Problem       HPI     Acute Illness  Acute illness concerns: C/O eye irritation and ear pain for 1 week -is currently using antibiotic drops and states that her eye has much improved but he continues to itch after she uses the drops.  Symptoms:  Fever: No  Chills/Sweats: No  Headache (location?): No  Sinus Pressure: No  Conjunctivitis:  Yes  Ear Pain: YES: bilateral  Rhinorrhea: No  Congestion: No  Sore Throat: No  Cough: no  Wheeze: No  Decreased Appetite: No  Nausea: No  Vomiting: No  Diarrhea: No  Dysuria/Freq.: No  Dysuria or Hematuria: No  Fatigue/Achiness: No  Sick/Strep Exposure: No  Therapies tried and outcome: None    Review of Systems   Review of Systems   See HPI    Objective    Temp: 98.1  F (36.7  C) Temp src: Tympanic BP: 123/82 Pulse: 59     SpO2: 100 %       Physical Exam   Physical Exam  Constitutional:       General: She is not in acute distress.     Appearance: Normal appearance. She is normal weight. She is not ill-appearing, toxic-appearing or diaphoretic.   HENT:      Head: Normocephalic and atraumatic.      Right Ear: Ear canal and external ear normal. There is no impacted cerumen. Tympanic membrane is injected, erythematous and bulging.      Left Ear:  Tympanic membrane, ear canal and external ear normal. There is no impacted cerumen.   Eyes:      General:         Right eye: No discharge.         Left eye: No discharge.      Extraocular Movements: Extraocular movements intact.      Conjunctiva/sclera: Conjunctivae normal.      Pupils: Pupils are equal, round, and reactive to light.   Cardiovascular:      Rate and Rhythm: Normal rate.      Pulses: Normal pulses.   Pulmonary:      Effort: Pulmonary effort is normal. No respiratory distress.   Neurological:      General: No focal deficit present.      Mental Status: She is alert and oriented to person, place, and time. Mental status is at baseline.      Gait: Gait normal.   Psychiatric:         Mood and Affect: Mood normal.         Behavior: Behavior normal.         Thought Content: Thought content normal.         Judgment: Judgment normal.          No results found for this or any previous visit (from the past 24 hour(s)).

## 2023-09-26 ENCOUNTER — OFFICE VISIT (OUTPATIENT)
Dept: URGENT CARE | Facility: URGENT CARE | Age: 68
End: 2023-09-26
Payer: MEDICARE

## 2023-09-26 ENCOUNTER — ANCILLARY PROCEDURE (OUTPATIENT)
Dept: GENERAL RADIOLOGY | Facility: CLINIC | Age: 68
End: 2023-09-26
Attending: PHYSICIAN ASSISTANT
Payer: MEDICARE

## 2023-09-26 VITALS
SYSTOLIC BLOOD PRESSURE: 142 MMHG | TEMPERATURE: 98 F | OXYGEN SATURATION: 98 % | HEART RATE: 91 BPM | DIASTOLIC BLOOD PRESSURE: 77 MMHG

## 2023-09-26 DIAGNOSIS — S49.91XA INJURY OF RIGHT SHOULDER, INITIAL ENCOUNTER: ICD-10-CM
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
## 2023-09-26 PROCEDURE — 99214 OFFICE O/P EST MOD 30 MIN: CPT

## 2023-09-26 PROCEDURE — 73050 X-RAY EXAM OF SHOULDERS: CPT | Mod: TC | Performed by: RADIOLOGY

## 2023-09-26 PROCEDURE — 73030 X-RAY EXAM OF SHOULDER: CPT | Mod: TC | Performed by: RADIOLOGY

## 2023-09-26 NOTE — PROGRESS NOTES
Chief Complaint   Patient presents with    Fall     Pt reports falling 09/09 injured falling on right shoulder. Has not seen anyone for the pain to shoulder.       ASSESSMENT/PLAN:  Suma was seen today for fall.    Diagnoses and all orders for this visit:    Fall, initial encounter    Injury of right shoulder, initial encounter  -     XR Shoulder Right G/E 3 Views; Future  -     Cancel: XR Acromioclavicular Joint Bilateral; Future  -     XR Acromioclavicular Joint Bilateral; Future  -     Physical Therapy Referral; Future    Note is a fracture today.  Would benefit greatly from PT.  Recurrent injuries likely leading to impingement versus tendinopathy  Activity modification as outlined in AVS    Osvaldo Mckeon PA-C  30 minutes spent by me on the date of the encounter doing chart review, history and exam, documentation and further activities per the note    SUBJECTIVE:  Suma is a 68 year old female who presents to urgent care with pain in the right shoulder with decreased range of motion after falling on it 17 days ago.  She tripped while moving an art stand and landed directly on the lateral aspect of her right shoulder.  Since then she has had pain and decreased range of motion.  This is the third fall in the shoulder this year.  She has some chronic changes in mobility but has become worse.  She has difficulty and almost no ability to raise her arm above her head.  Sometimes she will feel some tingling in the back, shoulder and down her arm.  She did not hit her head, no loss of consciousness    ROS: Pertinent ROS neg other than the symptoms noted above in the HPI.     OBJECTIVE:  BP (!) 142/77   Pulse 91   Temp 98  F (36.7  C) (Tympanic)   LMP  (LMP Unknown)   SpO2 98%    GENERAL: healthy, alert and no distress  MS: Right shoulder: No obvious deformity, swelling or scapular winging.  Tenderness at the AC joint, coracoid process and anterior lateral humeral head, hypertonicity of the supraspinatus  with focal tenderness, crepitus noted.  Cannot raise arm past 70 degrees of flexion and abduction without pain  SKIN: no suspicious lesions or rashes  NEURO: Normal strength and tone, mentation intact and speech normal,  strength normal, okay sign normal, finger abduction and abduction normal, symmetric bilaterally for the above    DIAGNOSTICS  Xray - Reviewed and interpreted by me.  No acute bony abnormalities noted, no fracture or dislocation  Results for orders placed or performed in visit on 09/26/23   XR Acromioclavicular Joint Bilateral     Status: None    Narrative    XR SHOULDER RIGHT G/E 3 VIEWS, XR ACROMIOCLAVICULAR JOINT BILATERAL   9/26/2023 11:03 AM     HISTORY: fall onto lat shoulder, tender at anterior humeral head, AC  joint; Injury of right shoulder, initial encounter  COMPARISON: 8/4/2023.       Impression    IMPRESSION: There is osseous demineralization. No acute, displaced  fracture is seen. No dislocation. There is mild right glenohumeral and  moderate acromioclavicular degenerative arthrosis. Normal  acromioclavicular alignment bilaterally.    NGA WATKINS MD         SYSTEM ID:  [id number]   Results for orders placed or performed in visit on 09/26/23   XR Shoulder Right G/E 3 Views     Status: None    Narrative    XR SHOULDER RIGHT G/E 3 VIEWS, XR ACROMIOCLAVICULAR JOINT BILATERAL   9/26/2023 11:03 AM     HISTORY: fall onto lat shoulder, tender at anterior humeral head, AC  joint; Injury of right shoulder, initial encounter  COMPARISON: 8/4/2023.       Impression    IMPRESSION: There is osseous demineralization. No acute, displaced  fracture is seen. No dislocation. There is mild right glenohumeral and  moderate acromioclavicular degenerative arthrosis. Normal  acromioclavicular alignment bilaterally.    NGA WATKINS MD         SYSTEM ID:  [id number]        Current Outpatient Medications   Medication    fluticasone (FLONASE) 50 MCG/ACT nasal spray    levothyroxine  (SYNTHROID/LEVOTHROID) 75 MCG tablet    multivitamin (CENTRUM SILVER) tablet    triamterene-HCTZ (DYAZIDE) 37.5-25 MG capsule    VITAMIN D, CHOLECALCIFEROL, PO    ketotifen (ZADITOR) 0.025 % ophthalmic solution    valACYclovir (VALTREX) 1000 mg tablet     No current facility-administered medications for this visit.      Patient Active Problem List   Diagnosis    Nontoxic uninodular goiter    Rectal prolapse    Essential hypertension    Internal hemorrhoids with other complication    Tobacco use disorder    Hyperlipidemia LDL goal <100    Hypothyroidism due to acquired atrophy of thyroid    Diverticulitis of colon    Malignant neoplasm of upper-outer quadrant of right breast in female, estrogen receptor positive (H)    S/P bilateral mastectomy    Hyponatremia    Hypokalemia    Diverticulitis large intestine    Tubular adenoma of colon    Colostomy in place (H)    Vitamin B12 deficiency (non anemic)    Dysfunction of right eustachian tube    Hearing loss of right ear, unspecified hearing loss type    Osteopenia, unspecified location    Aromatase inhibitor use      Past Medical History:   Diagnosis Date    Cancer (H)     breast    Diverticulitis     Hypertension     Nontoxic uninodular goiter 1/28/2013    Pure hypercholesterolemia 1/28/2013    TMJ (temporomandibular joint syndrome) 7/1/2013     Past Surgical History:   Procedure Laterality Date    BIOPSY      BIOPSY NODE SENTINEL Bilateral 10/16/2017    Procedure: BIOPSY NODE SENTINEL;;  Surgeon: Van Salazar MD;  Location: [facility]    bladder dilitation[  age 17    COLECTOMY SUBTOTAL Left 11/13/2017    Procedure: COLECTOMY SUBTOTAL;  EXPLORATORY LAPAROTOMY, OPEN SUBTOTAL  COLECTOMY, END ILEOSTOMY;  Surgeon: May Spain MD;  Location:  OR    DENTAL SURGERY      wisdom tooth extraction    ILEOSTOMY N/A 11/13/2017    Procedure: ILEOSTOMY;;  Surgeon: May Spain MD;  Location:  OR    LAPAROTOMY EXPLORATORY N/A 11/13/2017    Procedure: LAPAROTOMY  EXPLORATORY;;  Surgeon: May Spain MD;  Location:  OR    MASTECTOMY SIMPLE BILATERAL Bilateral 10/16/2017    Procedure: MASTECTOMY SIMPLE BILATERAL;  BILATERAL SIMPLE MASTECTOMY, BILATERAL AXILLARY SENTINEL NODE BIOPSY;  Surgeon: Van Salazar MD;  Location: [facility]     Family History   Problem Relation Age of Onset    Unknown/Adopted Mother     Unknown/Adopted Father      Social History     Tobacco Use    Smoking status: Every Day     Packs/day: 0.50     Years: 40.00     Pack years: 20.00     Types: Cigarettes     Passive exposure: Never    Smokeless tobacco: Never    Tobacco comments:     1/2 PPD   Substance Use Topics    Alcohol use: Yes     Alcohol/week: 0.0 standard drinks of alcohol     Comment: weekends              The plan of care was discussed with the patient. They understand and agree with the course of treatment prescribed. A printed summary was given including instructions and medications.  The use of Dragon/Chevia dictation services may have been used to construct the content in this note; any grammatical or spelling errors are non-intentional. Please contact the author of this note directly if you are in need of any clarification.

## 2023-09-26 NOTE — PATIENT INSTRUCTIONS
Heat, stretch, Modify activity.  Mild pain is okay as long as it resolves after a minute or 2 of discontinuing the activity.  Decreased level activity if pain is moderate to severe or last longer than a few minutes after discontinuing.  Voltaren gel 4 times a day for the next 1 to 2 weeks can be effective.  Has minimal side effects but can take a few days to begin working and have to be diligent about application.

## 2023-10-02 ENCOUNTER — THERAPY VISIT (OUTPATIENT)
Dept: PHYSICAL THERAPY | Facility: CLINIC | Age: 68
End: 2023-10-02
Attending: PHYSICIAN ASSISTANT
Payer: MEDICARE

## 2023-10-02 DIAGNOSIS — S49.91XA INJURY OF RIGHT SHOULDER, INITIAL ENCOUNTER: ICD-10-CM

## 2023-10-02 PROCEDURE — 97110 THERAPEUTIC EXERCISES: CPT | Mod: GP

## 2023-10-02 PROCEDURE — 97161 PT EVAL LOW COMPLEX 20 MIN: CPT | Mod: GP
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
## 2023-10-05 ENCOUNTER — OFFICE VISIT (OUTPATIENT)
Dept: URGENT CARE | Facility: URGENT CARE | Age: 68
End: 2023-10-05
Payer: MEDICARE

## 2023-10-05 VITALS
BODY MASS INDEX: 20.14 KG/M2 | HEART RATE: 88 BPM | DIASTOLIC BLOOD PRESSURE: 91 MMHG | OXYGEN SATURATION: 99 % | SYSTOLIC BLOOD PRESSURE: 134 MMHG | TEMPERATURE: 98.1 F | WEIGHT: 121 LBS

## 2023-10-05 DIAGNOSIS — L50.9 URTICARIA: Primary | ICD-10-CM

## 2023-10-05 PROCEDURE — 99213 OFFICE O/P EST LOW 20 MIN: CPT

## 2023-10-05 NOTE — PROGRESS NOTES
ASSESSMENT:  (L50.9) Urticaria  (primary encounter diagnosis)    PLAN:  The patient and I discussed that the urticaria is likely related to her taking a super beets gummy yesterday.  I informed her that these type of products are not regulated by the FDA so it is unclear as to what type of ingredients are included.  Urticaria patient instructions discussed and provided.  Informed her to take Claritin daily for her symptoms and use calamine lotion to the areas for itching.  We discussed that she could also use a cool compress to the areas for her symptoms.  Informed the patient to return to clinic with any new or worsening symptoms.  Patient acknowledged her understanding of the above plan.    The use of Dragon/PowerMic dictation services may have been used to construct the content in this note; any grammatical or spelling errors are non-intentional. Please contact the author of this note directly if you are in need of any clarification.      Jah Mackenzie, JT CNP      SUBJECTIVE:  Suma Calabrese is a 68 year old female who presents to the clinic today for a rash.  Onset of rash was earlier today.  Rash is gradual onset.  Location of the rash: vaginal area and groin.  Symptoms are mild to moderate and rash seems to be not changing over the course of time.  Associated symptoms include: itching and burning.  Therapies tried to improve the rash: none.  Recent new medication? No but the patient reports taking super beets gummy yesterday   Previous history of a similar rash? No  Recent exposure history: none known    ROS:  Negative except noted above.    OBJECTIVE:  Female staff present during the exam.  EXAM:  VITALS: BP (!) 134/91   Pulse 88   Temp 98.1  F (36.7  C) (Oral)   Wt 54.9 kg (121 lb)   LMP  (LMP Unknown)   SpO2 99%   BMI 20.14 kg/m    GENERAL APPEARANCE: healthy, alert and no distress  RESP: lungs clear to auscultation - no rales, rhonchi or wheezes  SKIN: Rash description:    Location:  abdomen and chest    Distribution: localized  Lesion grouping: clustered  Lesion type: wheals  Color: red  Nail exam: normal- no clubbing or nail changes  Hair exam: normal

## 2023-10-05 NOTE — PATIENT INSTRUCTIONS
Take Claritin daily for your symptoms.  You can also use Calamine lotion to the areas for itching.  You could also try cool compresses to the area for your symptoms.

## 2023-10-11 ENCOUNTER — LAB (OUTPATIENT)
Dept: LAB | Facility: CLINIC | Age: 68
End: 2023-10-11
Payer: MEDICARE

## 2023-10-11 ENCOUNTER — THERAPY VISIT (OUTPATIENT)
Dept: PHYSICAL THERAPY | Facility: CLINIC | Age: 68
End: 2023-10-11
Attending: PHYSICIAN ASSISTANT
Payer: MEDICARE

## 2023-10-11 DIAGNOSIS — S49.91XA INJURY OF RIGHT SHOULDER, INITIAL ENCOUNTER: Primary | ICD-10-CM

## 2023-10-11 DIAGNOSIS — I10 ESSENTIAL HYPERTENSION: ICD-10-CM

## 2023-10-11 DIAGNOSIS — E03.4 HYPOTHYROIDISM DUE TO ACQUIRED ATROPHY OF THYROID: ICD-10-CM

## 2023-10-11 LAB
ANION GAP SERPL CALCULATED.3IONS-SCNC: 10 MMOL/L (ref 7–15)
BUN SERPL-MCNC: 9.8 MG/DL (ref 8–23)
CALCIUM SERPL-MCNC: 10 MG/DL (ref 8.8–10.2)
CHLORIDE SERPL-SCNC: 92 MMOL/L (ref 98–107)
CREAT SERPL-MCNC: 0.8 MG/DL (ref 0.51–0.95)
DEPRECATED HCO3 PLAS-SCNC: 27 MMOL/L (ref 22–29)
EGFRCR SERPLBLD CKD-EPI 2021: 79 ML/MIN/1.73M2
GLUCOSE SERPL-MCNC: 93 MG/DL (ref 70–99)
POTASSIUM SERPL-SCNC: 4.2 MMOL/L (ref 3.4–5.3)
SODIUM SERPL-SCNC: 129 MMOL/L (ref 135–145)
TSH SERPL DL<=0.005 MIU/L-ACNC: 0.56 UIU/ML (ref 0.3–4.2)

## 2023-10-11 PROCEDURE — 36415 COLL VENOUS BLD VENIPUNCTURE: CPT

## 2023-10-11 PROCEDURE — 97110 THERAPEUTIC EXERCISES: CPT | Mod: GP | Performed by: PHYSICAL THERAPIST

## 2023-10-11 PROCEDURE — 84443 ASSAY THYROID STIM HORMONE: CPT

## 2023-10-11 PROCEDURE — 80048 BASIC METABOLIC PNL TOTAL CA: CPT

## 2023-10-16 ENCOUNTER — THERAPY VISIT (OUTPATIENT)
Dept: PHYSICAL THERAPY | Facility: CLINIC | Age: 68
End: 2023-10-16
Attending: PHYSICIAN ASSISTANT
Payer: MEDICARE

## 2023-10-16 DIAGNOSIS — S49.91XA INJURY OF RIGHT SHOULDER, INITIAL ENCOUNTER: Primary | ICD-10-CM

## 2023-10-16 PROCEDURE — 97110 THERAPEUTIC EXERCISES: CPT | Mod: GP

## 2023-10-16 PROCEDURE — 97140 MANUAL THERAPY 1/> REGIONS: CPT | Mod: GP

## 2023-10-20 ENCOUNTER — OFFICE VISIT (OUTPATIENT)
Dept: INTERNAL MEDICINE | Facility: CLINIC | Age: 68
End: 2023-10-20
Payer: MEDICARE

## 2023-10-20 VITALS
HEART RATE: 87 BPM | OXYGEN SATURATION: 99 % | BODY MASS INDEX: 19.47 KG/M2 | TEMPERATURE: 98.6 F | RESPIRATION RATE: 14 BRPM | DIASTOLIC BLOOD PRESSURE: 92 MMHG | HEIGHT: 65 IN | SYSTOLIC BLOOD PRESSURE: 146 MMHG | WEIGHT: 116.9 LBS

## 2023-10-20 DIAGNOSIS — C50.411 MALIGNANT NEOPLASM OF UPPER-OUTER QUADRANT OF RIGHT BREAST IN FEMALE, ESTROGEN RECEPTOR POSITIVE (H): ICD-10-CM

## 2023-10-20 DIAGNOSIS — B00.1 COLD SORE: ICD-10-CM

## 2023-10-20 DIAGNOSIS — Z87.891 PERSONAL HISTORY OF TOBACCO USE: ICD-10-CM

## 2023-10-20 DIAGNOSIS — Z90.13 S/P BILATERAL MASTECTOMY: ICD-10-CM

## 2023-10-20 DIAGNOSIS — I10 ESSENTIAL HYPERTENSION: ICD-10-CM

## 2023-10-20 DIAGNOSIS — Z93.2 ILEOSTOMY STATUS (H): ICD-10-CM

## 2023-10-20 DIAGNOSIS — Z00.00 ENCOUNTER FOR MEDICARE ANNUAL WELLNESS EXAM: Primary | ICD-10-CM

## 2023-10-20 DIAGNOSIS — E03.4 HYPOTHYROIDISM DUE TO ACQUIRED ATROPHY OF THYROID: Chronic | ICD-10-CM

## 2023-10-20 DIAGNOSIS — E87.1 HYPONATREMIA: ICD-10-CM

## 2023-10-20 DIAGNOSIS — Z17.0 MALIGNANT NEOPLASM OF UPPER-OUTER QUADRANT OF RIGHT BREAST IN FEMALE, ESTROGEN RECEPTOR POSITIVE (H): ICD-10-CM

## 2023-10-20 PROBLEM — Z79.811 AROMATASE INHIBITOR USE: Status: RESOLVED | Noted: 2019-10-09 | Resolved: 2023-10-20

## 2023-10-20 PROBLEM — K57.32 DIVERTICULITIS LARGE INTESTINE: Status: RESOLVED | Noted: 2017-11-07 | Resolved: 2023-10-20

## 2023-10-20 PROBLEM — S49.91XA INJURY OF RIGHT SHOULDER, INITIAL ENCOUNTER: Status: RESOLVED | Noted: 2023-10-02 | Resolved: 2023-10-20

## 2023-10-20 PROBLEM — H69.91 DYSFUNCTION OF RIGHT EUSTACHIAN TUBE: Status: RESOLVED | Noted: 2019-05-13 | Resolved: 2023-10-20

## 2023-10-20 PROCEDURE — G0296 VISIT TO DETERM LDCT ELIG: HCPCS | Performed by: INTERNAL MEDICINE

## 2023-10-20 PROCEDURE — G0439 PPPS, SUBSEQ VISIT: HCPCS | Performed by: INTERNAL MEDICINE

## 2023-10-20 PROCEDURE — 99214 OFFICE O/P EST MOD 30 MIN: CPT | Mod: 25 | Performed by: INTERNAL MEDICINE

## 2023-10-20 RX ORDER — LEVOTHYROXINE SODIUM 75 UG/1
75 TABLET ORAL DAILY
Qty: 90 TABLET | Refills: 3 | Status: SHIPPED | OUTPATIENT
Start: 2023-10-20

## 2023-10-20 RX ORDER — VALACYCLOVIR HYDROCHLORIDE 1 G/1
2000 TABLET, FILM COATED ORAL 2 TIMES DAILY
Qty: 4 TABLET | Refills: 11 | Status: SHIPPED | OUTPATIENT
Start: 2023-10-20

## 2023-10-20 RX ORDER — AMLODIPINE BESYLATE 10 MG/1
10 TABLET ORAL DAILY
Qty: 30 TABLET | Refills: 1 | Status: SHIPPED | OUTPATIENT
Start: 2023-10-20 | End: 2023-11-20

## 2023-10-20 ASSESSMENT — ENCOUNTER SYMPTOMS
FEVER: 0
EYE PAIN: 0
COUGH: 0
HEADACHES: 0
SORE THROAT: 0
JOINT SWELLING: 0
HEMATURIA: 0
NAUSEA: 0
ABDOMINAL PAIN: 0
PALPITATIONS: 0
PARESTHESIAS: 0
MYALGIAS: 0
NERVOUS/ANXIOUS: 0
FREQUENCY: 0
DIARRHEA: 0
HEARTBURN: 0
ARTHRALGIAS: 1
WEAKNESS: 0
CONSTIPATION: 0
DIZZINESS: 0
BREAST MASS: 0
HEMATOCHEZIA: 0
CHILLS: 0
DYSURIA: 0
SHORTNESS OF BREATH: 0

## 2023-10-20 ASSESSMENT — ACTIVITIES OF DAILY LIVING (ADL): CURRENT_FUNCTION: NO ASSISTANCE NEEDED

## 2023-10-20 NOTE — PATIENT INSTRUCTIONS
Patient Education   Personalized Prevention Plan  You are due for the preventive services outlined below.  Your care team is available to assist you in scheduling these services.  If you have already completed any of these items, please share that information with your care team to update in your medical record.  Health Maintenance Due   Topic Date Due     LUNG CANCER SCREENING  Never done     RSV VACCINE 60+ (1 - 1-dose 60+ series) Never done     Annual Wellness Visit  08/23/2023     Flu Vaccine (1) 09/01/2023     COVID-19 Vaccine (5 - 2023-24 season) 09/01/2023     Preventing Falls: Care Instructions    Talk to your doctor about the medicines you take. Ask if any of them increase the risk of falls and whether they can be changed or stopped.   Try to exercise regularly. It can help improve your strength and balance. This can help lower your risk of falling.     Practice fall safety and prevention.    Wear low-heeled shoes that fit well and give your feet good support. Talk to your doctor if you have foot problems that make this hard.  Carry a cellphone or wear a medical alert device that you can use to call for help.  Use stepladders instead of chairs to reach high objects. Don't climb if you're at risk for falls. Ask for help, if needed.  Wear the correct eyeglasses, if you need them.    Make your home safer.    Remove rugs, cords, clutter, and furniture from walkways.  Keep your house well lit. Use night-lights in hallways and bathrooms.  Install and use sturdy handrails on stairways.  Wear nonskid footwear, even inside. Don't walk barefoot or in socks without shoes.    Be safe outside.    Use handrails, curb cuts, and ramps whenever possible.  Keep your hands free by using a shoulder bag or backpack.  Try to walk in well-lit areas. Watch out for uneven ground, changes in pavement, and debris.  Be careful in the winter. Walk on the grass or gravel when sidewalks are slippery. Use de-icer on steps and walkways.  "Add non-slip devices to shoes.    Put grab bars and nonskid mats in your shower or tub and near the toilet. Try to use a shower chair or bath bench when bathing.   Get into a tub or shower by putting in your weaker leg first. Get out with your strong side first. Have a phone or medical alert device in the bathroom with you.   Where can you learn more?  Go to https://www.[employer].net/patiented  Enter G117 in the search box to learn more about \"Preventing Falls: Care Instructions.\"  Current as of: November 9, 2022               Content Version: 13.7    0206-9553 [employer].   Care instructions adapted under license by your healthcare professional. If you have questions about a medical condition or this instruction, always ask your healthcare professional. [employer] disclaims any warranty or liability for your use of this information.      How to Get Up Safely After a Fall: Care Instructions  Overview     If you have injuries, health problems, or other reasons that may make it easy for you to fall at home, it is a good idea to learn how to get up safely after a fall. Learning how to get up correctly can help you avoid making an injury worse.  Also, knowing what to do if you cannot get up can help you stay safe until help arrives.  Follow-up care is a key part of your treatment and safety. Be sure to make and go to all appointments, and call your doctor if you are having problems. It's also a good idea to know your test results and keep a list of the medicines you take.  How can you care for yourself after a fall?  If you think you can get up  First lie still for a few minutes and think about how you feel. If your body feels okay and you think you can get up safely, follow the rest of the steps below:  Look for a chair or other piece of furniture that is close to you.  Roll onto your side and rest. Roll by turning your head in the direction you want to roll, move your shoulder and arm, " "then hip and leg in the same direction.  Lie still for a moment to let your blood pressure adjust.  Slowly push your upper body up, lift your head, and take a moment to rest.  Slowly get up on your hands and knees, and crawl to the chair or other stable piece of furniture.  Put your hands on the chair.  Move one foot forward, and place it flat on the floor. Your other leg should be bent with the knee on the floor.  Rise slowly, turn your body, and sit in the chair. Stay seated for a bit and think about how you feel. Call for help. Even if you feel okay, let someone know what happened to you. You might not know that you have a serious injury.  If you cannot get up  If you think you are injured after a fall or you cannot get up, try not to panic.  Call out for help.  If you have a phone within reach or you have an emergency call device, use it to call for help.  If you do not have a phone within reach, try to slide yourself toward it. If you cannot get to the phone, try to slide toward a door or window or a place where you think you can be heard.  [place] or use an object to make noise so someone might hear you.  If you can reach something that you can use for a pillow, place it under your head. Try to stay warm by covering yourself with a blanket or clothing while you wait for help.  When should you call for help?   Call 911 anytime you think you may need emergency care. For example, call if:    You passed out (lost consciousness).     You cannot get up after a fall.     You have severe pain.   Call your doctor now or seek immediate medical care if:    You have new or worse pain.     You are dizzy or lightheaded.     You hit your head.   Watch closely for changes in your health, and be sure to contact your doctor if:    You do not get better as expected.   Where can you learn more?  Go to https://www.healthwise.net/patiented  Enter G513 in the search box to learn more about \"How to Get Up Safely After a Fall: Care " "Instructions.\"  Current as of: November 14, 2022               Content Version: 13.7    9938-8177 [employer].   Care instructions adapted under license by your healthcare professional. If you have questions about a medical condition or this instruction, always ask your healthcare professional. [employer] disclaims any warranty or liability for your use of this information.         Lung Cancer Screening   Frequently Asked Questions  If you are at high-risk for lung cancer, getting screened with low-dose computed tomography (LDCT) every year can help save your life. This handout offers answers to some of the most common questions about lung cancer screening. If you have other questions, please call [phone] [place] ([phone]).     What is it?  Lung cancer screening uses special X-ray technology to create an image of your lung tissue. The exam is quick and easy and takes less than 10 seconds. We don t give you any medicine or use any needles. You can eat before and after the exam. You don t need to change your clothes as long as the clothing on your chest doesn t contain metal. But, you do need to be able to hold your breath for at least 6 seconds during the exam.    What is the goal of lung cancer screening?  The goal of lung cancer screening is to save lives. Many times, lung cancer is not found until a person starts having physical symptoms. Lung cancer screening can help detect lung cancer in the earliest stages when it may be easier to treat.    Who should be screened for lung cancer?  We suggest lung cancer screening for anyone who is at high-risk for lung cancer. You are in the high-risk group if you:      are between the ages of 55 and 79, and    have smoked at least 1 pack of cigarettes a day for 20 or more years, and    still smoke or have quit within the past 15 years.    However, if you have a new cough or shortness of breath, you should talk to your doctor before being " screened.    Why does it matter if I have symptoms?  Certain symptoms can be a sign that you have a condition in your lungs that should be checked and treated by your doctor. These symptoms include fever, chest pain, a new or changing cough, shortness of breath that you have never felt before, coughing up blood or unexplained weight loss. Having any of these symptoms can greatly affect the results of lung cancer screening.       Should all smokers get an LDCT lung cancer screening exam?  It depends. Lung cancer screening is for a very specific group of men and women who have a history of heavy smoking over a long period of time (see  Who should be screened for lung cancer  above).  I am in the high-risk group, but have been diagnosed with cancer in the past. Is LDCT lung cancer screening right for me?  In some cases, you should not have LDCT lung screening, such as when your doctor is already following your cancer with CT scan studies. Your doctor will help you decide if LDCT lung screening is right for you.  Do I need to have a screening exam every year?  Yes. If you are in the high-risk group described earlier, you should get an LDCT lung cancer screening exam every year until you are 79, or are no longer willing or able to undergo screening and possible procedures to diagnose and treat lung cancer.  How effective is LDCT at preventing death from lung cancer?  Studies have shown that LDCT lung cancer screening can lower the risk of death from lung cancer by 20 percent in people who are at high-risk.  What are the risks?  There are some risks and limitations of LDCT lung cancer screening. We want to make sure you understand the risks and benefits, so please let us know if you have any questions. Your doctor may want to talk with you more about these risks.    Radiation exposure: As with any exam that uses radiation, there is a very small increased risk of cancer. The amount of radiation in LDCT is small--about the  same amount a person would get from a mammogram. Your doctor orders the exam when he or she feels the potential benefits outweigh the risks.    False negatives: No test is perfect, including LDCT. It is possible that you may have a medical condition, including lung cancer, that is not found during your exam. This is called a false negative result.    False positives and more testing: LDCT very often finds something in the lung that could be cancer, but in fact is not. This is called a false positive result. False positive tests often cause anxiety. To make sure these findings are not cancer, you may need to have more tests. These tests will be done only if you give us permission. Sometimes patients need a treatment that can have side effects, such as a biopsy. For more information on false positives, see  What can I expect from the results?     Findings not related to lung cancer: Your LDCT exam also takes pictures of areas of your body next to your lungs. In a very small number of cases, the CT scan will show an abnormal finding in one of these areas, such as your kidneys, adrenal glands, liver or thyroid. This finding may not be serious, but you may need more tests. Your doctor can help you decide what other tests you may need, if any.  What can I expect from the results?  About 1 out of 4 LDCT exams will find something that may need more tests. Most of the time, these findings are lung nodules. Lung nodules are very small collections of tissue in the lung. These nodules are very common, and the vast majority--more than 97 percent--are not cancer (benign). Most are normal lymph nodes or small areas of scarring from past infections.  But, if a small lung nodule is found to be cancer, the cancer can be cured more than 90 percent of the time. To know if the nodule is cancer, we may need to get more images before your next yearly screening exam. If the nodule has suspicious features (for example, it is large, has an  odd shape or grows over time), we will refer you to a specialist for further testing.  Will my doctor also get the results?  Yes. Your doctor will get a copy of your results.  Is it okay to keep smoking now that there s a cancer screening exam?  No. Tobacco is one of the strongest cancer-causing agents. It causes not only lung cancer, but other cancers and cardiovascular (heart) diseases as well. The damage caused by smoking builds over time. This means that the longer you smoke, the higher your risk of disease. While it is never too late to quit, the sooner you quit, the better.  Where can I find help to quit smoking?  The best way to prevent lung cancer is to stop smoking. If you have already quit smoking, congratulations and keep it up! For help on quitting smoking, please call [facility] at 4-035-QUITNOW (1-115.755.6226) or the [employer] at 1-247.707.2349 to find local resources near you.  One-on-one health coaching:  If you d prefer to work individually with a health care provider on tobacco cessation, we offer:      Medication Therapy Management:  Our specially trained pharmacists work closely with you and your doctor to help you quit smoking.  Call [phone] or [phone] (toll free).

## 2023-10-20 NOTE — PROGRESS NOTES
"SUBJECTIVE:   Hansa is a 68 year old who presents for Preventive Visit.      Are you in the first 12 months of your Medicare coverage?  No    Healthy Habits:     In general, how would you rate your overall health?  Excellent    Frequency of exercise:  6-7 days/week    Duration of exercise:  45-60 minutes    Do you usually eat at least 4 servings of fruit and vegetables a day, include whole grains    & fiber and avoid regularly eating high fat or \"junk\" foods?  No    Taking medications regularly:  Yes    Medication side effects:  Not applicable    Ability to successfully perform activities of daily living:  No assistance needed    Home Safety:  No safety concerns identified    Hearing Impairment:  No hearing concerns    In the past 6 months, have you been bothered by leaking of urine?  No    In general, how would you rate your overall mental or emotional health?  Excellent    Additional concerns today:  No    HTN- notes BP has been a bit elevated.  S/p ileostomy- drinks a lot of water daily to keep up on fluids.   Hx of colon polyps 2017 on colectomy specimen. She has refused f/u endoscopies on this.     Today's PHQ-2 Score:       10/20/2023     8:40 AM   PHQ-2 ( 1999 [employer])   Q1: Little interest or pleasure in doing things 0   Q2: Feeling down, depressed or hopeless 0   PHQ-2 Score 0   Q1: Little interest or pleasure in doing things Not at all   Q2: Feeling down, depressed or hopeless Not at all   PHQ-2 Score 0           Have you ever done Advance Care Planning? (For example, a Health Directive, POLST, or a discussion with a medical provider or your loved ones about your wishes): No, advance care planning information given to patient to review.  Patient plans to discuss their wishes with loved ones or provider.         Fall risk  Fallen 2 or more times in the past year?: No  Any fall with injury in the past year?: Yes    Cognitive Screening   1) Repeat 3 items (Leader, Season, Table)    2) Clock draw: NORMAL  3) 3 " item recall: Recalls 3 objects  Results: 3 items recalled: COGNITIVE IMPAIRMENT LESS LIKELY    Mini-CogTM Copyright S Paco. Licensed by the author for use in [facility]; reprinted with permission (benita@[facility]). All rights reserved.      Do you have sleep apnea, excessive snoring or daytime drowsiness? : no    Reviewed and updated as needed this visit by clinical staff   Tobacco  Allergies  Meds              Reviewed and updated as needed this visit by Provider   Tobacco               Social History     Tobacco Use    Smoking status: Every Day     Packs/day: 0.50     Years: 41.00     Additional pack years: 0.00     Total pack years: 20.50     Types: Cigarettes     Passive exposure: Never    Smokeless tobacco: Never    Tobacco comments:     1/2 PPD   Substance Use Topics    Alcohol use: Yes     Alcohol/week: 2.0 standard drinks of alcohol     Types: 2 Standard drinks or equivalent per week     Comment: weekends           10/20/2023     8:40 AM   Alcohol Use   Prescreen: >3 drinks/day or >7 drinks/week? No     Do you have a current opioid prescription? No  Do you use any other controlled substances or medications that are not prescribed by a provider? None              Current providers sharing in care for this patient include:   Patient Care Team:  Osvaldo Borrero MD as PCP - General (Internal Medicine)  Erik Lopez MD as Referring Physician (Internal Medicine)  Ceci Whaley PA-C as Physician Assistant (Dermatology)  Serena Peoples MD as Assigned Cancer Care Provider  Ceci Whaley PA-C as Assigned Surgical Provider  Osvaldo Borrero MD as Assigned PCP  Whitley Davies PA-C as Assigned Neuroscience Provider    The following health maintenance items are reviewed in Epic and correct as of today:  Health Maintenance   Topic Date Due    LUNG CANCER SCREENING  Never done    RSV VACCINE 60+ (1 - 1-dose 60+ series) Never done    MEDICARE ANNUAL WELLNESS VISIT  08/23/2023     "INFLUENZA VACCINE (1) 09/01/2023    COVID-19 Vaccine (5 - 2023-24 season) 09/01/2023    ANNUAL REVIEW OF HM ORDERS  08/28/2024    BMP  10/11/2024    TSH W/FREE T4 REFLEX  10/11/2024    NICOTINE/TOBACCO CESSATION COUNSELING Q 1 YR  10/20/2024    FALL RISK ASSESSMENT  10/20/2024    DTAP/TDAP/TD IMMUNIZATION (2 - Td or Tdap) 09/28/2025    DEXA  10/04/2025    LIPID  08/23/2027    ADVANCE CARE PLANNING  10/20/2028    HEPATITIS C SCREENING  Completed    PHQ-2 (once per calendar year)  Completed    Pneumococcal Vaccine: 65+ Years  Completed    ZOSTER IMMUNIZATION  Completed    IPV IMMUNIZATION  Aged Out    HPV IMMUNIZATION  Aged Out    MENINGITIS IMMUNIZATION  Aged Out    COLORECTAL CANCER SCREENING  Discontinued               Review of Systems   Constitutional:  Negative for chills and fever.   HENT:  Negative for congestion, ear pain, hearing loss and sore throat.    Eyes:  Negative for pain and visual disturbance.   Respiratory:  Negative for cough and shortness of breath.    Cardiovascular:  Negative for chest pain, palpitations and peripheral edema.   Gastrointestinal:  Negative for abdominal pain, constipation, diarrhea, heartburn, hematochezia and nausea.   Breasts:  Negative for tenderness, breast mass and discharge.   Genitourinary:  Negative for dysuria, frequency, genital sores, hematuria, pelvic pain, urgency, vaginal bleeding and vaginal discharge.   Musculoskeletal:  Positive for arthralgias. Negative for joint swelling and myalgias.   Skin:  Negative for rash.   Neurological:  Negative for dizziness, weakness, headaches and paresthesias.   Psychiatric/Behavioral:  Negative for mood changes. The patient is not nervous/anxious.          OBJECTIVE:   BP (!) 146/92   Pulse 87   Temp 98.6  F (37  C) (Temporal)   Resp 14   Ht 1.651 m (5' 5\")   Wt 53 kg (116 lb 14.4 oz)   LMP  (LMP Unknown)   SpO2 99%   BMI 19.45 kg/m   Estimated body mass index is 19.45 kg/m  as calculated from the following:    Height as " "of this encounter: 1.651 m (5' 5\").    Weight as of this encounter: 53 kg (116 lb 14.4 oz).  Physical Exam  GENERAL APPEARANCE: alert and no distress  EYES: Eyes grossly normal to inspection, PERRL, and conjunctivae and sclerae normal  HENT: ear canals and TM's normal, nose and mouth without ulcers or lesions, oropharynx clear, and oral mucous membranes moist  NECK: no adenopathy, no asymmetry, masses, or scars and thyroid normal to palpation  RESP: lungs clear to auscultation - no rales, rhonchi or wheezes  CV: regular rate and rhythm, normal S1 S2, no S3 or S4, no murmur, click or rub, no peripheral edema and peripheral pulses strong  ABDOMEN: soft, nontender, bowel sounds normal, and ileostomy present.   MS: no musculoskeletal defects are noted and gait is age appropriate without ataxia  SKIN: no suspicious lesions or rashes  NEURO: Normal strength and tone, sensory exam grossly normal, mentation intact and speech normal  PSYCH: mentation appears normal and affect normal/bright    Diagnostic Test Results:  No results found for any visits on 10/20/23.     ASSESSMENT / PLAN:       ICD-10-CM    1. Encounter for Medicare annual wellness exam  Z00.00       2. Essential hypertension  I10 amLODIPine (NORVASC) 10 MG tablet     **Basic metabolic panel FUTURE 14d      3. Hypothyroidism due to acquired atrophy of thyroid  E03.4 levothyroxine (SYNTHROID/LEVOTHROID) 75 MCG tablet      4. Ileostomy status (H)  Z93.2       5. Hyponatremia  E87.1       6. Cold sore  B00.1 valACYclovir (VALTREX) 1000 mg tablet      7. Personal history of tobacco use  Z87.891 Prof fee: Shared Decision Making for Lung Cancer Screening     CT Chest Lung Cancer Scrn Low Dose wo      8. Malignant neoplasm of upper-outer quadrant of right breast in female, estrogen receptor positive (H)  C50.411     Z17.0       9. S/P bilateral mastectomy  Z90.13         -Updated screening, immunizations, prevention.  Please see health maintenance list, care " gaps  -chronic hyponatremia last 3-4 yrs. Given she is on triam/hydrochlorothiazide as well as pushes free water intake due to her ostomy- use of the diuretic may not be the ideal anti-HTN for her - at least at this dose.   Rec'd we d/c this , switch to amlodipine, consider ARB or ACEi if needed as an add-on.  Recheck BMP off the diuretics in a few weeks to see if this resolves. If not -> independent w/u needed.   -no further specific f/u on breast ca needed  -DEXA q2-3 yrs given osteopenia and intolerance to bisphosphonate rx  -cont thyroid replacement as is      Patient has been advised of split billing requirements and indicates understanding: Yes      COUNSELING:  Reviewed preventive health counseling, as reflected in patient instructions       Regular exercise       Healthy diet/nutrition       Osteoporosis prevention/bone health        She reports that she has been smoking cigarettes. She has a 20.50 pack-year smoking history. She has never been exposed to tobacco smoke. She has never used smokeless tobacco.  Nicotine/Tobacco Cessation Plan:   Information offered: Patient not interested at this time      Appropriate preventive services were discussed with this patient, including applicable screening as appropriate for fall prevention, nutrition, physical activity, Tobacco-use cessation, weight loss and cognition.  Checklist reviewing preventive services available has been given to the patient.    Reviewed patients plan of care and provided an AVS. The Basic Care Plan (routine screening as documented in Health Maintenance) for Suma meets the Care Plan requirement. This Care Plan has been established and reviewed with the Patient.          Osvaldo Borrero MD  [facility]    Identified Health Risks:  I have reviewed Opioid Use Disorder and Substance Use Disorder risk factors and made any needed referrals. She is at risk for falling and has been provided with information to  reduce the risk of falling at home.  Lung Cancer Screening Shared Decision Making Visit     Suma Calabrese, a 68 year old female, is eligible for lung cancer screening    History   Smoking Status    Every Day    Packs/day: 0.50    Years: 41.00    Types: Cigarettes   Smokeless Tobacco    Never       I have discussed with patient the risks and benefits of screening for lung cancer with low-dose CT.     The risks include:    radiation exposure: one low dose chest CT has as much ionizing radiation as about 15 chest x-rays, or 6 months of background radiation living in [place]      false positives: most findings/nodules are NOT cancer, but some might still require additional diagnostic evaluation, including biopsy    over-diagnosis: some slow growing cancers that might never have been clinically significant will be detected and treated unnecessarily     The benefit of early detection of lung cancer is contingent upon adherence to annual screening or more frequent follow up if indicated.     Furthermore, to benefit from screening, Suma must be willing and able to undergo diagnostic procedures, if indicated. Although no specific guide is available for determining severity of comorbidities, it is reasonable to withhold screening in patients who have greater mortality risk from other diseases.     We did discuss that the best way to prevent lung cancer is to not smoke.    Some patients may value a numeric estimation of lung cancer risk when evaluating if lung cancer screening is right for them, here is one calculator:    ShouldIScreen

## 2023-10-24 ENCOUNTER — THERAPY VISIT (OUTPATIENT)
Dept: PHYSICAL THERAPY | Facility: CLINIC | Age: 68
End: 2023-10-24
Payer: MEDICARE

## 2023-10-24 DIAGNOSIS — S49.91XA INJURY OF RIGHT SHOULDER: Primary | ICD-10-CM

## 2023-10-24 PROCEDURE — 97110 THERAPEUTIC EXERCISES: CPT | Mod: GP

## 2023-11-01 ENCOUNTER — THERAPY VISIT (OUTPATIENT)
Dept: PHYSICAL THERAPY | Facility: CLINIC | Age: 68
End: 2023-11-01
Payer: MEDICARE

## 2023-11-01 DIAGNOSIS — S49.91XA INJURY OF RIGHT SHOULDER: Primary | ICD-10-CM

## 2023-11-01 PROCEDURE — 97110 THERAPEUTIC EXERCISES: CPT | Mod: GP

## 2023-11-15 ENCOUNTER — OFFICE VISIT (OUTPATIENT)
Dept: URGENT CARE | Facility: URGENT CARE | Age: 68
End: 2023-11-15
Payer: MEDICARE

## 2023-11-15 ENCOUNTER — LAB (OUTPATIENT)
Dept: LAB | Facility: CLINIC | Age: 68
End: 2023-11-15
Payer: MEDICARE

## 2023-11-15 ENCOUNTER — THERAPY VISIT (OUTPATIENT)
Dept: PHYSICAL THERAPY | Facility: CLINIC | Age: 68
End: 2023-11-15
Payer: MEDICARE

## 2023-11-15 VITALS
HEART RATE: 75 BPM | HEIGHT: 65 IN | DIASTOLIC BLOOD PRESSURE: 83 MMHG | OXYGEN SATURATION: 100 % | TEMPERATURE: 98.4 F | SYSTOLIC BLOOD PRESSURE: 125 MMHG | BODY MASS INDEX: 19.16 KG/M2 | WEIGHT: 115 LBS

## 2023-11-15 DIAGNOSIS — Z23 NEED FOR VACCINE FOR TD (TETANUS-DIPHTHERIA): ICD-10-CM

## 2023-11-15 DIAGNOSIS — S61.032A PUNCTURE WOUND OF LEFT THUMB WITHOUT FOREIGN BODY WITHOUT DAMAGE TO NAIL, INITIAL ENCOUNTER: Primary | ICD-10-CM

## 2023-11-15 DIAGNOSIS — S49.91XA INJURY OF RIGHT SHOULDER: Primary | ICD-10-CM

## 2023-11-15 DIAGNOSIS — I10 ESSENTIAL HYPERTENSION: ICD-10-CM

## 2023-11-15 LAB
ANION GAP SERPL CALCULATED.3IONS-SCNC: 11 MMOL/L (ref 7–15)
BUN SERPL-MCNC: 10.5 MG/DL (ref 8–23)
CALCIUM SERPL-MCNC: 9.8 MG/DL (ref 8.8–10.2)
CHLORIDE SERPL-SCNC: 102 MMOL/L (ref 98–107)
CREAT SERPL-MCNC: 0.73 MG/DL (ref 0.51–0.95)
DEPRECATED HCO3 PLAS-SCNC: 24 MMOL/L (ref 22–29)
EGFRCR SERPLBLD CKD-EPI 2021: 88 ML/MIN/1.73M2
GLUCOSE SERPL-MCNC: 85 MG/DL (ref 70–99)
POTASSIUM SERPL-SCNC: 4.7 MMOL/L (ref 3.4–5.3)
SODIUM SERPL-SCNC: 137 MMOL/L (ref 135–145)

## 2023-11-15 PROCEDURE — 90714 TD VACC NO PRESV 7 YRS+ IM: CPT | Performed by: NURSE PRACTITIONER

## 2023-11-15 PROCEDURE — 90471 IMMUNIZATION ADMIN: CPT | Performed by: NURSE PRACTITIONER

## 2023-11-15 PROCEDURE — 99213 OFFICE O/P EST LOW 20 MIN: CPT | Mod: 25 | Performed by: NURSE PRACTITIONER

## 2023-11-15 PROCEDURE — 97110 THERAPEUTIC EXERCISES: CPT | Mod: GP

## 2023-11-15 PROCEDURE — 36415 COLL VENOUS BLD VENIPUNCTURE: CPT

## 2023-11-15 PROCEDURE — 80048 BASIC METABOLIC PNL TOTAL CA: CPT

## 2023-11-15 NOTE — PROGRESS NOTES
"Chief Complaint   Patient presents with    Urgent Care    Laceration     Cut left thumb on cari nail, cut not deep, wants to know if tetanus shot is needed           ICD-10-CM    1. Puncture wound of left thumb without foreign body without damage to nail, initial encounter  S61.032A       2. Need for vaccine for Td (tetanus-diphtheria)  Z23       Tetanus was updated.  Patient was given wound care instructions and warning signs of when to return for recheck.      Subjective     Suma Calabrese is an 68 year old female who presents to clinic today for left thumb pain since poking it with a cari nail in her garage accidetnally.  She squeezed it to get it bleed and then washed thoroughly at home.    Last tetanus was in 2015 and needs to be updated.      Objective    /83   Pulse 75   Temp 98.4  F (36.9  C) (Temporal)   Ht 1.651 m (5' 5\")   Wt 52.2 kg (115 lb)   LMP  (LMP Unknown)   SpO2 100%   BMI 19.14 kg/m    Nurses notes and VS have been reviewed.    Physical Exam     Alert and oriented, left thumb puncture of the fingertip pad, no bleeding    Bacitracin and bandage were applied.      JT Godwin, CNP  [facility] Provider    The use of Dragon/[employer] dictation services may have been used to construct the content in this note; any grammatical or spelling errors are non-intentional. Please contact the author of this note directly if you are in need of any clarification.   "

## 2023-11-20 ENCOUNTER — OFFICE VISIT (OUTPATIENT)
Dept: INTERNAL MEDICINE | Facility: CLINIC | Age: 68
End: 2023-11-20
Attending: INTERNAL MEDICINE
Payer: MEDICARE

## 2023-11-20 VITALS
DIASTOLIC BLOOD PRESSURE: 76 MMHG | BODY MASS INDEX: 18.96 KG/M2 | HEIGHT: 65 IN | OXYGEN SATURATION: 100 % | WEIGHT: 113.8 LBS | SYSTOLIC BLOOD PRESSURE: 130 MMHG | HEART RATE: 91 BPM | TEMPERATURE: 98.6 F | RESPIRATION RATE: 16 BRPM

## 2023-11-20 DIAGNOSIS — Z17.0 MALIGNANT NEOPLASM OF UPPER-OUTER QUADRANT OF RIGHT BREAST IN FEMALE, ESTROGEN RECEPTOR POSITIVE (H): ICD-10-CM

## 2023-11-20 DIAGNOSIS — C50.411 MALIGNANT NEOPLASM OF UPPER-OUTER QUADRANT OF RIGHT BREAST IN FEMALE, ESTROGEN RECEPTOR POSITIVE (H): ICD-10-CM

## 2023-11-20 DIAGNOSIS — I10 ESSENTIAL HYPERTENSION: Primary | ICD-10-CM

## 2023-11-20 PROCEDURE — 99213 OFFICE O/P EST LOW 20 MIN: CPT | Performed by: INTERNAL MEDICINE

## 2023-11-20 RX ORDER — AMLODIPINE BESYLATE 10 MG/1
10 TABLET ORAL DAILY
Qty: 90 TABLET | Refills: 3 | Status: SHIPPED | OUTPATIENT
Start: 2023-11-20

## 2023-11-20 NOTE — PROGRESS NOTES
"  Assessment & Plan     Essential hypertension  Hyponatremia  Better- hyponatremia resolved off diuretics. BP adequate. Cont meds  - amLODIPine (NORVASC) 10 MG tablet; Take 1 tablet (10 mg) by mouth daily    Malignant neoplasm of upper-outer quadrant of right breast in female, estrogen receptor positive (H)  Surveillance Per oncology     Prescription drug management             Osvaldo Borrero MD  [facility] is a 68 year old, presenting for the following health issues:  Hypertension      History of Present Illness       Hypertension: She presents for follow up of hypertension.  She does not check blood pressure  regularly outside of the clinic. Outpatient blood pressures have not been over 140/90. She follows a low salt diet.     She eats 2-3 servings of fruits and vegetables daily.She consumes 2 sweetened beverage(s) daily.She exercises with enough effort to increase her heart rate 60 or more minutes per day.    She is taking medications regularly.                 Review of Systems         Objective    /81   Pulse 91   Temp 98.6  F (37  C) (Temporal)   Resp 16   Ht 1.651 m (5' 5\")   Wt 51.6 kg (113 lb 12.8 oz)   LMP  (LMP Unknown)   SpO2 100%   BMI 18.94 kg/m    Body mass index is 18.94 kg/m .  Physical Exam   GENERAL: healthy, alert and no distress  HENT: ear canals and TM's normal, nose and mouth without ulcers or lesions  RESP: lungs clear to auscultation - no rales, rhonchi or wheezes  CV: regular rate and rhythm, normal S1 S2, no S3 or S4, no murmur, click or rub, no peripheral edema and peripheral pulses strong                      "

## 2023-12-19 ENCOUNTER — PATIENT OUTREACH (OUTPATIENT)
Dept: GASTROENTEROLOGY | Facility: CLINIC | Age: 68
End: 2023-12-19
Payer: MEDICARE

## 2023-12-27 ENCOUNTER — THERAPY VISIT (OUTPATIENT)
Dept: PHYSICAL THERAPY | Facility: CLINIC | Age: 68
End: 2023-12-27
Payer: MEDICARE

## 2023-12-27 DIAGNOSIS — S49.91XA INJURY OF RIGHT SHOULDER: Primary | ICD-10-CM

## 2023-12-27 PROCEDURE — 97110 THERAPEUTIC EXERCISES: CPT | Mod: GP | Performed by: PHYSICAL THERAPIST

## 2023-12-27 PROCEDURE — 97140 MANUAL THERAPY 1/> REGIONS: CPT | Mod: GP | Performed by: PHYSICAL THERAPIST

## 2023-12-27 NOTE — PROGRESS NOTES
12/27/23 0500   Appointment Info   Signing clinician's name / credentials Maribel Lees, PT, DPT   Total/Authorized Visits E&T 8 POC (+4)   Visits Used 7   Medical Diagnosis Injury of right shoulder   PT Tx Diagnosis R shoulder pain   Other pertinent information concern for RC tear   Quick Adds Certification   Progress Note/Certification   Start of Care Date 10/02/23   Onset of illness/injury or Date of Surgery 09/26/23  (MD order)   Therapy Frequency 1x/week   Predicted Duration 4 weeks tapering to 2x/month for 8 weeks (+ 6 weeks)   Certification date from 12/26/23   Certification date to 02/06/23   Progress Note Due Date 12/01/23   Progress Note Completed Date 10/02/23   PT Goal 1   Goal Identifier Reaching   Goal Description pt will be able to reach overhead, ROM WNL, pain no greater than 2/10   Rationale to maximize safety and independence with performance of ADLs and functional tasks;to maximize safety and independence within the home;to maximize safety and independence with self cares   Goal Progress AROM progress has slowed   Target Date 12/25/23   Subjective Report   Subjective Report Pt notes that she has not been great with exercises. She has made progress overall but it has been slower than when starting.   Objective Measures   Objective Measures Objective Measure 1   Objective Measure 1   Details right shoulder AROM: , ABD 90 without compensation   Treatment Interventions (PT)   Interventions Therapeutic Procedure/Exercise   Therapeutic Procedure/Exercise   Therapeutic Procedures: strength, endurance, ROM, flexibillity minutes (91240) 20   Ther Proc 1 PROM   Ther Proc 1 - Details Abduction and flexion   PTRx Ther Proc 1 Cervical Retraction   PTRx Ther Proc 1 - Details VR - continue HEP   PTRx Ther Proc 2 Shoulder Theraband Rows   PTRx Ther Proc 2 - Details Reviewed x 15 w/ blue TB    PTRx Ther Proc 3 Bilateral Rotation With Theraband   PTRx Ther Proc 3 - Details x 15 red TB full ROM no pain  tolerated progression well   PTRx Ther Proc 4 Shoulder Flexion   PTRx Ther Proc 4 - Details Reviewed x 15 reps    PTRx Ther Proc 5 Shoulder Scaption Full Can   PTRx Ther Proc 5 - Details Reviewed x 15 reps    PTRx Ther Proc 6 Shoulder Theraband Low Row/Pulldown   PTRx Ther Proc 6 - Details Reviewed x 15 w/ blue TB    PTRx Ther Proc 7 Wall Climb   PTRx Ther Proc 7 - Details 5x w/ lift off at top of motion for end range strengthening   PTRx Ther Proc 8 Wall Slides Abduction   PTRx Ther Proc 8 - Details Introduced; On and off pressure pushing into end range   PTRx Ther Proc 9 Wall Climb   PTRx Ther Proc 9 - Details 5x w/ lift off at top of motion for end range strengthening   Skilled Intervention progressions/adjustment of HEP based on presentation   Manual Therapy   Manual Therapy: Mobilization, MFR, MLD, friction massage minutes (77427) 15   Manual Therapy 1 MFR   Manual Therapy 1 - Details infra, biceps tendon   Patient Response/Progress Increased ROM and comfort.   Manual Therapy 2 Joint mobilizations R shoulder   Manual Therapy 2 - Details posterior glide   Manual Therapy Manual Therapy 2   Plan   Home program printed   Plan for next session continue manual prn,progress strength, overhead ROM   Total Session Time   Timed Code Treatment Minutes 35   Total Treatment Time (sum of timed and untimed services) 35         PLAN  Continue therapy per current plan of care.    Beginning/End Dates of Progress Note Reporting Period:  10/02/23 to 12/27/2023    Referring Provider:  Osvaldo MAGALLON [facility]                                                                                   OUTPATIENT PHYSICAL THERAPY    PLAN OF TREATMENT FOR OUTPATIENT REHABILITATION   Patient's Last Name, First Name, Suma Florence YOB: 1955   Provider's Name   [facility]   Medical Record No.  [id number]     Onset Date: 09/26/23 (MD order)  Start of Care  Date: 10/02/23     Medical Diagnosis:  Injury of right shoulder      PT Treatment Diagnosis:  R shoulder pain Plan of Treatment  Frequency/Duration: 1x/week/ 4 weeks tapering to 2x/month for 8 weeks (+ 6 weeks)    Certification date from 12/26/23 to 02/06/23         See note for plan of treatment details and functional goals     Maribel Lees, PT                           I CERTIFY THE NEED FOR THESE SERVICES FURNISHED UNDER        THIS PLAN OF TREATMENT AND WHILE UNDER MY CARE     (Physician attestation of this document indicates review and certification of the therapy plan).              Referring Provider:  Osvaldo Mckeon    Initial Assessment  See Epic Evaluation- Start of Care Date: 10/02/23

## 2024-01-10 ENCOUNTER — TELEPHONE (OUTPATIENT)
Dept: INTERNAL MEDICINE | Facility: CLINIC | Age: 69
End: 2024-01-10

## 2024-01-10 ENCOUNTER — THERAPY VISIT (OUTPATIENT)
Dept: PHYSICAL THERAPY | Facility: CLINIC | Age: 69
End: 2024-01-10
Payer: MEDICARE

## 2024-01-10 DIAGNOSIS — S49.91XD INJURY OF RIGHT SHOULDER, SUBSEQUENT ENCOUNTER: Primary | ICD-10-CM

## 2024-01-10 DIAGNOSIS — S49.91XA INJURY OF RIGHT SHOULDER, INITIAL ENCOUNTER: Primary | ICD-10-CM

## 2024-01-10 PROCEDURE — 97110 THERAPEUTIC EXERCISES: CPT | Mod: GP | Performed by: PHYSICAL THERAPIST

## 2024-01-10 NOTE — TELEPHONE ENCOUNTER
----- Message from Maribel Lees PT sent at 1/10/2024  9:42 AM CST -----  Regarding: Hansa's Shoulder  Hello,    Hansa has been seeing physical therapy for her right shoulder for over 3 months now. She was referred through urgent care. While she has made some progress, it is slow and I am concerned for possible rotator cuff tear. I encouraged her to schedule an appointment with her PCP in order to discuss other options as well as possible additional imaging. She will continue with physical therapy at this time.     Thanks,  Maribel Lees, PT, DPT

## 2024-01-24 ENCOUNTER — THERAPY VISIT (OUTPATIENT)
Dept: PHYSICAL THERAPY | Facility: CLINIC | Age: 69
End: 2024-01-24
Payer: MEDICARE

## 2024-01-24 DIAGNOSIS — S49.91XA INJURY OF RIGHT SHOULDER, INITIAL ENCOUNTER: Primary | ICD-10-CM

## 2024-01-24 PROCEDURE — 97110 THERAPEUTIC EXERCISES: CPT | Mod: GP | Performed by: PHYSICAL THERAPIST

## 2024-01-25 ENCOUNTER — OFFICE VISIT (OUTPATIENT)
Dept: ORTHOPEDICS | Facility: CLINIC | Age: 69
End: 2024-01-25
Payer: MEDICARE

## 2024-01-25 VITALS — HEIGHT: 65 IN | WEIGHT: 113 LBS | BODY MASS INDEX: 18.83 KG/M2

## 2024-01-25 DIAGNOSIS — S49.91XD INJURY OF RIGHT SHOULDER, SUBSEQUENT ENCOUNTER: ICD-10-CM

## 2024-01-25 PROCEDURE — 99203 OFFICE O/P NEW LOW 30 MIN: CPT | Performed by: FAMILY MEDICINE

## 2024-01-25 NOTE — PROGRESS NOTES
CHIEF COMPLAINT:  Pain of the Right Shoulder       HISTORY OF PRESENT ILLNESS  Ms. Calabrese is a pleasant 68 year old year old female who presents to clinic today with right shoulder injury.  Suma explains that she had a few falls in the summer of 2023 and one of them was directly on the right shoulder. Ever since then, she has limited ROM and strength but has been doing PT since Oct 2023. There has been some improvement but not fully.     Onset: sudden  Location: right shoulder  Quality:  soreness, limited mobility, aching  Duration: 6 months   Severity: 6/10 at worst  Timing:constant  Modifying factors:  resting and non-use makes it better, movement and use makes it worse  Associated signs & symptoms: limited range of motion, decreased strength, no radiating symptoms or neck pain.   Previous similar pain: No  Treatments to date: physical therapy    Additional history: as documented    Review of Systems:  A 10-point review of systems was obtained and is negative except for as noted in the HPI.     MEDICAL HISTORY  Patient Active Problem List   Diagnosis    Essential hypertension    Tobacco use disorder    Hyperlipidemia LDL goal <100    Hypothyroidism due to acquired atrophy of thyroid    Malignant neoplasm of upper-outer quadrant of right breast in female, estrogen receptor positive (H)    S/P bilateral mastectomy    Hyponatremia    Hypokalemia    Tubular adenoma of colon    Colostomy in place (H)    Vitamin B12 deficiency (non anemic)    Hearing loss of right ear, unspecified hearing loss type    Osteopenia, unspecified location    Injury of right shoulder       Current Outpatient Medications   Medication Sig Dispense Refill    amLODIPine (NORVASC) 10 MG tablet Take 1 tablet (10 mg) by mouth daily 90 tablet 3    fluticasone (FLONASE) 50 MCG/ACT nasal spray Spray 1 spray into both nostrils daily      levothyroxine (SYNTHROID/LEVOTHROID) 75 MCG tablet Take 1 tablet (75 mcg) by mouth daily 90 tablet 3    multivitamin  (CENTRUM SILVER) tablet Take 1 tablet by mouth daily      valACYclovir (VALTREX) 1000 mg tablet Take 2 tablets (2,000 mg) by mouth 2 times daily 4 tablet 11    VITAMIN D, CHOLECALCIFEROL, PO Take 2,000 Units by mouth every morning         Allergies   Allergen Reactions    Bupropion Other (See Comments)     Jittery and HA    Erythromycin GI Disturbance    Oxycodone Nausea and Vomiting       Family History   Problem Relation Age of Onset    Unknown/Adopted Mother     Unknown/Adopted Father        Additional medical/Social/Surgical histories reviewed in [facility] and updated as appropriate.       PHYSICAL EXAM  LMP  (LMP Unknown)     General  - normal appearance, in no obvious distress  Musculoskeletal - right shoulder  - inspection: normal bone and joint alignment, no obvious deformity, no scapular winging, no AC step-off  - palpation: mildly tender RC insertion, normal clavicle, non-tender AC  - ROM:  painful and limited flexion and abduction at end range, shoulder hiking with abduction and limited to about 90, passively abduction full with gentle assistance  - strength: 4+ ER shoulder, 4- IR shoulder, 3+supraspinatus strength.   - special tests:  (-) Speed's  (-) Neer  (+) Hawkin's  (+) Janie's pain and weakness  (-) Hendry's  (-) apprehension  (-) subscap lift-off  Neuro  - no sensory or motor deficit, grossly normal coordination, normal muscle tone  Skin  - no ecchymosis, erythema, warmth, or induration, no obvious rash  Psych  - interactive, appropriate, normal mood and affect       IMAGING : XR shoulder right 3 views from August 2023. Final results and radiologist's interpretation, available in the [facility] health record. Images were reviewed with the patient/family members in the office today. My personal interpretation of the performed imaging is mild osteoarthritis changes of glenohumeral joint.  Moderate AC joint osteoarthritis.     ASSESSMENT & PLAN  Ms. Calabrese is a 68 year old year old female past medical history of  breast cancer status post bilateral mastectomy, small bowel obstruction status post resection and current ostomy who presents to clinic today with chronic right shoulder pain after two traumatic falls onto right shoulder in August 2023    Ongoing extensive PT with ongoing weakness of rotator cuff.  Concern for full thickness cuff tear at supraspinatus, subscapularis    Diagnosis: Chronic pain right shouler, suspected rotator cuff tear    - MRI right shoulder without contrast  -Treat options discussed including surgical and nonsurgical.  Care.  Due to history of surgeries, she notes that she would not be interested in pursuing surgery if there was indeed full-thickness tear.  We also discussed consideration for injections -corticosteroid or PRP, optimizing physical therapy as a part of our nonsurgical strategy.  -Follow up after MRI completed, visit facilitated in 2 weeks.    It was a pleasure seeing Suma today.    José Antonio Messina DO, CAQSM  Primary Care Sports Medicine

## 2024-01-25 NOTE — PATIENT INSTRUCTIONS
Thank you for choosing [facility]    DR ARZATE'S CLINIC LOCATIONS  [place] [address] [address] [facility], 4th [place], [address] [place] [address]   [phone]       APPOINTMENTS: [phone]    CARE QUESTIONS: [phone],    BILLING QUESTIONS: [phone]    FAX NUMBER: [phone]        Follow up: After the MRI to go over results. Feb 7th @ 8 am      1. Injury of right shoulder, subsequent encounter      An order for an MRI was placed today. You may call directly to schedule at [phone] at your convenience.

## 2024-01-25 NOTE — LETTER
1/25/2024         RE: Suma Calabrese  [address]  [place] [facility] [address]        Dear Colleague,    Thank you for referring your patient, Suma Calabrese, to the [facility] [place]. Please see a copy of my visit note below.    CHIEF COMPLAINT:  Pain of the Right Shoulder       HISTORY OF PRESENT ILLNESS  Ms. Calabrese is a pleasant 68 year old year old female who presents to clinic today with right shoulder injury.  Suma explains that she had a few falls in the summer of 2023 and one of them was directly on the right shoulder. Ever since then, she has limited ROM and strength but has been doing PT since Oct 2023. There has been some improvement but not fully.     Onset: sudden  Location: right shoulder  Quality:  soreness, limited mobility, aching  Duration: 6 months   Severity: 6/10 at worst  Timing:constant  Modifying factors:  resting and non-use makes it better, movement and use makes it worse  Associated signs & symptoms: limited range of motion, decreased strength, no radiating symptoms or neck pain.   Previous similar pain: No  Treatments to date: physical therapy    Additional history: as documented    Review of Systems:  A 10-point review of systems was obtained and is negative except for as noted in the HPI.     MEDICAL HISTORY  Patient Active Problem List   Diagnosis     Essential hypertension     Tobacco use disorder     Hyperlipidemia LDL goal <100     Hypothyroidism due to acquired atrophy of thyroid     Malignant neoplasm of upper-outer quadrant of right breast in female, estrogen receptor positive (H)     S/P bilateral mastectomy     Hyponatremia     Hypokalemia     Tubular adenoma of colon     Colostomy in place (H)     Vitamin B12 deficiency (non anemic)     Hearing loss of right ear, unspecified hearing loss type     Osteopenia, unspecified location     Injury of right shoulder       Current Outpatient Medications   Medication Sig Dispense Refill      amLODIPine (NORVASC) 10 MG tablet Take 1 tablet (10 mg) by mouth daily 90 tablet 3     fluticasone (FLONASE) 50 MCG/ACT nasal spray Spray 1 spray into both nostrils daily       levothyroxine (SYNTHROID/LEVOTHROID) 75 MCG tablet Take 1 tablet (75 mcg) by mouth daily 90 tablet 3     multivitamin (CENTRUM SILVER) tablet Take 1 tablet by mouth daily       valACYclovir (VALTREX) 1000 mg tablet Take 2 tablets (2,000 mg) by mouth 2 times daily 4 tablet 11     VITAMIN D, CHOLECALCIFEROL, PO Take 2,000 Units by mouth every morning         Allergies   Allergen Reactions     Bupropion Other (See Comments)     Jittery and HA     Erythromycin GI Disturbance     Oxycodone Nausea and Vomiting       Family History   Problem Relation Age of Onset     Unknown/Adopted Mother      Unknown/Adopted Father        Additional medical/Social/Surgical histories reviewed in [facility] and updated as appropriate.       PHYSICAL EXAM  LMP  (LMP Unknown)     General  - normal appearance, in no obvious distress  Musculoskeletal - right shoulder  - inspection: normal bone and joint alignment, no obvious deformity, no scapular winging, no AC step-off  - palpation: mildly tender RC insertion, normal clavicle, non-tender AC  - ROM:  painful and limited flexion and abduction at end range, shoulder hiking with abduction and limited to about 90, passively abduction full with gentle assistance  - strength: 4+ ER shoulder, 4- IR shoulder, 3+supraspinatus strength.   - special tests:  (-) Speed's  (-) Neer  (+) Hawkin's  (+) Janie's pain and weakness  (-) Chaves's  (-) apprehension  (-) subscap lift-off  Neuro  - no sensory or motor deficit, grossly normal coordination, normal muscle tone  Skin  - no ecchymosis, erythema, warmth, or induration, no obvious rash  Psych  - interactive, appropriate, normal mood and affect       IMAGING : XR shoulder right 3 views from August 2023. Final results and radiologist's interpretation, available in the [facility] health record.  Images were reviewed with the patient/family members in the office today. My personal interpretation of the performed imaging is mild osteoarthritis changes of glenohumeral joint.  Moderate AC joint osteoarthritis.     ASSESSMENT & PLAN  Ms. Calabrese is a 68 year old year old female past medical history of breast cancer status post bilateral mastectomy, small bowel obstruction status post resection and current ostomy who presents to clinic today with chronic right shoulder pain after two traumatic falls onto right shoulder in August 2023    Ongoing extensive PT with ongoing weakness of rotator cuff.  Concern for full thickness cuff tear at supraspinatus, subscapularis    Diagnosis: Chronic pain right shouler, suspected rotator cuff tear    - MRI right shoulder without contrast  -Treat options discussed including surgical and nonsurgical.  Care.  Due to history of surgeries, she notes that she would not be interested in pursuing surgery if there was indeed full-thickness tear.  We also discussed consideration for injections -corticosteroid or PRP, optimizing physical therapy as a part of our nonsurgical strategy.  -Follow up after MRI completed, visit facilitated in 2 weeks.    It was a pleasure seeing Suma today.    José Antonio Messina DO, [facility]  Primary Care Sports Medicine      Again, thank you for allowing me to participate in the care of your patient.        Sincerely,        José Antonio Messina DO

## 2024-02-05 ENCOUNTER — ANCILLARY PROCEDURE (OUTPATIENT)
Dept: MRI IMAGING | Facility: CLINIC | Age: 69
End: 2024-02-05
Attending: FAMILY MEDICINE
Payer: MEDICARE

## 2024-02-05 DIAGNOSIS — S49.91XD INJURY OF RIGHT SHOULDER, SUBSEQUENT ENCOUNTER: ICD-10-CM

## 2024-02-05 PROCEDURE — 73221 MRI JOINT UPR EXTREM W/O DYE: CPT | Mod: RT,MF

## 2024-02-07 ENCOUNTER — OFFICE VISIT (OUTPATIENT)
Dept: ORTHOPEDICS | Facility: CLINIC | Age: 69
End: 2024-02-07
Payer: MEDICARE

## 2024-02-07 VITALS — BODY MASS INDEX: 18.8 KG/M2 | WEIGHT: 113 LBS

## 2024-02-07 DIAGNOSIS — S46.011D TRAUMATIC COMPLETE TEAR OF RIGHT ROTATOR CUFF, SUBSEQUENT ENCOUNTER: Primary | ICD-10-CM

## 2024-02-07 PROCEDURE — 99213 OFFICE O/P EST LOW 20 MIN: CPT | Performed by: FAMILY MEDICINE

## 2024-02-07 NOTE — LETTER
2/7/2024         RE: Suma Calabrese  [address]  [place] [address]        Dear Colleague,    Thank you for referring your patient, Suma Calabrese, to the [facility] [place]. Please see a copy of my visit note below.    ESTABLISHED PATIENT FOLLOW-UP:  Follow Up of the Right Shoulder       HISTORY OF PRESENT ILLNESS  Ms. Calabrese is a pleasant 68 year old year old female who presents to clinic today for follow-up of right shoulder pain.    Date of injury: Summer 2023 with falls.  Date last seen: 1/25/24  Following Therapeutic Plan: Yes, MRI done on 2/05/24  Pain: Still the same since last visit, achy and sore pain   Function: Decreased range of motion, difficulty moving the arm   Interval History: right shoulder MRI on  2/05/24.     Overall minimal pain.  Not utilizing OTC analgesics.     Treatment to date; PT    Additional medical/Social/Surgical histories reviewed in [facility] and updated as appropriate.    REVIEW OF SYSTEMS (2/7/2024)  CONSTITUTIONAL: Denies fever and weight loss  GASTROINTESTINAL: Denies abdominal pain, nausea, vomiting  MUSCULOSKELETAL: See HPI  SKIN: Denies any recent rash or lesion  NEUROLOGICAL: Denies numbness or focal weakness     PHYSICAL EXAM  Wt 51.3 kg (113 lb)   LMP  (LMP Unknown)   BMI 18.80 kg/m      General  - normal appearance, in no obvious distress  Musculoskeletal - right shoulder  - inspection: normal bone and joint alignment, no obvious deformity, no scapular winging, no AC step-off  - palpation: mildly tender RC insertion, normal clavicle, non-tender AC  - ROM:  painful and limited flexion and abduction at end range, shoulder hiking with abduction and limited to about 90, passively abduction full with gentle assistance  - strength: 4+ ER shoulder, 4- IR shoulder, 3+supraspinatus strength.   - special tests:  (-) Speed's  (-) Neer  (+) Hawkin's  (+) Janie's pain and weakness  (-) Buxton's  (-) apprehension  (-) subscap lift-off  Neuro  -  no sensory or motor deficit, grossly normal coordination, normal muscle tone  Skin  - no ecchymosis, erythema, warmth, or induration, no obvious rash  Psych  - interactive, appropriate, normal mood and affect     IMAGING :  MR SHOULDER RIGHT WO CON 2/5/24    IMPRESSION:  1.  Full-thickness tearing of the entire supraspinatus tendon with retraction nearly to the level of the glenohumeral joint. Mild supraspinatus muscle fatty atrophy.  2.  Full-thickness tearing involving the majority of the subscapularis tendon with retraction nearly to the glenohumeral joint. Moderate subscapularis muscle fatty atrophy.  3.  Medial dislocation of the proximal extra-articular long head of the biceps tendon secondary to the subscapularis tearing. Moderate underlying tendinopathy.  4.  Partial-thickness undersurface tearing of the distal infraspinatus tendon.  5.  There is a large glenohumeral joint effusion with synovitis. This effusion decompresses into the subacromial/subdeltoid bursa via the rotator cuff tears.  6.  Mild glenohumeral chondromalacia.     ASSESSMENT & PLAN  Ms. Calabrese is a 68 year old year old female who presents to clinic today with Follow Up of the Right Shoulder    MR Revealing massive rotator cuff tear of right supraspinatus and subscapularis.  Additional partial tear of infraspinatus.    Diagnosis:  Complete traumatic rotator cuff tear of right shoulder    -Surgical options reviewed and consult offered.  Hansa notes that she would not be interested in surgery due to a number of personal and health related issues. Discussed time sensitive nature of  larger cuff tears.   -Discussed ongoing HEP/PT to optimize shoulder and deltoid to compensation for cuff loss. May complete additional course of PT now that PT will have an understanding of her cuff.  -Offered corticosteroid injection if pain increases or stiffness worsens and Hansa will consider this  -Trial of aleve BID x 2 weeks to attempt to calm synovitis  -May  also message/call if she desires shoulder surgical consult.  -Follow up PRN    It was a pleasure seeing Suma.    José Antonio Messina DO, [facility]  Primary Care Sports Medicine         Again, thank you for allowing me to participate in the care of your patient.        Sincerely,        José Antonio Messina DO

## 2024-02-07 NOTE — PROGRESS NOTES
ESTABLISHED PATIENT FOLLOW-UP:  Follow Up of the Right Shoulder       HISTORY OF PRESENT ILLNESS  Ms. Calabrese is a pleasant 68 year old year old female who presents to clinic today for follow-up of right shoulder pain.    Date of injury: Summer 2023 with falls.  Date last seen: 1/25/24  Following Therapeutic Plan: Yes, MRI done on 2/05/24  Pain: Still the same since last visit, achy and sore pain   Function: Decreased range of motion, difficulty moving the arm   Interval History: right shoulder MRI on  2/05/24.     Overall minimal pain.  Not utilizing OTC analgesics.     Treatment to date; PT    Additional medical/Social/Surgical histories reviewed in [facility] and updated as appropriate.    REVIEW OF SYSTEMS (2/7/2024)  CONSTITUTIONAL: Denies fever and weight loss  GASTROINTESTINAL: Denies abdominal pain, nausea, vomiting  MUSCULOSKELETAL: See HPI  SKIN: Denies any recent rash or lesion  NEUROLOGICAL: Denies numbness or focal weakness     PHYSICAL EXAM  Wt 51.3 kg (113 lb)   LMP  (LMP Unknown)   BMI 18.80 kg/m      General  - normal appearance, in no obvious distress  Musculoskeletal - right shoulder  - inspection: normal bone and joint alignment, no obvious deformity, no scapular winging, no AC step-off  - palpation: mildly tender RC insertion, normal clavicle, non-tender AC  - ROM:  painful and limited flexion and abduction at end range, shoulder hiking with abduction and limited to about 90, passively abduction full with gentle assistance  - strength: 4+ ER shoulder, 4- IR shoulder, 3+supraspinatus strength.   - special tests:  (-) Speed's  (-) Neer  (+) Hawkin's  (+) Janie's pain and weakness  (-) Butte's  (-) apprehension  (-) subscap lift-off  Neuro  - no sensory or motor deficit, grossly normal coordination, normal muscle tone  Skin  - no ecchymosis, erythema, warmth, or induration, no obvious rash  Psych  - interactive, appropriate, normal mood and affect     IMAGING :  MR SHOULDER RIGHT WO CON  2/5/24    IMPRESSION:  1.  Full-thickness tearing of the entire supraspinatus tendon with retraction nearly to the level of the glenohumeral joint. Mild supraspinatus muscle fatty atrophy.  2.  Full-thickness tearing involving the majority of the subscapularis tendon with retraction nearly to the glenohumeral joint. Moderate subscapularis muscle fatty atrophy.  3.  Medial dislocation of the proximal extra-articular long head of the biceps tendon secondary to the subscapularis tearing. Moderate underlying tendinopathy.  4.  Partial-thickness undersurface tearing of the distal infraspinatus tendon.  5.  There is a large glenohumeral joint effusion with synovitis. This effusion decompresses into the subacromial/subdeltoid bursa via the rotator cuff tears.  6.  Mild glenohumeral chondromalacia.     ASSESSMENT & PLAN  Ms. Calabrese is a 68 year old year old female who presents to clinic today with Follow Up of the Right Shoulder    MR Revealing massive rotator cuff tear of right supraspinatus and subscapularis.  Additional partial tear of infraspinatus.    Diagnosis:  Complete traumatic rotator cuff tear of right shoulder    -Surgical options reviewed and consult offered.  Hansa notes that she would not be interested in surgery due to a number of personal and health related issues. Discussed time sensitive nature of  larger cuff tears.   -Discussed ongoing HEP/PT to optimize shoulder and deltoid to compensation for cuff loss. May complete additional course of PT now that PT will have an understanding of her cuff.  -Offered corticosteroid injection if pain increases or stiffness worsens and Hansa will consider this  -Trial of aleve BID x 2 weeks to attempt to calm synovitis  -May also message/call if she desires shoulder surgical consult.  -Follow up PRN    It was a pleasure seeing Sebastián Messina, , [facility]  Primary Care Sports Medicine

## 2024-02-07 NOTE — PATIENT INSTRUCTIONS
Thank you for choosing [facility]    DR ARZATE'S CLINIC LOCATIONS  [place] [address] [facility], 4th [place]   [place], [address] [place] [address]   [phone]       APPOINTMENTS: [phone]    CARE QUESTIONS: [phone],    BILLING QUESTIONS: [phone]    FAX NUMBER: [phone]        Follow up: With Dr Rahman to discuss surgery if desired     With Dr Messina if you would like to consider a shoulder injection      No diagnosis found.     NOÉ

## 2024-02-21 ENCOUNTER — THERAPY VISIT (OUTPATIENT)
Dept: PHYSICAL THERAPY | Facility: CLINIC | Age: 69
End: 2024-02-21
Payer: MEDICARE

## 2024-02-21 DIAGNOSIS — S49.91XA INJURY OF RIGHT SHOULDER, INITIAL ENCOUNTER: Primary | ICD-10-CM

## 2024-02-21 PROCEDURE — 97110 THERAPEUTIC EXERCISES: CPT | Mod: GP | Performed by: PHYSICAL THERAPIST

## 2024-02-21 NOTE — PROGRESS NOTES
02/21/24 0500   Appointment Info   Signing clinician's name / credentials Maribel Lees, PT, DPT   Total/Authorized Visits E&T 8 POC (+4)   Visits Used 10   Medical Diagnosis Injury of right shoulder   PT Tx Diagnosis R shoulder pain   Other pertinent information concern for RC tear   Quick Adds Certification   Progress Note/Certification   Start of Care Date 10/02/23   Onset of illness/injury or Date of Surgery 09/26/23  (MD order)   Therapy Frequency as of 2/21/24: 2x month   Predicted Duration as of 2/21/24: 1-2 months   Certification date from 02/07/24   Certification date to 04/17/24   Progress Note Due Date 12/01/23   Progress Note Completed Date 02/21/24   PT Goal 1   Goal Identifier Reaching   Goal Description pt will be able to reach overhead, ROM WNL, pain no greater than 2/10   Rationale to maximize safety and independence with performance of ADLs and functional tasks;to maximize safety and independence within the home;to maximize safety and independence with self cares   Goal Progress AROM progress has slowed   Target Date 02/06/24   Subjective Report   Subjective Report since seem last, pt had an MRI noting full tear of supraspinatus and subscapularis. She is very discouraged and admits to less than ideal compliance with HEP since seeing those results   Objective Measures   Objective Measures Objective Measure 1   Treatment Interventions (PT)   Interventions Therapeutic Procedure/Exercise   Therapeutic Procedure/Exercise   Therapeutic Procedures: strength, endurance, ROM, flexibillity minutes ([id number]) 33   Therapeutic Procedures Ther Proc 2;Ther Proc 3   Ther Proc 1 PROM   Ther Proc 1 - Details Brief - Abduction and flexion; increased sensitivity so stopped once increase in sxs   Ther Proc 2 Seated Scapular retraction   Ther Proc 2 - Details 10 sec holds x 10 reps   Ther Proc 3 Codmans   Ther Proc 3 - Details for pain relief after wall slides   PTRx Ther Proc 1 Cervical Retraction   PTRx Ther Proc 1  - Details VR - continue HEP   PTRx Ther Proc 2 Wall Climb   PTRx Ther Proc 2 - Details Reviewed x 5 reps    PTRx Ther Proc 3 Wall Slides Abduction   PTRx Ther Proc 3 - Details Reviewed x 5 reps    PTRx Ther Proc 4 Upper Trapezius Stretch   PTRx Ther Proc 4 - Details introduced for relief from cervical compensation    PTRx Ther Proc 5 Levator Scapulae Stretch   PTRx Ther Proc 5 - Details introduced for relief from cervical compensation    PTRx Ther Proc 6 Shoulder Flexion   PTRx Ther Proc 6 - Details Reviewed x 5 reps;   PTRx Ther Proc 7 Shoulder Scaption Full Can   PTRx Ther Proc 7 - Details Reviewed x 10 reps    PTRx Ther Proc 8 Shoulder Theraband Rows   PTRx Ther Proc 8 - Details Reviewed with BTB x reps to fatigue    Patient Response/Progress Pt overaly sensitive to strengthening exercises today so focused more on ROM and gentle mid-back activation   PTRx Ther Proc 9 Shoulder Theraband Low Row/Pulldown   PTRx Ther Proc 9 - Details Reviewed with BTB x reps to fatigue    PTRx Ther Proc 10 Shoulder External Rotation Sidelying   PTRx Ther Proc 10 - Details Introduced; this is one of the muscles that is not fully torn - partial infra tear   Therapeutic Activity   PTRx Ther Act 1 Posture Correction with Lumbar Roll   PTRx Ther Act 1 - Details VR - continue HEP   Plan   Home program printed   Updates to plan of care MRI showed full RTC tear supra and subscap   Plan for next session Focus on strength progression as able with goal of function (pt not interested in surgery)   Total Session Time   Timed Code Treatment Minutes 33   Total Treatment Time (sum of timed and untimed services) 33         [facility]                                                                                   OUTPATIENT PHYSICAL THERAPY    PLAN OF TREATMENT FOR OUTPATIENT REHABILITATION   Patient's Last Name, First Name, Suma Florence YOB: 1955   Provider's Name   [facility]   Medical Record No.  [id number]     Onset Date: 09/26/23 (MD order)  Start of Care Date: 10/02/23     Medical Diagnosis:  Injury of right shoulder      PT Treatment Diagnosis:  R shoulder pain Plan of Treatment  Frequency/Duration: as of 2/21/24: 2x month/ as of 2/21/24: 1-2 months    Certification date from 02/07/24 to 04/17/24         See note for plan of treatment details and functional goals     Maribel Lees, PT                         I CERTIFY THE NEED FOR THESE SERVICES FURNISHED UNDER        THIS PLAN OF TREATMENT AND WHILE UNDER MY CARE     (Physician attestation of this document indicates review and certification of the therapy plan).              Referring Provider:  Osvaldo Mckeon    Initial Assessment  See Epic Evaluation- Start of Care Date: 10/02/23            PLAN  Continue therapy per current plan of care with reduced frequency to 2x/ month for 1-2 months for functional strength progression as pt is not interested in surgery at this time for her fully torn rotator cuff muscles.     Beginning/End Dates of Progress Note Reporting Period:  02/21/24 to 02/21/2024    Referring Provider:  Osvaldo Mckeon

## 2024-03-06 ENCOUNTER — THERAPY VISIT (OUTPATIENT)
Dept: PHYSICAL THERAPY | Facility: CLINIC | Age: 69
End: 2024-03-06
Payer: MEDICARE

## 2024-03-06 DIAGNOSIS — S49.91XA INJURY OF RIGHT SHOULDER, INITIAL ENCOUNTER: Primary | ICD-10-CM

## 2024-03-06 PROCEDURE — 97110 THERAPEUTIC EXERCISES: CPT | Mod: GP

## 2024-03-06 NOTE — PROGRESS NOTES
03/06/24 0500   Appointment Info   Signing clinician's name / credentials Elisabeth Miranda DPT   Total/Authorized Visits E&T 8 POC (+4)   Visits Used 11   Medical Diagnosis Injury of right shoulder   PT Tx Diagnosis R shoulder pain   Quick Adds Certification   Progress Note/Certification   Start of Care Date 10/02/23   Onset of illness/injury or Date of Surgery 09/26/23  (MD order)   Therapy Frequency as of 2/21/24: 2x month   Predicted Duration as of 2/21/24: 1-2 months   Certification date from 02/07/24   Certification date to 04/17/24   Progress Note Due Date 12/01/23   Progress Note Completed Date 02/21/24   PT Goal 1   Goal Identifier Reaching   Goal Description pt will be able to reach overhead, ROM WNL, pain no greater than 2/10   Rationale to maximize safety and independence with performance of ADLs and functional tasks;to maximize safety and independence within the home;to maximize safety and independence with self cares   Goal Progress AROM progress has slowed   Target Date 02/06/24   Subjective Report   Subjective Report Things feel about the same. Less painful than last session.   Objective Measures   Objective Measures Objective Measure 1   Objective Measure 1   Objective Measure general   Details shoulder AROM continues to be limited as expected w/ full tear, elevation now to 90 deg (was 60-80 initially). PROM full. Weakness continues. However, strength and AROM have improved since eval, form improved w/ decreased compensation with shrug.   Treatment Interventions (PT)   Interventions Therapeutic Procedure/Exercise   Therapeutic Procedure/Exercise   Therapeutic Procedures: strength, endurance, ROM, flexibillity minutes (13946) 30   Therapeutic Procedures Ther Proc 2;Ther Proc 3   Ther Proc 1 Full review of HEP, education on how to adjust based on sxs, don't push through pain. Continue as able.   Ther Proc 2 Seated Scapular retraction   Ther Proc 2 - Details do frequently as needed   PTRx Ther Proc  1 Cervical Retraction   PTRx Ther Proc 1 - Details VR - continue HEP   PTRx Ther Proc 2 Wall Climb   PTRx Ther Proc 2 - Details Reviewed x 5 reps    PTRx Ther Proc 3 Wall Slides Abduction   PTRx Ther Proc 3 - Details Reviewed x 5 reps    PTRx Ther Proc 4 Upper Trapezius Stretch   PTRx Ther Proc 4 - Details VR continue   PTRx Ther Proc 5 Levator Scapulae Stretch   PTRx Ther Proc 5 - Details VR continue   PTRx Ther Proc 6 Shoulder Flexion   PTRx Ther Proc 6 - Details VR If consistently getting 30 reps add 1# (soup can)   PTRx Ther Proc 7 Shoulder Scaption Full Can   PTRx Ther Proc 7 - Details VR If consistently getting 30 reps add 1# (soup can)   PTRx Ther Proc 8 Shoulder Theraband Rows   PTRx Ther Proc 8 - Details Reviewed with BTB x reps to fatigue    PTRx Ther Proc 9 Shoulder Theraband Low Row/Pulldown   PTRx Ther Proc 9 - Details Reviewed with BTB x reps to fatigue    PTRx Ther Proc 10 Shoulder External Rotation Sidelying   PTRx Ther Proc 10 - Details VR continue for strengthening   Patient Response/Progress Pt overaly sensitive to strengthening exercises today so focused more on ROM and gentle mid-back activation   PTRx Ther Proc 11 Elbow Strengthening Flexion with Theraband   PTRx Ther Proc 11 - Details Red TB forearm neutral 10x w/ mild fatigue no pain   PTRx Ther Proc 12 Bent Over Tricep Extension   PTRx Ther Proc 12 - Details 10x red TB cues for positioning no pain   Therapeutic Activity   PTRx Ther Act 1 Posture Correction with Lumbar Roll   PTRx Ther Act 1 - Details VR - continue HEP   Plan   Home program printed   Updates to plan of care MRI showed full RTC tear supra and subscap   Plan for next session discharge   Total Session Time   Timed Code Treatment Minutes 30   Total Treatment Time (sum of timed and untimed services) 30         DISCHARGE  Reason for Discharge: No further expectation of progress.    Equipment Issued: none    Discharge Plan: Patient to continue home program.    Referring  Provider:  Osvaldo Mckeon

## 2024-03-11 ENCOUNTER — MYC MEDICAL ADVICE (OUTPATIENT)
Dept: INTERNAL MEDICINE | Facility: CLINIC | Age: 69
End: 2024-03-11
Payer: MEDICARE

## 2024-03-11 DIAGNOSIS — C50.411 MALIGNANT NEOPLASM OF UPPER-OUTER QUADRANT OF RIGHT BREAST IN FEMALE, ESTROGEN RECEPTOR POSITIVE (H): Primary | ICD-10-CM

## 2024-03-11 DIAGNOSIS — Z17.0 MALIGNANT NEOPLASM OF UPPER-OUTER QUADRANT OF RIGHT BREAST IN FEMALE, ESTROGEN RECEPTOR POSITIVE (H): Primary | ICD-10-CM

## 2024-03-12 NOTE — TELEPHONE ENCOUNTER
MR- see response from pt, order updated, please review and sign if appropriate    Gretel Lyons CMA

## 2024-03-19 NOTE — TELEPHONE ENCOUNTER
"MR< see mychart message, new order pended with updated information, no real spot to add Bras-plural, so free type with \"Disp 3 Bras with 4 refills\" added.    Please auth if appropriate and team can fax    Gretel Lyons CMA    "

## 2024-06-04 ENCOUNTER — MEDICAL CORRESPONDENCE (OUTPATIENT)
Dept: HEALTH INFORMATION MANAGEMENT | Facility: CLINIC | Age: 69
End: 2024-06-04
Payer: MEDICARE

## 2024-06-14 ENCOUNTER — OFFICE VISIT (OUTPATIENT)
Dept: URGENT CARE | Facility: URGENT CARE | Age: 69
End: 2024-06-14
Payer: MEDICARE

## 2024-06-14 VITALS
BODY MASS INDEX: 18.68 KG/M2 | TEMPERATURE: 98.6 F | SYSTOLIC BLOOD PRESSURE: 128 MMHG | WEIGHT: 112.1 LBS | HEIGHT: 65 IN | DIASTOLIC BLOOD PRESSURE: 62 MMHG | HEART RATE: 90 BPM | OXYGEN SATURATION: 96 %

## 2024-06-14 DIAGNOSIS — H66.001 ACUTE SUPPURATIVE OTITIS MEDIA OF RIGHT EAR WITHOUT SPONTANEOUS RUPTURE OF TYMPANIC MEMBRANE, RECURRENCE NOT SPECIFIED: Primary | ICD-10-CM

## 2024-06-14 DIAGNOSIS — J30.2 SEASONAL ALLERGIC RHINITIS, UNSPECIFIED TRIGGER: ICD-10-CM

## 2024-06-14 DIAGNOSIS — J02.9 SORE THROAT: ICD-10-CM

## 2024-06-14 LAB
DEPRECATED S PYO AG THROAT QL EIA: NEGATIVE
GROUP A STREP BY PCR: NOT DETECTED

## 2024-06-14 PROCEDURE — 87635 SARS-COV-2 COVID-19 AMP PRB: CPT | Performed by: PHYSICIAN ASSISTANT

## 2024-06-14 PROCEDURE — 87651 STREP A DNA AMP PROBE: CPT | Performed by: PHYSICIAN ASSISTANT

## 2024-06-14 PROCEDURE — 99214 OFFICE O/P EST MOD 30 MIN: CPT | Performed by: PHYSICIAN ASSISTANT

## 2024-06-14 ASSESSMENT — ENCOUNTER SYMPTOMS
SORE THROAT: 1
HEADACHES: 1
FEVER: 1

## 2024-06-14 ASSESSMENT — PAIN SCALES - GENERAL: PAINLEVEL: SEVERE PAIN (7)

## 2024-06-14 NOTE — PROGRESS NOTES
Assessment & Plan        1. Acute suppurative otitis media of right ear without spontaneous rupture of tympanic membrane, recurrence not specified    -Patient is having intermittent pain in the right ear.  Patient was given a wait-and-see prescription and advised to start taking the antibiotics if her symptoms worsen in the next 2 to 3 days.  - amoxicillin-clavulanate (AUGMENTIN) 875-125 MG tablet; Take 1 tablet by mouth 2 times daily for 10 days  Dispense: 20 tablet; Refill: 0    2. Sore throat    -Rapid strep is negative  -Advised patient we will contact her if the strep culture comes back positive.  Advised the same prescription for the infection will cover for strep.  - Streptococcus A Rapid Screen w/Reflex to PCR - Clinic Collect  - Group A Streptococcus PCR Throat Swab  - Symptomatic COVID-19 Virus (Coronavirus) by PCR Nasopharyngeal    3. Seasonal allergic rhinitis, unspecified trigger    -Patient started taking her allergy medication yesterday.  Advised patient to continue on allergy medication until the allergy season is over.    Results for orders placed or performed in visit on 06/14/24   Streptococcus A Rapid Screen w/Reflex to PCR - Clinic Collect     Status: Normal    Specimen: Throat; Swab   Result Value Ref Range    Group A Strep antigen Negative Negative         Patient Instructions   Otitis media  You have been provided with a wait and see prescription. Start the antibiotic treatment if symptoms worsen or fail to improve in 2-3 days from today    Allergies  Continue on the allergy medication     COVID-19 test results  This will come back tomorrow and you will be notified of results through [facility]    Strep test  Strep is negative, Culture is pending. If culture comes back positive, the same medication for ear infection will cover for strep. Just remember to throw away your tooth brush after 24 hours.     Return if symptoms worsen or fail to improve, for Follow up, 7days.    At the end of the  encounter, I discussed results, diagnosis, medications. Discussed red flags for immediate return to clinic/ER, as well as indications for follow up if no improvement. Patient understood and agreed to plan. Patient was stable for discharge.    Koffi Santo is a 69 year old female who presents to clinic today for the following health issues:  Chief Complaint   Patient presents with    Urgent Care     Pt. Complain of sore throat this past Wednesday, headache with burning sensation.      HPI    Patient reports headache, sore throat for  2 days. Feels like her throat is burning. She notes ears feel plugged and has intermittent pain in the right ear.  She also reports subjective fever. No recent travel or exposure to deer tick areas. She has a history of allergies. She has started taking allergy medication one day ago. (Flonase and cetirizine). She has taken ibuprofen which has helped with aches and pain. Denies cough symptoms.     Review of Systems   Constitutional:  Positive for fever.   HENT:  Positive for congestion, ear pain and sore throat.    Neurological:  Positive for headaches.   All other systems reviewed and are negative.      Problem List:  2023-10: Injury of right shoulder  2022-04: Screening for hyperlipidemia  2021-10: Chronic kidney disease, stage 3  2019-10: Osteopenia, unspecified location  2019-10: Aromatase inhibitor use  2019-05: Dysfunction of right eustachian tube  2019-05: Hearing loss of right ear, unspecified hearing loss type  2019-02: Moderate malnutrition (H24)  2018-12: Tubular adenoma of colon  2018-12: Colostomy in place (H)  2018-12: Vitamin B12 deficiency (non anemic)  2017-11: Diverticulitis large intestine  2017-11: Hyponatremia  2017-11: Hypokalemia  2017-10: S/P bilateral mastectomy  2017-09: Malignant neoplasm of upper-outer quadrant of right breast   in female, estrogen receptor positive (H)  2016-08: Acute diverticulitis  2016-08: Hypothyroidism due to acquired atrophy of  "thyroid  2016-08: Diverticulitis of colon  2015-05: Hypothyroidism  2013-07: TMJ (temporomandibular joint syndrome)  2013-07: Hyperlipidemia LDL goal <100  2013-01: Nontoxic uninodular goiter  2013-01: Rectal prolapse  2013-01: Essential hypertension  2013-01: Internal hemorrhoids with other complication  2013-01: Dysfunction of eustachian tube  2013-01: Tobacco use disorder      Past Medical History:   Diagnosis Date    Cancer (H)     breast    Diverticulitis     Hypertension     Nontoxic uninodular goiter 1/28/2013    Pure hypercholesterolemia 1/28/2013    TMJ (temporomandibular joint syndrome) 7/1/2013       Social History     Tobacco Use    Smoking status: Every Day     Current packs/day: 0.50     Average packs/day: 0.5 packs/day for 41.0 years (20.5 ttl pk-yrs)     Types: Cigarettes     Passive exposure: Never    Smokeless tobacco: Never    Tobacco comments:     1/2 PPD   Substance Use Topics    Alcohol use: Yes     Alcohol/week: 2.0 standard drinks of alcohol     Types: 2 Standard drinks or equivalent per week     Comment: weekends           Objective    /62 (BP Location: Right arm, Patient Position: Sitting, Cuff Size: Adult Regular)   Pulse 90   Temp 98.6  F (37  C) (Temporal)   Ht 1.651 m (5' 5\")   Wt 50.8 kg (112 lb 1.6 oz)   LMP  (LMP Unknown)   SpO2 96%   BMI 18.65 kg/m    Physical Exam  Constitutional:       Appearance: Normal appearance.   HENT:      Head: Normocephalic.      Right Ear: A middle ear effusion (mild) is present. Tympanic membrane is erythematous (mild).      Left Ear: Tympanic membrane normal.      Nose: Congestion present.      Mouth/Throat:      Mouth: Mucous membranes are moist.      Pharynx: Oropharynx is clear. Uvula midline. No posterior oropharyngeal erythema.   Cardiovascular:      Rate and Rhythm: Normal rate and regular rhythm.   Pulmonary:      Effort: Pulmonary effort is normal.      Breath sounds: Normal breath sounds.   Lymphadenopathy:      Head:      Right " side of head: No submental, submandibular or tonsillar adenopathy.      Left side of head: No submental, submandibular or tonsillar adenopathy.      Cervical: No cervical adenopathy.      Right cervical: No superficial cervical adenopathy.     Left cervical: No superficial cervical adenopathy.   Skin:     General: Skin is warm and dry.      Findings: No rash.   Neurological:      Mental Status: She is alert.   Psychiatric:         Mood and Affect: Mood normal.         Behavior: Behavior normal.              Whitley Davies PA-C

## 2024-06-14 NOTE — PATIENT INSTRUCTIONS
Otitis media  You have been provided with a wait and see prescription. Start the antibiotic treatment if symptoms worsen or fail to improve in 2-3 days from today    Allergies  Continue on the allergy medication     COVID-19 test results  This will come back tomorrow and you will be notified of results through [facility]    Strep test  Strep is negative, Culture is pending. If culture comes back positive, the same medication for ear infection will cover for strep. Just remember to throw away your tooth brush after 24 hours.

## 2024-06-15 LAB — SARS-COV-2 RNA RESP QL NAA+PROBE: NEGATIVE

## 2024-07-19 ENCOUNTER — MEDICAL CORRESPONDENCE (OUTPATIENT)
Dept: HEALTH INFORMATION MANAGEMENT | Facility: CLINIC | Age: 69
End: 2024-07-19
Payer: MEDICARE

## 2024-10-01 ENCOUNTER — MYC MEDICAL ADVICE (OUTPATIENT)
Dept: INTERNAL MEDICINE | Facility: CLINIC | Age: 69
End: 2024-10-01
Payer: MEDICARE

## 2024-10-01 DIAGNOSIS — Z13.220 LIPID SCREENING: ICD-10-CM

## 2024-10-01 DIAGNOSIS — E03.4 HYPOTHYROIDISM DUE TO ACQUIRED ATROPHY OF THYROID: Primary | Chronic | ICD-10-CM

## 2024-10-01 DIAGNOSIS — I10 ESSENTIAL HYPERTENSION: ICD-10-CM

## 2024-10-21 ENCOUNTER — LAB (OUTPATIENT)
Dept: LAB | Facility: CLINIC | Age: 69
End: 2024-10-21
Payer: MEDICARE

## 2024-10-21 DIAGNOSIS — Z13.220 LIPID SCREENING: ICD-10-CM

## 2024-10-21 DIAGNOSIS — I10 ESSENTIAL HYPERTENSION: ICD-10-CM

## 2024-10-21 DIAGNOSIS — E03.4 HYPOTHYROIDISM DUE TO ACQUIRED ATROPHY OF THYROID: Chronic | ICD-10-CM

## 2024-10-21 LAB
ANION GAP SERPL CALCULATED.3IONS-SCNC: 10 MMOL/L (ref 7–15)
BUN SERPL-MCNC: 9.5 MG/DL (ref 8–23)
CALCIUM SERPL-MCNC: 9.8 MG/DL (ref 8.8–10.4)
CHLORIDE SERPL-SCNC: 101 MMOL/L (ref 98–107)
CHOLEST SERPL-MCNC: 205 MG/DL
CREAT SERPL-MCNC: 0.78 MG/DL (ref 0.51–0.95)
EGFRCR SERPLBLD CKD-EPI 2021: 81 ML/MIN/1.73M2
FASTING STATUS PATIENT QL REPORTED: YES
FASTING STATUS PATIENT QL REPORTED: YES
GLUCOSE SERPL-MCNC: 93 MG/DL (ref 70–99)
HCO3 SERPL-SCNC: 25 MMOL/L (ref 22–29)
HDLC SERPL-MCNC: 70 MG/DL
LDLC SERPL CALC-MCNC: 120 MG/DL
NONHDLC SERPL-MCNC: 135 MG/DL
POTASSIUM SERPL-SCNC: 4.1 MMOL/L (ref 3.4–5.3)
SODIUM SERPL-SCNC: 136 MMOL/L (ref 135–145)
TRIGL SERPL-MCNC: 73 MG/DL
TSH SERPL DL<=0.005 MIU/L-ACNC: 1.31 UIU/ML (ref 0.3–4.2)

## 2024-10-21 PROCEDURE — 84443 ASSAY THYROID STIM HORMONE: CPT

## 2024-10-21 PROCEDURE — 80061 LIPID PANEL: CPT

## 2024-10-21 PROCEDURE — 36415 COLL VENOUS BLD VENIPUNCTURE: CPT

## 2024-10-21 PROCEDURE — 80048 BASIC METABOLIC PNL TOTAL CA: CPT

## 2024-10-25 ENCOUNTER — OFFICE VISIT (OUTPATIENT)
Dept: INTERNAL MEDICINE | Facility: CLINIC | Age: 69
End: 2024-10-25
Attending: INTERNAL MEDICINE
Payer: COMMERCIAL

## 2024-10-25 VITALS
BODY MASS INDEX: 18.66 KG/M2 | HEIGHT: 65 IN | RESPIRATION RATE: 16 BRPM | WEIGHT: 112 LBS | SYSTOLIC BLOOD PRESSURE: 120 MMHG | TEMPERATURE: 97.1 F | DIASTOLIC BLOOD PRESSURE: 77 MMHG | OXYGEN SATURATION: 99 % | HEART RATE: 75 BPM

## 2024-10-25 DIAGNOSIS — B00.1 COLD SORE: ICD-10-CM

## 2024-10-25 DIAGNOSIS — Z00.00 ENCOUNTER FOR MEDICARE ANNUAL WELLNESS EXAM: Primary | ICD-10-CM

## 2024-10-25 DIAGNOSIS — Z93.2 ILEOSTOMY STATUS (H): ICD-10-CM

## 2024-10-25 DIAGNOSIS — I10 ESSENTIAL HYPERTENSION: ICD-10-CM

## 2024-10-25 DIAGNOSIS — Z87.891 PERSONAL HISTORY OF TOBACCO USE: ICD-10-CM

## 2024-10-25 DIAGNOSIS — E03.4 HYPOTHYROIDISM DUE TO ACQUIRED ATROPHY OF THYROID: Chronic | ICD-10-CM

## 2024-10-25 DIAGNOSIS — Z78.0 POSTMENOPAUSAL: ICD-10-CM

## 2024-10-25 DIAGNOSIS — M85.80 OSTEOPENIA, UNSPECIFIED LOCATION: ICD-10-CM

## 2024-10-25 PROBLEM — Z93.3 COLOSTOMY IN PLACE (H): Status: RESOLVED | Noted: 2018-12-24 | Resolved: 2024-10-25

## 2024-10-25 PROBLEM — E87.1 HYPONATREMIA: Status: RESOLVED | Noted: 2017-11-06 | Resolved: 2024-10-25

## 2024-10-25 PROBLEM — D12.6 TUBULAR ADENOMA OF COLON: Status: RESOLVED | Noted: 2018-12-24 | Resolved: 2024-10-25

## 2024-10-25 PROBLEM — E87.6 HYPOKALEMIA: Status: RESOLVED | Noted: 2017-11-06 | Resolved: 2024-10-25

## 2024-10-25 PROBLEM — Z85.3 PERSONAL HISTORY OF MALIGNANT NEOPLASM OF BREAST: Status: ACTIVE | Noted: 2017-09-21

## 2024-10-25 PROCEDURE — 99397 PER PM REEVAL EST PAT 65+ YR: CPT | Mod: 25 | Performed by: INTERNAL MEDICINE

## 2024-10-25 PROCEDURE — 91320 SARSCV2 VAC 30MCG TRS-SUC IM: CPT | Performed by: INTERNAL MEDICINE

## 2024-10-25 PROCEDURE — 90471 IMMUNIZATION ADMIN: CPT | Performed by: INTERNAL MEDICINE

## 2024-10-25 PROCEDURE — 99214 OFFICE O/P EST MOD 30 MIN: CPT | Mod: 25 | Performed by: INTERNAL MEDICINE

## 2024-10-25 PROCEDURE — 90480 ADMN SARSCOV2 VAC 1/ONLY CMP: CPT | Performed by: INTERNAL MEDICINE

## 2024-10-25 PROCEDURE — G0296 VISIT TO DETERM LDCT ELIG: HCPCS | Performed by: INTERNAL MEDICINE

## 2024-10-25 PROCEDURE — 90662 IIV NO PRSV INCREASED AG IM: CPT | Performed by: INTERNAL MEDICINE

## 2024-10-25 RX ORDER — AMLODIPINE BESYLATE 10 MG/1
10 TABLET ORAL DAILY
Qty: 90 TABLET | Refills: 3 | Status: SHIPPED | OUTPATIENT
Start: 2024-10-25

## 2024-10-25 RX ORDER — LEVOTHYROXINE SODIUM 75 UG/1
75 TABLET ORAL DAILY
Qty: 90 TABLET | Refills: 3 | Status: SHIPPED | OUTPATIENT
Start: 2024-10-25

## 2024-10-25 RX ORDER — VALACYCLOVIR HYDROCHLORIDE 1 G/1
2000 TABLET, FILM COATED ORAL 2 TIMES DAILY
Qty: 4 TABLET | Refills: 11 | Status: SHIPPED | OUTPATIENT
Start: 2024-10-25

## 2024-10-25 SDOH — HEALTH STABILITY: PHYSICAL HEALTH: ON AVERAGE, HOW MANY DAYS PER WEEK DO YOU ENGAGE IN MODERATE TO STRENUOUS EXERCISE (LIKE A BRISK WALK)?: 5 DAYS

## 2024-10-25 ASSESSMENT — SOCIAL DETERMINANTS OF HEALTH (SDOH): HOW OFTEN DO YOU GET TOGETHER WITH FRIENDS OR RELATIVES?: MORE THAN THREE TIMES A WEEK

## 2024-10-25 NOTE — PROGRESS NOTES
Preventive Care Visit  [facility]  Osvaldo Borrero MD, Internal Medicine  Oct 25, 2024      Assessment & Plan     Encounter for Medicare annual wellness exam  Updated screening, immunizations, prevention.  Please see health maintenance list, care gaps     Essential hypertension  Well controlled  - amLODIPine (NORVASC) 10 MG tablet; Take 1 tablet (10 mg) by mouth daily.    Hypothyroidism due to acquired atrophy of thyroid  - levothyroxine (SYNTHROID/LEVOTHROID) 75 MCG tablet; Take 1 tablet (75 mcg) by mouth daily.    Osteopenia, unspecified location  Postmenopausal  - DX Bone Density; Future    Cold sore  - valACYclovir (VALTREX) 1000 mg tablet; Take 2 tablets (2,000 mg) by mouth 2 times daily.    Personal history of tobacco use  - Prof fee: Shared Decision Making for Lung Cancer Screening  - CT Chest Lung Cancer Scrn Low Dose wo; Future    Ileostomy status (H)  Weight an issue- focus on heathy fats     Patient has been advised of split billing requirements and indicates understanding: Yes        Nicotine/Tobacco Cessation  She reports that she has been smoking cigarettes. She has a 20.5 pack-year smoking history. She has never been exposed to tobacco smoke. She has never used smokeless tobacco.  Nicotine/Tobacco Cessation Plan  Information offered: Patient not interested at this time      Counseling  Appropriate preventive services were addressed with this patient via screening, questionnaire, or discussion as appropriate for fall prevention, nutrition, physical activity, Tobacco-use cessation, social engagement, weight loss and cognition.  Checklist reviewing preventive services available has been given to the patient.  Reviewed patient's diet, addressing concerns and/or questions.   The patient was instructed to see the dentist every 6 months.   She is at risk for psychosocial distress and has been provided with information to reduce risk.       Regular exercise  See Patient  Alec Santo is a 69 year old, presenting for the following:  Medicare Visit          HPI       Hypertension Follow-up    Do you check your blood pressure regularly outside of the clinic? No   Are you following a low salt diet? No  Are your blood pressures ever more than 140 on the top number (systolic) OR more   than 90 on the bottom number (diastolic), for example 140/90? Yes    Hypothyroidism Follow-up    Since last visit, patient describes the following symptoms: Weight stable, no hair loss, no skin changes, no constipation, no loose stools    Health Care Directive  Patient does not have a Health Care Directive: Discussed advance care planning with patient; however, patient declined at this time.      10/25/2024   General Health   How would you rate your overall physical health? Excellent   Feel stress (tense, anxious, or unable to sleep) Only a little      (!) STRESS CONCERN      10/25/2024   Nutrition   Diet: Low salt    Low fat/cholesterol       Multiple values from one day are sorted in reverse-chronological order         10/25/2024   Exercise   Days per week of moderate/strenous exercise 5 days            10/25/2024   Social Factors   Frequency of gathering with friends or relatives More than three times a week   Worry food won't last until get money to buy more No   Food not last or not have enough money for food? No   Do you have housing? (Housing is defined as stable permanent housing and does not include staying ouside in a car, in a tent, in an abandoned building, in an overnight shelter, or couch-surfing.) Yes   Are you worried about losing your housing? No   Lack of transportation? No   Unable to get utilities (heat,electricity)? No            10/25/2024   Fall Risk   Fallen 2 or more times in the past year? No     No    Trouble with walking or balance? No     No        Patient-reported    Multiple values from one day are sorted in reverse-chronological order          10/25/2024    Activities of Daily Living- Home Safety   Needs help with the following daily activites None of the above   Safety concerns in the home None of the above            10/25/2024   Dental   Dentist two times every year? (!) NO            10/25/2024   Hearing Screening   Hearing concerns? None of the above            10/25/2024   Driving Risk Screening   Patient/family members have concerns about driving No            10/25/2024   General Alertness/Fatigue Screening   Have you been more tired than usual lately? No            10/25/2024   Urinary Incontinence Screening   Bothered by leaking urine in past 6 months No            10/25/2024   TB Screening   Were you born outside of the [place]? No            Today's PHQ-2 Score:       10/25/2024     8:37 AM   PHQ-2 ( 1999 Pf[employer])   Q1: Little interest or pleasure in doing things 0    Q2: Feeling down, depressed or hopeless 0    PHQ-2 Score 0    Q1: Little interest or pleasure in doing things Not at all   Q2: Feeling down, depressed or hopeless Not at all   PHQ-2 Score 0       Patient-reported           10/25/2024   Substance Use   If I could quit smoking, I would Neutral   I want to quit somking, worry about health affects Neutral   Willing to make a plan to quit smoking Neutral   Willing to cut down before quitting Somewhat disagree   Alcohol more than 3/day or more than 7/wk No   Do you have a current opioid prescription? No   How severe/bad is pain from 1 to 10? 1/10   Do you use any other substances recreationally? No        Social History     Tobacco Use    Smoking status: Every Day     Current packs/day: 0.50     Average packs/day: 0.5 packs/day for 41.0 years (20.5 ttl pk-yrs)     Types: Cigarettes     Passive exposure: Never    Smokeless tobacco: Never    Tobacco comments:     1/2 PPD   Substance Use Topics    Alcohol use: Yes     Alcohol/week: 2.0 standard drinks of alcohol     Types: 2 Standard drinks or equivalent per week     Comment: weekends    Drug use: No               ASCVD Risk   The 10-year ASCVD risk score (Alaina DOMINGUEZ, et al., 2019) is: 18.7%    Values used to calculate the score:      Age: 69 years      Sex: Female      Is Non- : No      Diabetic: No      Tobacco smoker: Yes      Systolic Blood Pressure: 120 mmHg      Is BP treated: Yes      HDL Cholesterol: 70 mg/dL      Total Cholesterol: 205 mg/dL            Reviewed and updated as needed this visit by Provider   Tobacco   Meds   Med Hx  Surg Hx  Fam Hx              Current providers sharing in care for this patient include:  Patient Care Team:  Osvaldo Borrero MD as PCP - General (Internal Medicine)  Erik Lopez MD as Referring Physician (Internal Medicine)  Ceci Whaley PA-C as Physician Assistant (Dermatology)  Osvaldo Borrero MD as Assigned PCP  José Antonio Messina DO as Assigned Musculoskeletal Provider    The following health maintenance items are reviewed in Epic and correct as of today:  Health Maintenance   Topic Date Due    LUNG CANCER SCREENING  Never done    NICOTINE/TOBACCO CESSATION COUNSELING Q 1 YR  10/20/2024    MEDICARE ANNUAL WELLNESS VISIT  10/20/2024    ANNUAL REVIEW OF HM ORDERS  10/01/2025    DEXA  10/04/2025    BMP  10/21/2025    LIPID  10/21/2025    TSH W/FREE T4 REFLEX  10/21/2025    FALL RISK ASSESSMENT  10/25/2025    GLUCOSE  10/21/2027    RSV VACCINE (1 - 1-dose 75+ series) 04/24/2028    ADVANCE CARE PLANNING  10/25/2029    DTAP/TDAP/TD IMMUNIZATION (3 - Td or Tdap) 11/15/2033    HEPATITIS C SCREENING  Completed    PHQ-2 (once per calendar year)  Completed    INFLUENZA VACCINE  Completed    Pneumococcal Vaccine: 65+ Years  Completed    ZOSTER IMMUNIZATION  Completed    COVID-19 Vaccine  Completed    HPV IMMUNIZATION  Aged Out    MENINGITIS IMMUNIZATION  Aged Out    RSV MONOCLONAL ANTIBODY  Aged Out    COLORECTAL CANCER SCREENING  Discontinued            Objective    Exam  /77   Pulse 75   Temp 97.1  F (36.2  C)  "(Temporal)   Resp 16   Ht 1.651 m (5' 5\")   Wt 50.8 kg (112 lb)   LMP  (LMP Unknown)   SpO2 99%   BMI 18.64 kg/m     Estimated body mass index is 18.64 kg/m  as calculated from the following:    Height as of this encounter: 1.651 m (5' 5\").    Weight as of this encounter: 50.8 kg (112 lb).    Physical Exam          10/25/2024   Mini Cog   Clock Draw Score 2 Normal   3 Item Recall 3 objects recalled   Mini Cog Total Score 5                 Signed Electronically by: Osvaldo Borrero MD  Lung Cancer Screening Shared Decision Making Visit     Suma Calabrese, a 69 year old female, is eligible for lung cancer screening    History   Smoking Status    Every Day    Types: Cigarettes   Smokeless Tobacco    Never       I have discussed with patient the risks and benefits of screening for lung cancer with low-dose CT.     The risks include:    radiation exposure: one low dose chest CT has as much ionizing radiation as about 15 chest x-rays, or 6 months of background radiation living in [place]      false positives: most findings/nodules are NOT cancer, but some might still require additional diagnostic evaluation, including biopsy    over-diagnosis: some slow growing cancers that might never have been clinically significant will be detected and treated unnecessarily     The benefit of early detection of lung cancer is contingent upon adherence to annual screening or more frequent follow up if indicated.     Furthermore, to benefit from screening, Suma must be willing and able to undergo diagnostic procedures, if indicated. Although no specific guide is available for determining severity of comorbidities, it is reasonable to withhold screening in patients who have greater mortality risk from other diseases.     We did discuss that the best way to prevent lung cancer is to not smoke.    Some patients may value a numeric estimation of lung cancer risk when evaluating if lung cancer screening is right for them, " here is one calculator:    ShouldIScreen

## 2024-10-25 NOTE — PATIENT INSTRUCTIONS
Patient Education   Preventive Care Advice   This is general advice given by our system to help you stay healthy. However, your care team may have specific advice just for you. Please talk to your care team about your preventive care needs.  Nutrition  Eat 5 or more servings of fruits and vegetables each day.  Try wheat bread, brown rice and whole grain pasta (instead of white bread, rice, and pasta).  Get enough calcium and vitamin D. Check the label on foods and aim for 100% of the RDA (recommended daily allowance).  Lifestyle  Exercise at least 150 minutes each week  (30 minutes a day, 5 days a week).  Do muscle strengthening activities 2 days a week. These help control your weight and prevent disease.  No smoking.  Wear sunscreen to prevent skin cancer.  Have a dental exam and cleaning every 6 months.  Yearly exams  See your health care team every year to talk about:  Any changes in your health.  Any medicines your care team has prescribed.  Preventive care, family planning, and ways to prevent chronic diseases.  Shots (vaccines)   HPV shots (up to age 26), if you've never had them before.  Hepatitis B shots (up to age 59), if you've never had them before.  COVID-19 shot: Get this shot when it's due.  Flu shot: Get a flu shot every year.  Tetanus shot: Get a tetanus shot every 10 years.  Pneumococcal, hepatitis A, and RSV shots: Ask your care team if you need these based on your risk.  Shingles shot (for age 50 and up)  General health tests  Diabetes screening:  Starting at age 35, Get screened for diabetes at least every 3 years.  If you are younger than age 35, ask your care team if you should be screened for diabetes.  Cholesterol test: At age 39, start having a cholesterol test every 5 years, or more often if advised.  Bone density scan (DEXA): At age 50, ask your care team if you should have this scan for osteoporosis (brittle bones).  Hepatitis C: Get tested at least once in your life.  STIs (sexually  transmitted infections)  Before age 24: Ask your care team if you should be screened for STIs.  After age 24: Get screened for STIs if you're at risk. You are at risk for STIs (including HIV) if:  You are sexually active with more than one person.  You don't use condoms every time.  You or a partner was diagnosed with a sexually transmitted infection.  If you are at risk for HIV, ask about PrEP medicine to prevent HIV.  Get tested for HIV at least once in your life, whether you are at risk for HIV or not.  Cancer screening tests  Cervical cancer screening: If you have a cervix, begin getting regular cervical cancer screening tests starting at age 21.  Breast cancer scan (mammogram): If you've ever had breasts, begin having regular mammograms starting at age 40. This is a scan to check for breast cancer.  Colon cancer screening: It is important to start screening for colon cancer at age 45.  Have a colonoscopy test every 10 years (or more often if you're at risk) Or, ask your provider about stool tests like a FIT test every year or Cologuard test every 3 years.  To learn more about your testing options, visit:   .  For help making a decision, visit:   https://bit.ly/wl50314.  Prostate cancer screening test: If you have a prostate, ask your care team if a prostate cancer screening test (PSA) at age 55 is right for you.  Lung cancer screening: If you are a current or former smoker ages 50 to 80, ask your care team if ongoing lung cancer screenings are right for you.  For informational purposes only. Not to replace the advice of your health care provider. Copyright   2023 [facility]. All rights reserved. Clinically reviewed by the [facility] Transitions Program. [employer] 273920 - REV 01/24.     Lung Cancer Screening   Frequently Asked Questions  If you are at high-risk for lung cancer, getting screened with low-dose computed tomography (LDCT) every year can help save your life. This handout offers  answers to some of the most common questions about lung cancer screening. If you have other questions, please call [phone] ([phone]).     What is it?  Lung cancer screening uses special X-ray technology to create an image of your lung tissue. The exam is quick and easy and takes less than 10 seconds. We don t give you any medicine or use any needles. You can eat before and after the exam. You don t need to change your clothes as long as the clothing on your chest doesn t contain metal. But, you do need to be able to hold your breath for at least 6 seconds during the exam.    What is the goal of lung cancer screening?  The goal of lung cancer screening is to save lives. Many times, lung cancer is not found until a person starts having physical symptoms. Lung cancer screening can help detect lung cancer in the earliest stages when it may be easier to treat.    Who should be screened for lung cancer?  We suggest lung cancer screening for anyone who is at high-risk for lung cancer. You are in the high-risk group if you:      are between the ages of 55 and 79, and    have smoked at least 1 pack of cigarettes a day for 20 or more years, and    still smoke or have quit within the past 15 years.    However, if you have a new cough or shortness of breath, you should talk to your doctor before being screened.    Why does it matter if I have symptoms?  Certain symptoms can be a sign that you have a condition in your lungs that should be checked and treated by your doctor. These symptoms include fever, chest pain, a new or changing cough, shortness of breath that you have never felt before, coughing up blood or unexplained weight loss. Having any of these symptoms can greatly affect the results of lung cancer screening.       Should all smokers get an LDCT lung cancer screening exam?  It depends. Lung cancer screening is for a very specific group of men and women who have a history of heavy smoking over a long  period of time (see  Who should be screened for lung cancer  above).  I am in the high-risk group, but have been diagnosed with cancer in the past. Is LDCT lung cancer screening right for me?  In some cases, you should not have LDCT lung screening, such as when your doctor is already following your cancer with CT scan studies. Your doctor will help you decide if LDCT lung screening is right for you.  Do I need to have a screening exam every year?  Yes. If you are in the high-risk group described earlier, you should get an LDCT lung cancer screening exam every year until you are 79, or are no longer willing or able to undergo screening and possible procedures to diagnose and treat lung cancer.  How effective is LDCT at preventing death from lung cancer?  Studies have shown that LDCT lung cancer screening can lower the risk of death from lung cancer by 20 percent in people who are at high-risk.  What are the risks?  There are some risks and limitations of LDCT lung cancer screening. We want to make sure you understand the risks and benefits, so please let us know if you have any questions. Your doctor may want to talk with you more about these risks.    Radiation exposure: As with any exam that uses radiation, there is a very small increased risk of cancer. The amount of radiation in LDCT is small--about the same amount a person would get from a mammogram. Your doctor orders the exam when he or she feels the potential benefits outweigh the risks.    False negatives: No test is perfect, including LDCT. It is possible that you may have a medical condition, including lung cancer, that is not found during your exam. This is called a false negative result.    False positives and more testing: LDCT very often finds something in the lung that could be cancer, but in fact is not. This is called a false positive result. False positive tests often cause anxiety. To make sure these findings are not cancer, you may need to have  more tests. These tests will be done only if you give us permission. Sometimes patients need a treatment that can have side effects, such as a biopsy. For more information on false positives, see  What can I expect from the results?     Findings not related to lung cancer: Your LDCT exam also takes pictures of areas of your body next to your lungs. In a very small number of cases, the CT scan will show an abnormal finding in one of these areas, such as your kidneys, adrenal glands, liver or thyroid. This finding may not be serious, but you may need more tests. Your doctor can help you decide what other tests you may need, if any.  What can I expect from the results?  About 1 out of 4 LDCT exams will find something that may need more tests. Most of the time, these findings are lung nodules. Lung nodules are very small collections of tissue in the lung. These nodules are very common, and the vast majority--more than 97 percent--are not cancer (benign). Most are normal lymph nodes or small areas of scarring from past infections.  But, if a small lung nodule is found to be cancer, the cancer can be cured more than 90 percent of the time. To know if the nodule is cancer, we may need to get more images before your next yearly screening exam. If the nodule has suspicious features (for example, it is large, has an odd shape or grows over time), we will refer you to a specialist for further testing.  Will my doctor also get the results?  Yes. Your doctor will get a copy of your results.  Is it okay to keep smoking now that there s a cancer screening exam?  No. Tobacco is one of the strongest cancer-causing agents. It causes not only lung cancer, but other cancers and cardiovascular (heart) diseases as well. The damage caused by smoking builds over time. This means that the longer you smoke, the higher your risk of disease. While it is never too late to quit, the sooner you quit, the better.  Where can I find help to quit  smoking?  The best way to prevent lung cancer is to stop smoking. If you have already quit smoking, congratulations and keep it up! For help on quitting smoking, please call [employer] at 7-864-QUITNOW (1-753.994.3304) or the American [employer] Society at 1-255.991.3852 to find local resources near you.  One-on-one health coaching:  If you d prefer to work individually with a health care provider on tobacco cessation, we offer:      Medication Therapy Management:  Our specially trained pharmacists work closely with you and your doctor to help you quit smoking.  Call [phone] or [phone] (toll free).

## 2024-10-28 ENCOUNTER — PATIENT OUTREACH (OUTPATIENT)
Dept: CARE COORDINATION | Facility: CLINIC | Age: 69
End: 2024-10-28
Payer: MEDICARE

## 2024-11-16 DIAGNOSIS — E03.4 HYPOTHYROIDISM DUE TO ACQUIRED ATROPHY OF THYROID: Chronic | ICD-10-CM

## 2024-11-18 RX ORDER — LEVOTHYROXINE SODIUM 75 UG/1
75 TABLET ORAL DAILY
Qty: 90 TABLET | Refills: 3 | OUTPATIENT
Start: 2024-11-18

## 2024-11-23 ENCOUNTER — OFFICE VISIT (OUTPATIENT)
Dept: URGENT CARE | Facility: URGENT CARE | Age: 69
End: 2024-11-23
Payer: MEDICARE

## 2024-11-23 VITALS
SYSTOLIC BLOOD PRESSURE: 124 MMHG | BODY MASS INDEX: 19.14 KG/M2 | TEMPERATURE: 98 F | OXYGEN SATURATION: 99 % | HEART RATE: 73 BPM | DIASTOLIC BLOOD PRESSURE: 81 MMHG | WEIGHT: 115 LBS | RESPIRATION RATE: 16 BRPM

## 2024-11-23 DIAGNOSIS — N30.90 BLADDER INFECTION: Primary | ICD-10-CM

## 2024-11-23 LAB
ALBUMIN UR-MCNC: NEGATIVE MG/DL
APPEARANCE UR: CLEAR
BACTERIA #/AREA URNS HPF: ABNORMAL /HPF
BILIRUB UR QL STRIP: NEGATIVE
COLOR UR AUTO: YELLOW
GLUCOSE UR STRIP-MCNC: NEGATIVE MG/DL
HGB UR QL STRIP: ABNORMAL
KETONES UR STRIP-MCNC: NEGATIVE MG/DL
LEUKOCYTE ESTERASE UR QL STRIP: ABNORMAL
NITRATE UR QL: NEGATIVE
PH UR STRIP: 6.5 [PH] (ref 5–7)
RBC #/AREA URNS AUTO: ABNORMAL /HPF
SP GR UR STRIP: 1.01 (ref 1–1.03)
SQUAMOUS #/AREA URNS AUTO: ABNORMAL /LPF
UROBILINOGEN UR STRIP-ACNC: 0.2 E.U./DL
WBC #/AREA URNS AUTO: ABNORMAL /HPF

## 2024-11-23 PROCEDURE — 87086 URINE CULTURE/COLONY COUNT: CPT | Performed by: FAMILY MEDICINE

## 2024-11-23 PROCEDURE — 87088 URINE BACTERIA CULTURE: CPT | Performed by: FAMILY MEDICINE

## 2024-11-23 PROCEDURE — 99213 OFFICE O/P EST LOW 20 MIN: CPT | Performed by: FAMILY MEDICINE

## 2024-11-23 PROCEDURE — 87186 SC STD MICRODIL/AGAR DIL: CPT | Performed by: FAMILY MEDICINE

## 2024-11-23 PROCEDURE — 81001 URINALYSIS AUTO W/SCOPE: CPT | Performed by: FAMILY MEDICINE

## 2024-11-23 RX ORDER — SULFAMETHOXAZOLE AND TRIMETHOPRIM 800; 160 MG/1; MG/1
1 TABLET ORAL 2 TIMES DAILY
Qty: 10 TABLET | Refills: 0 | Status: SHIPPED | OUTPATIENT
Start: 2024-11-23 | End: 2024-11-28

## 2024-11-23 NOTE — PROGRESS NOTES
Subjective: Patient has recently become sexually active and yesterday noticed burning and frequency with urination.  No fever or back pain.  She has had them in the past but it has been a long time.    Objective: No CVAT.  Abdomen benign.  Urine is suggestive but not clear-cut.  Culture sent.    Assessment and plan: Most likely bladder infection, will treat with Septra for 5 days.  We can notify her if the culture shows that she should be on a different antibiotic.

## 2024-11-25 ENCOUNTER — TELEPHONE (OUTPATIENT)
Dept: INTERNAL MEDICINE | Facility: CLINIC | Age: 69
End: 2024-11-25
Payer: MEDICARE

## 2024-11-25 LAB — BACTERIA UR CULT: ABNORMAL

## 2024-11-25 NOTE — TELEPHONE ENCOUNTER
Relayed providers message to patient. Patient verbalized understanding and states she will wait until tomorrow to see if she starts to have improvement. She prefers to return to  if symptoms do not improve.       Patient requesting [facility] to notify her when sensitivities return.   She would like to know either way if she will need to change to a different antibiotic.          Can we leave a detailed message on this number? YES  Phone number patient can be reached at: Cell number on file:    Telephone Information:   Mobile [phone]       Nicole Beltran RN  [facility] Triage

## 2024-11-25 NOTE — TELEPHONE ENCOUNTER
Sensitivities are not back - Im out of office all week after today - would recommend she set up a telephone visit w/another provider or submit e-visit if sx persist > 3 d. 3 d of bactrim should be enough - but would wait on sensitivities to change to another med

## 2024-11-25 NOTE — TELEPHONE ENCOUNTER
Dr. Borrero, please advise.    Pt calling to report her UC visit 11/23 dx UTI. Pt contacted UC and was told to call PCP for followup - no information was provided on UA to pt. Pt has been taking Bactrim DS, pt's pharmacy dispensed 400mg/80mg tablets and been taking 2 tablets BID.  Pt reports symptoms have improved slightly, however burning and urge increase have returned as of Monday 11/25.    Aurea Chew RN

## 2024-11-26 ENCOUNTER — OFFICE VISIT (OUTPATIENT)
Dept: URGENT CARE | Facility: URGENT CARE | Age: 69
End: 2024-11-26
Payer: MEDICARE

## 2024-11-26 VITALS
TEMPERATURE: 98.1 F | OXYGEN SATURATION: 98 % | HEART RATE: 75 BPM | RESPIRATION RATE: 20 BRPM | SYSTOLIC BLOOD PRESSURE: 115 MMHG | DIASTOLIC BLOOD PRESSURE: 73 MMHG

## 2024-11-26 DIAGNOSIS — B37.31 YEAST INFECTION OF THE VAGINA: ICD-10-CM

## 2024-11-26 DIAGNOSIS — B96.89 BV (BACTERIAL VAGINOSIS): ICD-10-CM

## 2024-11-26 DIAGNOSIS — N76.0 BV (BACTERIAL VAGINOSIS): ICD-10-CM

## 2024-11-26 DIAGNOSIS — N30.00 ACUTE CYSTITIS WITHOUT HEMATURIA: ICD-10-CM

## 2024-11-26 DIAGNOSIS — R30.0 DYSURIA: Primary | ICD-10-CM

## 2024-11-26 LAB
CLUE CELLS: PRESENT
TRICHOMONAS, WET PREP: ABNORMAL
WBC'S/HIGH POWER FIELD, WET PREP: ABNORMAL
YEAST, WET PREP: PRESENT

## 2024-11-26 PROCEDURE — 87210 SMEAR WET MOUNT SALINE/INK: CPT | Performed by: NURSE PRACTITIONER

## 2024-11-26 PROCEDURE — 99213 OFFICE O/P EST LOW 20 MIN: CPT | Performed by: NURSE PRACTITIONER

## 2024-11-26 RX ORDER — METRONIDAZOLE 7.5 MG/G
1 GEL VAGINAL AT BEDTIME
Qty: 25 G | Refills: 0 | Status: SHIPPED | OUTPATIENT
Start: 2024-11-26 | End: 2024-12-01

## 2024-11-26 RX ORDER — CEFDINIR 300 MG/1
300 CAPSULE ORAL 2 TIMES DAILY
Qty: 14 CAPSULE | Refills: 0 | Status: SHIPPED | OUTPATIENT
Start: 2024-11-26 | End: 2024-12-03

## 2024-11-26 RX ORDER — FLUCONAZOLE 150 MG/1
150 TABLET ORAL
Qty: 2 TABLET | Refills: 0 | Status: SHIPPED | OUTPATIENT
Start: 2024-11-28 | End: 2024-12-03

## 2024-11-26 NOTE — PROGRESS NOTES
Chief Complaint   Patient presents with    UTI     Seen Saturday for UTI and culture came back - patient on Sulfa and not helpful     SUBJECTIVE:   Suma Calabrese is a 69 year old female who  presents today for a possible UTI.   She has symptoms of dysuria, urgency, frequency, burning, and suprapubic pain and pressure that have been going on for 4 day(s).    Symptom timing described as sudden onset and moderate in severity.    This patient does have a history of urinary tract infections.  There is no history of hematuria, fever,nausea or vomiting.   Patient wonders if she has a separate yeast infection.    Past Medical History:   Diagnosis Date    Cancer (H)     breast    Diverticulitis of colon with perforation 2017    s/p colectomy    Hypertension     Nontoxic uninodular goiter 01/28/2013    Pure hypercholesterolemia 01/28/2013    Rheumatic fever in pediatric patient     TMJ (temporomandibular joint syndrome) 07/01/2013    Tubular adenoma of colon 12/24/2018    2 small adenomas; see surgery note 7/18; flex sig advised       Current Outpatient Medications   Medication Sig Dispense Refill    amLODIPine (NORVASC) 10 MG tablet Take 1 tablet (10 mg) by mouth daily. 90 tablet 3    cefdinir (OMNICEF) 300 MG capsule Take 1 capsule (300 mg) by mouth 2 times daily for 7 days. 14 capsule 0    [START ON 11/28/2024] fluconazole (DIFLUCAN) 150 MG tablet Take 1 tablet (150 mg) by mouth twice a week for 2 doses. 2 tablet 0    fluticasone (FLONASE) 50 MCG/ACT nasal spray Spray 1 spray into both nostrils daily      levothyroxine (SYNTHROID/LEVOTHROID) 75 MCG tablet Take 1 tablet (75 mcg) by mouth daily. 90 tablet 3    metroNIDAZOLE (METROGEL) 0.75 % vaginal gel Place 1 applicator (5 g) vaginally at bedtime for 5 days. 25 g 0    multivitamin (CENTRUM SILVER) tablet Take 1 tablet by mouth daily      sulfamethoxazole-trimethoprim (BACTRIM DS) 800-160 MG tablet Take 1 tablet by mouth 2 times daily for 5 days. 10 tablet 0    VITAMIN  D, CHOLECALCIFEROL, PO Take 2,000 Units by mouth every morning      valACYclovir (VALTREX) 1000 mg tablet Take 2 tablets (2,000 mg) by mouth 2 times daily. (Patient not taking: Reported on 11/26/2024) 4 tablet 11     Social History     Tobacco Use    Smoking status: Every Day     Current packs/day: 0.50     Average packs/day: 0.5 packs/day for 41.0 years (20.5 ttl pk-yrs)     Types: Cigarettes     Passive exposure: Never    Smokeless tobacco: Never    Tobacco comments:     1/2 PPD   Substance Use Topics    Alcohol use: Yes     Alcohol/week: 2.0 standard drinks of alcohol     Types: 2 Standard drinks or equivalent per week     Comment: weekends     Allergies   Allergen Reactions    Bupropion Other (See Comments)     Jittery and HA    Erythromycin GI Disturbance    Oxycodone Nausea and Vomiting       Review of Systems  All systems negative except for those listed above in HPI.    OBJECTIVE:  /73 (BP Location: Left arm, Patient Position: Sitting, Cuff Size: Adult Regular)   Pulse 75   Temp 98.1  F (36.7  C) (Oral)   Resp 20   LMP  (LMP Unknown)   SpO2 98%   Physical Exam  Vitals reviewed.   Constitutional:       General: She is not in acute distress.     Appearance: Normal appearance. She is well-developed. She is not ill-appearing, toxic-appearing or diaphoretic.   Cardiovascular:      Pulses: Normal pulses.   Pulmonary:      Effort: Pulmonary effort is normal.   Abdominal:      General: Bowel sounds are normal.      Palpations: Abdomen is soft.      Tenderness: There is no abdominal tenderness. There is no right CVA tenderness or left CVA tenderness.   Musculoskeletal:         General: Normal range of motion.   Skin:     General: Skin is warm and dry.      Capillary Refill: Capillary refill takes less than 2 seconds.      Coloration: Skin is not jaundiced or pale.   Neurological:      General: No focal deficit present.      Mental Status: She is alert and oriented to person, place, and time.   Psychiatric:          Mood and Affect: Mood normal.         Behavior: Behavior normal.       Results for orders placed or performed in visit on 11/26/24   Wet prep - Clinic Collect     Status: Abnormal    Specimen: Vagina; Swab   Result Value Ref Range    Trichomonas Absent Absent    Yeast Present (A) Absent    Clue Cells Present (A) Absent    WBCs/high power field 1+ (A) None       ASSESSMENT:    ICD-10-CM    1. Dysuria  R30.0 Wet prep - Clinic Collect     CANCELED: UA Macroscopic with reflex to Microscopic and Culture - Clinic Collect      2. Acute cystitis without hematuria  N30.00 cefdinir (OMNICEF) 300 MG capsule      3. Yeast infection of the vagina  B37.31 fluconazole (DIFLUCAN) 150 MG tablet      4. BV (bacterial vaginosis)  N76.0 metroNIDAZOLE (METROGEL) 0.75 % vaginal gel    B96.89         PLAN:     Will switch from Bactrim to Omnicef  Should no improvement in the next 24 hours  Wet prep done in clinic positive for yeast and BV  take full course of antibiotics.  Urine culture pending, we will call you only if culture shows resistance and change of antibiotic is required or if no growth to stop antibiotics and to follow-up with your primary care provider.  May use over the counter AZO pain relief or Uristat (phenazopyridine) for urinary burning but do not use for 24 hours before future urine tests.  Drink plenty of fluids. Limit caffeine and alcohol as these are bladder irritants.  If you develop any vomiting, high fevers or lower back pain, these can be signs of a kidney infection and you should be seen in urgent care or in the ER.  Prevention of future infections by drinking cranberry juice, urination after intercourse and wiping from front to back after using the toilet.  Please follow up with primary care provider if symptoms return, if you're not improving, worsening or new symptoms or for any adverse reactions to medications.     Follow up with primary care provider with any problems, questions or concerns or if  symptoms worsen or fail to improve. Patient agreed to plan and verbalized understanding.    Caty Martinez, LIANET-BC  [facility]

## 2024-11-26 NOTE — PATIENT INSTRUCTIONS
Will switch from Bactrim to Omnicef  Should no improvement in the next 24 hours  Wet prep done in clinic positive for yeast and BV  take full course of antibiotics.  Urine culture pending, we will call you only if culture shows resistance and change of antibiotic is required or if no growth to stop antibiotics and to follow-up with your primary care provider.  May use over the counter AZO pain relief or Uristat (phenazopyridine) for urinary burning but do not use for 24 hours before future urine tests.  Drink plenty of fluids. Limit caffeine and alcohol as these are bladder irritants.  If you develop any vomiting, high fevers or lower back pain, these can be signs of a kidney infection and you should be seen in urgent care or in the ER.  Prevention of future infections by drinking cranberry juice, urination after intercourse and wiping from front to back after using the toilet.  Please follow up with primary care provider if symptoms return, if you're not improving, worsening or new symptoms or for any adverse reactions to medications.

## 2024-12-03 ENCOUNTER — APPOINTMENT (OUTPATIENT)
Dept: CT IMAGING | Facility: CLINIC | Age: 69
End: 2024-12-03
Attending: EMERGENCY MEDICINE
Payer: COMMERCIAL

## 2024-12-03 ENCOUNTER — HOSPITAL ENCOUNTER (EMERGENCY)
Facility: CLINIC | Age: 69
Discharge: HOME | End: 2024-12-03
Attending: EMERGENCY MEDICINE | Admitting: EMERGENCY MEDICINE
Payer: COMMERCIAL

## 2024-12-03 VITALS
HEART RATE: 78 BPM | SYSTOLIC BLOOD PRESSURE: 146 MMHG | TEMPERATURE: 97.5 F | DIASTOLIC BLOOD PRESSURE: 83 MMHG | BODY MASS INDEX: 18 KG/M2 | OXYGEN SATURATION: 99 % | HEIGHT: 66 IN | WEIGHT: 112 LBS | RESPIRATION RATE: 14 BRPM

## 2024-12-03 DIAGNOSIS — R19.8 RECTAL DISCHARGE: ICD-10-CM

## 2024-12-03 LAB
ALBUMIN UR-MCNC: NEGATIVE MG/DL
ANION GAP SERPL CALCULATED.3IONS-SCNC: 9 MMOL/L (ref 7–15)
APPEARANCE UR: CLEAR
BASOPHILS # BLD AUTO: 0 10E3/UL (ref 0–0.2)
BASOPHILS NFR BLD AUTO: 0 %
BILIRUB UR QL STRIP: NEGATIVE
BUN SERPL-MCNC: 7.1 MG/DL (ref 8–23)
CALCIUM SERPL-MCNC: 10 MG/DL (ref 8.8–10.4)
CHLORIDE SERPL-SCNC: 98 MMOL/L (ref 98–107)
CLUE CELLS: NORMAL
COLOR UR AUTO: NORMAL
CREAT SERPL-MCNC: 0.67 MG/DL (ref 0.51–0.95)
EGFRCR SERPLBLD CKD-EPI 2021: >90 ML/MIN/1.73M2
EOSINOPHIL # BLD AUTO: 0.1 10E3/UL (ref 0–0.7)
EOSINOPHIL NFR BLD AUTO: 1 %
ERYTHROCYTE [DISTWIDTH] IN BLOOD BY AUTOMATED COUNT: 12.9 % (ref 10–15)
GLUCOSE SERPL-MCNC: 83 MG/DL (ref 70–99)
GLUCOSE UR STRIP-MCNC: NEGATIVE MG/DL
HCO3 SERPL-SCNC: 27 MMOL/L (ref 22–29)
HCT VFR BLD AUTO: 45.8 % (ref 35–47)
HGB BLD-MCNC: 15.8 G/DL (ref 11.7–15.7)
HGB UR QL STRIP: NEGATIVE
IMM GRANULOCYTES # BLD: 0 10E3/UL
IMM GRANULOCYTES NFR BLD: 0 %
KETONES UR STRIP-MCNC: NEGATIVE MG/DL
LEUKOCYTE ESTERASE UR QL STRIP: NEGATIVE
LYMPHOCYTES # BLD AUTO: 2.2 10E3/UL (ref 0.8–5.3)
LYMPHOCYTES NFR BLD AUTO: 34 %
MCH RBC QN AUTO: 32.1 PG (ref 26.5–33)
MCHC RBC AUTO-ENTMCNC: 34.5 G/DL (ref 31.5–36.5)
MCV RBC AUTO: 93 FL (ref 78–100)
MONOCYTES # BLD AUTO: 0.4 10E3/UL (ref 0–1.3)
MONOCYTES NFR BLD AUTO: 6 %
NEUTROPHILS # BLD AUTO: 3.9 10E3/UL (ref 1.6–8.3)
NEUTROPHILS NFR BLD AUTO: 59 %
NITRATE UR QL: NEGATIVE
NRBC # BLD AUTO: 0 10E3/UL
NRBC BLD AUTO-RTO: 0 /100
PH UR STRIP: 6.5 [PH] (ref 5–7)
PLATELET # BLD AUTO: 183 10E3/UL (ref 150–450)
POTASSIUM SERPL-SCNC: 4 MMOL/L (ref 3.4–5.3)
RBC # BLD AUTO: 4.92 10E6/UL (ref 3.8–5.2)
RBC URINE: <1 /HPF
SODIUM SERPL-SCNC: 134 MMOL/L (ref 135–145)
SP GR UR STRIP: 1 (ref 1–1.03)
TRICHOMONAS, WET PREP: NORMAL
UROBILINOGEN UR STRIP-MCNC: NORMAL MG/DL
WBC # BLD AUTO: 6.6 10E3/UL (ref 4–11)
WBC URINE: 0 /HPF
WBC'S/HIGH POWER FIELD, WET PREP: NORMAL
YEAST, WET PREP: NORMAL

## 2024-12-03 PROCEDURE — 80048 BASIC METABOLIC PNL TOTAL CA: CPT | Performed by: EMERGENCY MEDICINE

## 2024-12-03 PROCEDURE — G1010 CDSM STANSON: HCPCS

## 2024-12-03 PROCEDURE — 87210 SMEAR WET MOUNT SALINE/INK: CPT | Performed by: EMERGENCY MEDICINE

## 2024-12-03 PROCEDURE — 250N000011 HC RX IP 250 OP 636: Performed by: EMERGENCY MEDICINE

## 2024-12-03 PROCEDURE — 81001 URINALYSIS AUTO W/SCOPE: CPT | Performed by: EMERGENCY MEDICINE

## 2024-12-03 PROCEDURE — 85025 COMPLETE CBC W/AUTO DIFF WBC: CPT | Performed by: EMERGENCY MEDICINE

## 2024-12-03 PROCEDURE — 99285 EMERGENCY DEPT VISIT HI MDM: CPT | Mod: 25

## 2024-12-03 PROCEDURE — 36415 COLL VENOUS BLD VENIPUNCTURE: CPT | Performed by: EMERGENCY MEDICINE

## 2024-12-03 PROCEDURE — 72193 CT PELVIS W/DYE: CPT | Mod: MG

## 2024-12-03 RX ORDER — IOPAMIDOL 755 MG/ML
57 INJECTION, SOLUTION INTRAVASCULAR ONCE
Status: COMPLETED | OUTPATIENT
Start: 2024-12-03 | End: 2024-12-03

## 2024-12-03 RX ADMIN — IOPAMIDOL 57 ML: 755 INJECTION, SOLUTION INTRAVENOUS at 15:08

## 2024-12-03 ASSESSMENT — COLUMBIA-SUICIDE SEVERITY RATING SCALE - C-SSRS
1. IN THE PAST MONTH, HAVE YOU WISHED YOU WERE DEAD OR WISHED YOU COULD GO TO SLEEP AND NOT WAKE UP?: NO
2. HAVE YOU ACTUALLY HAD ANY THOUGHTS OF KILLING YOURSELF IN THE PAST MONTH?: NO
6. HAVE YOU EVER DONE ANYTHING, STARTED TO DO ANYTHING, OR PREPARED TO DO ANYTHING TO END YOUR LIFE?: NO

## 2024-12-03 ASSESSMENT — ACTIVITIES OF DAILY LIVING (ADL)
ADLS_ACUITY_SCORE: 44

## 2024-12-03 NOTE — DISCHARGE INSTRUCTIONS
Your blood work, urine sample are normal.  You do not have a yeast infection.  Your CT scan is also normal.  We recommend that you follow-up with your primary care provider.  Please return the emergency department if you develop any new or concerning symptoms.

## 2024-12-03 NOTE — ED PROVIDER NOTES
"  Emergency Department Note      History of Present Illness     Chief Complaint   Rectal Discharge       HPI   Suma Calabrese is a 69 year old female with history of hypertension, hyperlipidemia, breast cancer, and diverticulitis who presents with rectal discharge. The patient reports that she was diagnosed with a yeast infection and UTI 1.5 weeks ago, and she finished her antibiotics and treatment for both of these yesterday. She explains that she has still been having a burning sensation in her vaginal and rectal area that has persisted throughout taking the medications. The patient adds that today she noticed white/yellow discharge coming out of her rectum. She denies abdominal pain, cough, fever, chest pain, shortness of breath, nausea, vomiting, and black/bloody stools. The patient reports that 8 years ago she had a double mastectomy and had a small bowel obstruction shortly after that. She explains that this has left her with a chronic ostomy bag in place with no recent changes to output. The patient also reports having a rectal prolapse that has not been surgically operated on.    Independent Historian   None    Review of External Notes   I reviewed the patient's urgent care visit note from 11/26/24 for dysuria    Past Medical History     Medical History and Problem List   Hypothyroidism  Hypertension  Tobacco use disorder  Hyperlipidemia  Breast cancer  Hearing loss (R)  Osteopenia  Diverticulitis  Nontoxic uninodular goiter  Rheumatic fever  TMJ  Tubular adenoma of colon    Medications   Omnicef  Norvasc  Diflucan  Flonase  Levothyroxine    Surgical History   Biopsy node sentinel (B)  Colectomy subtotal (L)  [place] tooth extraction  Ileostomy  Exploratory laparotomy  Mastectomy simple (B)    Physical Exam     Patient Vitals for the past 24 hrs:   BP Temp Temp src Pulse Resp SpO2 Height Weight   12/03/24 1139 (!) 146/83 97.5  F (36.4  C) Oral 78 14 99 % 1.676 m (5' 6\") 50.8 kg (112 lb)     Physical " Exam  General: No acute distress  Head: No obvious trauma to head.  Ears, Nose, Throat:  External ears normal.  Nose normal.  No pharyngeal erythema, swelling or exudate.  Midline uvula. Moist mucus membranes.  Eyes:  Conjunctivae clear.   Neck: Normal range of motion.  Neck supple.   CV: Regular rate and rhythm.  No murmurs.      Respiratory: Effort normal and breath sounds normal.  No wheezing or crackles.   Gastrointestinal: Soft.  No distension. There is no tenderness.  There is no rigidity, no rebound and no guarding.   Rectal: Rectal prolapse. No fissure, hemorrhoid, or evidence of bleeding. No perianal erythema or induration.  Musculoskeletal: Normal range of motion.  Non tender extremities to palpations. No lower extremity edema  Neuro: Alert. Moving all extremities appropriately.  Normal speech.    Skin: Skin is warm and dry.  No rash noted.   Psych: Normal mood and affect. Behavior is normal.     Diagnostics     Lab Results   Labs Ordered and Resulted from Time of ED Arrival to Time of ED Departure   BASIC METABOLIC PANEL - Abnormal       Result Value    Sodium 134 (*)     Potassium 4.0      Chloride 98      Carbon Dioxide (CO2) 27      Anion Gap 9      Urea Nitrogen 7.1 (*)     Creatinine 0.67      GFR Estimate >90      Calcium 10.0      Glucose 83     CBC WITH PLATELETS AND DIFFERENTIAL - Abnormal    WBC Count 6.6      RBC Count 4.92      Hemoglobin 15.8 (*)     Hematocrit 45.8      MCV 93      MCH 32.1      MCHC 34.5      RDW 12.9      Platelet Count 183      % Neutrophils 59      % Lymphocytes 34      % Monocytes 6      % Eosinophils 1      % Basophils 0      % Immature Granulocytes 0      NRBCs per 100 WBC 0      Absolute Neutrophils 3.9      Absolute Lymphocytes 2.2      Absolute Monocytes 0.4      Absolute Eosinophils 0.1      Absolute Basophils 0.0      Absolute Immature Granulocytes 0.0      Absolute NRBCs 0.0     ROUTINE UA WITH MICROSCOPIC REFLEX TO CULTURE - Normal    Color Urine Straw       Appearance Urine Clear      Glucose Urine Negative      Bilirubin Urine Negative      Ketones Urine Negative      Specific Gravity Urine 1.004      Blood Urine Negative      pH Urine 6.5      Protein Albumin Urine Negative      Urobilinogen Urine Normal      Nitrite Urine Negative      Leukocyte Esterase Urine Negative      RBC Urine <1      WBC Urine 0     WET PREPARATION - Normal    Trichomonas Absent      Yeast Absent      Clue Cells Absent      WBCs/high power field None         Imaging   CT Pelvis Soft Tissue w Contrast   Final Result   IMPRESSION:    1.  Right lower quadrant ileostomy and a remnant rectum/Kate's   pouch.   2.  Small amount of soft tissue extending outside the anus could   represent rectal mucosal prolapse. Please correlate with physical   exam.   3.  A few foci of nodular enhancement in the mid rectum are   indeterminate and not well evaluated by CT. Recommend proctoscopy   correlation.      CLYDE STEELE MD            SYSTEM ID:  [id number]          Independent Interpretation   None    ED Course      Medications Administered   Medications   iopamidol (ISOVUE-370) solution 57 mL (57 mLs Intravenous $Given 12/3/24 1508)   sodium chloride (PF) 0.9% PF flush 60 mL (60 mLs Intravenous $Given 12/3/24 1507)         Discussion of Management   None    ED Course   ED Course as of 12/03/24 1636   Tue Dec 03, 2024   1326 I obtained history and examined the patient as noted above   1621 I rechecked the patient and explained findings.       Additional Documentation  None    Medical Decision Making / Diagnosis     CMS Diagnoses: None    MIPS       None    [facility]   Suma Calabrese is a 69 year old female presenting with rectal discharge.  She has an ostomy and does not normally have any output per her rectum.  On exam she does have a rectal prolapse but I do not appreciate any abscess, fissure, signs of infection.  She is concerned that she may still have a yeast infection.  Wet prep is obtained and is  negative for yeast.  UA is negative for infection.  CBC and BMP are grossly unremarkable.  CT of the pelvis is obtained to evaluate for signs of fistula or abscess.  This is negative for signs of infection.  She is relieved by the negative workup.  I encouraged her to follow-up with her primary care provider.  Return precautions are given and she verbalizes understanding.  She is discharged home in stable condition.        Disposition   The patient was discharged.     Diagnosis     ICD-10-CM    1. Rectal discharge  R19.8              Scribe Disclosure:  I, Kavin Sanchez, am serving as a scribe at 1:32 PM on 12/3/2024 to document services personally performed by Diego Toney MD based on my observations and the provider's statements to me.        Diego Toney MD  12/03/24 1956

## 2024-12-03 NOTE — ED TRIAGE NOTES
Pt Dx with yeast infection and UTI a week and a half ago. Pt finished antibiotics and treatment yesterday. Today Pt reports burning continued to vaginal area and this morning she noted a white/yellow discharge out of rectum and reports burning feeling to rectum. Pt denies bleeding.     Pt reports she does have a rectal prolapse and has a colostomy.      Triage Assessment (Adult)       Row Name 12/03/24 1142          Triage Assessment    Airway WDL WDL        Respiratory WDL    Respiratory WDL WDL        Cardiac WDL    Cardiac WDL WDL        Cognitive/Neuro/Behavioral WDL    Cognitive/Neuro/Behavioral WDL WDL

## 2024-12-04 ENCOUNTER — PATIENT OUTREACH (OUTPATIENT)
Dept: INTERNAL MEDICINE | Facility: CLINIC | Age: 69
End: 2024-12-04
Payer: MEDICARE

## 2024-12-05 NOTE — TELEPHONE ENCOUNTER
Pt stated she doesn't think she needs a follow up with PCP at this time. Pt will call back if she changes her mind.       Transitions of Care Outreach  Chief Complaint   Patient presents with    Hospital F/U     Rectal Discharge       Most Recent Admission Date: 12/3/2024   Most Recent Admission Diagnosis:      Most Recent Discharge Date: 12/3/2024   Most Recent Discharge Diagnosis: Rectal discharge - R19.8     Transitions of Care Assessment    Discharge Assessment  How are you doing now that you are home?: Doing a lot better  How are your symptoms? (Red Flag symptoms escalate to triage hotline per guidelines): Improved  Do you know how to contact your clinic care team if you have future questions or changes to your health status? : Yes  Does the patient have their discharge instructions? : Yes  Does the patient have questions regarding their discharge instructions? : No  Were you started on any new medications or were there changes to any of your previous medications? : No  Does the patient have all of their medications?: Yes  Do you have questions regarding any of your medications? : Yes (see comment) (Pt asked how long the medication for yeast infection stays in her system. [facility]  Do you have all of your needed medical supplies or equipment (DME)?  (i.e. oxygen tank, CPAP, cane, etc.): Yes    Follow up Plan          Future Appointments   Date Time Provider Department Center   10/17/2025  9:00 AM Osvaldo Borrero MD [facility]       Outpatient Plan as outlined on AVS reviewed with patient.    For any urgent concerns, please contact our 24 hour nurse triage line: [phone] ([phone])       Pamela Lara RN

## 2025-03-17 ENCOUNTER — HOSPITAL ENCOUNTER (EMERGENCY)
Facility: CLINIC | Age: 70
Discharge: HOME | End: 2025-03-17
Attending: EMERGENCY MEDICINE | Admitting: EMERGENCY MEDICINE
Payer: COMMERCIAL

## 2025-03-17 VITALS
WEIGHT: 105 LBS | OXYGEN SATURATION: 99 % | TEMPERATURE: 96.7 F | HEART RATE: 85 BPM | SYSTOLIC BLOOD PRESSURE: 119 MMHG | HEIGHT: 66 IN | BODY MASS INDEX: 16.88 KG/M2 | RESPIRATION RATE: 14 BRPM | DIASTOLIC BLOOD PRESSURE: 86 MMHG

## 2025-03-17 DIAGNOSIS — N17.9 AKI (ACUTE KIDNEY INJURY): ICD-10-CM

## 2025-03-17 DIAGNOSIS — E86.0 DEHYDRATION: ICD-10-CM

## 2025-03-17 DIAGNOSIS — R19.7 DIARRHEA, UNSPECIFIED TYPE: ICD-10-CM

## 2025-03-17 DIAGNOSIS — R42 DIZZINESS: ICD-10-CM

## 2025-03-17 LAB
ADV 40+41 DNA STL QL NAA+NON-PROBE: NEGATIVE
ALBUMIN SERPL BCG-MCNC: 4 G/DL (ref 3.5–5.2)
ALBUMIN UR-MCNC: NEGATIVE MG/DL
ALP SERPL-CCNC: 109 U/L (ref 40–150)
ALT SERPL W P-5'-P-CCNC: 31 U/L (ref 0–50)
ANION GAP SERPL CALCULATED.3IONS-SCNC: 10 MMOL/L (ref 7–15)
APPEARANCE UR: CLEAR
AST SERPL W P-5'-P-CCNC: 36 U/L (ref 0–45)
ASTRO TYP 1-8 RNA STL QL NAA+NON-PROBE: NEGATIVE
BASOPHILS # BLD AUTO: 0 10E3/UL (ref 0–0.2)
BASOPHILS NFR BLD AUTO: 0 %
BILIRUB SERPL-MCNC: 0.4 MG/DL
BILIRUB UR QL STRIP: NEGATIVE
BUN SERPL-MCNC: 33 MG/DL (ref 8–23)
C CAYETANENSIS DNA STL QL NAA+NON-PROBE: NEGATIVE
C DIFF TOX B STL QL: NEGATIVE
CALCIUM SERPL-MCNC: 9 MG/DL (ref 8.8–10.4)
CAMPYLOBACTER DNA SPEC NAA+PROBE: NEGATIVE
CHLORIDE SERPL-SCNC: 99 MMOL/L (ref 98–107)
COLOR UR AUTO: ABNORMAL
CREAT SERPL-MCNC: 1.28 MG/DL (ref 0.51–0.95)
CRYPTOSP DNA STL QL NAA+NON-PROBE: NEGATIVE
E COLI O157 DNA STL QL NAA+NON-PROBE: ABNORMAL
E HISTOLYT DNA STL QL NAA+NON-PROBE: NEGATIVE
EAEC ASTA GENE ISLT QL NAA+PROBE: NEGATIVE
EC STX1+STX2 GENES STL QL NAA+NON-PROBE: NEGATIVE
EGFRCR SERPLBLD CKD-EPI 2021: 45 ML/MIN/1.73M2
EOSINOPHIL # BLD AUTO: 0 10E3/UL (ref 0–0.7)
EOSINOPHIL NFR BLD AUTO: 0 %
EPEC EAE GENE STL QL NAA+NON-PROBE: NEGATIVE
ERYTHROCYTE [DISTWIDTH] IN BLOOD BY AUTOMATED COUNT: 13 % (ref 10–15)
ETEC LTA+ST1A+ST1B TOX ST NAA+NON-PROBE: NEGATIVE
G LAMBLIA DNA STL QL NAA+NON-PROBE: NEGATIVE
GLUCOSE SERPL-MCNC: 105 MG/DL (ref 70–99)
GLUCOSE UR STRIP-MCNC: NEGATIVE MG/DL
HCO3 SERPL-SCNC: 21 MMOL/L (ref 22–29)
HCT VFR BLD AUTO: 45.9 % (ref 35–47)
HGB BLD-MCNC: 15.6 G/DL (ref 11.7–15.7)
HGB UR QL STRIP: NEGATIVE
HYALINE CASTS: 4 /LPF
IMM GRANULOCYTES # BLD: 0 10E3/UL
IMM GRANULOCYTES NFR BLD: 0 %
KETONES UR STRIP-MCNC: NEGATIVE MG/DL
LACTATE SERPL-SCNC: 0.7 MMOL/L (ref 0.7–2)
LEUKOCYTE ESTERASE UR QL STRIP: NEGATIVE
LIPASE SERPL-CCNC: 43 U/L (ref 13–60)
LYMPHOCYTES # BLD AUTO: 1.8 10E3/UL (ref 0.8–5.3)
LYMPHOCYTES NFR BLD AUTO: 46 %
MCH RBC QN AUTO: 30.5 PG (ref 26.5–33)
MCHC RBC AUTO-ENTMCNC: 34 G/DL (ref 31.5–36.5)
MCV RBC AUTO: 90 FL (ref 78–100)
MONOCYTES # BLD AUTO: 0.4 10E3/UL (ref 0–1.3)
MONOCYTES NFR BLD AUTO: 10 %
MUCOUS THREADS #/AREA URNS LPF: PRESENT /LPF
NEUTROPHILS # BLD AUTO: 1.7 10E3/UL (ref 1.6–8.3)
NEUTROPHILS NFR BLD AUTO: 43 %
NITRATE UR QL: NEGATIVE
NOROVIRUS GI+II RNA STL QL NAA+NON-PROBE: POSITIVE
NRBC # BLD AUTO: 0 10E3/UL
NRBC BLD AUTO-RTO: 0 /100
P SHIGELLOIDES DNA STL QL NAA+NON-PROBE: NEGATIVE
PH UR STRIP: 5.5 [PH] (ref 5–7)
PLATELET # BLD AUTO: 142 10E3/UL (ref 150–450)
POTASSIUM SERPL-SCNC: 4.7 MMOL/L (ref 3.4–5.3)
PROT SERPL-MCNC: 7.6 G/DL (ref 6.4–8.3)
RBC # BLD AUTO: 5.11 10E6/UL (ref 3.8–5.2)
RBC URINE: 1 /HPF
RVA RNA STL QL NAA+NON-PROBE: NEGATIVE
SALMONELLA SP RPOD STL QL NAA+PROBE: NEGATIVE
SAPO I+II+IV+V RNA STL QL NAA+NON-PROBE: NEGATIVE
SHIGELLA SP+EIEC IPAH ST NAA+NON-PROBE: NEGATIVE
SODIUM SERPL-SCNC: 130 MMOL/L (ref 135–145)
SP GR UR STRIP: 1.01 (ref 1–1.03)
SQUAMOUS EPITHELIAL: <1 /HPF
UROBILINOGEN UR STRIP-MCNC: NORMAL MG/DL
V CHOLERAE DNA SPEC QL NAA+PROBE: NEGATIVE
VIBRIO DNA SPEC NAA+PROBE: NEGATIVE
WBC # BLD AUTO: 3.9 10E3/UL (ref 4–11)
WBC URINE: <1 /HPF
Y ENTEROCOL DNA STL QL NAA+PROBE: NEGATIVE

## 2025-03-17 PROCEDURE — 258N000003 HC RX IP 258 OP 636: Performed by: EMERGENCY MEDICINE

## 2025-03-17 PROCEDURE — 96360 HYDRATION IV INFUSION INIT: CPT

## 2025-03-17 PROCEDURE — 85004 AUTOMATED DIFF WBC COUNT: CPT | Performed by: EMERGENCY MEDICINE

## 2025-03-17 PROCEDURE — 36415 COLL VENOUS BLD VENIPUNCTURE: CPT | Performed by: EMERGENCY MEDICINE

## 2025-03-17 PROCEDURE — 82310 ASSAY OF CALCIUM: CPT | Performed by: EMERGENCY MEDICINE

## 2025-03-17 PROCEDURE — 83605 ASSAY OF LACTIC ACID: CPT | Performed by: EMERGENCY MEDICINE

## 2025-03-17 PROCEDURE — 80053 COMPREHEN METABOLIC PANEL: CPT | Performed by: EMERGENCY MEDICINE

## 2025-03-17 PROCEDURE — 85018 HEMOGLOBIN: CPT | Performed by: EMERGENCY MEDICINE

## 2025-03-17 PROCEDURE — 81001 URINALYSIS AUTO W/SCOPE: CPT | Performed by: EMERGENCY MEDICINE

## 2025-03-17 PROCEDURE — 96361 HYDRATE IV INFUSION ADD-ON: CPT

## 2025-03-17 PROCEDURE — 99283 EMERGENCY DEPT VISIT LOW MDM: CPT | Mod: 25

## 2025-03-17 PROCEDURE — 87507 IADNA-DNA/RNA PROBE TQ 12-25: CPT | Performed by: EMERGENCY MEDICINE

## 2025-03-17 PROCEDURE — 83690 ASSAY OF LIPASE: CPT | Performed by: EMERGENCY MEDICINE

## 2025-03-17 PROCEDURE — 87493 C DIFF AMPLIFIED PROBE: CPT | Performed by: EMERGENCY MEDICINE

## 2025-03-17 RX ORDER — MECLIZINE HYDROCHLORIDE 25 MG/1
25 TABLET ORAL 3 TIMES DAILY PRN
Qty: 20 TABLET | Refills: 0 | Status: SHIPPED | OUTPATIENT
Start: 2025-03-17

## 2025-03-17 RX ADMIN — SODIUM CHLORIDE 1000 ML: 0.9 INJECTION, SOLUTION INTRAVENOUS at 11:45

## 2025-03-17 RX ADMIN — SODIUM CHLORIDE 1000 ML: 0.9 INJECTION, SOLUTION INTRAVENOUS at 10:19

## 2025-03-17 ASSESSMENT — COLUMBIA-SUICIDE SEVERITY RATING SCALE - C-SSRS
1. IN THE PAST MONTH, HAVE YOU WISHED YOU WERE DEAD OR WISHED YOU COULD GO TO SLEEP AND NOT WAKE UP?: NO
6. HAVE YOU EVER DONE ANYTHING, STARTED TO DO ANYTHING, OR PREPARED TO DO ANYTHING TO END YOUR LIFE?: NO
2. HAVE YOU ACTUALLY HAD ANY THOUGHTS OF KILLING YOURSELF IN THE PAST MONTH?: NO

## 2025-03-17 ASSESSMENT — ACTIVITIES OF DAILY LIVING (ADL)
ADLS_ACUITY_SCORE: 44

## 2025-03-17 NOTE — ED TRIAGE NOTES
Pt believes she had food poisoning on Friday.  Severe diarrhea. No emesis. Stomach pain 8/10.  Fine yesterday but now feels lightheaded.   Rates stomach pain 3/10 now.    Called 9-1-1 this am as patient was too dizzy to walk.

## 2025-03-17 NOTE — ED PROVIDER NOTES
Emergency Department Note      History of Present Illness     Chief Complaint   Abdominal Pain      HPI   Suma Calabrese is a 69 year old female who presents for evaluation of lightheadedness, concern for diarrhea diarrhea, and dehydration.  She has a history of an ileostomy.  Since Friday, 3 days ago she has been having increased diarrhea.  No blood in the stool.  No vomiting but did feel nauseous occasionally.  She had some stomach pain but does not have pain at this time.  She presents today after trying to get up out of bed today and became very lightheaded every time she tried to stand up.  She gets some dizziness when standing but that resolved quickly when she went and laid back down.  No fevers or chills.  No recent antibiotic use.  No hospitalizations or known sick contacts.  She is concerned she had food poisoning after going out to eat prior to the symptoms.    Independent Historian   None    Review of External Notes       Past Medical History     Medical History and Problem List   Past Medical History:   Diagnosis Date    Cancer (H)     Diverticulitis of colon with perforation 2017    Hypertension     Nontoxic uninodular goiter 01/28/2013    Pure hypercholesterolemia 01/28/2013    Rheumatic fever in pediatric patient     TMJ (temporomandibular joint syndrome) 07/01/2013    Tubular adenoma of colon 12/24/2018       Medications   amLODIPine (NORVASC) 10 MG tablet  fluticasone (FLONASE) 50 MCG/ACT nasal spray  levothyroxine (SYNTHROID/LEVOTHROID) 75 MCG tablet  multivitamin (CENTRUM SILVER) tablet  valACYclovir (VALTREX) 1000 mg tablet  VITAMIN D, CHOLECALCIFEROL, PO        Surgical History   Past Surgical History:   Procedure Laterality Date    BIOPSY      BIOPSY NODE SENTINEL Bilateral 10/16/2017    Procedure: BIOPSY NODE SENTINEL;;  Surgeon: Van Salazar MD;  Location:  [facility]    bladder dilitation[  age 17    COLECTOMY SUBTOTAL Left 11/13/2017    Procedure: COLECTOMY SUBTOTAL;  EXPLORATORY  "LAPAROTOMY, OPEN SUBTOTAL  COLECTOMY, END ILEOSTOMY;  Surgeon: May Spain MD;  Location:  [facility]    DENTAL SURGERY      [place] tooth extraction    ILEOSTOMY N/A 11/13/2017    Procedure: ILEOSTOMY;;  Surgeon: May Spain MD;  Location:  OR    LAPAROTOMY EXPLORATORY N/A 11/13/2017    Procedure: LAPAROTOMY EXPLORATORY;;  Surgeon: May Spain MD;  Location:  OR    MASTECTOMY SIMPLE BILATERAL Bilateral 10/16/2017    Procedure: MASTECTOMY SIMPLE BILATERAL;  BILATERAL SIMPLE MASTECTOMY, BILATERAL AXILLARY SENTINEL NODE BIOPSY;  Surgeon: Van Salazar MD;  Location: [facility]       Physical Exam     Patient Vitals for the past 24 hrs:   BP Temp Temp src Pulse Resp SpO2 Height Weight   03/17/25 0936 119/86 (!) 96.7  F (35.9  C) Temporal 85 14 99 % 1.676 m (5' 6\") 47.6 kg (105 lb)     Physical Exam  ***    Diagnostics     Lab Results   Labs Ordered and Resulted from Time of ED Arrival to Time of ED Departure   COMPREHENSIVE METABOLIC PANEL - Abnormal       Result Value    Sodium 130 (*)     Potassium 4.7      Carbon Dioxide (CO2) 21 (*)     Anion Gap 10      Urea Nitrogen 33.0 (*)     Creatinine 1.28 (*)     GFR Estimate 45 (*)     Calcium 9.0      Chloride 99      Glucose 105 (*)     Alkaline Phosphatase 109      AST 36      ALT 31      Protein Total 7.6      Albumin 4.0      Bilirubin Total 0.4     CBC WITH PLATELETS AND DIFFERENTIAL - Abnormal    WBC Count 3.9 (*)     RBC Count 5.11      Hemoglobin 15.6      Hematocrit 45.9      MCV 90      MCH 30.5      MCHC 34.0      RDW 13.0      Platelet Count 142 (*)     % Neutrophils 43      % Lymphocytes 46      % Monocytes 10      % Eosinophils 0      % Basophils 0      % Immature Granulocytes 0      NRBCs per 100 WBC 0      Absolute Neutrophils 1.7      Absolute Lymphocytes 1.8      Absolute Monocytes 0.4      Absolute Eosinophils 0.0      Absolute Basophils 0.0      Absolute Immature Granulocytes 0.0      Absolute NRBCs 0.0     LACTIC ACID WHOLE BLOOD " "WITH 1X REPEAT IN 2 HR WHEN >2 - Normal    Lactic Acid, Initial 0.7     LIPASE - Normal    Lipase 43     ROUTINE UA WITH MICROSCOPIC REFLEX TO CULTURE   C. DIFFICILE TOXIN B PCR WITH REFLEX TO C. DIFFICILE EIA   ENTERIC BACTERIA AND VIRUS PANEL BY PCR       Imaging   No orders to display       EKG   ECG taken at ***, ECG read at ***  ***   *** as compared to prior, dated ***/***/***.  Rate *** bpm. TN interval *** ms. QRS duration *** ms. QT/QTc ***/*** ms. P-R-T axes *** *** ***.    Independent Interpretation   {IndependentReview:149161::\"None\"}    ED Course      Medications Administered   Medications   sodium chloride 0.9% BOLUS 1,000 mL (1,000 mLs Intravenous $New Bag 3/17/25 1145)   sodium chloride 0.9% BOLUS 1,000 mL (0 mLs Intravenous Stopped 3/17/25 1145)       Procedures   Procedures     Discussion of Management   {Consults/Care Discussions:971404::\"None\"}    ED Course        Additional Documentation  {EPPAAdditionalPhrase:894414::\"None\"}    Medical Decision Making / Diagnosis     CMS Diagnoses: {Sepsis/Septic Shock/Stemi/Stroke:813057::\"None\"}    MIPS       {EPPA MIPS:999233::\"None\"}    [facility]   Suma Calabrese is a 69 year old female ***    Disposition   {EPPAFV Dispo:086485}    Diagnosis   No diagnosis found.     Discharge Medications   New Prescriptions    No medications on file         {Provider or scribe signature:392180}    " hemodynamically stable.  She appears dehydrated with significant diarrhea that is actually improving.  Abdomen soft and benign with no tenderness no indication for imaging and she is in agreement.  Lab workup as noted as above.  She is acute kidney injury likely secondary to dehydration from her diarrhea.  She given IV fluids with improvement.  She is now up and ambulating.  She does still feel lightheaded and dizzy only when she sits up.  No dizziness at rest.  She has no neurologic changes on exam.  We discussed an MRI to rule out CVA, however, this seems less likely and she would like to forego it at this time which I think is reasonable.  She is eating and drinking and feels comfortable discharging home at this point now that she is been rehydrated with IV fluids and her diarrhea has essentially stopped and can hydrate further at home.  She go home with some meclizine to see that helps with some dizziness as well.  I welcomed her return if you change her mind and risk of supportive care at home and strict return precautions were discussed.  She was in no distress at time of discharge.    Disposition   The patient was discharged.     Diagnosis     ICD-10-CM    1. Diarrhea, unspecified type  R19.7       2. Dehydration  E86.0       3. MARY (acute kidney injury)  N17.9       4. Dizziness  R42            Discharge Medications   Discharge Medication List as of 3/17/2025  2:09 PM        START taking these medications    Details   meclizine (ANTIVERT) 25 MG tablet Take 1 tablet (25 mg) by mouth 3 times daily as needed for dizziness., Disp-20 tablet, R-0, Local Print               MD Michelle Snider Nicholas J, MD  03/21/25 1407

## 2025-03-18 ENCOUNTER — PATIENT OUTREACH (OUTPATIENT)
Dept: INTERNAL MEDICINE | Facility: CLINIC | Age: 70
End: 2025-03-18
Payer: MEDICARE

## 2025-03-18 ENCOUNTER — TELEPHONE (OUTPATIENT)
Dept: NURSING | Facility: CLINIC | Age: 70
End: 2025-03-18
Payer: MEDICARE

## 2025-03-18 NOTE — TELEPHONE ENCOUNTER
[facility]    Reason for call: Lab Result Notification     Lab Result (including Rx patient on, if applicable).  If culture, copy of lab report at bottom.  Lab Result: Enteric Bacteria and Virus Panel PCR   Component      Latest Ref Rng 3/17/2025  11:44 AM   Norovirus Gl/Gll      Negative  Positive !    Legend:  ! Abnormal    Recommendation based on Pt assessment and Norovirus Protocol    Creatinine Level (mg/dl)   Creatinine   Date Value Ref Range Status   03/17/2025 1.28 (H) 0.51 - 0.95 mg/dL Final   02/15/2021 0.81 0.52 - 1.04 mg/dL Final    Creatinine clearance (ml/min), if applicable    Serum creatinine: 1.28 mg/dL (H) 03/17/25 1018  Estimated creatinine clearance: 30.3 mL/min (A)     RN Recommendations/Instructions per [place] ED lab result protocol:   [facility] ED lab result protocol utilized: Enteric bacteria and virus's    3/17/25 Presented to ED with symptoms: 3 days diarrhea    Unable to reach patient/caregiver.   Left voicemail message requesting a call back to [phone] between 9 a.m. and 5:30 p.m. for patient's ED/UC lab results.  Letter pended to be sent via [employer].    Toshia Alfonso RN

## 2025-03-19 NOTE — TELEPHONE ENCOUNTER
Transitions of Care Outreach  Chief Complaint   Patient presents with    Hospital F/U       Most Recent Admission Date: 3/17/2025   Most Recent Admission Diagnosis:  abdominal pain    Most Recent Discharge Date: 3/17/2025   Most Recent Discharge Diagnosis: Diarrhea, unspecified type - R19.7  Dehydration - E86.0  MARY (acute kidney injury) - N17.9  Dizziness - R42     Transitions of Care Assessment    Discharge Assessment  How are you doing now that you are home?: I'm feeling better.  How are your symptoms? (Red Flag symptoms escalate to triage hotline per guidelines): Improved  Do you know how to contact your clinic care team if you have future questions or changes to your health status? : Yes  Does the patient have their discharge instructions? : Yes  Does the patient have questions regarding their discharge instructions? : No  Were you started on any new medications or were there changes to any of your previous medications? : Yes  Does the patient have all of their medications?: No (see comment) (didn't  meclizine)  Do you have questions regarding any of your medications? : No  Do you have all of your needed medical supplies or equipment (DME)?  (i.e. oxygen tank, CPAP, cane, etc.): Yes (will bring new BP monitor to clinic, pharmacy or fire station for fitting of BP cuff)    Follow up Plan     Discharge Follow-Up  Discharge follow up appointment scheduled in alignment with recommended follow up timeframe or Transitions of Risk Category? (Low = within 30 days; Moderate= within 14 days; High= within 7 days): No  Patient's follow up appointment not scheduled: Patient declined scheduling support. Education on the importance of transitions of care follow up. Provided scheduling phone number.    Future Appointments   Date Time Provider Department Center   10/17/2025  9:00 AM Osvaldo Borrero MD [facility]       Outpatient Plan as outlined on AVS reviewed with patient.    For any urgent concerns, please contact  our 24 hour nurse triage line: [phone] ([phone])       Lawanda Elliott RN

## 2025-05-27 ENCOUNTER — MEDICAL CORRESPONDENCE (OUTPATIENT)
Dept: HEALTH INFORMATION MANAGEMENT | Facility: CLINIC | Age: 70
End: 2025-05-27
Payer: MEDICARE

## 2025-07-02 ENCOUNTER — MEDICAL CORRESPONDENCE (OUTPATIENT)
Dept: HEALTH INFORMATION MANAGEMENT | Facility: CLINIC | Age: 70
End: 2025-07-02
Payer: MEDICARE

## (undated) DEVICE — SUCTION CANISTER MEDIVAC LINER 3000ML W/LID 65651-530

## (undated) DEVICE — SLEEVE PROTECTIVE BREAST BIOPSY  GMSLV001-10

## (undated) DEVICE — SU VICRYL 3-0 SH CR 8X18" J774

## (undated) DEVICE — GLOVE PROTEXIS MICRO 6.0  2D73PM60

## (undated) DEVICE — CATH FOLEY 12FR 5ML LATEX 0165SI12

## (undated) DEVICE — PAD CHUX UNDERPAD 23X24" 7136

## (undated) DEVICE — DRAPE IOBAN INCISE 23X17" 6650EZ

## (undated) DEVICE — PIN SAFTY INF 1.5" STERILE SS C18700-020

## (undated) DEVICE — SU PROLENE 2-0 SHDA 48" 8533H

## (undated) DEVICE — GLOVE GAMMEX DERMAPRENE ULTRA SZ 8 LF 8516

## (undated) DEVICE — PREP CHLORAPREP 26ML TINTED ORANGE  260815

## (undated) DEVICE — SU VICRYL 3-0 SH 27" J316H

## (undated) DEVICE — DRSG GAUZE 4X4" 3033

## (undated) DEVICE — STPL SKIN 35W APPOSE 8886803712

## (undated) DEVICE — DRSG TELFA ISLAND 4X8" 7541

## (undated) DEVICE — SU VICRYL 3-0 TIE 12X18" J904T

## (undated) DEVICE — SU VICRYL 0 TIE 12X18" J906G

## (undated) DEVICE — DRAIN JACKSON PRATT RESERVOIR 100ML SU130-1305

## (undated) DEVICE — GLOVE PROTEXIS W/NEU-THERA 6.5  2D73TE65

## (undated) DEVICE — BNDG ELASTIC 6" DBL LENGTH UNSTERILE 6611-16

## (undated) DEVICE — DRSG TELFA 3X8" 1238

## (undated) DEVICE — LINEN TOWEL PACK X5 5464

## (undated) DEVICE — GLOVE PROTEGRITY MICRO 8 LATEX

## (undated) DEVICE — DRSG TELFA ISLAND 4X10"

## (undated) DEVICE — ESU ELEC BLADE 6" COATED E1450-6

## (undated) DEVICE — TUBING SUCTION 12"X1/4" N612

## (undated) DEVICE — SU PDS II 0 CTX 60" Z990G

## (undated) DEVICE — SYR 10ML FINGER CONTROL W/O NDL 309695

## (undated) DEVICE — GLOVE PROTEXIS MICRO 8.0  2D73PM80

## (undated) DEVICE — STPL POWERED ECHELON 60MM PSEE60A

## (undated) DEVICE — ESU LIGASURE IMPACT OPEN SEALER/DVDR CVD LG JAW LF4418

## (undated) DEVICE — PACK MAJOR SBA15MAFSI

## (undated) DEVICE — STPL RELOAD REG TISSUE ECHELON 60 X 3.6MM BLUE GST60B

## (undated) DEVICE — DRSG STERI STRIP 1/2X4" R1547

## (undated) DEVICE — SU ETHILON 3-0 FS-1 18" 663G

## (undated) DEVICE — SU MONOCRYL 4-0 PS-2 18" UND Y496G

## (undated) DEVICE — DRAPE BREAST/CHEST 29420

## (undated) DEVICE — DRAPE SHEET REV FOLD 3/4 9349

## (undated) DEVICE — ESU ELEC BLADE 2.75" COATED/INSULATED E1455

## (undated) DEVICE — BLADE KNIFE SURG 10 371110

## (undated) DEVICE — SU VICRYL 2-0 TIE 12X18" J905T

## (undated) DEVICE — DRAIN JACKSON PRATT 15FR ROUND SU130-1323

## (undated) DEVICE — GLOVE PROTEXIS W/NEU-THERA 7.5  2D73TE75

## (undated) DEVICE — SPONGE LAP 18X18" X8435

## (undated) DEVICE — DRSG TELFA ISLAND 4X14" 7544

## (undated) DEVICE — STPL SKIN SUBCUTICULAR INSORB  2030

## (undated) DEVICE — BLADE KNIFE SURG 15 371115

## (undated) DEVICE — ESU GROUND PAD UNIVERSAL W/O CORD

## (undated) DEVICE — Device

## (undated) DEVICE — DRSG DRAIN 4X4" 7086

## (undated) RX ORDER — CEFAZOLIN SODIUM 1 G/3ML
INJECTION, POWDER, FOR SOLUTION INTRAMUSCULAR; INTRAVENOUS
Status: DISPENSED
Start: 2017-10-16

## (undated) RX ORDER — ONDANSETRON 2 MG/ML
INJECTION INTRAMUSCULAR; INTRAVENOUS
Status: DISPENSED
Start: 2017-11-13

## (undated) RX ORDER — FENTANYL CITRATE 50 UG/ML
INJECTION, SOLUTION INTRAMUSCULAR; INTRAVENOUS
Status: DISPENSED
Start: 2017-11-13

## (undated) RX ORDER — VECURONIUM BROMIDE 1 MG/ML
INJECTION, POWDER, LYOPHILIZED, FOR SOLUTION INTRAVENOUS
Status: DISPENSED
Start: 2017-11-13

## (undated) RX ORDER — GLYCOPYRROLATE 0.2 MG/ML
INJECTION, SOLUTION INTRAMUSCULAR; INTRAVENOUS
Status: DISPENSED
Start: 2017-11-13

## (undated) RX ORDER — PROPOFOL 10 MG/ML
INJECTION, EMULSION INTRAVENOUS
Status: DISPENSED
Start: 2017-10-16

## (undated) RX ORDER — KETOROLAC TROMETHAMINE 15 MG/ML
INJECTION, SOLUTION INTRAMUSCULAR; INTRAVENOUS
Status: DISPENSED
Start: 2017-10-16

## (undated) RX ORDER — NALOXONE HYDROCHLORIDE 0.4 MG/ML
INJECTION, SOLUTION INTRAMUSCULAR; INTRAVENOUS; SUBCUTANEOUS
Status: DISPENSED
Start: 2017-11-13

## (undated) RX ORDER — PROPOFOL 10 MG/ML
INJECTION, EMULSION INTRAVENOUS
Status: DISPENSED
Start: 2017-11-13

## (undated) RX ORDER — LIDOCAINE HYDROCHLORIDE 20 MG/ML
INJECTION, SOLUTION EPIDURAL; INFILTRATION; INTRACAUDAL; PERINEURAL
Status: DISPENSED
Start: 2017-11-13

## (undated) RX ORDER — HYDROMORPHONE HYDROCHLORIDE 1 MG/ML
INJECTION, SOLUTION INTRAMUSCULAR; INTRAVENOUS; SUBCUTANEOUS
Status: DISPENSED
Start: 2017-11-13

## (undated) RX ORDER — ONDANSETRON 2 MG/ML
INJECTION INTRAMUSCULAR; INTRAVENOUS
Status: DISPENSED
Start: 2017-10-16

## (undated) RX ORDER — FENTANYL CITRATE 50 UG/ML
INJECTION, SOLUTION INTRAMUSCULAR; INTRAVENOUS
Status: DISPENSED
Start: 2017-10-16

## (undated) RX ORDER — CEFAZOLIN SODIUM 2 G/100ML
INJECTION, SOLUTION INTRAVENOUS
Status: DISPENSED
Start: 2017-10-16

## (undated) RX ORDER — SCOLOPAMINE TRANSDERMAL SYSTEM 1 MG/1
PATCH, EXTENDED RELEASE TRANSDERMAL
Status: DISPENSED
Start: 2017-11-13

## (undated) RX ORDER — LIDOCAINE HYDROCHLORIDE 20 MG/ML
INJECTION, SOLUTION EPIDURAL; INFILTRATION; INTRACAUDAL; PERINEURAL
Status: DISPENSED
Start: 2017-10-16

## (undated) RX ORDER — DIPHENHYDRAMINE HYDROCHLORIDE 50 MG/ML
INJECTION INTRAMUSCULAR; INTRAVENOUS
Status: DISPENSED
Start: 2017-11-13

## (undated) RX ORDER — ALBUMIN, HUMAN INJ 5% 5 %
SOLUTION INTRAVENOUS
Status: DISPENSED
Start: 2017-11-13

## (undated) RX ORDER — DEXAMETHASONE SODIUM PHOSPHATE 4 MG/ML
INJECTION, SOLUTION INTRA-ARTICULAR; INTRALESIONAL; INTRAMUSCULAR; INTRAVENOUS; SOFT TISSUE
Status: DISPENSED
Start: 2017-11-13

## (undated) RX ORDER — DEXAMETHASONE SODIUM PHOSPHATE 4 MG/ML
INJECTION, SOLUTION INTRA-ARTICULAR; INTRALESIONAL; INTRAMUSCULAR; INTRAVENOUS; SOFT TISSUE
Status: DISPENSED
Start: 2017-10-16